# Patient Record
Sex: MALE | HISPANIC OR LATINO | Employment: UNEMPLOYED | URBAN - METROPOLITAN AREA
[De-identification: names, ages, dates, MRNs, and addresses within clinical notes are randomized per-mention and may not be internally consistent; named-entity substitution may affect disease eponyms.]

---

## 2017-01-01 ENCOUNTER — APPOINTMENT (OUTPATIENT)
Dept: RADIOLOGY | Facility: MEDICAL CENTER | Age: 52
DRG: 871 | End: 2017-01-01
Attending: INTERNAL MEDICINE
Payer: MEDICAID

## 2017-01-01 ENCOUNTER — RESOLUTE PROFESSIONAL BILLING HOSPITAL PROF FEE (OUTPATIENT)
Dept: HOSPITALIST | Facility: MEDICAL CENTER | Age: 52
End: 2017-01-01
Payer: MEDICAID

## 2017-01-01 ENCOUNTER — OFFICE VISIT (OUTPATIENT)
Dept: CARDIOLOGY | Facility: MEDICAL CENTER | Age: 52
End: 2017-01-01
Payer: MEDICAID

## 2017-01-01 ENCOUNTER — HOSPITAL ENCOUNTER (INPATIENT)
Facility: MEDICAL CENTER | Age: 52
LOS: 24 days | DRG: 871 | End: 2017-04-06
Attending: EMERGENCY MEDICINE | Admitting: HOSPITALIST
Payer: MEDICAID

## 2017-01-01 ENCOUNTER — RESOLUTE PROFESSIONAL BILLING HOSPITAL PROF FEE (OUTPATIENT)
Dept: OTHER | Facility: MEDICAL CENTER | Age: 52
End: 2017-01-01
Payer: MEDICAID

## 2017-01-01 ENCOUNTER — APPOINTMENT (OUTPATIENT)
Dept: RADIOLOGY | Facility: MEDICAL CENTER | Age: 52
DRG: 280 | End: 2017-01-01
Attending: INTERNAL MEDICINE
Payer: MEDICAID

## 2017-01-01 ENCOUNTER — APPOINTMENT (OUTPATIENT)
Dept: RADIOLOGY | Facility: MEDICAL CENTER | Age: 52
DRG: 280 | End: 2017-01-01
Attending: STUDENT IN AN ORGANIZED HEALTH CARE EDUCATION/TRAINING PROGRAM
Payer: MEDICAID

## 2017-01-01 ENCOUNTER — APPOINTMENT (OUTPATIENT)
Dept: RADIOLOGY | Facility: MEDICAL CENTER | Age: 52
DRG: 871 | End: 2017-01-01
Attending: HOSPITALIST
Payer: MEDICAID

## 2017-01-01 ENCOUNTER — TELEPHONE (OUTPATIENT)
Dept: VASCULAR LAB | Facility: MEDICAL CENTER | Age: 52
End: 2017-01-01

## 2017-01-01 ENCOUNTER — APPOINTMENT (OUTPATIENT)
Dept: RADIOLOGY | Facility: MEDICAL CENTER | Age: 52
DRG: 871 | End: 2017-01-01
Attending: EMERGENCY MEDICINE
Payer: MEDICAID

## 2017-01-01 ENCOUNTER — PATIENT OUTREACH (OUTPATIENT)
Dept: HEALTH INFORMATION MANAGEMENT | Facility: OTHER | Age: 52
End: 2017-01-01

## 2017-01-01 ENCOUNTER — HOSPITAL ENCOUNTER (INPATIENT)
Facility: MEDICAL CENTER | Age: 52
LOS: 14 days | DRG: 280 | End: 2017-02-10
Attending: EMERGENCY MEDICINE | Admitting: HOSPITALIST
Payer: MEDICAID

## 2017-01-01 ENCOUNTER — APPOINTMENT (OUTPATIENT)
Dept: RADIOLOGY | Facility: MEDICAL CENTER | Age: 52
DRG: 280 | End: 2017-01-01
Attending: EMERGENCY MEDICINE
Payer: MEDICAID

## 2017-01-01 VITALS
RESPIRATION RATE: 19 BRPM | TEMPERATURE: 97.2 F | DIASTOLIC BLOOD PRESSURE: 72 MMHG | SYSTOLIC BLOOD PRESSURE: 96 MMHG | BODY MASS INDEX: 38.5 KG/M2 | WEIGHT: 225.53 LBS | HEART RATE: 93 BPM | OXYGEN SATURATION: 100 % | HEIGHT: 64 IN

## 2017-01-01 VITALS
HEIGHT: 66 IN | HEART RATE: 58 BPM | TEMPERATURE: 96.8 F | RESPIRATION RATE: 4 BRPM | BODY MASS INDEX: 41.38 KG/M2 | SYSTOLIC BLOOD PRESSURE: 108 MMHG | WEIGHT: 257.5 LBS | OXYGEN SATURATION: 66 % | DIASTOLIC BLOOD PRESSURE: 76 MMHG

## 2017-01-01 VITALS
HEIGHT: 66 IN | BODY MASS INDEX: 32.14 KG/M2 | SYSTOLIC BLOOD PRESSURE: 90 MMHG | WEIGHT: 200 LBS | OXYGEN SATURATION: 94 % | HEART RATE: 98 BPM | DIASTOLIC BLOOD PRESSURE: 60 MMHG

## 2017-01-01 DIAGNOSIS — N19 RENAL FAILURE: ICD-10-CM

## 2017-01-01 DIAGNOSIS — R06.03 RESPIRATORY DISTRESS: ICD-10-CM

## 2017-01-01 DIAGNOSIS — I51.3 LV (LEFT VENTRICULAR) MURAL THROMBUS: ICD-10-CM

## 2017-01-01 DIAGNOSIS — I50.9 ACUTE CONGESTIVE HEART FAILURE, UNSPECIFIED CONGESTIVE HEART FAILURE TYPE: ICD-10-CM

## 2017-01-01 DIAGNOSIS — I50.9 ACUTE ON CHRONIC CONGESTIVE HEART FAILURE, UNSPECIFIED CONGESTIVE HEART FAILURE TYPE: ICD-10-CM

## 2017-01-01 DIAGNOSIS — R60.1 ANASARCA: ICD-10-CM

## 2017-01-01 DIAGNOSIS — E87.1 HYPONATREMIA: ICD-10-CM

## 2017-01-01 DIAGNOSIS — R18.8 OTHER ASCITES: ICD-10-CM

## 2017-01-01 DIAGNOSIS — I50.20 ACC/AHA STAGE D SYSTOLIC HEART FAILURE (HCC): ICD-10-CM

## 2017-01-01 DIAGNOSIS — A41.9 SEPSIS, DUE TO UNSPECIFIED ORGANISM: ICD-10-CM

## 2017-01-01 LAB
ALBUMIN SERPL BCP-MCNC: 2.4 G/DL (ref 3.2–4.9)
ALBUMIN SERPL BCP-MCNC: 2.5 G/DL (ref 3.2–4.9)
ALBUMIN SERPL BCP-MCNC: 2.5 G/DL (ref 3.2–4.9)
ALBUMIN SERPL BCP-MCNC: 2.6 G/DL (ref 3.2–4.9)
ALBUMIN SERPL BCP-MCNC: 2.7 G/DL (ref 3.2–4.9)
ALBUMIN SERPL BCP-MCNC: 2.7 G/DL (ref 3.2–4.9)
ALBUMIN SERPL BCP-MCNC: 2.8 G/DL (ref 3.2–4.9)
ALBUMIN SERPL BCP-MCNC: 2.9 G/DL (ref 3.2–4.9)
ALBUMIN SERPL BCP-MCNC: 2.9 G/DL (ref 3.2–4.9)
ALBUMIN SERPL BCP-MCNC: 3 G/DL (ref 3.2–4.9)
ALBUMIN SERPL BCP-MCNC: 3 G/DL (ref 3.2–4.9)
ALBUMIN SERPL BCP-MCNC: 3.2 G/DL (ref 3.2–4.9)
ALBUMIN SERPL BCP-MCNC: 3.2 G/DL (ref 3.2–4.9)
ALBUMIN SERPL BCP-MCNC: 3.4 G/DL (ref 3.2–4.9)
ALBUMIN SERPL BCP-MCNC: 3.5 G/DL (ref 3.2–4.9)
ALBUMIN SERPL BCP-MCNC: 3.6 G/DL (ref 3.2–4.9)
ALBUMIN SERPL BCP-MCNC: 3.7 G/DL (ref 3.2–4.9)
ALBUMIN SERPL BCP-MCNC: 3.9 G/DL (ref 3.2–4.9)
ALBUMIN SERPL BCP-MCNC: 3.9 G/DL (ref 3.2–4.9)
ALBUMIN SERPL BCP-MCNC: 4 G/DL (ref 3.2–4.9)
ALBUMIN SERPL BCP-MCNC: 4 G/DL (ref 3.2–4.9)
ALBUMIN/GLOB SERPL: 0.6 G/DL
ALBUMIN/GLOB SERPL: 0.7 G/DL
ALBUMIN/GLOB SERPL: 0.8 G/DL
ALBUMIN/GLOB SERPL: 0.9 G/DL
ALBUMIN/GLOB SERPL: 1 G/DL
ALP SERPL-CCNC: 35 U/L (ref 30–99)
ALP SERPL-CCNC: 36 U/L (ref 30–99)
ALP SERPL-CCNC: 37 U/L (ref 30–99)
ALP SERPL-CCNC: 38 U/L (ref 30–99)
ALP SERPL-CCNC: 39 U/L (ref 30–99)
ALP SERPL-CCNC: 39 U/L (ref 30–99)
ALP SERPL-CCNC: 40 U/L (ref 30–99)
ALP SERPL-CCNC: 40 U/L (ref 30–99)
ALP SERPL-CCNC: 41 U/L (ref 30–99)
ALP SERPL-CCNC: 41 U/L (ref 30–99)
ALP SERPL-CCNC: 42 U/L (ref 30–99)
ALP SERPL-CCNC: 43 U/L (ref 30–99)
ALP SERPL-CCNC: 43 U/L (ref 30–99)
ALP SERPL-CCNC: 44 U/L (ref 30–99)
ALP SERPL-CCNC: 46 U/L (ref 30–99)
ALP SERPL-CCNC: 50 U/L (ref 30–99)
ALP SERPL-CCNC: 51 U/L (ref 30–99)
ALP SERPL-CCNC: 52 U/L (ref 30–99)
ALP SERPL-CCNC: 55 U/L (ref 30–99)
ALP SERPL-CCNC: 55 U/L (ref 30–99)
ALP SERPL-CCNC: 56 U/L (ref 30–99)
ALP SERPL-CCNC: 64 U/L (ref 30–99)
ALP SERPL-CCNC: 69 U/L (ref 30–99)
ALP SERPL-CCNC: 69 U/L (ref 30–99)
ALP SERPL-CCNC: 72 U/L (ref 30–99)
ALT SERPL-CCNC: 101 U/L (ref 2–50)
ALT SERPL-CCNC: 105 U/L (ref 2–50)
ALT SERPL-CCNC: 114 U/L (ref 2–50)
ALT SERPL-CCNC: 122 U/L (ref 2–50)
ALT SERPL-CCNC: 21 U/L (ref 2–50)
ALT SERPL-CCNC: 21 U/L (ref 2–50)
ALT SERPL-CCNC: 22 U/L (ref 2–50)
ALT SERPL-CCNC: 23 U/L (ref 2–50)
ALT SERPL-CCNC: 23 U/L (ref 2–50)
ALT SERPL-CCNC: 24 U/L (ref 2–50)
ALT SERPL-CCNC: 25 U/L (ref 2–50)
ALT SERPL-CCNC: 25 U/L (ref 2–50)
ALT SERPL-CCNC: 26 U/L (ref 2–50)
ALT SERPL-CCNC: 27 U/L (ref 2–50)
ALT SERPL-CCNC: 27 U/L (ref 2–50)
ALT SERPL-CCNC: 29 U/L (ref 2–50)
ALT SERPL-CCNC: 31 U/L (ref 2–50)
ALT SERPL-CCNC: 35 U/L (ref 2–50)
ALT SERPL-CCNC: 38 U/L (ref 2–50)
ALT SERPL-CCNC: 41 U/L (ref 2–50)
ALT SERPL-CCNC: 52 U/L (ref 2–50)
ALT SERPL-CCNC: 53 U/L (ref 2–50)
ALT SERPL-CCNC: 58 U/L (ref 2–50)
ALT SERPL-CCNC: 67 U/L (ref 2–50)
ALT SERPL-CCNC: 71 U/L (ref 2–50)
ALT SERPL-CCNC: 81 U/L (ref 2–50)
ALT SERPL-CCNC: 84 U/L (ref 2–50)
AMMONIA PLAS-SCNC: 31 UMOL/L (ref 11–45)
AMMONIA PLAS-SCNC: 32 UMOL/L (ref 11–45)
AMMONIA PLAS-SCNC: 35 UMOL/L (ref 11–45)
AMMONIA PLAS-SCNC: 37 UMOL/L (ref 11–45)
AMMONIA PLAS-SCNC: 41 UMOL/L (ref 11–45)
AMMONIA PLAS-SCNC: 41 UMOL/L (ref 11–45)
AMMONIA PLAS-SCNC: 47 UMOL/L (ref 11–45)
AMMONIA PLAS-SCNC: 48 UMOL/L (ref 11–45)
AMMONIA PLAS-SCNC: 49 UMOL/L (ref 11–45)
AMMONIA PLAS-SCNC: 52 UMOL/L (ref 11–45)
AMMONIA PLAS-SCNC: 54 UMOL/L (ref 11–45)
AMMONIA PLAS-SCNC: 55 UMOL/L (ref 11–45)
AMMONIA PLAS-SCNC: 57 UMOL/L (ref 11–45)
AMMONIA PLAS-SCNC: 57 UMOL/L (ref 11–45)
AMMONIA PLAS-SCNC: 59 UMOL/L (ref 11–45)
AMMONIA PLAS-SCNC: 64 UMOL/L (ref 11–45)
AMMONIA PLAS-SCNC: 69 UMOL/L (ref 11–45)
AMMONIA PLAS-SCNC: 86 UMOL/L (ref 11–45)
AMMONIA PLAS-SCNC: 92 UMOL/L (ref 11–45)
AMORPH CRY #/AREA URNS HPF: PRESENT /HPF
AMPHET UR QL SCN: NEGATIVE
ANION GAP SERPL CALC-SCNC: 10 MMOL/L (ref 0–11.9)
ANION GAP SERPL CALC-SCNC: 11 MMOL/L (ref 0–11.9)
ANION GAP SERPL CALC-SCNC: 12 MMOL/L (ref 0–11.9)
ANION GAP SERPL CALC-SCNC: 13 MMOL/L (ref 0–11.9)
ANION GAP SERPL CALC-SCNC: 14 MMOL/L (ref 0–11.9)
ANION GAP SERPL CALC-SCNC: 15 MMOL/L (ref 0–11.9)
ANION GAP SERPL CALC-SCNC: 16 MMOL/L (ref 0–11.9)
ANION GAP SERPL CALC-SCNC: 16 MMOL/L (ref 0–11.9)
ANION GAP SERPL CALC-SCNC: 6 MMOL/L (ref 0–11.9)
ANION GAP SERPL CALC-SCNC: 6 MMOL/L (ref 0–11.9)
ANION GAP SERPL CALC-SCNC: 7 MMOL/L (ref 0–11.9)
ANION GAP SERPL CALC-SCNC: 8 MMOL/L (ref 0–11.9)
ANION GAP SERPL CALC-SCNC: 9 MMOL/L (ref 0–11.9)
ANISOCYTOSIS BLD QL SMEAR: ABNORMAL
APPEARANCE UR: ABNORMAL
APPEARANCE UR: CLEAR
APPEARANCE UR: CLEAR
APTT PPP: 115.7 SEC (ref 24.7–36)
APTT PPP: 117.6 SEC (ref 24.7–36)
APTT PPP: 121.6 SEC (ref 24.7–36)
APTT PPP: 135.6 SEC (ref 24.7–36)
APTT PPP: 182.4 SEC (ref 24.7–36)
APTT PPP: 29.9 SEC (ref 24.7–36)
APTT PPP: 35.7 SEC (ref 24.7–36)
APTT PPP: 39.4 SEC (ref 24.7–36)
APTT PPP: 41.2 SEC (ref 24.7–36)
APTT PPP: 44.2 SEC (ref 24.7–36)
APTT PPP: 45 SEC (ref 24.7–36)
APTT PPP: 50 SEC (ref 24.7–36)
APTT PPP: 50.1 SEC (ref 24.7–36)
APTT PPP: 50.1 SEC (ref 24.7–36)
APTT PPP: 50.5 SEC (ref 24.7–36)
APTT PPP: 51.8 SEC (ref 24.7–36)
APTT PPP: 53.3 SEC (ref 24.7–36)
APTT PPP: 55.8 SEC (ref 24.7–36)
APTT PPP: 56.5 SEC (ref 24.7–36)
APTT PPP: 59.6 SEC (ref 24.7–36)
APTT PPP: 60.2 SEC (ref 24.7–36)
APTT PPP: 62.3 SEC (ref 24.7–36)
APTT PPP: 62.7 SEC (ref 24.7–36)
APTT PPP: 63.9 SEC (ref 24.7–36)
APTT PPP: 80.3 SEC (ref 24.7–36)
APTT PPP: 84.8 SEC (ref 24.7–36)
APTT PPP: 85.3 SEC (ref 24.7–36)
APTT PPP: 86 SEC (ref 24.7–36)
APTT PPP: 86.9 SEC (ref 24.7–36)
APTT PPP: 88.1 SEC (ref 24.7–36)
APTT PPP: 88.2 SEC (ref 24.7–36)
APTT PPP: 89.9 SEC (ref 24.7–36)
APTT PPP: 90.4 SEC (ref 24.7–36)
APTT PPP: 94.4 SEC (ref 24.7–36)
APTT PPP: 98.6 SEC (ref 24.7–36)
APTT PPP: 98.7 SEC (ref 24.7–36)
APTT PPP: 98.7 SEC (ref 24.7–36)
APTT PPP: 98.9 SEC (ref 24.7–36)
AST SERPL-CCNC: 108 U/L (ref 12–45)
AST SERPL-CCNC: 111 U/L (ref 12–45)
AST SERPL-CCNC: 121 U/L (ref 12–45)
AST SERPL-CCNC: 121 U/L (ref 12–45)
AST SERPL-CCNC: 130 U/L (ref 12–45)
AST SERPL-CCNC: 41 U/L (ref 12–45)
AST SERPL-CCNC: 42 U/L (ref 12–45)
AST SERPL-CCNC: 42 U/L (ref 12–45)
AST SERPL-CCNC: 44 U/L (ref 12–45)
AST SERPL-CCNC: 47 U/L (ref 12–45)
AST SERPL-CCNC: 47 U/L (ref 12–45)
AST SERPL-CCNC: 48 U/L (ref 12–45)
AST SERPL-CCNC: 50 U/L (ref 12–45)
AST SERPL-CCNC: 52 U/L (ref 12–45)
AST SERPL-CCNC: 52 U/L (ref 12–45)
AST SERPL-CCNC: 53 U/L (ref 12–45)
AST SERPL-CCNC: 53 U/L (ref 12–45)
AST SERPL-CCNC: 54 U/L (ref 12–45)
AST SERPL-CCNC: 55 U/L (ref 12–45)
AST SERPL-CCNC: 55 U/L (ref 12–45)
AST SERPL-CCNC: 56 U/L (ref 12–45)
AST SERPL-CCNC: 60 U/L (ref 12–45)
AST SERPL-CCNC: 66 U/L (ref 12–45)
AST SERPL-CCNC: 66 U/L (ref 12–45)
AST SERPL-CCNC: 74 U/L (ref 12–45)
AST SERPL-CCNC: 77 U/L (ref 12–45)
BACTERIA #/AREA URNS HPF: ABNORMAL /HPF
BACTERIA BLD CULT: NORMAL
BACTERIA UR CULT: NORMAL
BACTERIA WND AEROBE CULT: NORMAL
BARBITURATES UR QL SCN: NEGATIVE
BASE EXCESS BLDA CALC-SCNC: -1 MMOL/L (ref -4–3)
BASE EXCESS BLDA CALC-SCNC: -6 MMOL/L (ref -4–3)
BASE EXCESS BLDA CALC-SCNC: -7 MMOL/L (ref -4–3)
BASOPHILS # BLD AUTO: 0 % (ref 0–1.8)
BASOPHILS # BLD AUTO: 0.1 % (ref 0–1.8)
BASOPHILS # BLD AUTO: 0.3 % (ref 0–1.8)
BASOPHILS # BLD AUTO: 0.3 % (ref 0–1.8)
BASOPHILS # BLD AUTO: 0.4 % (ref 0–1.8)
BASOPHILS # BLD AUTO: 0.4 % (ref 0–1.8)
BASOPHILS # BLD AUTO: 0.5 % (ref 0–1.8)
BASOPHILS # BLD AUTO: 0.6 % (ref 0–1.8)
BASOPHILS # BLD AUTO: 0.8 % (ref 0–1.8)
BASOPHILS # BLD AUTO: 0.9 % (ref 0–1.8)
BASOPHILS # BLD AUTO: 1 % (ref 0–1.8)
BASOPHILS # BLD AUTO: 1 % (ref 0–1.8)
BASOPHILS # BLD AUTO: 1.1 % (ref 0–1.8)
BASOPHILS # BLD AUTO: 1.5 % (ref 0–1.8)
BASOPHILS # BLD AUTO: 1.7 % (ref 0–1.8)
BASOPHILS # BLD AUTO: 1.7 % (ref 0–1.8)
BASOPHILS # BLD AUTO: 2.6 % (ref 0–1.8)
BASOPHILS # BLD AUTO: 2.7 % (ref 0–1.8)
BASOPHILS # BLD AUTO: 3.5 % (ref 0–1.8)
BASOPHILS # BLD: 0 K/UL (ref 0–0.12)
BASOPHILS # BLD: 0.01 K/UL (ref 0–0.12)
BASOPHILS # BLD: 0.05 K/UL (ref 0–0.12)
BASOPHILS # BLD: 0.05 K/UL (ref 0–0.12)
BASOPHILS # BLD: 0.06 K/UL (ref 0–0.12)
BASOPHILS # BLD: 0.07 K/UL (ref 0–0.12)
BASOPHILS # BLD: 0.08 K/UL (ref 0–0.12)
BASOPHILS # BLD: 0.09 K/UL (ref 0–0.12)
BASOPHILS # BLD: 0.11 K/UL (ref 0–0.12)
BASOPHILS # BLD: 0.12 K/UL (ref 0–0.12)
BASOPHILS # BLD: 0.13 K/UL (ref 0–0.12)
BASOPHILS # BLD: 0.14 K/UL (ref 0–0.12)
BASOPHILS # BLD: 0.15 K/UL (ref 0–0.12)
BASOPHILS # BLD: 0.16 K/UL (ref 0–0.12)
BASOPHILS # BLD: 0.16 K/UL (ref 0–0.12)
BASOPHILS # BLD: 0.19 K/UL (ref 0–0.12)
BASOPHILS # BLD: 0.21 K/UL (ref 0–0.12)
BASOPHILS # BLD: 0.21 K/UL (ref 0–0.12)
BASOPHILS # BLD: 0.29 K/UL (ref 0–0.12)
BASOPHILS # BLD: 0.32 K/UL (ref 0–0.12)
BASOPHILS # BLD: 0.4 K/UL (ref 0–0.12)
BASOPHILS # BLD: 0.47 K/UL (ref 0–0.12)
BENZODIAZ UR QL SCN: NEGATIVE
BILIRUB CONJ SERPL-MCNC: 1.2 MG/DL (ref 0.1–0.5)
BILIRUB INDIRECT SERPL-MCNC: 1 MG/DL (ref 0–1)
BILIRUB SERPL-MCNC: 10.1 MG/DL (ref 0.1–1.5)
BILIRUB SERPL-MCNC: 10.3 MG/DL (ref 0.1–1.5)
BILIRUB SERPL-MCNC: 2 MG/DL (ref 0.1–1.5)
BILIRUB SERPL-MCNC: 2.2 MG/DL (ref 0.1–1.5)
BILIRUB SERPL-MCNC: 2.2 MG/DL (ref 0.1–1.5)
BILIRUB SERPL-MCNC: 2.5 MG/DL (ref 0.1–1.5)
BILIRUB SERPL-MCNC: 2.8 MG/DL (ref 0.1–1.5)
BILIRUB SERPL-MCNC: 3.1 MG/DL (ref 0.1–1.5)
BILIRUB SERPL-MCNC: 3.5 MG/DL (ref 0.1–1.5)
BILIRUB SERPL-MCNC: 3.8 MG/DL (ref 0.1–1.5)
BILIRUB SERPL-MCNC: 4.3 MG/DL (ref 0.1–1.5)
BILIRUB SERPL-MCNC: 4.4 MG/DL (ref 0.1–1.5)
BILIRUB SERPL-MCNC: 4.7 MG/DL (ref 0.1–1.5)
BILIRUB SERPL-MCNC: 4.8 MG/DL (ref 0.1–1.5)
BILIRUB SERPL-MCNC: 4.8 MG/DL (ref 0.1–1.5)
BILIRUB SERPL-MCNC: 4.9 MG/DL (ref 0.1–1.5)
BILIRUB SERPL-MCNC: 5 MG/DL (ref 0.1–1.5)
BILIRUB SERPL-MCNC: 5.3 MG/DL (ref 0.1–1.5)
BILIRUB SERPL-MCNC: 5.3 MG/DL (ref 0.1–1.5)
BILIRUB SERPL-MCNC: 5.5 MG/DL (ref 0.1–1.5)
BILIRUB SERPL-MCNC: 5.6 MG/DL (ref 0.1–1.5)
BILIRUB SERPL-MCNC: 5.7 MG/DL (ref 0.1–1.5)
BILIRUB SERPL-MCNC: 6.8 MG/DL (ref 0.1–1.5)
BILIRUB SERPL-MCNC: 7.2 MG/DL (ref 0.1–1.5)
BILIRUB SERPL-MCNC: 7.2 MG/DL (ref 0.1–1.5)
BILIRUB SERPL-MCNC: 7.5 MG/DL (ref 0.1–1.5)
BILIRUB SERPL-MCNC: 7.9 MG/DL (ref 0.1–1.5)
BILIRUB SERPL-MCNC: 8.2 MG/DL (ref 0.1–1.5)
BILIRUB SERPL-MCNC: 9.3 MG/DL (ref 0.1–1.5)
BILIRUB SERPL-MCNC: 9.7 MG/DL (ref 0.1–1.5)
BILIRUB UR QL CFM: POSITIVE
BILIRUB UR QL CFM: POSITIVE
BILIRUB UR QL STRIP.AUTO: NEGATIVE
BNP SERPL-MCNC: 1025 PG/ML (ref 0–100)
BNP SERPL-MCNC: 1317 PG/ML (ref 0–100)
BNP SERPL-MCNC: 1389 PG/ML (ref 0–100)
BNP SERPL-MCNC: 1538 PG/ML (ref 0–100)
BNP SERPL-MCNC: 2331 PG/ML (ref 0–100)
BNP SERPL-MCNC: 3495 PG/ML (ref 0–100)
BNP SERPL-MCNC: 870 PG/ML (ref 0–100)
BODY TEMPERATURE: ABNORMAL DEGREES
BUN SERPL-MCNC: 111 MG/DL (ref 8–22)
BUN SERPL-MCNC: 119 MG/DL (ref 8–22)
BUN SERPL-MCNC: 129 MG/DL (ref 8–22)
BUN SERPL-MCNC: 143 MG/DL (ref 8–22)
BUN SERPL-MCNC: 143 MG/DL (ref 8–22)
BUN SERPL-MCNC: 144 MG/DL (ref 8–22)
BUN SERPL-MCNC: 155 MG/DL (ref 8–22)
BUN SERPL-MCNC: 25 MG/DL (ref 8–22)
BUN SERPL-MCNC: 26 MG/DL (ref 8–22)
BUN SERPL-MCNC: 26 MG/DL (ref 8–22)
BUN SERPL-MCNC: 27 MG/DL (ref 8–22)
BUN SERPL-MCNC: 28 MG/DL (ref 8–22)
BUN SERPL-MCNC: 29 MG/DL (ref 8–22)
BUN SERPL-MCNC: 29 MG/DL (ref 8–22)
BUN SERPL-MCNC: 30 MG/DL (ref 8–22)
BUN SERPL-MCNC: 32 MG/DL (ref 8–22)
BUN SERPL-MCNC: 33 MG/DL (ref 8–22)
BUN SERPL-MCNC: 33 MG/DL (ref 8–22)
BUN SERPL-MCNC: 34 MG/DL (ref 8–22)
BUN SERPL-MCNC: 34 MG/DL (ref 8–22)
BUN SERPL-MCNC: 36 MG/DL (ref 8–22)
BUN SERPL-MCNC: 36 MG/DL (ref 8–22)
BUN SERPL-MCNC: 37 MG/DL (ref 8–22)
BUN SERPL-MCNC: 39 MG/DL (ref 8–22)
BUN SERPL-MCNC: 45 MG/DL (ref 8–22)
BUN SERPL-MCNC: 45 MG/DL (ref 8–22)
BUN SERPL-MCNC: 46 MG/DL (ref 8–22)
BUN SERPL-MCNC: 46 MG/DL (ref 8–22)
BUN SERPL-MCNC: 47 MG/DL (ref 8–22)
BUN SERPL-MCNC: 48 MG/DL (ref 8–22)
BUN SERPL-MCNC: 49 MG/DL (ref 8–22)
BUN SERPL-MCNC: 49 MG/DL (ref 8–22)
BUN SERPL-MCNC: 50 MG/DL (ref 8–22)
BUN SERPL-MCNC: 52 MG/DL (ref 8–22)
BUN SERPL-MCNC: 53 MG/DL (ref 8–22)
BUN SERPL-MCNC: 60 MG/DL (ref 8–22)
BUN SERPL-MCNC: 74 MG/DL (ref 8–22)
BUN SERPL-MCNC: 91 MG/DL (ref 8–22)
BURR CELLS BLD QL SMEAR: NORMAL
BZE UR QL SCN: NEGATIVE
CALCIUM SERPL-MCNC: 7.7 MG/DL (ref 8.5–10.5)
CALCIUM SERPL-MCNC: 8.1 MG/DL (ref 8.5–10.5)
CALCIUM SERPL-MCNC: 8.2 MG/DL (ref 8.5–10.5)
CALCIUM SERPL-MCNC: 8.2 MG/DL (ref 8.5–10.5)
CALCIUM SERPL-MCNC: 8.3 MG/DL (ref 8.5–10.5)
CALCIUM SERPL-MCNC: 8.4 MG/DL (ref 8.5–10.5)
CALCIUM SERPL-MCNC: 8.6 MG/DL (ref 8.5–10.5)
CALCIUM SERPL-MCNC: 8.7 MG/DL (ref 8.5–10.5)
CALCIUM SERPL-MCNC: 8.8 MG/DL (ref 8.5–10.5)
CALCIUM SERPL-MCNC: 8.9 MG/DL (ref 8.5–10.5)
CALCIUM SERPL-MCNC: 9 MG/DL (ref 8.5–10.5)
CALCIUM SERPL-MCNC: 9.1 MG/DL (ref 8.5–10.5)
CALCIUM SERPL-MCNC: 9.2 MG/DL (ref 8.5–10.5)
CALCIUM SERPL-MCNC: 9.3 MG/DL (ref 8.5–10.5)
CALCIUM SERPL-MCNC: 9.4 MG/DL (ref 8.5–10.5)
CALCIUM SERPL-MCNC: 9.4 MG/DL (ref 8.5–10.5)
CALCIUM SERPL-MCNC: 9.5 MG/DL (ref 8.5–10.5)
CALCIUM SERPL-MCNC: 9.5 MG/DL (ref 8.5–10.5)
CALCIUM SERPL-MCNC: 9.6 MG/DL (ref 8.5–10.5)
CALCIUM SERPL-MCNC: 9.7 MG/DL (ref 8.5–10.5)
CALCIUM SERPL-MCNC: 9.9 MG/DL (ref 8.5–10.5)
CANNABINOIDS UR QL SCN: NEGATIVE
CHLORIDE SERPL-SCNC: 101 MMOL/L (ref 96–112)
CHLORIDE SERPL-SCNC: 101 MMOL/L (ref 96–112)
CHLORIDE SERPL-SCNC: 85 MMOL/L (ref 96–112)
CHLORIDE SERPL-SCNC: 85 MMOL/L (ref 96–112)
CHLORIDE SERPL-SCNC: 86 MMOL/L (ref 96–112)
CHLORIDE SERPL-SCNC: 87 MMOL/L (ref 96–112)
CHLORIDE SERPL-SCNC: 87 MMOL/L (ref 96–112)
CHLORIDE SERPL-SCNC: 88 MMOL/L (ref 96–112)
CHLORIDE SERPL-SCNC: 89 MMOL/L (ref 96–112)
CHLORIDE SERPL-SCNC: 90 MMOL/L (ref 96–112)
CHLORIDE SERPL-SCNC: 90 MMOL/L (ref 96–112)
CHLORIDE SERPL-SCNC: 91 MMOL/L (ref 96–112)
CHLORIDE SERPL-SCNC: 92 MMOL/L (ref 96–112)
CHLORIDE SERPL-SCNC: 94 MMOL/L (ref 96–112)
CHLORIDE SERPL-SCNC: 95 MMOL/L (ref 96–112)
CHLORIDE SERPL-SCNC: 95 MMOL/L (ref 96–112)
CHLORIDE SERPL-SCNC: 96 MMOL/L (ref 96–112)
CHLORIDE SERPL-SCNC: 97 MMOL/L (ref 96–112)
CHLORIDE SERPL-SCNC: 98 MMOL/L (ref 96–112)
CHLORIDE SERPL-SCNC: 99 MMOL/L (ref 96–112)
CHOLEST SERPL-MCNC: 57 MG/DL (ref 100–199)
CK SERPL-CCNC: 211 U/L (ref 0–154)
CO2 BLDA-SCNC: 16 MMOL/L (ref 20–33)
CO2 BLDA-SCNC: 18 MMOL/L (ref 20–33)
CO2 BLDA-SCNC: 23 MMOL/L (ref 20–33)
CO2 SERPL-SCNC: 12 MMOL/L (ref 20–33)
CO2 SERPL-SCNC: 12 MMOL/L (ref 20–33)
CO2 SERPL-SCNC: 13 MMOL/L (ref 20–33)
CO2 SERPL-SCNC: 14 MMOL/L (ref 20–33)
CO2 SERPL-SCNC: 14 MMOL/L (ref 20–33)
CO2 SERPL-SCNC: 15 MMOL/L (ref 20–33)
CO2 SERPL-SCNC: 15 MMOL/L (ref 20–33)
CO2 SERPL-SCNC: 16 MMOL/L (ref 20–33)
CO2 SERPL-SCNC: 16 MMOL/L (ref 20–33)
CO2 SERPL-SCNC: 17 MMOL/L (ref 20–33)
CO2 SERPL-SCNC: 17 MMOL/L (ref 20–33)
CO2 SERPL-SCNC: 18 MMOL/L (ref 20–33)
CO2 SERPL-SCNC: 19 MMOL/L (ref 20–33)
CO2 SERPL-SCNC: 20 MMOL/L (ref 20–33)
CO2 SERPL-SCNC: 21 MMOL/L (ref 20–33)
CO2 SERPL-SCNC: 22 MMOL/L (ref 20–33)
CO2 SERPL-SCNC: 23 MMOL/L (ref 20–33)
CO2 SERPL-SCNC: 23 MMOL/L (ref 20–33)
CO2 SERPL-SCNC: 24 MMOL/L (ref 20–33)
CO2 SERPL-SCNC: 25 MMOL/L (ref 20–33)
CO2 SERPL-SCNC: 26 MMOL/L (ref 20–33)
CO2 SERPL-SCNC: 27 MMOL/L (ref 20–33)
CO2 SERPL-SCNC: 30 MMOL/L (ref 20–33)
CO2 SERPL-SCNC: 31 MMOL/L (ref 20–33)
COLOR UR: NORMAL
COLOR UR: YELLOW
COLOR UR: YELLOW
CORTIS SERPL-MCNC: 10.4 UG/DL (ref 0–23)
CREAT SERPL-MCNC: 0.8 MG/DL (ref 0.5–1.4)
CREAT SERPL-MCNC: 0.81 MG/DL (ref 0.5–1.4)
CREAT SERPL-MCNC: 0.86 MG/DL (ref 0.5–1.4)
CREAT SERPL-MCNC: 0.91 MG/DL (ref 0.5–1.4)
CREAT SERPL-MCNC: 0.94 MG/DL (ref 0.5–1.4)
CREAT SERPL-MCNC: 1 MG/DL (ref 0.5–1.4)
CREAT SERPL-MCNC: 1.03 MG/DL (ref 0.5–1.4)
CREAT SERPL-MCNC: 1.05 MG/DL (ref 0.5–1.4)
CREAT SERPL-MCNC: 1.08 MG/DL (ref 0.5–1.4)
CREAT SERPL-MCNC: 1.09 MG/DL (ref 0.5–1.4)
CREAT SERPL-MCNC: 1.1 MG/DL (ref 0.5–1.4)
CREAT SERPL-MCNC: 1.14 MG/DL (ref 0.5–1.4)
CREAT SERPL-MCNC: 1.15 MG/DL (ref 0.5–1.4)
CREAT SERPL-MCNC: 1.16 MG/DL (ref 0.5–1.4)
CREAT SERPL-MCNC: 1.19 MG/DL (ref 0.5–1.4)
CREAT SERPL-MCNC: 1.21 MG/DL (ref 0.5–1.4)
CREAT SERPL-MCNC: 1.31 MG/DL (ref 0.5–1.4)
CREAT SERPL-MCNC: 1.32 MG/DL (ref 0.5–1.4)
CREAT SERPL-MCNC: 1.33 MG/DL (ref 0.5–1.4)
CREAT SERPL-MCNC: 1.35 MG/DL (ref 0.5–1.4)
CREAT SERPL-MCNC: 1.38 MG/DL (ref 0.5–1.4)
CREAT SERPL-MCNC: 1.42 MG/DL (ref 0.5–1.4)
CREAT SERPL-MCNC: 1.43 MG/DL (ref 0.5–1.4)
CREAT SERPL-MCNC: 1.45 MG/DL (ref 0.5–1.4)
CREAT SERPL-MCNC: 1.46 MG/DL (ref 0.5–1.4)
CREAT SERPL-MCNC: 1.47 MG/DL (ref 0.5–1.4)
CREAT SERPL-MCNC: 1.48 MG/DL (ref 0.5–1.4)
CREAT SERPL-MCNC: 1.49 MG/DL (ref 0.5–1.4)
CREAT SERPL-MCNC: 1.52 MG/DL (ref 0.5–1.4)
CREAT SERPL-MCNC: 1.56 MG/DL (ref 0.5–1.4)
CREAT SERPL-MCNC: 1.62 MG/DL (ref 0.5–1.4)
CREAT SERPL-MCNC: 1.78 MG/DL (ref 0.5–1.4)
CREAT SERPL-MCNC: 1.78 MG/DL (ref 0.5–1.4)
CREAT SERPL-MCNC: 1.93 MG/DL (ref 0.5–1.4)
CREAT SERPL-MCNC: 1.96 MG/DL (ref 0.5–1.4)
CREAT SERPL-MCNC: 1.97 MG/DL (ref 0.5–1.4)
CREAT SERPL-MCNC: 2.11 MG/DL (ref 0.5–1.4)
CREAT SERPL-MCNC: 2.17 MG/DL (ref 0.5–1.4)
CREAT SERPL-MCNC: 2.76 MG/DL (ref 0.5–1.4)
CREAT SERPL-MCNC: 3.16 MG/DL (ref 0.5–1.4)
CREAT SERPL-MCNC: 3.93 MG/DL (ref 0.5–1.4)
CREAT SERPL-MCNC: 4.54 MG/DL (ref 0.5–1.4)
CREAT SERPL-MCNC: 5.22 MG/DL (ref 0.5–1.4)
CREAT SERPL-MCNC: 5.58 MG/DL (ref 0.5–1.4)
CREAT SERPL-MCNC: 6.6 MG/DL (ref 0.5–1.4)
CRP SERPL HS-MCNC: 2.13 MG/DL (ref 0–0.75)
CRP SERPL HS-MCNC: 3.07 MG/DL (ref 0–0.75)
CULTURE IF INDICATED INDCX: NO UA CULTURE
DACRYOCYTES BLD QL SMEAR: NORMAL
EKG IMPRESSION: NORMAL
EOSINOPHIL # BLD AUTO: 0 K/UL (ref 0–0.51)
EOSINOPHIL # BLD AUTO: 0.02 K/UL (ref 0–0.51)
EOSINOPHIL # BLD AUTO: 0.02 K/UL (ref 0–0.51)
EOSINOPHIL # BLD AUTO: 0.03 K/UL (ref 0–0.51)
EOSINOPHIL # BLD AUTO: 0.04 K/UL (ref 0–0.51)
EOSINOPHIL # BLD AUTO: 0.13 K/UL (ref 0–0.51)
EOSINOPHIL # BLD AUTO: 0.14 K/UL (ref 0–0.51)
EOSINOPHIL # BLD AUTO: 0.15 K/UL (ref 0–0.51)
EOSINOPHIL # BLD AUTO: 0.18 K/UL (ref 0–0.51)
EOSINOPHIL # BLD AUTO: 0.2 K/UL (ref 0–0.51)
EOSINOPHIL # BLD AUTO: 0.2 K/UL (ref 0–0.51)
EOSINOPHIL # BLD AUTO: 0.22 K/UL (ref 0–0.51)
EOSINOPHIL # BLD AUTO: 0.25 K/UL (ref 0–0.51)
EOSINOPHIL # BLD AUTO: 0.27 K/UL (ref 0–0.51)
EOSINOPHIL # BLD AUTO: 0.32 K/UL (ref 0–0.51)
EOSINOPHIL # BLD AUTO: 0.39 K/UL (ref 0–0.51)
EOSINOPHIL # BLD AUTO: 0.45 K/UL (ref 0–0.51)
EOSINOPHIL # BLD AUTO: 0.59 K/UL (ref 0–0.51)
EOSINOPHIL # BLD AUTO: 0.61 K/UL (ref 0–0.51)
EOSINOPHIL # BLD AUTO: 0.64 K/UL (ref 0–0.51)
EOSINOPHIL # BLD AUTO: 0.67 K/UL (ref 0–0.51)
EOSINOPHIL # BLD AUTO: 0.67 K/UL (ref 0–0.51)
EOSINOPHIL # BLD AUTO: 0.94 K/UL (ref 0–0.51)
EOSINOPHIL NFR BLD: 0 % (ref 0–6.9)
EOSINOPHIL NFR BLD: 0.1 % (ref 0–6.9)
EOSINOPHIL NFR BLD: 0.1 % (ref 0–6.9)
EOSINOPHIL NFR BLD: 0.2 % (ref 0–6.9)
EOSINOPHIL NFR BLD: 0.3 % (ref 0–6.9)
EOSINOPHIL NFR BLD: 0.8 % (ref 0–6.9)
EOSINOPHIL NFR BLD: 0.9 % (ref 0–6.9)
EOSINOPHIL NFR BLD: 1.1 % (ref 0–6.9)
EOSINOPHIL NFR BLD: 1.3 % (ref 0–6.9)
EOSINOPHIL NFR BLD: 1.5 % (ref 0–6.9)
EOSINOPHIL NFR BLD: 1.7 % (ref 0–6.9)
EOSINOPHIL NFR BLD: 1.8 % (ref 0–6.9)
EOSINOPHIL NFR BLD: 1.8 % (ref 0–6.9)
EOSINOPHIL NFR BLD: 2.1 % (ref 0–6.9)
EOSINOPHIL NFR BLD: 2.6 % (ref 0–6.9)
EOSINOPHIL NFR BLD: 2.6 % (ref 0–6.9)
EOSINOPHIL NFR BLD: 3.6 % (ref 0–6.9)
EOSINOPHIL NFR BLD: 6.2 % (ref 0–6.9)
EOSINOPHIL NFR BLD: 6.5 % (ref 0–6.9)
EOSINOPHIL NFR BLD: 6.7 % (ref 0–6.9)
EOSINOPHIL NFR BLD: 7 % (ref 0–6.9)
EOSINOPHIL NFR BLD: 7.1 % (ref 0–6.9)
EOSINOPHIL NFR BLD: 7.6 % (ref 0–6.9)
EPI CELLS #/AREA URNS HPF: ABNORMAL /HPF
ERYTHROCYTE [DISTWIDTH] IN BLOOD BY AUTOMATED COUNT: 47.8 FL (ref 35.9–50)
ERYTHROCYTE [DISTWIDTH] IN BLOOD BY AUTOMATED COUNT: 48.2 FL (ref 35.9–50)
ERYTHROCYTE [DISTWIDTH] IN BLOOD BY AUTOMATED COUNT: 48.7 FL (ref 35.9–50)
ERYTHROCYTE [DISTWIDTH] IN BLOOD BY AUTOMATED COUNT: 48.9 FL (ref 35.9–50)
ERYTHROCYTE [DISTWIDTH] IN BLOOD BY AUTOMATED COUNT: 49.1 FL (ref 35.9–50)
ERYTHROCYTE [DISTWIDTH] IN BLOOD BY AUTOMATED COUNT: 49.1 FL (ref 35.9–50)
ERYTHROCYTE [DISTWIDTH] IN BLOOD BY AUTOMATED COUNT: 49.2 FL (ref 35.9–50)
ERYTHROCYTE [DISTWIDTH] IN BLOOD BY AUTOMATED COUNT: 49.5 FL (ref 35.9–50)
ERYTHROCYTE [DISTWIDTH] IN BLOOD BY AUTOMATED COUNT: 49.7 FL (ref 35.9–50)
ERYTHROCYTE [DISTWIDTH] IN BLOOD BY AUTOMATED COUNT: 49.9 FL (ref 35.9–50)
ERYTHROCYTE [DISTWIDTH] IN BLOOD BY AUTOMATED COUNT: 50.4 FL (ref 35.9–50)
ERYTHROCYTE [DISTWIDTH] IN BLOOD BY AUTOMATED COUNT: 50.6 FL (ref 35.9–50)
ERYTHROCYTE [DISTWIDTH] IN BLOOD BY AUTOMATED COUNT: 50.8 FL (ref 35.9–50)
ERYTHROCYTE [DISTWIDTH] IN BLOOD BY AUTOMATED COUNT: 51.4 FL (ref 35.9–50)
ERYTHROCYTE [DISTWIDTH] IN BLOOD BY AUTOMATED COUNT: 51.8 FL (ref 35.9–50)
ERYTHROCYTE [DISTWIDTH] IN BLOOD BY AUTOMATED COUNT: 51.9 FL (ref 35.9–50)
ERYTHROCYTE [DISTWIDTH] IN BLOOD BY AUTOMATED COUNT: 52.3 FL (ref 35.9–50)
ERYTHROCYTE [DISTWIDTH] IN BLOOD BY AUTOMATED COUNT: 52.4 FL (ref 35.9–50)
ERYTHROCYTE [DISTWIDTH] IN BLOOD BY AUTOMATED COUNT: 53.7 FL (ref 35.9–50)
ERYTHROCYTE [DISTWIDTH] IN BLOOD BY AUTOMATED COUNT: 54.1 FL (ref 35.9–50)
ERYTHROCYTE [DISTWIDTH] IN BLOOD BY AUTOMATED COUNT: 54.2 FL (ref 35.9–50)
ERYTHROCYTE [DISTWIDTH] IN BLOOD BY AUTOMATED COUNT: 55.7 FL (ref 35.9–50)
ERYTHROCYTE [DISTWIDTH] IN BLOOD BY AUTOMATED COUNT: 57 FL (ref 35.9–50)
ERYTHROCYTE [DISTWIDTH] IN BLOOD BY AUTOMATED COUNT: 61 FL (ref 35.9–50)
ERYTHROCYTE [DISTWIDTH] IN BLOOD BY AUTOMATED COUNT: 64.4 FL (ref 35.9–50)
ERYTHROCYTE [DISTWIDTH] IN BLOOD BY AUTOMATED COUNT: 68.7 FL (ref 35.9–50)
ERYTHROCYTE [DISTWIDTH] IN BLOOD BY AUTOMATED COUNT: 71.6 FL (ref 35.9–50)
ERYTHROCYTE [DISTWIDTH] IN BLOOD BY AUTOMATED COUNT: 73.5 FL (ref 35.9–50)
ERYTHROCYTE [DISTWIDTH] IN BLOOD BY AUTOMATED COUNT: 74.5 FL (ref 35.9–50)
ERYTHROCYTE [DISTWIDTH] IN BLOOD BY AUTOMATED COUNT: 77.3 FL (ref 35.9–50)
ERYTHROCYTE [DISTWIDTH] IN BLOOD BY AUTOMATED COUNT: 79.2 FL (ref 35.9–50)
ERYTHROCYTE [DISTWIDTH] IN BLOOD BY AUTOMATED COUNT: 79.3 FL (ref 35.9–50)
ERYTHROCYTE [DISTWIDTH] IN BLOOD BY AUTOMATED COUNT: 81 FL (ref 35.9–50)
ERYTHROCYTE [DISTWIDTH] IN BLOOD BY AUTOMATED COUNT: 81.5 FL (ref 35.9–50)
ERYTHROCYTE [DISTWIDTH] IN BLOOD BY AUTOMATED COUNT: 82 FL (ref 35.9–50)
ERYTHROCYTE [DISTWIDTH] IN BLOOD BY AUTOMATED COUNT: 84 FL (ref 35.9–50)
ERYTHROCYTE [SEDIMENTATION RATE] IN BLOOD BY WESTERGREN METHOD: 20 MM/HOUR (ref 0–20)
ERYTHROCYTE [SEDIMENTATION RATE] IN BLOOD BY WESTERGREN METHOD: 4 MM/HOUR (ref 0–20)
EST. AVERAGE GLUCOSE BLD GHB EST-MCNC: 134 MG/DL
ETHANOL BLD-MCNC: 0 G/DL
ETHANOL BLD-MCNC: 0 G/DL
FERRITIN SERPL-MCNC: 37.5 NG/ML (ref 22–322)
GFR SERPL CREATININE-BSD FRML MDRD: 11 ML/MIN/1.73 M 2
GFR SERPL CREATININE-BSD FRML MDRD: 12 ML/MIN/1.73 M 2
GFR SERPL CREATININE-BSD FRML MDRD: 14 ML/MIN/1.73 M 2
GFR SERPL CREATININE-BSD FRML MDRD: 16 ML/MIN/1.73 M 2
GFR SERPL CREATININE-BSD FRML MDRD: 21 ML/MIN/1.73 M 2
GFR SERPL CREATININE-BSD FRML MDRD: 24 ML/MIN/1.73 M 2
GFR SERPL CREATININE-BSD FRML MDRD: 32 ML/MIN/1.73 M 2
GFR SERPL CREATININE-BSD FRML MDRD: 33 ML/MIN/1.73 M 2
GFR SERPL CREATININE-BSD FRML MDRD: 36 ML/MIN/1.73 M 2
GFR SERPL CREATININE-BSD FRML MDRD: 40 ML/MIN/1.73 M 2
GFR SERPL CREATININE-BSD FRML MDRD: 40 ML/MIN/1.73 M 2
GFR SERPL CREATININE-BSD FRML MDRD: 45 ML/MIN/1.73 M 2
GFR SERPL CREATININE-BSD FRML MDRD: 47 ML/MIN/1.73 M 2
GFR SERPL CREATININE-BSD FRML MDRD: 48 ML/MIN/1.73 M 2
GFR SERPL CREATININE-BSD FRML MDRD: 49 ML/MIN/1.73 M 2
GFR SERPL CREATININE-BSD FRML MDRD: 50 ML/MIN/1.73 M 2
GFR SERPL CREATININE-BSD FRML MDRD: 51 ML/MIN/1.73 M 2
GFR SERPL CREATININE-BSD FRML MDRD: 52 ML/MIN/1.73 M 2
GFR SERPL CREATININE-BSD FRML MDRD: 52 ML/MIN/1.73 M 2
GFR SERPL CREATININE-BSD FRML MDRD: 54 ML/MIN/1.73 M 2
GFR SERPL CREATININE-BSD FRML MDRD: 55 ML/MIN/1.73 M 2
GFR SERPL CREATININE-BSD FRML MDRD: 56 ML/MIN/1.73 M 2
GFR SERPL CREATININE-BSD FRML MDRD: 57 ML/MIN/1.73 M 2
GFR SERPL CREATININE-BSD FRML MDRD: 9 ML/MIN/1.73 M 2
GFR SERPL CREATININE-BSD FRML MDRD: >60 ML/MIN/1.73 M 2
GLOBULIN SER CALC-MCNC: 3.3 G/DL (ref 1.9–3.5)
GLOBULIN SER CALC-MCNC: 3.5 G/DL (ref 1.9–3.5)
GLOBULIN SER CALC-MCNC: 3.6 G/DL (ref 1.9–3.5)
GLOBULIN SER CALC-MCNC: 3.7 G/DL (ref 1.9–3.5)
GLOBULIN SER CALC-MCNC: 3.7 G/DL (ref 1.9–3.5)
GLOBULIN SER CALC-MCNC: 3.8 G/DL (ref 1.9–3.5)
GLOBULIN SER CALC-MCNC: 3.9 G/DL (ref 1.9–3.5)
GLOBULIN SER CALC-MCNC: 4 G/DL (ref 1.9–3.5)
GLOBULIN SER CALC-MCNC: 4 G/DL (ref 1.9–3.5)
GLOBULIN SER CALC-MCNC: 4.1 G/DL (ref 1.9–3.5)
GLOBULIN SER CALC-MCNC: 4.2 G/DL (ref 1.9–3.5)
GLOBULIN SER CALC-MCNC: 4.3 G/DL (ref 1.9–3.5)
GLOBULIN SER CALC-MCNC: 4.4 G/DL (ref 1.9–3.5)
GLOBULIN SER CALC-MCNC: 4.5 G/DL (ref 1.9–3.5)
GLOBULIN SER CALC-MCNC: 4.6 G/DL (ref 1.9–3.5)
GLOBULIN SER CALC-MCNC: 4.6 G/DL (ref 1.9–3.5)
GLOBULIN SER CALC-MCNC: 4.7 G/DL (ref 1.9–3.5)
GLOBULIN SER CALC-MCNC: 5 G/DL (ref 1.9–3.5)
GLUCOSE BLD-MCNC: 109 MG/DL (ref 65–99)
GLUCOSE BLD-MCNC: 112 MG/DL (ref 65–99)
GLUCOSE BLD-MCNC: 113 MG/DL (ref 65–99)
GLUCOSE BLD-MCNC: 116 MG/DL (ref 65–99)
GLUCOSE BLD-MCNC: 116 MG/DL (ref 65–99)
GLUCOSE BLD-MCNC: 117 MG/DL (ref 65–99)
GLUCOSE BLD-MCNC: 122 MG/DL (ref 65–99)
GLUCOSE BLD-MCNC: 126 MG/DL (ref 65–99)
GLUCOSE BLD-MCNC: 127 MG/DL (ref 65–99)
GLUCOSE BLD-MCNC: 133 MG/DL (ref 65–99)
GLUCOSE BLD-MCNC: 136 MG/DL (ref 65–99)
GLUCOSE BLD-MCNC: 141 MG/DL (ref 65–99)
GLUCOSE BLD-MCNC: 154 MG/DL (ref 65–99)
GLUCOSE BLD-MCNC: 159 MG/DL (ref 65–99)
GLUCOSE BLD-MCNC: 163 MG/DL (ref 65–99)
GLUCOSE BLD-MCNC: 67 MG/DL (ref 65–99)
GLUCOSE BLD-MCNC: 95 MG/DL (ref 65–99)
GLUCOSE BLD-MCNC: 97 MG/DL (ref 65–99)
GLUCOSE SERPL-MCNC: 100 MG/DL (ref 65–99)
GLUCOSE SERPL-MCNC: 103 MG/DL (ref 65–99)
GLUCOSE SERPL-MCNC: 107 MG/DL (ref 65–99)
GLUCOSE SERPL-MCNC: 109 MG/DL (ref 65–99)
GLUCOSE SERPL-MCNC: 109 MG/DL (ref 65–99)
GLUCOSE SERPL-MCNC: 110 MG/DL (ref 65–99)
GLUCOSE SERPL-MCNC: 112 MG/DL (ref 65–99)
GLUCOSE SERPL-MCNC: 113 MG/DL (ref 65–99)
GLUCOSE SERPL-MCNC: 113 MG/DL (ref 65–99)
GLUCOSE SERPL-MCNC: 114 MG/DL (ref 65–99)
GLUCOSE SERPL-MCNC: 119 MG/DL (ref 65–99)
GLUCOSE SERPL-MCNC: 119 MG/DL (ref 65–99)
GLUCOSE SERPL-MCNC: 120 MG/DL (ref 65–99)
GLUCOSE SERPL-MCNC: 122 MG/DL (ref 65–99)
GLUCOSE SERPL-MCNC: 122 MG/DL (ref 65–99)
GLUCOSE SERPL-MCNC: 123 MG/DL (ref 65–99)
GLUCOSE SERPL-MCNC: 123 MG/DL (ref 65–99)
GLUCOSE SERPL-MCNC: 125 MG/DL (ref 65–99)
GLUCOSE SERPL-MCNC: 126 MG/DL (ref 65–99)
GLUCOSE SERPL-MCNC: 127 MG/DL (ref 65–99)
GLUCOSE SERPL-MCNC: 127 MG/DL (ref 65–99)
GLUCOSE SERPL-MCNC: 129 MG/DL (ref 65–99)
GLUCOSE SERPL-MCNC: 130 MG/DL (ref 65–99)
GLUCOSE SERPL-MCNC: 132 MG/DL (ref 65–99)
GLUCOSE SERPL-MCNC: 132 MG/DL (ref 65–99)
GLUCOSE SERPL-MCNC: 133 MG/DL (ref 65–99)
GLUCOSE SERPL-MCNC: 135 MG/DL (ref 65–99)
GLUCOSE SERPL-MCNC: 135 MG/DL (ref 65–99)
GLUCOSE SERPL-MCNC: 136 MG/DL (ref 65–99)
GLUCOSE SERPL-MCNC: 137 MG/DL (ref 65–99)
GLUCOSE SERPL-MCNC: 141 MG/DL (ref 65–99)
GLUCOSE SERPL-MCNC: 142 MG/DL (ref 65–99)
GLUCOSE SERPL-MCNC: 143 MG/DL (ref 65–99)
GLUCOSE SERPL-MCNC: 143 MG/DL (ref 65–99)
GLUCOSE SERPL-MCNC: 148 MG/DL (ref 65–99)
GLUCOSE SERPL-MCNC: 177 MG/DL (ref 65–99)
GLUCOSE SERPL-MCNC: 78 MG/DL (ref 65–99)
GLUCOSE SERPL-MCNC: 96 MG/DL (ref 65–99)
GLUCOSE SERPL-MCNC: 99 MG/DL (ref 65–99)
GLUCOSE UR STRIP.AUTO-MCNC: NEGATIVE MG/DL
GRAM STN SPEC: NORMAL
GRAM STN SPEC: NORMAL
HAV IGM SERPL QL IA: NEGATIVE
HBA1C MFR BLD: 6.3 % (ref 0–5.6)
HBV CORE IGM SER QL: NEGATIVE
HBV SURFACE AG SER QL: NEGATIVE
HCO3 BLDA-SCNC: 15 MMOL/L (ref 17–25)
HCO3 BLDA-SCNC: 16.8 MMOL/L (ref 17–25)
HCO3 BLDA-SCNC: 21.8 MMOL/L (ref 17–25)
HCT VFR BLD AUTO: 26.6 % (ref 42–52)
HCT VFR BLD AUTO: 32.7 % (ref 42–52)
HCT VFR BLD AUTO: 33.4 % (ref 42–52)
HCT VFR BLD AUTO: 33.6 % (ref 42–52)
HCT VFR BLD AUTO: 34.3 % (ref 42–52)
HCT VFR BLD AUTO: 34.7 % (ref 42–52)
HCT VFR BLD AUTO: 34.7 % (ref 42–52)
HCT VFR BLD AUTO: 34.9 % (ref 42–52)
HCT VFR BLD AUTO: 35 % (ref 42–52)
HCT VFR BLD AUTO: 35.2 % (ref 42–52)
HCT VFR BLD AUTO: 35.9 % (ref 42–52)
HCT VFR BLD AUTO: 36.1 % (ref 42–52)
HCT VFR BLD AUTO: 36.2 % (ref 42–52)
HCT VFR BLD AUTO: 36.2 % (ref 42–52)
HCT VFR BLD AUTO: 36.4 % (ref 42–52)
HCT VFR BLD AUTO: 36.5 % (ref 42–52)
HCT VFR BLD AUTO: 36.7 % (ref 42–52)
HCT VFR BLD AUTO: 36.8 % (ref 42–52)
HCT VFR BLD AUTO: 36.8 % (ref 42–52)
HCT VFR BLD AUTO: 37.6 % (ref 42–52)
HCT VFR BLD AUTO: 38.8 % (ref 42–52)
HCT VFR BLD AUTO: 39.3 % (ref 42–52)
HCT VFR BLD AUTO: 39.4 % (ref 42–52)
HCT VFR BLD AUTO: 39.6 % (ref 42–52)
HCT VFR BLD AUTO: 40.5 % (ref 42–52)
HCT VFR BLD AUTO: 43.2 % (ref 42–52)
HCT VFR BLD AUTO: 43.3 % (ref 42–52)
HCT VFR BLD AUTO: 43.6 % (ref 42–52)
HCT VFR BLD AUTO: 43.8 % (ref 42–52)
HCT VFR BLD AUTO: 44 % (ref 42–52)
HCT VFR BLD AUTO: 44.2 % (ref 42–52)
HCT VFR BLD AUTO: 44.8 % (ref 42–52)
HCT VFR BLD AUTO: 45.1 % (ref 42–52)
HCT VFR BLD AUTO: 45.5 % (ref 42–52)
HCT VFR BLD AUTO: 45.6 % (ref 42–52)
HCT VFR BLD AUTO: 46.1 % (ref 42–52)
HCT VFR BLD AUTO: 50.9 % (ref 42–52)
HCV AB SER QL: REACTIVE
HCV AB SER QL: REACTIVE
HCV RNA SERPL NAA+PROBE-ACNC: ABNORMAL IU/ML
HCV RNA SERPL NAA+PROBE-ACNC: ABNORMAL IU/ML
HCV RNA SERPL NAA+PROBE-LOG IU: 5.8 LOG IU
HCV RNA SERPL NAA+PROBE-LOG IU: 5.9 LOG IU
HCV RNA SERPL QL NAA+PROBE: DETECTED
HCV RNA SERPL QL NAA+PROBE: DETECTED
HDLC SERPL-MCNC: 15 MG/DL
HEMOCCULT SP1 STL QL: POSITIVE
HGB BLD-MCNC: 10 G/DL (ref 14–18)
HGB BLD-MCNC: 10.1 G/DL (ref 14–18)
HGB BLD-MCNC: 10.3 G/DL (ref 14–18)
HGB BLD-MCNC: 10.3 G/DL (ref 14–18)
HGB BLD-MCNC: 10.4 G/DL (ref 14–18)
HGB BLD-MCNC: 10.6 G/DL (ref 14–18)
HGB BLD-MCNC: 10.8 G/DL (ref 14–18)
HGB BLD-MCNC: 10.8 G/DL (ref 14–18)
HGB BLD-MCNC: 11 G/DL (ref 14–18)
HGB BLD-MCNC: 11.5 G/DL (ref 14–18)
HGB BLD-MCNC: 11.5 G/DL (ref 14–18)
HGB BLD-MCNC: 11.7 G/DL (ref 14–18)
HGB BLD-MCNC: 11.7 G/DL (ref 14–18)
HGB BLD-MCNC: 12 G/DL (ref 14–18)
HGB BLD-MCNC: 12.1 G/DL (ref 14–18)
HGB BLD-MCNC: 12.2 G/DL (ref 14–18)
HGB BLD-MCNC: 12.3 G/DL (ref 14–18)
HGB BLD-MCNC: 12.5 G/DL (ref 14–18)
HGB BLD-MCNC: 12.6 G/DL (ref 14–18)
HGB BLD-MCNC: 12.6 G/DL (ref 14–18)
HGB BLD-MCNC: 13 G/DL (ref 14–18)
HGB BLD-MCNC: 13.6 G/DL (ref 14–18)
HGB BLD-MCNC: 13.6 G/DL (ref 14–18)
HGB BLD-MCNC: 13.8 G/DL (ref 14–18)
HGB BLD-MCNC: 13.9 G/DL (ref 14–18)
HGB BLD-MCNC: 14 G/DL (ref 14–18)
HGB BLD-MCNC: 14.1 G/DL (ref 14–18)
HGB BLD-MCNC: 14.1 G/DL (ref 14–18)
HGB BLD-MCNC: 14.2 G/DL (ref 14–18)
HGB BLD-MCNC: 14.4 G/DL (ref 14–18)
HGB BLD-MCNC: 14.6 G/DL (ref 14–18)
HGB BLD-MCNC: 14.6 G/DL (ref 14–18)
HGB BLD-MCNC: 14.7 G/DL (ref 14–18)
HGB BLD-MCNC: 15.8 G/DL (ref 14–18)
HGB BLD-MCNC: 7.9 G/DL (ref 14–18)
HIV 1+2 AB+HIV1 P24 AG SERPL QL IA: NON REACTIVE
HYALINE CASTS #/AREA URNS LPF: >10 /LPF
HYALINE CASTS #/AREA URNS LPF: >10 /LPF
HYPOCHROMIA BLD QL SMEAR: ABNORMAL
HYPOCHROMIA BLD QL SMEAR: ABNORMAL
IMM GRANULOCYTES # BLD AUTO: 0.04 K/UL (ref 0–0.11)
IMM GRANULOCYTES # BLD AUTO: 0.06 K/UL (ref 0–0.11)
IMM GRANULOCYTES # BLD AUTO: 0.06 K/UL (ref 0–0.11)
IMM GRANULOCYTES # BLD AUTO: 0.07 K/UL (ref 0–0.11)
IMM GRANULOCYTES # BLD AUTO: 0.07 K/UL (ref 0–0.11)
IMM GRANULOCYTES # BLD AUTO: 0.09 K/UL (ref 0–0.11)
IMM GRANULOCYTES # BLD AUTO: 0.09 K/UL (ref 0–0.11)
IMM GRANULOCYTES # BLD AUTO: 0.14 K/UL (ref 0–0.11)
IMM GRANULOCYTES # BLD AUTO: 0.15 K/UL (ref 0–0.11)
IMM GRANULOCYTES # BLD AUTO: 0.17 K/UL (ref 0–0.11)
IMM GRANULOCYTES # BLD AUTO: 0.22 K/UL (ref 0–0.11)
IMM GRANULOCYTES # BLD AUTO: 0.24 K/UL (ref 0–0.11)
IMM GRANULOCYTES # BLD AUTO: 0.26 K/UL (ref 0–0.11)
IMM GRANULOCYTES # BLD AUTO: 0.27 K/UL (ref 0–0.11)
IMM GRANULOCYTES # BLD AUTO: 0.3 K/UL (ref 0–0.11)
IMM GRANULOCYTES # BLD AUTO: 0.33 K/UL (ref 0–0.11)
IMM GRANULOCYTES # BLD AUTO: 0.4 K/UL (ref 0–0.11)
IMM GRANULOCYTES # BLD AUTO: 0.72 K/UL (ref 0–0.11)
IMM GRANULOCYTES NFR BLD AUTO: 0.5 % (ref 0–0.9)
IMM GRANULOCYTES NFR BLD AUTO: 0.6 % (ref 0–0.9)
IMM GRANULOCYTES NFR BLD AUTO: 0.7 % (ref 0–0.9)
IMM GRANULOCYTES NFR BLD AUTO: 0.8 % (ref 0–0.9)
IMM GRANULOCYTES NFR BLD AUTO: 1 % (ref 0–0.9)
IMM GRANULOCYTES NFR BLD AUTO: 1 % (ref 0–0.9)
IMM GRANULOCYTES NFR BLD AUTO: 1.1 % (ref 0–0.9)
IMM GRANULOCYTES NFR BLD AUTO: 1.2 % (ref 0–0.9)
IMM GRANULOCYTES NFR BLD AUTO: 1.6 % (ref 0–0.9)
IMM GRANULOCYTES NFR BLD AUTO: 1.6 % (ref 0–0.9)
IMM GRANULOCYTES NFR BLD AUTO: 1.8 % (ref 0–0.9)
IMM GRANULOCYTES NFR BLD AUTO: 1.9 % (ref 0–0.9)
IMM GRANULOCYTES NFR BLD AUTO: 1.9 % (ref 0–0.9)
IMM GRANULOCYTES NFR BLD AUTO: 2.2 % (ref 0–0.9)
IMM GRANULOCYTES NFR BLD AUTO: 2.8 % (ref 0–0.9)
IMM GRANULOCYTES NFR BLD AUTO: 4.5 % (ref 0–0.9)
INR PPP: 1.35 (ref 0.87–1.13)
INR PPP: 1.35 (ref 0.87–1.13)
INR PPP: 1.38 (ref 0.87–1.13)
INR PPP: 1.6 (ref 0.87–1.13)
INR PPP: 1.62 (ref 0.87–1.13)
INR PPP: 1.62 (ref 0.87–1.13)
INR PPP: 1.67 (ref 0.87–1.13)
INR PPP: 1.69 (ref 0.87–1.13)
INR PPP: 1.82 (ref 0.87–1.13)
INR PPP: 1.85 (ref 0.87–1.13)
INR PPP: 1.89 (ref 0.87–1.13)
INR PPP: 1.98 (ref 0.87–1.13)
INR PPP: 2.1 (ref 0.87–1.13)
INR PPP: 2.15 (ref 0.87–1.13)
INR PPP: 2.45 (ref 0.87–1.13)
INR PPP: 2.54 (ref 0.87–1.13)
INR PPP: 2.55 (ref 0.87–1.13)
KETONES UR STRIP.AUTO-MCNC: NEGATIVE MG/DL
LACTATE BLD-SCNC: 2.1 MMOL/L (ref 0.5–2)
LACTATE BLD-SCNC: 2.7 MMOL/L (ref 0.5–2)
LACTATE BLD-SCNC: 2.8 MMOL/L (ref 0.5–2)
LACTATE BLD-SCNC: 3.3 MMOL/L (ref 0.5–2)
LACTATE BLD-SCNC: 3.4 MMOL/L (ref 0.5–2)
LACTATE BLD-SCNC: 4 MMOL/L (ref 0.5–2)
LDLC SERPL CALC-MCNC: 31 MG/DL
LEUKOCYTE ESTERASE UR QL STRIP.AUTO: NEGATIVE
LG PLATELETS BLD QL SMEAR: NORMAL
LG PLATELETS BLD QL SMEAR: NORMAL
LIPASE SERPL-CCNC: 170 U/L (ref 11–82)
LIPASE SERPL-CCNC: 37 U/L (ref 11–82)
LIPASE SERPL-CCNC: 403 U/L (ref 11–82)
LIPASE SERPL-CCNC: 465 U/L (ref 11–82)
LV EJECT FRACT  99904: 15
LV EJECT FRACT  99904: 20
LV EJECT FRACT MOD 2C 99903: 25.33
LV EJECT FRACT MOD 2C 99903: 34.01
LV EJECT FRACT MOD 4C 99902: 14.13
LV EJECT FRACT MOD 4C 99902: 28.85
LV EJECT FRACT MOD BP 99901: 19.87
LV EJECT FRACT MOD BP 99901: 27.03
LYMPHOCYTES # BLD AUTO: 0.32 K/UL (ref 1–4.8)
LYMPHOCYTES # BLD AUTO: 0.36 K/UL (ref 1–4.8)
LYMPHOCYTES # BLD AUTO: 0.43 K/UL (ref 1–4.8)
LYMPHOCYTES # BLD AUTO: 0.59 K/UL (ref 1–4.8)
LYMPHOCYTES # BLD AUTO: 0.62 K/UL (ref 1–4.8)
LYMPHOCYTES # BLD AUTO: 0.66 K/UL (ref 1–4.8)
LYMPHOCYTES # BLD AUTO: 0.73 K/UL (ref 1–4.8)
LYMPHOCYTES # BLD AUTO: 0.81 K/UL (ref 1–4.8)
LYMPHOCYTES # BLD AUTO: 0.82 K/UL (ref 1–4.8)
LYMPHOCYTES # BLD AUTO: 0.92 K/UL (ref 1–4.8)
LYMPHOCYTES # BLD AUTO: 0.94 K/UL (ref 1–4.8)
LYMPHOCYTES # BLD AUTO: 0.99 K/UL (ref 1–4.8)
LYMPHOCYTES # BLD AUTO: 1.23 K/UL (ref 1–4.8)
LYMPHOCYTES # BLD AUTO: 1.23 K/UL (ref 1–4.8)
LYMPHOCYTES # BLD AUTO: 1.31 K/UL (ref 1–4.8)
LYMPHOCYTES # BLD AUTO: 1.33 K/UL (ref 1–4.8)
LYMPHOCYTES # BLD AUTO: 1.37 K/UL (ref 1–4.8)
LYMPHOCYTES # BLD AUTO: 1.39 K/UL (ref 1–4.8)
LYMPHOCYTES # BLD AUTO: 1.41 K/UL (ref 1–4.8)
LYMPHOCYTES # BLD AUTO: 1.48 K/UL (ref 1–4.8)
LYMPHOCYTES # BLD AUTO: 1.51 K/UL (ref 1–4.8)
LYMPHOCYTES # BLD AUTO: 1.51 K/UL (ref 1–4.8)
LYMPHOCYTES # BLD AUTO: 1.67 K/UL (ref 1–4.8)
LYMPHOCYTES # BLD AUTO: 1.69 K/UL (ref 1–4.8)
LYMPHOCYTES # BLD AUTO: 1.76 K/UL (ref 1–4.8)
LYMPHOCYTES # BLD AUTO: 1.77 K/UL (ref 1–4.8)
LYMPHOCYTES # BLD AUTO: 1.84 K/UL (ref 1–4.8)
LYMPHOCYTES # BLD AUTO: 2.05 K/UL (ref 1–4.8)
LYMPHOCYTES # BLD AUTO: 2.12 K/UL (ref 1–4.8)
LYMPHOCYTES # BLD AUTO: 2.25 K/UL (ref 1–4.8)
LYMPHOCYTES # BLD AUTO: 2.31 K/UL (ref 1–4.8)
LYMPHOCYTES # BLD AUTO: 2.48 K/UL (ref 1–4.8)
LYMPHOCYTES NFR BLD: 10.4 % (ref 22–41)
LYMPHOCYTES NFR BLD: 10.6 % (ref 22–41)
LYMPHOCYTES NFR BLD: 11.3 % (ref 22–41)
LYMPHOCYTES NFR BLD: 12.5 % (ref 22–41)
LYMPHOCYTES NFR BLD: 13.2 % (ref 22–41)
LYMPHOCYTES NFR BLD: 15.7 % (ref 22–41)
LYMPHOCYTES NFR BLD: 16.3 % (ref 22–41)
LYMPHOCYTES NFR BLD: 18.4 % (ref 22–41)
LYMPHOCYTES NFR BLD: 2.6 % (ref 22–41)
LYMPHOCYTES NFR BLD: 2.6 % (ref 22–41)
LYMPHOCYTES NFR BLD: 21.2 % (ref 22–41)
LYMPHOCYTES NFR BLD: 21.8 % (ref 22–41)
LYMPHOCYTES NFR BLD: 22.5 % (ref 22–41)
LYMPHOCYTES NFR BLD: 24.1 % (ref 22–41)
LYMPHOCYTES NFR BLD: 25.8 % (ref 22–41)
LYMPHOCYTES NFR BLD: 3.4 % (ref 22–41)
LYMPHOCYTES NFR BLD: 3.6 % (ref 22–41)
LYMPHOCYTES NFR BLD: 3.7 % (ref 22–41)
LYMPHOCYTES NFR BLD: 4.3 % (ref 22–41)
LYMPHOCYTES NFR BLD: 4.4 % (ref 22–41)
LYMPHOCYTES NFR BLD: 4.9 % (ref 22–41)
LYMPHOCYTES NFR BLD: 5.2 % (ref 22–41)
LYMPHOCYTES NFR BLD: 5.3 % (ref 22–41)
LYMPHOCYTES NFR BLD: 5.8 % (ref 22–41)
LYMPHOCYTES NFR BLD: 6 % (ref 22–41)
LYMPHOCYTES NFR BLD: 6.1 % (ref 22–41)
LYMPHOCYTES NFR BLD: 6.1 % (ref 22–41)
LYMPHOCYTES NFR BLD: 7.8 % (ref 22–41)
LYMPHOCYTES NFR BLD: 8.5 % (ref 22–41)
LYMPHOCYTES NFR BLD: 9.3 % (ref 22–41)
LYMPHOCYTES NFR BLD: 9.7 % (ref 22–41)
LYMPHOCYTES NFR BLD: 9.9 % (ref 22–41)
MACROCYTES BLD QL SMEAR: ABNORMAL
MAGNESIUM SERPL-MCNC: 1.3 MG/DL (ref 1.5–2.5)
MAGNESIUM SERPL-MCNC: 1.8 MG/DL (ref 1.5–2.5)
MAGNESIUM SERPL-MCNC: 1.8 MG/DL (ref 1.5–2.5)
MAGNESIUM SERPL-MCNC: 1.9 MG/DL (ref 1.5–2.5)
MAGNESIUM SERPL-MCNC: 2.1 MG/DL (ref 1.5–2.5)
MAGNESIUM SERPL-MCNC: 3 MG/DL (ref 1.5–2.5)
MANUAL DIFF BLD: ABNORMAL
MANUAL DIFF BLD: NORMAL
MCH RBC QN AUTO: 24.4 PG (ref 27–33)
MCH RBC QN AUTO: 24.5 PG (ref 27–33)
MCH RBC QN AUTO: 24.5 PG (ref 27–33)
MCH RBC QN AUTO: 24.7 PG (ref 27–33)
MCH RBC QN AUTO: 24.7 PG (ref 27–33)
MCH RBC QN AUTO: 24.8 PG (ref 27–33)
MCH RBC QN AUTO: 24.8 PG (ref 27–33)
MCH RBC QN AUTO: 24.9 PG (ref 27–33)
MCH RBC QN AUTO: 25 PG (ref 27–33)
MCH RBC QN AUTO: 25.1 PG (ref 27–33)
MCH RBC QN AUTO: 25.2 PG (ref 27–33)
MCH RBC QN AUTO: 25.3 PG (ref 27–33)
MCH RBC QN AUTO: 25.3 PG (ref 27–33)
MCH RBC QN AUTO: 25.4 PG (ref 27–33)
MCH RBC QN AUTO: 25.5 PG (ref 27–33)
MCH RBC QN AUTO: 25.5 PG (ref 27–33)
MCH RBC QN AUTO: 25.6 PG (ref 27–33)
MCH RBC QN AUTO: 25.6 PG (ref 27–33)
MCH RBC QN AUTO: 25.7 PG (ref 27–33)
MCH RBC QN AUTO: 26 PG (ref 27–33)
MCH RBC QN AUTO: 26 PG (ref 27–33)
MCH RBC QN AUTO: 26.1 PG (ref 27–33)
MCH RBC QN AUTO: 26.1 PG (ref 27–33)
MCH RBC QN AUTO: 26.4 PG (ref 27–33)
MCH RBC QN AUTO: 26.4 PG (ref 27–33)
MCH RBC QN AUTO: 26.5 PG (ref 27–33)
MCH RBC QN AUTO: 26.9 PG (ref 27–33)
MCHC RBC AUTO-ENTMCNC: 29.7 G/DL (ref 33.7–35.3)
MCHC RBC AUTO-ENTMCNC: 29.7 G/DL (ref 33.7–35.3)
MCHC RBC AUTO-ENTMCNC: 29.8 G/DL (ref 33.7–35.3)
MCHC RBC AUTO-ENTMCNC: 29.8 G/DL (ref 33.7–35.3)
MCHC RBC AUTO-ENTMCNC: 29.9 G/DL (ref 33.7–35.3)
MCHC RBC AUTO-ENTMCNC: 30.7 G/DL (ref 33.7–35.3)
MCHC RBC AUTO-ENTMCNC: 30.8 G/DL (ref 33.7–35.3)
MCHC RBC AUTO-ENTMCNC: 30.9 G/DL (ref 33.7–35.3)
MCHC RBC AUTO-ENTMCNC: 31 G/DL (ref 33.7–35.3)
MCHC RBC AUTO-ENTMCNC: 31.1 G/DL (ref 33.7–35.3)
MCHC RBC AUTO-ENTMCNC: 31.3 G/DL (ref 33.7–35.3)
MCHC RBC AUTO-ENTMCNC: 31.5 G/DL (ref 33.7–35.3)
MCHC RBC AUTO-ENTMCNC: 31.5 G/DL (ref 33.7–35.3)
MCHC RBC AUTO-ENTMCNC: 31.6 G/DL (ref 33.7–35.3)
MCHC RBC AUTO-ENTMCNC: 31.7 G/DL (ref 33.7–35.3)
MCHC RBC AUTO-ENTMCNC: 31.9 G/DL (ref 33.7–35.3)
MCHC RBC AUTO-ENTMCNC: 32 G/DL (ref 33.7–35.3)
MCHC RBC AUTO-ENTMCNC: 32 G/DL (ref 33.7–35.3)
MCHC RBC AUTO-ENTMCNC: 32.1 G/DL (ref 33.7–35.3)
MCHC RBC AUTO-ENTMCNC: 32.1 G/DL (ref 33.7–35.3)
MCHC RBC AUTO-ENTMCNC: 32.2 G/DL (ref 33.7–35.3)
MCHC RBC AUTO-ENTMCNC: 32.3 G/DL (ref 33.7–35.3)
MCHC RBC AUTO-ENTMCNC: 32.6 G/DL (ref 33.7–35.3)
MCHC RBC AUTO-ENTMCNC: 33.2 G/DL (ref 33.7–35.3)
MCHC RBC AUTO-ENTMCNC: 33.2 G/DL (ref 33.7–35.3)
MCHC RBC AUTO-ENTMCNC: 33.4 G/DL (ref 33.7–35.3)
MCHC RBC AUTO-ENTMCNC: 33.5 G/DL (ref 33.7–35.3)
MCHC RBC AUTO-ENTMCNC: 33.6 G/DL (ref 33.7–35.3)
MCHC RBC AUTO-ENTMCNC: 33.7 G/DL (ref 33.7–35.3)
MCV RBC AUTO: 73 FL (ref 81.4–97.8)
MCV RBC AUTO: 73.9 FL (ref 81.4–97.8)
MCV RBC AUTO: 74.3 FL (ref 81.4–97.8)
MCV RBC AUTO: 74.5 FL (ref 81.4–97.8)
MCV RBC AUTO: 74.5 FL (ref 81.4–97.8)
MCV RBC AUTO: 74.6 FL (ref 81.4–97.8)
MCV RBC AUTO: 75.4 FL (ref 81.4–97.8)
MCV RBC AUTO: 75.5 FL (ref 81.4–97.8)
MCV RBC AUTO: 76.5 FL (ref 81.4–97.8)
MCV RBC AUTO: 76.8 FL (ref 81.4–97.8)
MCV RBC AUTO: 76.9 FL (ref 81.4–97.8)
MCV RBC AUTO: 77.6 FL (ref 81.4–97.8)
MCV RBC AUTO: 78 FL (ref 81.4–97.8)
MCV RBC AUTO: 78.1 FL (ref 81.4–97.8)
MCV RBC AUTO: 78.7 FL (ref 81.4–97.8)
MCV RBC AUTO: 78.8 FL (ref 81.4–97.8)
MCV RBC AUTO: 78.8 FL (ref 81.4–97.8)
MCV RBC AUTO: 79.3 FL (ref 81.4–97.8)
MCV RBC AUTO: 79.8 FL (ref 81.4–97.8)
MCV RBC AUTO: 79.8 FL (ref 81.4–97.8)
MCV RBC AUTO: 80.2 FL (ref 81.4–97.8)
MCV RBC AUTO: 80.3 FL (ref 81.4–97.8)
MCV RBC AUTO: 81.1 FL (ref 81.4–97.8)
MCV RBC AUTO: 81.5 FL (ref 81.4–97.8)
MCV RBC AUTO: 82.1 FL (ref 81.4–97.8)
MCV RBC AUTO: 83.4 FL (ref 81.4–97.8)
MCV RBC AUTO: 83.7 FL (ref 81.4–97.8)
MCV RBC AUTO: 84.6 FL (ref 81.4–97.8)
MCV RBC AUTO: 85.8 FL (ref 81.4–97.8)
MCV RBC AUTO: 87 FL (ref 81.4–97.8)
MCV RBC AUTO: 87.5 FL (ref 81.4–97.8)
MCV RBC AUTO: 87.7 FL (ref 81.4–97.8)
MCV RBC AUTO: 89 FL (ref 81.4–97.8)
MCV RBC AUTO: 89.8 FL (ref 81.4–97.8)
MDMA UR QL SCN: NEGATIVE
METAMYELOCYTES NFR BLD MANUAL: 0.9 %
METAMYELOCYTES NFR BLD MANUAL: 0.9 %
METAMYELOCYTES NFR BLD MANUAL: 1.7 %
METAMYELOCYTES NFR BLD MANUAL: 1.7 %
METAMYELOCYTES NFR BLD MANUAL: 2.6 %
METAMYELOCYTES NFR BLD MANUAL: 6.1 %
METHADONE UR QL SCN: NEGATIVE
MICRO URNS: ABNORMAL
MICRO URNS: ABNORMAL
MICRO URNS: NORMAL
MICROCYTES BLD QL SMEAR: ABNORMAL
MONOCYTES # BLD AUTO: 0.39 K/UL (ref 0–0.85)
MONOCYTES # BLD AUTO: 0.61 K/UL (ref 0–0.85)
MONOCYTES # BLD AUTO: 0.62 K/UL (ref 0–0.85)
MONOCYTES # BLD AUTO: 0.63 K/UL (ref 0–0.85)
MONOCYTES # BLD AUTO: 0.64 K/UL (ref 0–0.85)
MONOCYTES # BLD AUTO: 0.65 K/UL (ref 0–0.85)
MONOCYTES # BLD AUTO: 0.66 K/UL (ref 0–0.85)
MONOCYTES # BLD AUTO: 0.66 K/UL (ref 0–0.85)
MONOCYTES # BLD AUTO: 0.78 K/UL (ref 0–0.85)
MONOCYTES # BLD AUTO: 0.84 K/UL (ref 0–0.85)
MONOCYTES # BLD AUTO: 0.85 K/UL (ref 0–0.85)
MONOCYTES # BLD AUTO: 0.89 K/UL (ref 0–0.85)
MONOCYTES # BLD AUTO: 0.92 K/UL (ref 0–0.85)
MONOCYTES # BLD AUTO: 0.95 K/UL (ref 0–0.85)
MONOCYTES # BLD AUTO: 1.03 K/UL (ref 0–0.85)
MONOCYTES # BLD AUTO: 1.03 K/UL (ref 0–0.85)
MONOCYTES # BLD AUTO: 1.05 K/UL (ref 0–0.85)
MONOCYTES # BLD AUTO: 1.09 K/UL (ref 0–0.85)
MONOCYTES # BLD AUTO: 1.1 K/UL (ref 0–0.85)
MONOCYTES # BLD AUTO: 1.13 K/UL (ref 0–0.85)
MONOCYTES # BLD AUTO: 1.13 K/UL (ref 0–0.85)
MONOCYTES # BLD AUTO: 1.14 K/UL (ref 0–0.85)
MONOCYTES # BLD AUTO: 1.14 K/UL (ref 0–0.85)
MONOCYTES # BLD AUTO: 1.16 K/UL (ref 0–0.85)
MONOCYTES # BLD AUTO: 1.21 K/UL (ref 0–0.85)
MONOCYTES # BLD AUTO: 1.22 K/UL (ref 0–0.85)
MONOCYTES # BLD AUTO: 1.23 K/UL (ref 0–0.85)
MONOCYTES # BLD AUTO: 1.35 K/UL (ref 0–0.85)
MONOCYTES # BLD AUTO: 1.36 K/UL (ref 0–0.85)
MONOCYTES # BLD AUTO: 1.47 K/UL (ref 0–0.85)
MONOCYTES # BLD AUTO: 1.72 K/UL (ref 0–0.85)
MONOCYTES # BLD AUTO: 3.14 K/UL (ref 0–0.85)
MONOCYTES NFR BLD AUTO: 1.7 % (ref 0–13.4)
MONOCYTES NFR BLD AUTO: 10.8 % (ref 0–13.4)
MONOCYTES NFR BLD AUTO: 11.2 % (ref 0–13.4)
MONOCYTES NFR BLD AUTO: 11.3 % (ref 0–13.4)
MONOCYTES NFR BLD AUTO: 12.1 % (ref 0–13.4)
MONOCYTES NFR BLD AUTO: 12.1 % (ref 0–13.4)
MONOCYTES NFR BLD AUTO: 12.2 % (ref 0–13.4)
MONOCYTES NFR BLD AUTO: 12.2 % (ref 0–13.4)
MONOCYTES NFR BLD AUTO: 13.7 % (ref 0–13.4)
MONOCYTES NFR BLD AUTO: 2.6 % (ref 0–13.4)
MONOCYTES NFR BLD AUTO: 3.5 % (ref 0–13.4)
MONOCYTES NFR BLD AUTO: 3.5 % (ref 0–13.4)
MONOCYTES NFR BLD AUTO: 4.4 % (ref 0–13.4)
MONOCYTES NFR BLD AUTO: 4.5 % (ref 0–13.4)
MONOCYTES NFR BLD AUTO: 5.2 % (ref 0–13.4)
MONOCYTES NFR BLD AUTO: 5.3 % (ref 0–13.4)
MONOCYTES NFR BLD AUTO: 6 % (ref 0–13.4)
MONOCYTES NFR BLD AUTO: 6.1 % (ref 0–13.4)
MONOCYTES NFR BLD AUTO: 6.1 % (ref 0–13.4)
MONOCYTES NFR BLD AUTO: 7.3 % (ref 0–13.4)
MONOCYTES NFR BLD AUTO: 7.3 % (ref 0–13.4)
MONOCYTES NFR BLD AUTO: 7.4 % (ref 0–13.4)
MONOCYTES NFR BLD AUTO: 7.4 % (ref 0–13.4)
MONOCYTES NFR BLD AUTO: 7.6 % (ref 0–13.4)
MONOCYTES NFR BLD AUTO: 7.6 % (ref 0–13.4)
MONOCYTES NFR BLD AUTO: 7.8 % (ref 0–13.4)
MONOCYTES NFR BLD AUTO: 8.2 % (ref 0–13.4)
MONOCYTES NFR BLD AUTO: 8.5 % (ref 0–13.4)
MONOCYTES NFR BLD AUTO: 8.8 % (ref 0–13.4)
MONOCYTES NFR BLD AUTO: 9.1 % (ref 0–13.4)
MONOCYTES NFR BLD AUTO: 9.5 % (ref 0–13.4)
MONOCYTES NFR BLD AUTO: 9.5 % (ref 0–13.4)
MORPHOLOGY BLD-IMP: NORMAL
MUCOUS THREADS #/AREA URNS HPF: ABNORMAL /HPF
MYELOCYTES NFR BLD MANUAL: 0.9 %
MYELOCYTES NFR BLD MANUAL: 0.9 %
MYELOCYTES NFR BLD MANUAL: 1.7 %
MYELOCYTES NFR BLD MANUAL: 1.7 %
MYELOCYTES NFR BLD MANUAL: 2.6 %
MYELOCYTES NFR BLD MANUAL: 2.7 %
MYELOCYTES NFR BLD MANUAL: 5.2 %
NEUTROPHILS # BLD AUTO: 10.59 K/UL (ref 1.82–7.42)
NEUTROPHILS # BLD AUTO: 10.72 K/UL (ref 1.82–7.42)
NEUTROPHILS # BLD AUTO: 10.73 K/UL (ref 1.82–7.42)
NEUTROPHILS # BLD AUTO: 10.87 K/UL (ref 1.82–7.42)
NEUTROPHILS # BLD AUTO: 11.2 K/UL (ref 1.82–7.42)
NEUTROPHILS # BLD AUTO: 11.27 K/UL (ref 1.82–7.42)
NEUTROPHILS # BLD AUTO: 11.69 K/UL (ref 1.82–7.42)
NEUTROPHILS # BLD AUTO: 12.1 K/UL (ref 1.82–7.42)
NEUTROPHILS # BLD AUTO: 12.12 K/UL (ref 1.82–7.42)
NEUTROPHILS # BLD AUTO: 12.59 K/UL (ref 1.82–7.42)
NEUTROPHILS # BLD AUTO: 12.62 K/UL (ref 1.82–7.42)
NEUTROPHILS # BLD AUTO: 12.9 K/UL (ref 1.82–7.42)
NEUTROPHILS # BLD AUTO: 13.24 K/UL (ref 1.82–7.42)
NEUTROPHILS # BLD AUTO: 13.56 K/UL (ref 1.82–7.42)
NEUTROPHILS # BLD AUTO: 13.71 K/UL (ref 1.82–7.42)
NEUTROPHILS # BLD AUTO: 13.87 K/UL (ref 1.82–7.42)
NEUTROPHILS # BLD AUTO: 14.62 K/UL (ref 1.82–7.42)
NEUTROPHILS # BLD AUTO: 17.22 K/UL (ref 1.82–7.42)
NEUTROPHILS # BLD AUTO: 20.61 K/UL (ref 1.82–7.42)
NEUTROPHILS # BLD AUTO: 21.02 K/UL (ref 1.82–7.42)
NEUTROPHILS # BLD AUTO: 21.64 K/UL (ref 1.82–7.42)
NEUTROPHILS # BLD AUTO: 4.38 K/UL (ref 1.82–7.42)
NEUTROPHILS # BLD AUTO: 4.84 K/UL (ref 1.82–7.42)
NEUTROPHILS # BLD AUTO: 5 K/UL (ref 1.82–7.42)
NEUTROPHILS # BLD AUTO: 5.21 K/UL (ref 1.82–7.42)
NEUTROPHILS # BLD AUTO: 5.91 K/UL (ref 1.82–7.42)
NEUTROPHILS # BLD AUTO: 6.69 K/UL (ref 1.82–7.42)
NEUTROPHILS # BLD AUTO: 7.01 K/UL (ref 1.82–7.42)
NEUTROPHILS # BLD AUTO: 7.27 K/UL (ref 1.82–7.42)
NEUTROPHILS # BLD AUTO: 8.06 K/UL (ref 1.82–7.42)
NEUTROPHILS # BLD AUTO: 9.09 K/UL (ref 1.82–7.42)
NEUTROPHILS # BLD AUTO: 9.81 K/UL (ref 1.82–7.42)
NEUTROPHILS NFR BLD: 55.8 % (ref 44–72)
NEUTROPHILS NFR BLD: 55.9 % (ref 44–72)
NEUTROPHILS NFR BLD: 56 % (ref 44–72)
NEUTROPHILS NFR BLD: 59.2 % (ref 44–72)
NEUTROPHILS NFR BLD: 62.6 % (ref 44–72)
NEUTROPHILS NFR BLD: 63.8 % (ref 44–72)
NEUTROPHILS NFR BLD: 64.1 % (ref 44–72)
NEUTROPHILS NFR BLD: 64.4 % (ref 44–72)
NEUTROPHILS NFR BLD: 67.5 % (ref 44–72)
NEUTROPHILS NFR BLD: 67.5 % (ref 44–72)
NEUTROPHILS NFR BLD: 70.4 % (ref 44–72)
NEUTROPHILS NFR BLD: 71 % (ref 44–72)
NEUTROPHILS NFR BLD: 71.1 % (ref 44–72)
NEUTROPHILS NFR BLD: 73.2 % (ref 44–72)
NEUTROPHILS NFR BLD: 73.9 % (ref 44–72)
NEUTROPHILS NFR BLD: 74.6 % (ref 44–72)
NEUTROPHILS NFR BLD: 74.8 % (ref 44–72)
NEUTROPHILS NFR BLD: 75.6 % (ref 44–72)
NEUTROPHILS NFR BLD: 75.9 % (ref 44–72)
NEUTROPHILS NFR BLD: 76.1 % (ref 44–72)
NEUTROPHILS NFR BLD: 78.6 % (ref 44–72)
NEUTROPHILS NFR BLD: 78.8 % (ref 44–72)
NEUTROPHILS NFR BLD: 79.6 % (ref 44–72)
NEUTROPHILS NFR BLD: 81.8 % (ref 44–72)
NEUTROPHILS NFR BLD: 83.2 % (ref 44–72)
NEUTROPHILS NFR BLD: 83.9 % (ref 44–72)
NEUTROPHILS NFR BLD: 84.5 % (ref 44–72)
NEUTROPHILS NFR BLD: 85 % (ref 44–72)
NEUTROPHILS NFR BLD: 85.2 % (ref 44–72)
NEUTROPHILS NFR BLD: 85.3 % (ref 44–72)
NEUTROPHILS NFR BLD: 87 % (ref 44–72)
NEUTROPHILS NFR BLD: 92 % (ref 44–72)
NEUTS BAND NFR BLD MANUAL: 0.9 % (ref 0–10)
NEUTS BAND NFR BLD MANUAL: 0.9 % (ref 0–10)
NEUTS BAND NFR BLD MANUAL: 1.7 % (ref 0–10)
NEUTS BAND NFR BLD MANUAL: 16.1 % (ref 0–10)
NEUTS BAND NFR BLD MANUAL: 16.5 % (ref 0–10)
NEUTS BAND NFR BLD MANUAL: 19.3 % (ref 0–10)
NEUTS BAND NFR BLD MANUAL: 2.6 % (ref 0–10)
NEUTS BAND NFR BLD MANUAL: 3.4 % (ref 0–10)
NEUTS BAND NFR BLD MANUAL: 3.5 % (ref 0–10)
NEUTS BAND NFR BLD MANUAL: 5.2 % (ref 0–10)
NEUTS BAND NFR BLD MANUAL: 5.2 % (ref 0–10)
NEUTS BAND NFR BLD MANUAL: 6.9 % (ref 0–10)
NEUTS BAND NFR BLD MANUAL: 7.7 % (ref 0–10)
NITRITE UR QL STRIP.AUTO: NEGATIVE
NRBC # BLD AUTO: 0 K/UL
NRBC # BLD AUTO: 0.02 K/UL
NRBC # BLD AUTO: 0.02 K/UL
NRBC # BLD AUTO: 0.03 K/UL
NRBC # BLD AUTO: 0.03 K/UL
NRBC # BLD AUTO: 0.04 K/UL
NRBC # BLD AUTO: 0.05 K/UL
NRBC # BLD AUTO: 0.06 K/UL
NRBC # BLD AUTO: 0.07 K/UL
NRBC # BLD AUTO: 0.1 K/UL
NRBC # BLD AUTO: 0.1 K/UL
NRBC # BLD AUTO: 0.11 K/UL
NRBC # BLD AUTO: 0.12 K/UL
NRBC # BLD AUTO: 0.13 K/UL
NRBC # BLD AUTO: 0.42 K/UL
NRBC # BLD AUTO: 0.55 K/UL
NRBC # BLD AUTO: 0.65 K/UL
NRBC BLD AUTO-RTO: 0 /100 WBC
NRBC BLD AUTO-RTO: 0.1 /100 WBC
NRBC BLD AUTO-RTO: 0.2 /100 WBC
NRBC BLD AUTO-RTO: 0.3 /100 WBC
NRBC BLD AUTO-RTO: 0.4 /100 WBC
NRBC BLD AUTO-RTO: 0.5 /100 WBC
NRBC BLD AUTO-RTO: 0.5 /100 WBC
NRBC BLD AUTO-RTO: 0.6 /100 WBC
NRBC BLD AUTO-RTO: 0.9 /100 WBC
NRBC BLD AUTO-RTO: 2.7 /100 WBC
NRBC BLD AUTO-RTO: 4 /100 WBC
NRBC BLD AUTO-RTO: 5.2 /100 WBC
O2/TOTAL GAS SETTING VFR VENT: 21 %
O2/TOTAL GAS SETTING VFR VENT: 40 %
O2/TOTAL GAS SETTING VFR VENT: 40 %
OPIATES UR QL SCN: POSITIVE
OSMOLALITY SERPL: 270 MOSM/KG H2O (ref 278–298)
OSMOLALITY UR: 289 MOSM/KG H2O (ref 300–900)
OVALOCYTES BLD QL SMEAR: NORMAL
OXYCODONE UR QL SCN: NEGATIVE
PATHOLOGY STUDY: ABNORMAL
PATHOLOGY STUDY: ABNORMAL
PCO2 BLDA: 22.5 MMHG (ref 26–37)
PCO2 BLDA: 26.8 MMHG (ref 26–37)
PCO2 BLDA: 31.6 MMHG (ref 26–37)
PCO2 TEMP ADJ BLDA: 21.3 MMHG (ref 26–37)
PCP UR QL SCN: NEGATIVE
PH BLDA: 7.41 [PH] (ref 7.4–7.5)
PH BLDA: 7.43 [PH] (ref 7.4–7.5)
PH BLDA: 7.45 [PH] (ref 7.4–7.5)
PH TEMP ADJ BLDA: 7.45 [PH] (ref 7.4–7.5)
PH UR STRIP.AUTO: 5 [PH]
PH UR STRIP.AUTO: 5.5 [PH]
PH UR STRIP.AUTO: 6.5 [PH]
PHOSPHATE SERPL-MCNC: 3.3 MG/DL (ref 2.5–4.5)
PHOSPHATE SERPL-MCNC: 3.4 MG/DL (ref 2.5–4.5)
PHOSPHATE SERPL-MCNC: 4.4 MG/DL (ref 2.5–4.5)
PHOSPHATE SERPL-MCNC: 5.4 MG/DL (ref 2.5–4.5)
PLATELET # BLD AUTO: 106 K/UL (ref 164–446)
PLATELET # BLD AUTO: 108 K/UL (ref 164–446)
PLATELET # BLD AUTO: 110 K/UL (ref 164–446)
PLATELET # BLD AUTO: 112 K/UL (ref 164–446)
PLATELET # BLD AUTO: 114 K/UL (ref 164–446)
PLATELET # BLD AUTO: 114 K/UL (ref 164–446)
PLATELET # BLD AUTO: 118 K/UL (ref 164–446)
PLATELET # BLD AUTO: 120 K/UL (ref 164–446)
PLATELET # BLD AUTO: 121 K/UL (ref 164–446)
PLATELET # BLD AUTO: 123 K/UL (ref 164–446)
PLATELET # BLD AUTO: 124 K/UL (ref 164–446)
PLATELET # BLD AUTO: 126 K/UL (ref 164–446)
PLATELET # BLD AUTO: 127 K/UL (ref 164–446)
PLATELET # BLD AUTO: 128 K/UL (ref 164–446)
PLATELET # BLD AUTO: 128 K/UL (ref 164–446)
PLATELET # BLD AUTO: 130 K/UL (ref 164–446)
PLATELET # BLD AUTO: 131 K/UL (ref 164–446)
PLATELET # BLD AUTO: 132 K/UL (ref 164–446)
PLATELET # BLD AUTO: 134 K/UL (ref 164–446)
PLATELET # BLD AUTO: 137 K/UL (ref 164–446)
PLATELET # BLD AUTO: 138 K/UL (ref 164–446)
PLATELET # BLD AUTO: 138 K/UL (ref 164–446)
PLATELET # BLD AUTO: 142 K/UL (ref 164–446)
PLATELET # BLD AUTO: 142 K/UL (ref 164–446)
PLATELET # BLD AUTO: 145 K/UL (ref 164–446)
PLATELET # BLD AUTO: 148 K/UL (ref 164–446)
PLATELET # BLD AUTO: 152 K/UL (ref 164–446)
PLATELET # BLD AUTO: 152 K/UL (ref 164–446)
PLATELET # BLD AUTO: 155 K/UL (ref 164–446)
PLATELET # BLD AUTO: 157 K/UL (ref 164–446)
PLATELET # BLD AUTO: 163 K/UL (ref 164–446)
PLATELET # BLD AUTO: 169 K/UL (ref 164–446)
PLATELET # BLD AUTO: 178 K/UL (ref 164–446)
PLATELET # BLD AUTO: 91 K/UL (ref 164–446)
PLATELET # BLD AUTO: 98 K/UL (ref 164–446)
PLATELET # BLD AUTO: 99 K/UL (ref 164–446)
PLATELET BLD QL SMEAR: NORMAL
PMV BLD AUTO: 10 FL (ref 9–12.9)
PMV BLD AUTO: 10.1 FL (ref 9–12.9)
PMV BLD AUTO: 10.2 FL (ref 9–12.9)
PMV BLD AUTO: 10.3 FL (ref 9–12.9)
PMV BLD AUTO: 10.3 FL (ref 9–12.9)
PMV BLD AUTO: 10.4 FL (ref 9–12.9)
PMV BLD AUTO: 10.5 FL (ref 9–12.9)
PMV BLD AUTO: 10.6 FL (ref 9–12.9)
PMV BLD AUTO: 10.6 FL (ref 9–12.9)
PMV BLD AUTO: 10.7 FL (ref 9–12.9)
PMV BLD AUTO: 10.8 FL (ref 9–12.9)
PMV BLD AUTO: 10.9 FL (ref 9–12.9)
PMV BLD AUTO: 11 FL (ref 9–12.9)
PMV BLD AUTO: 11.1 FL (ref 9–12.9)
PMV BLD AUTO: 11.2 FL (ref 9–12.9)
PMV BLD AUTO: 11.3 FL (ref 9–12.9)
PMV BLD AUTO: 11.3 FL (ref 9–12.9)
PMV BLD AUTO: 11.4 FL (ref 9–12.9)
PMV BLD AUTO: 11.5 FL (ref 9–12.9)
PMV BLD AUTO: 9.7 FL (ref 9–12.9)
PMV BLD AUTO: 9.7 FL (ref 9–12.9)
PMV BLD AUTO: 9.8 FL (ref 9–12.9)
PMV BLD AUTO: 9.9 FL (ref 9–12.9)
PO2 BLDA: 165 MMHG (ref 64–87)
PO2 BLDA: 167 MMHG (ref 64–87)
PO2 BLDA: 79 MMHG (ref 64–87)
PO2 TEMP ADJ BLDA: 73 MMHG (ref 64–87)
POIKILOCYTOSIS BLD QL SMEAR: NORMAL
POLYCHROMASIA BLD QL SMEAR: NORMAL
POTASSIUM SERPL-SCNC: 3.1 MMOL/L (ref 3.6–5.5)
POTASSIUM SERPL-SCNC: 3.2 MMOL/L (ref 3.6–5.5)
POTASSIUM SERPL-SCNC: 3.3 MMOL/L (ref 3.6–5.5)
POTASSIUM SERPL-SCNC: 3.3 MMOL/L (ref 3.6–5.5)
POTASSIUM SERPL-SCNC: 3.6 MMOL/L (ref 3.6–5.5)
POTASSIUM SERPL-SCNC: 3.7 MMOL/L (ref 3.6–5.5)
POTASSIUM SERPL-SCNC: 3.8 MMOL/L (ref 3.6–5.5)
POTASSIUM SERPL-SCNC: 3.9 MMOL/L (ref 3.6–5.5)
POTASSIUM SERPL-SCNC: 3.9 MMOL/L (ref 3.6–5.5)
POTASSIUM SERPL-SCNC: 4 MMOL/L (ref 3.6–5.5)
POTASSIUM SERPL-SCNC: 4.1 MMOL/L (ref 3.6–5.5)
POTASSIUM SERPL-SCNC: 4.2 MMOL/L (ref 3.6–5.5)
POTASSIUM SERPL-SCNC: 4.4 MMOL/L (ref 3.6–5.5)
POTASSIUM SERPL-SCNC: 4.6 MMOL/L (ref 3.6–5.5)
POTASSIUM SERPL-SCNC: 4.6 MMOL/L (ref 3.6–5.5)
POTASSIUM SERPL-SCNC: 4.7 MMOL/L (ref 3.6–5.5)
POTASSIUM SERPL-SCNC: 4.7 MMOL/L (ref 3.6–5.5)
POTASSIUM SERPL-SCNC: 5.1 MMOL/L (ref 3.6–5.5)
POTASSIUM SERPL-SCNC: 5.1 MMOL/L (ref 3.6–5.5)
POTASSIUM SERPL-SCNC: 5.3 MMOL/L (ref 3.6–5.5)
POTASSIUM SERPL-SCNC: 5.5 MMOL/L (ref 3.6–5.5)
POTASSIUM SERPL-SCNC: 5.7 MMOL/L (ref 3.6–5.5)
POTASSIUM SERPL-SCNC: 5.8 MMOL/L (ref 3.6–5.5)
POTASSIUM SERPL-SCNC: 5.8 MMOL/L (ref 3.6–5.5)
POTASSIUM SERPL-SCNC: 6.3 MMOL/L (ref 3.6–5.5)
POTASSIUM SERPL-SCNC: 6.5 MMOL/L (ref 3.6–5.5)
POTASSIUM SERPL-SCNC: 6.6 MMOL/L (ref 3.6–5.5)
PREALB SERPL-MCNC: 7 MG/DL (ref 18–38)
PREALB SERPL-MCNC: 7 MG/DL (ref 18–38)
PROMYELOCYTES NFR BLD MANUAL: 0.9 %
PROMYELOCYTES NFR BLD MANUAL: 0.9 %
PROMYELOCYTES NFR BLD MANUAL: 1.8 %
PROMYELOCYTES NFR BLD MANUAL: 1.8 %
PROPOXYPH UR QL SCN: NEGATIVE
PROT SERPL-MCNC: 5.8 G/DL (ref 6–8.2)
PROT SERPL-MCNC: 6.2 G/DL (ref 6–8.2)
PROT SERPL-MCNC: 6.3 G/DL (ref 6–8.2)
PROT SERPL-MCNC: 6.4 G/DL (ref 6–8.2)
PROT SERPL-MCNC: 6.4 G/DL (ref 6–8.2)
PROT SERPL-MCNC: 6.7 G/DL (ref 6–8.2)
PROT SERPL-MCNC: 6.9 G/DL (ref 6–8.2)
PROT SERPL-MCNC: 7 G/DL (ref 6–8.2)
PROT SERPL-MCNC: 7.1 G/DL (ref 6–8.2)
PROT SERPL-MCNC: 7.2 G/DL (ref 6–8.2)
PROT SERPL-MCNC: 7.2 G/DL (ref 6–8.2)
PROT SERPL-MCNC: 7.3 G/DL (ref 6–8.2)
PROT SERPL-MCNC: 7.3 G/DL (ref 6–8.2)
PROT SERPL-MCNC: 7.4 G/DL (ref 6–8.2)
PROT SERPL-MCNC: 7.4 G/DL (ref 6–8.2)
PROT SERPL-MCNC: 7.5 G/DL (ref 6–8.2)
PROT SERPL-MCNC: 7.5 G/DL (ref 6–8.2)
PROT SERPL-MCNC: 7.6 G/DL (ref 6–8.2)
PROT SERPL-MCNC: 7.6 G/DL (ref 6–8.2)
PROT SERPL-MCNC: 7.7 G/DL (ref 6–8.2)
PROT SERPL-MCNC: 7.7 G/DL (ref 6–8.2)
PROT SERPL-MCNC: 7.9 G/DL (ref 6–8.2)
PROT SERPL-MCNC: 7.9 G/DL (ref 6–8.2)
PROT SERPL-MCNC: 8 G/DL (ref 6–8.2)
PROT SERPL-MCNC: 8.1 G/DL (ref 6–8.2)
PROT SERPL-MCNC: 8.2 G/DL (ref 6–8.2)
PROT SERPL-MCNC: 8.4 G/DL (ref 6–8.2)
PROT SERPL-MCNC: 8.5 G/DL (ref 6–8.2)
PROT UR QL STRIP: 30 MG/DL
PROT UR QL STRIP: NEGATIVE MG/DL
PROT UR QL STRIP: NEGATIVE MG/DL
PROTHROMBIN TIME: 17.1 SEC (ref 12–14.6)
PROTHROMBIN TIME: 17.1 SEC (ref 12–14.6)
PROTHROMBIN TIME: 17.4 SEC (ref 12–14.6)
PROTHROMBIN TIME: 19.5 SEC (ref 12–14.6)
PROTHROMBIN TIME: 19.7 SEC (ref 12–14.6)
PROTHROMBIN TIME: 19.7 SEC (ref 12–14.6)
PROTHROMBIN TIME: 20.2 SEC (ref 12–14.6)
PROTHROMBIN TIME: 20.4 SEC (ref 12–14.6)
PROTHROMBIN TIME: 21.6 SEC (ref 12–14.6)
PROTHROMBIN TIME: 21.9 SEC (ref 12–14.6)
PROTHROMBIN TIME: 22.3 SEC (ref 12–14.6)
PROTHROMBIN TIME: 23.1 SEC (ref 12–14.6)
PROTHROMBIN TIME: 24.2 SEC (ref 12–14.6)
PROTHROMBIN TIME: 24.7 SEC (ref 12–14.6)
PROTHROMBIN TIME: 27.3 SEC (ref 12–14.6)
PROTHROMBIN TIME: 28.1 SEC (ref 12–14.6)
PROTHROMBIN TIME: 28.2 SEC (ref 12–14.6)
RBC # BLD AUTO: 2.94 M/UL (ref 4.7–6.1)
RBC # BLD AUTO: 3.81 M/UL (ref 4.7–6.1)
RBC # BLD AUTO: 3.83 M/UL (ref 4.7–6.1)
RBC # BLD AUTO: 3.9 M/UL (ref 4.7–6.1)
RBC # BLD AUTO: 3.95 M/UL (ref 4.7–6.1)
RBC # BLD AUTO: 4 M/UL (ref 4.7–6.1)
RBC # BLD AUTO: 4.16 M/UL (ref 4.7–6.1)
RBC # BLD AUTO: 4.17 M/UL (ref 4.7–6.1)
RBC # BLD AUTO: 4.22 M/UL (ref 4.7–6.1)
RBC # BLD AUTO: 4.48 M/UL (ref 4.7–6.1)
RBC # BLD AUTO: 4.56 M/UL (ref 4.7–6.1)
RBC # BLD AUTO: 4.66 M/UL (ref 4.7–6.1)
RBC # BLD AUTO: 4.67 M/UL (ref 4.7–6.1)
RBC # BLD AUTO: 4.74 M/UL (ref 4.7–6.1)
RBC # BLD AUTO: 4.76 M/UL (ref 4.7–6.1)
RBC # BLD AUTO: 4.78 M/UL (ref 4.7–6.1)
RBC # BLD AUTO: 4.86 M/UL (ref 4.7–6.1)
RBC # BLD AUTO: 4.86 M/UL (ref 4.7–6.1)
RBC # BLD AUTO: 4.94 M/UL (ref 4.7–6.1)
RBC # BLD AUTO: 4.94 M/UL (ref 4.7–6.1)
RBC # BLD AUTO: 5.04 M/UL (ref 4.7–6.1)
RBC # BLD AUTO: 5.14 M/UL (ref 4.7–6.1)
RBC # BLD AUTO: 5.21 M/UL (ref 4.7–6.1)
RBC # BLD AUTO: 5.43 M/UL (ref 4.7–6.1)
RBC # BLD AUTO: 5.49 M/UL (ref 4.7–6.1)
RBC # BLD AUTO: 5.5 M/UL (ref 4.7–6.1)
RBC # BLD AUTO: 5.51 M/UL (ref 4.7–6.1)
RBC # BLD AUTO: 5.55 M/UL (ref 4.7–6.1)
RBC # BLD AUTO: 5.69 M/UL (ref 4.7–6.1)
RBC # BLD AUTO: 5.7 M/UL (ref 4.7–6.1)
RBC # BLD AUTO: 5.72 M/UL (ref 4.7–6.1)
RBC # BLD AUTO: 5.72 M/UL (ref 4.7–6.1)
RBC # BLD AUTO: 5.81 M/UL (ref 4.7–6.1)
RBC # BLD AUTO: 5.83 M/UL (ref 4.7–6.1)
RBC # BLD AUTO: 5.84 M/UL (ref 4.7–6.1)
RBC # BLD AUTO: 6.28 M/UL (ref 4.7–6.1)
RBC # URNS HPF: ABNORMAL /HPF
RBC # URNS HPF: ABNORMAL /HPF
RBC BLD AUTO: PRESENT
RBC UR QL AUTO: ABNORMAL
RBC UR QL AUTO: NEGATIVE
RBC UR QL AUTO: NEGATIVE
SAO2 % BLDA: 100 % (ref 93–99)
SAO2 % BLDA: 100 % (ref 93–99)
SAO2 % BLDA: 96 % (ref 93–99)
SCHISTOCYTES BLD QL SMEAR: NORMAL
SCHISTOCYTES BLD QL SMEAR: NORMAL
SIGNIFICANT IND 70042: NORMAL
SITE SITE: NORMAL
SODIUM SERPL-SCNC: 117 MMOL/L (ref 135–145)
SODIUM SERPL-SCNC: 117 MMOL/L (ref 135–145)
SODIUM SERPL-SCNC: 118 MMOL/L (ref 135–145)
SODIUM SERPL-SCNC: 118 MMOL/L (ref 135–145)
SODIUM SERPL-SCNC: 119 MMOL/L (ref 135–145)
SODIUM SERPL-SCNC: 120 MMOL/L (ref 135–145)
SODIUM SERPL-SCNC: 120 MMOL/L (ref 135–145)
SODIUM SERPL-SCNC: 121 MMOL/L (ref 135–145)
SODIUM SERPL-SCNC: 121 MMOL/L (ref 135–145)
SODIUM SERPL-SCNC: 122 MMOL/L (ref 135–145)
SODIUM SERPL-SCNC: 123 MMOL/L (ref 135–145)
SODIUM SERPL-SCNC: 124 MMOL/L (ref 135–145)
SODIUM SERPL-SCNC: 125 MMOL/L (ref 135–145)
SODIUM SERPL-SCNC: 126 MMOL/L (ref 135–145)
SODIUM SERPL-SCNC: 126 MMOL/L (ref 135–145)
SODIUM SERPL-SCNC: 127 MMOL/L (ref 135–145)
SODIUM SERPL-SCNC: 128 MMOL/L (ref 135–145)
SODIUM SERPL-SCNC: 128 MMOL/L (ref 135–145)
SODIUM SERPL-SCNC: 129 MMOL/L (ref 135–145)
SODIUM SERPL-SCNC: 130 MMOL/L (ref 135–145)
SODIUM SERPL-SCNC: 130 MMOL/L (ref 135–145)
SODIUM SERPL-SCNC: 131 MMOL/L (ref 135–145)
SOURCE SOURCE: NORMAL
SP GR UR STRIP.AUTO: 1.01
SP GR UR STRIP.AUTO: 1.01
SP GR UR STRIP.AUTO: 1.02
SPECIMEN DRAWN FROM PATIENT: ABNORMAL
SPHEROCYTES BLD QL SMEAR: NORMAL
SPHEROCYTES BLD QL SMEAR: NORMAL
T4 FREE SERPL-MCNC: 1.2 NG/DL (ref 0.53–1.43)
TARGETS BLD QL SMEAR: NORMAL
TOXIC GRANULES BLD QL SMEAR: SLIGHT
TRIGL SERPL-MCNC: 56 MG/DL (ref 0–149)
TROPONIN I SERPL-MCNC: 0.04 NG/ML (ref 0–0.04)
TROPONIN I SERPL-MCNC: 0.05 NG/ML (ref 0–0.04)
TROPONIN I SERPL-MCNC: 0.09 NG/ML (ref 0–0.04)
TSH SERPL DL<=0.005 MIU/L-ACNC: 1.16 UIU/ML (ref 0.3–3.7)
TSH SERPL DL<=0.005 MIU/L-ACNC: 4.3 UIU/ML (ref 0.3–3.7)
URATE SERPL-MCNC: 13.3 MG/DL (ref 2.5–8.3)
VANCOMYCIN SERPL-MCNC: 24.6 UG/ML
WBC # BLD AUTO: 10 K/UL (ref 4.8–10.8)
WBC # BLD AUTO: 10.9 K/UL (ref 4.8–10.8)
WBC # BLD AUTO: 10.9 K/UL (ref 4.8–10.8)
WBC # BLD AUTO: 11.4 K/UL (ref 4.8–10.8)
WBC # BLD AUTO: 12.4 K/UL (ref 4.8–10.8)
WBC # BLD AUTO: 12.4 K/UL (ref 4.8–10.8)
WBC # BLD AUTO: 12.6 K/UL (ref 4.8–10.8)
WBC # BLD AUTO: 13.4 K/UL (ref 4.8–10.8)
WBC # BLD AUTO: 13.8 K/UL (ref 4.8–10.8)
WBC # BLD AUTO: 14.1 K/UL (ref 4.8–10.8)
WBC # BLD AUTO: 14.1 K/UL (ref 4.8–10.8)
WBC # BLD AUTO: 14.2 K/UL (ref 4.8–10.8)
WBC # BLD AUTO: 14.3 K/UL (ref 4.8–10.8)
WBC # BLD AUTO: 14.7 K/UL (ref 4.8–10.8)
WBC # BLD AUTO: 14.9 K/UL (ref 4.8–10.8)
WBC # BLD AUTO: 15.4 K/UL (ref 4.8–10.8)
WBC # BLD AUTO: 15.5 K/UL (ref 4.8–10.8)
WBC # BLD AUTO: 15.8 K/UL (ref 4.8–10.8)
WBC # BLD AUTO: 15.9 K/UL (ref 4.8–10.8)
WBC # BLD AUTO: 16 K/UL (ref 4.8–10.8)
WBC # BLD AUTO: 17.1 K/UL (ref 4.8–10.8)
WBC # BLD AUTO: 17.4 K/UL (ref 4.8–10.8)
WBC # BLD AUTO: 17.9 K/UL (ref 4.8–10.8)
WBC # BLD AUTO: 22.4 K/UL (ref 4.8–10.8)
WBC # BLD AUTO: 22.9 K/UL (ref 4.8–10.8)
WBC # BLD AUTO: 23 K/UL (ref 4.8–10.8)
WBC # BLD AUTO: 23.7 K/UL (ref 4.8–10.8)
WBC # BLD AUTO: 6.7 K/UL (ref 4.8–10.8)
WBC # BLD AUTO: 7.6 K/UL (ref 4.8–10.8)
WBC # BLD AUTO: 7.8 K/UL (ref 4.8–10.8)
WBC # BLD AUTO: 7.8 K/UL (ref 4.8–10.8)
WBC # BLD AUTO: 8.4 K/UL (ref 4.8–10.8)
WBC # BLD AUTO: 8.7 K/UL (ref 4.8–10.8)
WBC # BLD AUTO: 9 K/UL (ref 4.8–10.8)
WBC # BLD AUTO: 9.3 K/UL (ref 4.8–10.8)
WBC # BLD AUTO: 9.4 K/UL (ref 4.8–10.8)
WBC #/AREA URNS HPF: ABNORMAL /HPF
WBC #/AREA URNS HPF: ABNORMAL /HPF

## 2017-01-01 PROCEDURE — 700111 HCHG RX REV CODE 636 W/ 250 OVERRIDE (IP): Performed by: INTERNAL MEDICINE

## 2017-01-01 PROCEDURE — 700105 HCHG RX REV CODE 258: Performed by: EMERGENCY MEDICINE

## 2017-01-01 PROCEDURE — 700102 HCHG RX REV CODE 250 W/ 637 OVERRIDE(OP): Performed by: INTERNAL MEDICINE

## 2017-01-01 PROCEDURE — 99291 CRITICAL CARE FIRST HOUR: CPT | Performed by: INTERNAL MEDICINE

## 2017-01-01 PROCEDURE — 99223 1ST HOSP IP/OBS HIGH 75: CPT | Performed by: HOSPITALIST

## 2017-01-01 PROCEDURE — A9270 NON-COVERED ITEM OR SERVICE: HCPCS | Performed by: HOSPITALIST

## 2017-01-01 PROCEDURE — 700102 HCHG RX REV CODE 250 W/ 637 OVERRIDE(OP): Performed by: HOSPITALIST

## 2017-01-01 PROCEDURE — C9113 INJ PANTOPRAZOLE SODIUM, VIA: HCPCS | Performed by: INTERNAL MEDICINE

## 2017-01-01 PROCEDURE — A9270 NON-COVERED ITEM OR SERVICE: HCPCS | Performed by: INTERNAL MEDICINE

## 2017-01-01 PROCEDURE — 700101 HCHG RX REV CODE 250: Performed by: HOSPITALIST

## 2017-01-01 PROCEDURE — 770022 HCHG ROOM/CARE - ICU (200)

## 2017-01-01 PROCEDURE — 71010 DX-CHEST-PORTABLE (1 VIEW): CPT

## 2017-01-01 PROCEDURE — 36415 COLL VENOUS BLD VENIPUNCTURE: CPT

## 2017-01-01 PROCEDURE — 84100 ASSAY OF PHOSPHORUS: CPT

## 2017-01-01 PROCEDURE — 0W9G3ZZ DRAINAGE OF PERITONEAL CAVITY, PERCUTANEOUS APPROACH: ICD-10-PCS | Performed by: RADIOLOGY

## 2017-01-01 PROCEDURE — 93005 ELECTROCARDIOGRAM TRACING: CPT | Performed by: EMERGENCY MEDICINE

## 2017-01-01 PROCEDURE — G8988 SELF CARE GOAL STATUS: HCPCS | Mod: CK

## 2017-01-01 PROCEDURE — A9270 NON-COVERED ITEM OR SERVICE: HCPCS

## 2017-01-01 PROCEDURE — 99233 SBSQ HOSP IP/OBS HIGH 50: CPT | Performed by: HOSPITALIST

## 2017-01-01 PROCEDURE — 80076 HEPATIC FUNCTION PANEL: CPT

## 2017-01-01 PROCEDURE — 700112 HCHG RX REV CODE 229: Performed by: INTERNAL MEDICINE

## 2017-01-01 PROCEDURE — 51702 INSERT TEMP BLADDER CATH: CPT

## 2017-01-01 PROCEDURE — 700105 HCHG RX REV CODE 258: Performed by: HOSPITALIST

## 2017-01-01 PROCEDURE — 94760 N-INVAS EAR/PLS OXIMETRY 1: CPT

## 2017-01-01 PROCEDURE — 80048 BASIC METABOLIC PNL TOTAL CA: CPT

## 2017-01-01 PROCEDURE — 303105 HCHG CATHETER EXTRA

## 2017-01-01 PROCEDURE — 700101 HCHG RX REV CODE 250: Performed by: INTERNAL MEDICINE

## 2017-01-01 PROCEDURE — 700105 HCHG RX REV CODE 258

## 2017-01-01 PROCEDURE — 83735 ASSAY OF MAGNESIUM: CPT

## 2017-01-01 PROCEDURE — 82140 ASSAY OF AMMONIA: CPT

## 2017-01-01 PROCEDURE — 99291 CRITICAL CARE FIRST HOUR: CPT | Mod: 25 | Performed by: INTERNAL MEDICINE

## 2017-01-01 PROCEDURE — G8980 MOBILITY D/C STATUS: HCPCS | Mod: CI

## 2017-01-01 PROCEDURE — 87086 URINE CULTURE/COLONY COUNT: CPT

## 2017-01-01 PROCEDURE — 700111 HCHG RX REV CODE 636 W/ 250 OVERRIDE (IP): Performed by: HOSPITALIST

## 2017-01-01 PROCEDURE — 85730 THROMBOPLASTIN TIME PARTIAL: CPT

## 2017-01-01 PROCEDURE — 85025 COMPLETE CBC W/AUTO DIFF WBC: CPT

## 2017-01-01 PROCEDURE — 80053 COMPREHEN METABOLIC PANEL: CPT

## 2017-01-01 PROCEDURE — 770020 HCHG ROOM/CARE - TELE (206)

## 2017-01-01 PROCEDURE — 93970 EXTREMITY STUDY: CPT

## 2017-01-01 PROCEDURE — 76700 US EXAM ABDOM COMPLETE: CPT

## 2017-01-01 PROCEDURE — 84439 ASSAY OF FREE THYROXINE: CPT

## 2017-01-01 PROCEDURE — 700111 HCHG RX REV CODE 636 W/ 250 OVERRIDE (IP)

## 2017-01-01 PROCEDURE — 700105 HCHG RX REV CODE 258: Performed by: INTERNAL MEDICINE

## 2017-01-01 PROCEDURE — 92526 ORAL FUNCTION THERAPY: CPT

## 2017-01-01 PROCEDURE — 85007 BL SMEAR W/DIFF WBC COUNT: CPT

## 2017-01-01 PROCEDURE — C1751 CATH, INF, PER/CENT/MIDLINE: HCPCS

## 2017-01-01 PROCEDURE — 87389 HIV-1 AG W/HIV-1&-2 AB AG IA: CPT

## 2017-01-01 PROCEDURE — 99291 CRITICAL CARE FIRST HOUR: CPT | Performed by: HOSPITALIST

## 2017-01-01 PROCEDURE — 83930 ASSAY OF BLOOD OSMOLALITY: CPT

## 2017-01-01 PROCEDURE — 97535 SELF CARE MNGMENT TRAINING: CPT

## 2017-01-01 PROCEDURE — 94002 VENT MGMT INPAT INIT DAY: CPT

## 2017-01-01 PROCEDURE — 85027 COMPLETE CBC AUTOMATED: CPT

## 2017-01-01 PROCEDURE — 82962 GLUCOSE BLOOD TEST: CPT

## 2017-01-01 PROCEDURE — A9270 NON-COVERED ITEM OR SERVICE: HCPCS | Performed by: STUDENT IN AN ORGANIZED HEALTH CARE EDUCATION/TRAINING PROGRAM

## 2017-01-01 PROCEDURE — 700102 HCHG RX REV CODE 250 W/ 637 OVERRIDE(OP): Performed by: EMERGENCY MEDICINE

## 2017-01-01 PROCEDURE — 700111 HCHG RX REV CODE 636 W/ 250 OVERRIDE (IP): Performed by: EMERGENCY MEDICINE

## 2017-01-01 PROCEDURE — B548ZZA ULTRASONOGRAPHY OF SUPERIOR VENA CAVA, GUIDANCE: ICD-10-PCS | Performed by: INTERNAL MEDICINE

## 2017-01-01 PROCEDURE — 36556 INSERT NON-TUNNEL CV CATH: CPT | Performed by: INTERNAL MEDICINE

## 2017-01-01 PROCEDURE — 83880 ASSAY OF NATRIURETIC PEPTIDE: CPT

## 2017-01-01 PROCEDURE — 700105 HCHG RX REV CODE 258: Performed by: PHARMACIST

## 2017-01-01 PROCEDURE — 700111 HCHG RX REV CODE 636 W/ 250 OVERRIDE (IP): Performed by: STUDENT IN AN ORGANIZED HEALTH CARE EDUCATION/TRAINING PROGRAM

## 2017-01-01 PROCEDURE — 700102 HCHG RX REV CODE 250 W/ 637 OVERRIDE(OP)

## 2017-01-01 PROCEDURE — 83605 ASSAY OF LACTIC ACID: CPT

## 2017-01-01 PROCEDURE — 78226 HEPATOBILIARY SYSTEM IMAGING: CPT

## 2017-01-01 PROCEDURE — 85610 PROTHROMBIN TIME: CPT

## 2017-01-01 PROCEDURE — 85730 THROMBOPLASTIN TIME PARTIAL: CPT | Mod: 91

## 2017-01-01 PROCEDURE — 99291 CRITICAL CARE FIRST HOUR: CPT

## 2017-01-01 PROCEDURE — 80061 LIPID PANEL: CPT

## 2017-01-01 PROCEDURE — 302146: Performed by: HOSPITALIST

## 2017-01-01 PROCEDURE — P9047 ALBUMIN (HUMAN), 25%, 50ML: HCPCS | Performed by: HOSPITALIST

## 2017-01-01 PROCEDURE — 85652 RBC SED RATE AUTOMATED: CPT

## 2017-01-01 PROCEDURE — 306595 SYSTEM,FEED TUBE CLOG ZAPPER: Performed by: INTERNAL MEDICINE

## 2017-01-01 PROCEDURE — 81001 URINALYSIS AUTO W/SCOPE: CPT

## 2017-01-01 PROCEDURE — 700102 HCHG RX REV CODE 250 W/ 637 OVERRIDE(OP): Performed by: STUDENT IN AN ORGANIZED HEALTH CARE EDUCATION/TRAINING PROGRAM

## 2017-01-01 PROCEDURE — 80202 ASSAY OF VANCOMYCIN: CPT

## 2017-01-01 PROCEDURE — 83036 HEMOGLOBIN GLYCOSYLATED A1C: CPT

## 2017-01-01 PROCEDURE — 99233 SBSQ HOSP IP/OBS HIGH 50: CPT | Performed by: INTERNAL MEDICINE

## 2017-01-01 PROCEDURE — 700101 HCHG RX REV CODE 250

## 2017-01-01 PROCEDURE — G8979 MOBILITY GOAL STATUS: HCPCS | Mod: CK

## 2017-01-01 PROCEDURE — 87070 CULTURE OTHR SPECIMN AEROBIC: CPT

## 2017-01-01 PROCEDURE — 99232 SBSQ HOSP IP/OBS MODERATE 35: CPT | Performed by: HOSPITALIST

## 2017-01-01 PROCEDURE — G8978 MOBILITY CURRENT STATUS: HCPCS | Mod: CM

## 2017-01-01 PROCEDURE — 84550 ASSAY OF BLOOD/URIC ACID: CPT

## 2017-01-01 PROCEDURE — 84443 ASSAY THYROID STIM HORMONE: CPT

## 2017-01-01 PROCEDURE — 82803 BLOOD GASES ANY COMBINATION: CPT

## 2017-01-01 PROCEDURE — 84484 ASSAY OF TROPONIN QUANT: CPT

## 2017-01-01 PROCEDURE — 96368 THER/DIAG CONCURRENT INF: CPT

## 2017-01-01 PROCEDURE — G8996 SWALLOW CURRENT STATUS: HCPCS | Mod: CK

## 2017-01-01 PROCEDURE — G8979 MOBILITY GOAL STATUS: HCPCS | Mod: CI

## 2017-01-01 PROCEDURE — G8978 MOBILITY CURRENT STATUS: HCPCS | Mod: CJ

## 2017-01-01 PROCEDURE — 83935 ASSAY OF URINE OSMOLALITY: CPT

## 2017-01-01 PROCEDURE — 49083 ABD PARACENTESIS W/IMAGING: CPT

## 2017-01-01 PROCEDURE — 51798 US URINE CAPACITY MEASURE: CPT

## 2017-01-01 PROCEDURE — 700101 HCHG RX REV CODE 250: Performed by: STUDENT IN AN ORGANIZED HEALTH CARE EDUCATION/TRAINING PROGRAM

## 2017-01-01 PROCEDURE — 80048 BASIC METABOLIC PNL TOTAL CA: CPT | Mod: 91

## 2017-01-01 PROCEDURE — 94640 AIRWAY INHALATION TREATMENT: CPT

## 2017-01-01 PROCEDURE — 96367 TX/PROPH/DG ADDL SEQ IV INF: CPT

## 2017-01-01 PROCEDURE — 82270 OCCULT BLOOD FECES: CPT

## 2017-01-01 PROCEDURE — 85018 HEMOGLOBIN: CPT

## 2017-01-01 PROCEDURE — 80074 ACUTE HEPATITIS PANEL: CPT

## 2017-01-01 PROCEDURE — G8987 SELF CARE CURRENT STATUS: HCPCS | Mod: CM

## 2017-01-01 PROCEDURE — 700117 HCHG RX CONTRAST REV CODE 255: Performed by: EMERGENCY MEDICINE

## 2017-01-01 PROCEDURE — 96375 TX/PRO/DX INJ NEW DRUG ADDON: CPT

## 2017-01-01 PROCEDURE — 86140 C-REACTIVE PROTEIN: CPT

## 2017-01-01 PROCEDURE — 83605 ASSAY OF LACTIC ACID: CPT | Mod: 91

## 2017-01-01 PROCEDURE — 84134 ASSAY OF PREALBUMIN: CPT

## 2017-01-01 PROCEDURE — 99292 CRITICAL CARE ADDL 30 MIN: CPT | Mod: 25 | Performed by: INTERNAL MEDICINE

## 2017-01-01 PROCEDURE — C1751 CATH, INF, PER/CENT/MIDLINE: HCPCS | Performed by: EMERGENCY MEDICINE

## 2017-01-01 PROCEDURE — 93306 TTE W/DOPPLER COMPLETE: CPT

## 2017-01-01 PROCEDURE — 304562 HCHG STAT O2 MASK/CANNULA

## 2017-01-01 PROCEDURE — 70450 CT HEAD/BRAIN W/O DYE: CPT

## 2017-01-01 PROCEDURE — 96366 THER/PROPH/DIAG IV INF ADDON: CPT

## 2017-01-01 PROCEDURE — 02HV33Z INSERTION OF INFUSION DEVICE INTO SUPERIOR VENA CAVA, PERCUTANEOUS APPROACH: ICD-10-PCS | Performed by: INTERNAL MEDICINE

## 2017-01-01 PROCEDURE — 83690 ASSAY OF LIPASE: CPT

## 2017-01-01 PROCEDURE — 85014 HEMATOCRIT: CPT

## 2017-01-01 PROCEDURE — A9270 NON-COVERED ITEM OR SERVICE: HCPCS | Performed by: EMERGENCY MEDICINE

## 2017-01-01 PROCEDURE — 97530 THERAPEUTIC ACTIVITIES: CPT

## 2017-01-01 PROCEDURE — 80307 DRUG TEST PRSMV CHEM ANLYZR: CPT

## 2017-01-01 PROCEDURE — 99239 HOSP IP/OBS DSCHRG MGMT >30: CPT | Performed by: HOSPITALIST

## 2017-01-01 PROCEDURE — 93306 TTE W/DOPPLER COMPLETE: CPT | Mod: 26 | Performed by: INTERNAL MEDICINE

## 2017-01-01 PROCEDURE — 304561 HCHG STAT O2

## 2017-01-01 PROCEDURE — 94003 VENT MGMT INPAT SUBQ DAY: CPT

## 2017-01-01 PROCEDURE — 87205 SMEAR GRAM STAIN: CPT

## 2017-01-01 PROCEDURE — 76705 ECHO EXAM OF ABDOMEN: CPT

## 2017-01-01 PROCEDURE — 99232 SBSQ HOSP IP/OBS MODERATE 35: CPT | Performed by: INTERNAL MEDICINE

## 2017-01-01 PROCEDURE — 96365 THER/PROPH/DIAG IV INF INIT: CPT

## 2017-01-01 PROCEDURE — 97167 OT EVAL HIGH COMPLEX 60 MIN: CPT

## 2017-01-01 PROCEDURE — 92610 EVALUATE SWALLOWING FUNCTION: CPT

## 2017-01-01 PROCEDURE — 87522 HEPATITIS C REVRS TRNSCRPJ: CPT

## 2017-01-01 PROCEDURE — 306802 CATHETER,INSTAFLOW BOWEL: Performed by: INTERNAL MEDICINE

## 2017-01-01 PROCEDURE — 93010 ELECTROCARDIOGRAM REPORT: CPT | Performed by: INTERNAL MEDICINE

## 2017-01-01 PROCEDURE — 97161 PT EVAL LOW COMPLEX 20 MIN: CPT

## 2017-01-01 PROCEDURE — 71010 DX-CHEST-LIMITED (1 VIEW): CPT

## 2017-01-01 PROCEDURE — 86803 HEPATITIS C AB TEST: CPT

## 2017-01-01 PROCEDURE — 306310 ANTI-EMBOLISM STOCKINGS XXLRG REG: Performed by: HOSPITALIST

## 2017-01-01 PROCEDURE — G8997 SWALLOW GOAL STATUS: HCPCS | Mod: CI

## 2017-01-01 PROCEDURE — 81003 URINALYSIS AUTO W/O SCOPE: CPT

## 2017-01-01 PROCEDURE — 3E043XZ INTRODUCTION OF VASOPRESSOR INTO CENTRAL VEIN, PERCUTANEOUS APPROACH: ICD-10-PCS | Performed by: INTERNAL MEDICINE

## 2017-01-01 PROCEDURE — 82533 TOTAL CORTISOL: CPT

## 2017-01-01 PROCEDURE — 82728 ASSAY OF FERRITIN: CPT

## 2017-01-01 PROCEDURE — 74177 CT ABD & PELVIS W/CONTRAST: CPT

## 2017-01-01 PROCEDURE — 99285 EMERGENCY DEPT VISIT HI MDM: CPT

## 2017-01-01 PROCEDURE — 700111 HCHG RX REV CODE 636 W/ 250 OVERRIDE (IP): Performed by: PHARMACIST

## 2017-01-01 PROCEDURE — 36556 INSERT NON-TUNNEL CV CATH: CPT

## 2017-01-01 PROCEDURE — 32555 ASPIRATE PLEURA W/ IMAGING: CPT | Mod: LT

## 2017-01-01 PROCEDURE — 93005 ELECTROCARDIOGRAM TRACING: CPT | Performed by: INTERNAL MEDICINE

## 2017-01-01 PROCEDURE — 87040 BLOOD CULTURE FOR BACTERIA: CPT | Mod: 91

## 2017-01-01 PROCEDURE — 93970 EXTREMITY STUDY: CPT | Mod: 26 | Performed by: SURGERY

## 2017-01-01 PROCEDURE — 87040 BLOOD CULTURE FOR BACTERIA: CPT

## 2017-01-01 PROCEDURE — 96374 THER/PROPH/DIAG INJ IV PUSH: CPT

## 2017-01-01 PROCEDURE — 97162 PT EVAL MOD COMPLEX 30 MIN: CPT

## 2017-01-01 PROCEDURE — 93005 ELECTROCARDIOGRAM TRACING: CPT | Performed by: HOSPITALIST

## 2017-01-01 PROCEDURE — 82550 ASSAY OF CK (CPK): CPT

## 2017-01-01 PROCEDURE — 74000 DX-ABDOMEN-1 VIEW: CPT

## 2017-01-01 RX ORDER — OXYCODONE HYDROCHLORIDE 5 MG/1
2.5-5 TABLET ORAL EVERY 4 HOURS PRN
Status: DISCONTINUED | OUTPATIENT
Start: 2017-01-01 | End: 2017-04-07 | Stop reason: HOSPADM

## 2017-01-01 RX ORDER — HALOPERIDOL 5 MG/ML
5 INJECTION INTRAMUSCULAR EVERY 4 HOURS PRN
Status: DISCONTINUED | OUTPATIENT
Start: 2017-01-01 | End: 2017-04-07 | Stop reason: HOSPADM

## 2017-01-01 RX ORDER — RISPERIDONE 1 MG/1
1 TABLET ORAL EVERY EVENING
Status: DISCONTINUED | OUTPATIENT
Start: 2017-01-01 | End: 2017-01-01

## 2017-01-01 RX ORDER — FUROSEMIDE 10 MG/ML
40 INJECTION INTRAMUSCULAR; INTRAVENOUS EVERY 8 HOURS
Status: DISCONTINUED | OUTPATIENT
Start: 2017-01-01 | End: 2017-01-01

## 2017-01-01 RX ORDER — FUROSEMIDE 10 MG/ML
40 INJECTION INTRAMUSCULAR; INTRAVENOUS
Status: DISCONTINUED | OUTPATIENT
Start: 2017-01-01 | End: 2017-01-01

## 2017-01-01 RX ORDER — MORPHINE SULFATE 10 MG/ML
5-10 INJECTION, SOLUTION INTRAMUSCULAR; INTRAVENOUS
Status: DISCONTINUED | OUTPATIENT
Start: 2017-01-01 | End: 2017-04-07 | Stop reason: HOSPADM

## 2017-01-01 RX ORDER — ONDANSETRON 4 MG/1
4 TABLET, ORALLY DISINTEGRATING ORAL EVERY 4 HOURS PRN
Status: DISCONTINUED | OUTPATIENT
Start: 2017-01-01 | End: 2017-04-07 | Stop reason: HOSPADM

## 2017-01-01 RX ORDER — CLINDAMYCIN PHOSPHATE 900 MG/50ML
900 INJECTION, SOLUTION INTRAVENOUS EVERY 8 HOURS
Status: DISCONTINUED | OUTPATIENT
Start: 2017-01-01 | End: 2017-01-01

## 2017-01-01 RX ORDER — SENNOSIDES A AND B 8.6 MG/1
8.6 TABLET, FILM COATED ORAL
COMMUNITY
End: 2017-01-01

## 2017-01-01 RX ORDER — BISACODYL 10 MG
10 SUPPOSITORY, RECTAL RECTAL
Status: DISCONTINUED | OUTPATIENT
Start: 2017-01-01 | End: 2017-01-01 | Stop reason: HOSPADM

## 2017-01-01 RX ORDER — POTASSIUM CHLORIDE 20 MEQ/1
40 TABLET, EXTENDED RELEASE ORAL ONCE
Status: COMPLETED | OUTPATIENT
Start: 2017-01-01 | End: 2017-01-01

## 2017-01-01 RX ORDER — OXYCODONE HYDROCHLORIDE 10 MG/1
10 TABLET ORAL EVERY 4 HOURS PRN
Status: DISCONTINUED | OUTPATIENT
Start: 2017-01-01 | End: 2017-01-01 | Stop reason: HOSPADM

## 2017-01-01 RX ORDER — SODIUM CHLORIDE 9 MG/ML
INJECTION, SOLUTION INTRAVENOUS CONTINUOUS
Status: DISCONTINUED | OUTPATIENT
Start: 2017-01-01 | End: 2017-01-01

## 2017-01-01 RX ORDER — FAMOTIDINE 20 MG/1
20 TABLET, FILM COATED ORAL 2 TIMES DAILY
Status: DISCONTINUED | OUTPATIENT
Start: 2017-01-01 | End: 2017-01-01

## 2017-01-01 RX ORDER — SENNOSIDES A AND B 8.6 MG/1
8.6 TABLET, FILM COATED ORAL 2 TIMES DAILY
COMMUNITY

## 2017-01-01 RX ORDER — MORPHINE SULFATE 4 MG/ML
2-4 INJECTION, SOLUTION INTRAMUSCULAR; INTRAVENOUS EVERY 4 HOURS PRN
Status: DISCONTINUED | OUTPATIENT
Start: 2017-01-01 | End: 2017-01-01 | Stop reason: HOSPADM

## 2017-01-01 RX ORDER — CYCLOBENZAPRINE HCL 10 MG
10 TABLET ORAL 3 TIMES DAILY PRN
Status: DISCONTINUED | OUTPATIENT
Start: 2017-01-01 | End: 2017-01-01 | Stop reason: HOSPADM

## 2017-01-01 RX ORDER — MORPHINE SULFATE 10 MG/ML
INJECTION, SOLUTION INTRAMUSCULAR; INTRAVENOUS
Status: COMPLETED
Start: 2017-01-01 | End: 2017-01-01

## 2017-01-01 RX ORDER — LACTULOSE 20 G/30ML
30 SOLUTION ORAL
Status: DISCONTINUED | OUTPATIENT
Start: 2017-01-01 | End: 2017-01-01 | Stop reason: HOSPADM

## 2017-01-01 RX ORDER — LISINOPRIL 5 MG/1
2.5 TABLET ORAL DAILY
Status: DISCONTINUED | OUTPATIENT
Start: 2017-01-01 | End: 2017-01-01

## 2017-01-01 RX ORDER — GABAPENTIN 300 MG/1
300 CAPSULE ORAL
Status: DISCONTINUED | OUTPATIENT
Start: 2017-01-01 | End: 2017-01-01 | Stop reason: HOSPADM

## 2017-01-01 RX ORDER — WARFARIN SODIUM 3 MG/1
3 TABLET ORAL
Status: DISCONTINUED | OUTPATIENT
Start: 2017-01-01 | End: 2017-01-01

## 2017-01-01 RX ORDER — ASPIRIN 325 MG
325 TABLET ORAL ONCE
Status: COMPLETED | OUTPATIENT
Start: 2017-01-01 | End: 2017-01-01

## 2017-01-01 RX ORDER — FUROSEMIDE 40 MG/1
40 TABLET ORAL
Status: DISCONTINUED | OUTPATIENT
Start: 2017-01-01 | End: 2017-01-01

## 2017-01-01 RX ORDER — AMPICILLIN AND SULBACTAM 2; 1 G/1; G/1
3 INJECTION, POWDER, FOR SOLUTION INTRAMUSCULAR; INTRAVENOUS ONCE
Status: COMPLETED | OUTPATIENT
Start: 2017-01-01 | End: 2017-01-01

## 2017-01-01 RX ORDER — WARFARIN SODIUM 4 MG/1
4 TABLET ORAL
Status: DISCONTINUED | OUTPATIENT
Start: 2017-01-01 | End: 2017-01-01

## 2017-01-01 RX ORDER — PANTOPRAZOLE SODIUM 40 MG/10ML
40 INJECTION, POWDER, LYOPHILIZED, FOR SOLUTION INTRAVENOUS 2 TIMES DAILY
Status: DISCONTINUED | OUTPATIENT
Start: 2017-01-01 | End: 2017-01-01

## 2017-01-01 RX ORDER — MORPHINE SULFATE 4 MG/ML
1-10 INJECTION, SOLUTION INTRAMUSCULAR; INTRAVENOUS
Status: DISCONTINUED | OUTPATIENT
Start: 2017-01-01 | End: 2017-01-01

## 2017-01-01 RX ORDER — ACETAMINOPHEN 325 MG/1
650 TABLET ORAL EVERY 6 HOURS PRN
Status: DISCONTINUED | OUTPATIENT
Start: 2017-01-01 | End: 2017-04-07 | Stop reason: HOSPADM

## 2017-01-01 RX ORDER — SODIUM POLYSTYRENE SULFONATE 15 G/60ML
30 SUSPENSION ORAL; RECTAL ONCE
Status: COMPLETED | OUTPATIENT
Start: 2017-01-01 | End: 2017-01-01

## 2017-01-01 RX ORDER — LORAZEPAM 0.5 MG/1
0.5 TABLET ORAL EVERY 4 HOURS PRN
Qty: 30 TAB | Refills: 0 | Status: SHIPPED | OUTPATIENT
Start: 2017-01-01 | End: 2017-01-01

## 2017-01-01 RX ORDER — LACTULOSE 20 G/30ML
30 SOLUTION ORAL 2 TIMES DAILY
Status: DISCONTINUED | OUTPATIENT
Start: 2017-01-01 | End: 2017-04-07 | Stop reason: HOSPADM

## 2017-01-01 RX ORDER — METOLAZONE 5 MG/1
5 TABLET ORAL DAILY
Status: DISCONTINUED | OUTPATIENT
Start: 2017-01-01 | End: 2017-01-01

## 2017-01-01 RX ORDER — POTASSIUM CHLORIDE 1.5 G/1.58G
20 POWDER, FOR SOLUTION ORAL DAILY
Status: DISCONTINUED | OUTPATIENT
Start: 2017-01-01 | End: 2017-01-01

## 2017-01-01 RX ORDER — POTASSIUM CHLORIDE 1.5 G/1.58G
40 POWDER, FOR SOLUTION ORAL 2 TIMES DAILY
Status: DISCONTINUED | OUTPATIENT
Start: 2017-01-01 | End: 2017-01-01

## 2017-01-01 RX ORDER — WARFARIN SODIUM 5 MG/1
5 TABLET ORAL
Status: DISCONTINUED | OUTPATIENT
Start: 2017-01-01 | End: 2017-01-01

## 2017-01-01 RX ORDER — DEXTROSE MONOHYDRATE 25 G/50ML
50 INJECTION, SOLUTION INTRAVENOUS ONCE
Status: COMPLETED | OUTPATIENT
Start: 2017-01-01 | End: 2017-01-01

## 2017-01-01 RX ORDER — FUROSEMIDE 10 MG/ML
80 INJECTION INTRAMUSCULAR; INTRAVENOUS ONCE
Status: COMPLETED | OUTPATIENT
Start: 2017-01-01 | End: 2017-01-01

## 2017-01-01 RX ORDER — GABAPENTIN 300 MG/1
300 CAPSULE ORAL 2 TIMES DAILY
COMMUNITY

## 2017-01-01 RX ORDER — OXYCODONE HYDROCHLORIDE 5 MG/1
5 TABLET ORAL EVERY 4 HOURS PRN
Status: DISCONTINUED | OUTPATIENT
Start: 2017-01-01 | End: 2017-01-01

## 2017-01-01 RX ORDER — FUROSEMIDE 10 MG/ML
60 INJECTION INTRAMUSCULAR; INTRAVENOUS EVERY 8 HOURS
Status: DISCONTINUED | OUTPATIENT
Start: 2017-01-01 | End: 2017-01-01

## 2017-01-01 RX ORDER — LORAZEPAM 2 MG/ML
2 INJECTION INTRAMUSCULAR EVERY 6 HOURS PRN
Status: DISCONTINUED | OUTPATIENT
Start: 2017-01-01 | End: 2017-01-01

## 2017-01-01 RX ORDER — GABAPENTIN 300 MG/1
300 CAPSULE ORAL 2 TIMES DAILY
Status: DISCONTINUED | OUTPATIENT
Start: 2017-01-01 | End: 2017-04-07 | Stop reason: HOSPADM

## 2017-01-01 RX ORDER — GABAPENTIN 300 MG/1
300 CAPSULE ORAL 2 TIMES DAILY
Status: DISCONTINUED | OUTPATIENT
Start: 2017-01-01 | End: 2017-01-01

## 2017-01-01 RX ORDER — LISINOPRIL 5 MG/1
2.5 TABLET ORAL
Status: DISCONTINUED | OUTPATIENT
Start: 2017-01-01 | End: 2017-01-01 | Stop reason: HOSPADM

## 2017-01-01 RX ORDER — BUMETANIDE 1 MG/1
8 TABLET ORAL
Status: DISCONTINUED | OUTPATIENT
Start: 2017-01-01 | End: 2017-01-01

## 2017-01-01 RX ORDER — SODIUM CHLORIDE 9 MG/ML
500 INJECTION, SOLUTION INTRAVENOUS ONCE
Status: COMPLETED | OUTPATIENT
Start: 2017-01-01 | End: 2017-01-01

## 2017-01-01 RX ORDER — POTASSIUM CHLORIDE 1.5 G/1.58G
20 POWDER, FOR SOLUTION ORAL 4 TIMES DAILY
Status: COMPLETED | OUTPATIENT
Start: 2017-01-01 | End: 2017-01-01

## 2017-01-01 RX ORDER — POTASSIUM CHLORIDE 7.45 MG/ML
10 INJECTION INTRAVENOUS
Status: ACTIVE | OUTPATIENT
Start: 2017-01-01 | End: 2017-01-01

## 2017-01-01 RX ORDER — CARVEDILOL 3.12 MG/1
3.12 TABLET ORAL 2 TIMES DAILY WITH MEALS
Status: DISCONTINUED | OUTPATIENT
Start: 2017-01-01 | End: 2017-01-01 | Stop reason: HOSPADM

## 2017-01-01 RX ORDER — HEPARIN SODIUM 5000 [USP'U]/ML
5000 INJECTION, SOLUTION INTRAVENOUS; SUBCUTANEOUS EVERY 8 HOURS
Status: DISCONTINUED | OUTPATIENT
Start: 2017-01-01 | End: 2017-01-01

## 2017-01-01 RX ORDER — SODIUM CHLORIDE 9 MG/ML
INJECTION, SOLUTION INTRAVENOUS
Status: COMPLETED
Start: 2017-01-01 | End: 2017-01-01

## 2017-01-01 RX ORDER — GUAIFENESIN 600 MG/1
600 TABLET, EXTENDED RELEASE ORAL EVERY 12 HOURS
Status: DISCONTINUED | OUTPATIENT
Start: 2017-01-01 | End: 2017-01-01

## 2017-01-01 RX ORDER — BUMETANIDE 1 MG/1
3 TABLET ORAL
Status: DISCONTINUED | OUTPATIENT
Start: 2017-01-01 | End: 2017-01-01 | Stop reason: HOSPADM

## 2017-01-01 RX ORDER — DEXTROSE MONOHYDRATE 25 G/50ML
25 INJECTION, SOLUTION INTRAVENOUS
Status: DISCONTINUED | OUTPATIENT
Start: 2017-01-01 | End: 2017-04-07 | Stop reason: HOSPADM

## 2017-01-01 RX ORDER — CARVEDILOL 3.12 MG/1
3.12 TABLET ORAL 2 TIMES DAILY WITH MEALS
Status: DISCONTINUED | OUTPATIENT
Start: 2017-01-01 | End: 2017-01-01

## 2017-01-01 RX ORDER — ALLOPURINOL 100 MG/1
100 TABLET ORAL DAILY
Qty: 30 TAB | Refills: 2 | Status: SHIPPED | OUTPATIENT
Start: 2017-01-01 | End: 2017-01-01

## 2017-01-01 RX ORDER — METOCLOPRAMIDE HYDROCHLORIDE 5 MG/ML
5 INJECTION INTRAMUSCULAR; INTRAVENOUS EVERY 6 HOURS PRN
Status: DISCONTINUED | OUTPATIENT
Start: 2017-01-01 | End: 2017-01-01 | Stop reason: HOSPADM

## 2017-01-01 RX ORDER — HALOPERIDOL 5 MG/ML
5 INJECTION INTRAMUSCULAR EVERY 4 HOURS PRN
Status: DISCONTINUED | OUTPATIENT
Start: 2017-01-01 | End: 2017-01-01

## 2017-01-01 RX ORDER — HEPARIN SODIUM 5000 [USP'U]/ML
4400 INJECTION, SOLUTION INTRAVENOUS; SUBCUTANEOUS ONCE
Status: DISCONTINUED | OUTPATIENT
Start: 2017-01-01 | End: 2017-01-01

## 2017-01-01 RX ORDER — IPRATROPIUM BROMIDE AND ALBUTEROL SULFATE 2.5; .5 MG/3ML; MG/3ML
3 SOLUTION RESPIRATORY (INHALATION)
Status: DISCONTINUED | OUTPATIENT
Start: 2017-01-01 | End: 2017-01-01

## 2017-01-01 RX ORDER — FAMOTIDINE 20 MG/1
20 TABLET, FILM COATED ORAL DAILY
Status: DISCONTINUED | OUTPATIENT
Start: 2017-01-01 | End: 2017-01-01

## 2017-01-01 RX ORDER — WARFARIN SODIUM 5 MG/1
5 TABLET ORAL
Status: COMPLETED | OUTPATIENT
Start: 2017-01-01 | End: 2017-01-01

## 2017-01-01 RX ORDER — FUROSEMIDE 40 MG/1
40 TABLET ORAL 2 TIMES DAILY
COMMUNITY

## 2017-01-01 RX ORDER — FUROSEMIDE 10 MG/ML
40 INJECTION INTRAMUSCULAR; INTRAVENOUS ONCE
Status: DISCONTINUED | OUTPATIENT
Start: 2017-01-01 | End: 2017-01-01

## 2017-01-01 RX ORDER — COLCHICINE 0.6 MG/1
0.6 TABLET ORAL DAILY
Status: DISCONTINUED | OUTPATIENT
Start: 2017-01-01 | End: 2017-01-01

## 2017-01-01 RX ORDER — FUROSEMIDE 10 MG/ML
40 INJECTION INTRAMUSCULAR; INTRAVENOUS EVERY 12 HOURS
Status: DISCONTINUED | OUTPATIENT
Start: 2017-01-01 | End: 2017-01-01

## 2017-01-01 RX ORDER — SPIRONOLACTONE 25 MG/1
50 TABLET ORAL DAILY
Status: DISCONTINUED | OUTPATIENT
Start: 2017-01-01 | End: 2017-01-01

## 2017-01-01 RX ORDER — BUMETANIDE 1 MG/1
2 TABLET ORAL
Status: DISCONTINUED | OUTPATIENT
Start: 2017-01-01 | End: 2017-01-01

## 2017-01-01 RX ORDER — WARFARIN SODIUM 1 MG/1
1 TABLET ORAL
Status: COMPLETED | OUTPATIENT
Start: 2017-01-01 | End: 2017-01-01

## 2017-01-01 RX ORDER — ALBUMIN (HUMAN) 12.5 G/50ML
25 SOLUTION INTRAVENOUS EVERY 6 HOURS
Status: DISCONTINUED | OUTPATIENT
Start: 2017-01-01 | End: 2017-01-01

## 2017-01-01 RX ORDER — TAMSULOSIN HYDROCHLORIDE 0.4 MG/1
0.4 CAPSULE ORAL ONCE
Status: DISCONTINUED | OUTPATIENT
Start: 2017-01-01 | End: 2017-01-01

## 2017-01-01 RX ORDER — ENEMA 19; 7 G/133ML; G/133ML
1 ENEMA RECTAL
Status: DISCONTINUED | OUTPATIENT
Start: 2017-01-01 | End: 2017-01-01 | Stop reason: HOSPADM

## 2017-01-01 RX ORDER — FUROSEMIDE 10 MG/ML
20 INJECTION INTRAMUSCULAR; INTRAVENOUS ONCE
Status: COMPLETED | OUTPATIENT
Start: 2017-01-01 | End: 2017-01-01

## 2017-01-01 RX ORDER — TRAMADOL HYDROCHLORIDE 50 MG/1
50 TABLET ORAL EVERY 4 HOURS PRN
Status: DISCONTINUED | OUTPATIENT
Start: 2017-01-01 | End: 2017-01-01

## 2017-01-01 RX ORDER — SODIUM POLYSTYRENE SULFONATE 15 G/60ML
15 SUSPENSION ORAL; RECTAL EVERY 4 HOURS
Status: DISPENSED | OUTPATIENT
Start: 2017-01-01 | End: 2017-01-01

## 2017-01-01 RX ORDER — POLYETHYLENE GLYCOL 3350 17 G/17G
1 POWDER, FOR SOLUTION ORAL
Status: DISCONTINUED | OUTPATIENT
Start: 2017-01-01 | End: 2017-04-07 | Stop reason: HOSPADM

## 2017-01-01 RX ORDER — POTASSIUM CHLORIDE 20 MEQ/1
20 TABLET, EXTENDED RELEASE ORAL 2 TIMES DAILY
Status: DISCONTINUED | OUTPATIENT
Start: 2017-01-01 | End: 2017-01-01 | Stop reason: HOSPADM

## 2017-01-01 RX ORDER — SPIRONOLACTONE 25 MG/1
50 TABLET ORAL
Status: DISCONTINUED | OUTPATIENT
Start: 2017-01-01 | End: 2017-01-01 | Stop reason: HOSPADM

## 2017-01-01 RX ORDER — LACTULOSE 20 G/30ML
30 SOLUTION ORAL 2 TIMES DAILY
Status: DISCONTINUED | OUTPATIENT
Start: 2017-01-01 | End: 2017-01-01

## 2017-01-01 RX ORDER — FUROSEMIDE 10 MG/ML
20 INJECTION INTRAMUSCULAR; INTRAVENOUS EVERY 12 HOURS
Status: DISCONTINUED | OUTPATIENT
Start: 2017-01-01 | End: 2017-01-01

## 2017-01-01 RX ORDER — SPIRONOLACTONE 25 MG/1
50 TABLET ORAL DAILY
Qty: 60 TAB | Refills: 2 | Status: SHIPPED | OUTPATIENT
Start: 2017-01-01 | End: 2017-01-01

## 2017-01-01 RX ORDER — FUROSEMIDE 10 MG/ML
100 INJECTION INTRAMUSCULAR; INTRAVENOUS ONCE
Status: COMPLETED | OUTPATIENT
Start: 2017-01-01 | End: 2017-01-01

## 2017-01-01 RX ORDER — DEXTROSE MONOHYDRATE 25 G/50ML
25 INJECTION, SOLUTION INTRAVENOUS
Status: DISCONTINUED | OUTPATIENT
Start: 2017-01-01 | End: 2017-01-01 | Stop reason: HOSPADM

## 2017-01-01 RX ORDER — AMOXICILLIN 250 MG
1 CAPSULE ORAL NIGHTLY
Status: DISCONTINUED | OUTPATIENT
Start: 2017-01-01 | End: 2017-01-01 | Stop reason: HOSPADM

## 2017-01-01 RX ORDER — WARFARIN SODIUM 7.5 MG/1
7.5 TABLET ORAL
Status: COMPLETED | OUTPATIENT
Start: 2017-01-01 | End: 2017-01-01

## 2017-01-01 RX ORDER — PROMETHAZINE HYDROCHLORIDE 25 MG/1
12.5-25 SUPPOSITORY RECTAL EVERY 4 HOURS PRN
Status: DISCONTINUED | OUTPATIENT
Start: 2017-01-01 | End: 2017-04-07 | Stop reason: HOSPADM

## 2017-01-01 RX ORDER — AMOXICILLIN 250 MG
1 CAPSULE ORAL
Status: DISCONTINUED | OUTPATIENT
Start: 2017-01-01 | End: 2017-01-01 | Stop reason: HOSPADM

## 2017-01-01 RX ORDER — MORPHINE SULFATE 4 MG/ML
2 INJECTION, SOLUTION INTRAMUSCULAR; INTRAVENOUS ONCE
Status: COMPLETED | OUTPATIENT
Start: 2017-01-01 | End: 2017-01-01

## 2017-01-01 RX ORDER — FUROSEMIDE 10 MG/ML
20 INJECTION INTRAMUSCULAR; INTRAVENOUS
Status: DISCONTINUED | OUTPATIENT
Start: 2017-01-01 | End: 2017-01-01

## 2017-01-01 RX ORDER — SODIUM POLYSTYRENE SULFONATE 15 G/60ML
15 SUSPENSION ORAL; RECTAL ONCE
Status: COMPLETED | OUTPATIENT
Start: 2017-01-01 | End: 2017-01-01

## 2017-01-01 RX ORDER — ASPIRIN 81 MG
100 TABLET, DELAYED RELEASE (ENTERIC COATED) ORAL 2 TIMES DAILY PRN
COMMUNITY

## 2017-01-01 RX ORDER — DIPHENHYDRAMINE HCL 25 MG
50 TABLET ORAL NIGHTLY PRN
Status: DISCONTINUED | OUTPATIENT
Start: 2017-01-01 | End: 2017-01-01

## 2017-01-01 RX ORDER — PROMETHAZINE HYDROCHLORIDE 25 MG/1
12.5-25 TABLET ORAL EVERY 4 HOURS PRN
Status: DISCONTINUED | OUTPATIENT
Start: 2017-01-01 | End: 2017-04-07 | Stop reason: HOSPADM

## 2017-01-01 RX ORDER — ARIPIPRAZOLE 10 MG/1
10 TABLET ORAL DAILY
Status: DISCONTINUED | OUTPATIENT
Start: 2017-01-01 | End: 2017-01-01

## 2017-01-01 RX ORDER — COLCHICINE 0.6 MG/1
1.2 TABLET ORAL ONCE
Status: COMPLETED | OUTPATIENT
Start: 2017-01-01 | End: 2017-01-01

## 2017-01-01 RX ORDER — SPIRONOLACTONE 50 MG/1
50 TABLET, FILM COATED ORAL DAILY
COMMUNITY

## 2017-01-01 RX ORDER — SPIRONOLACTONE 25 MG/1
25 TABLET ORAL 2 TIMES DAILY
Status: DISCONTINUED | OUTPATIENT
Start: 2017-01-01 | End: 2017-01-01

## 2017-01-01 RX ORDER — MIDODRINE HYDROCHLORIDE 5 MG/1
5 TABLET ORAL
Status: DISCONTINUED | OUTPATIENT
Start: 2017-01-01 | End: 2017-04-07 | Stop reason: HOSPADM

## 2017-01-01 RX ORDER — AZITHROMYCIN 500 MG/1
500 INJECTION, POWDER, LYOPHILIZED, FOR SOLUTION INTRAVENOUS ONCE
Status: COMPLETED | OUTPATIENT
Start: 2017-01-01 | End: 2017-01-01

## 2017-01-01 RX ORDER — DOCUSATE SODIUM 100 MG/1
100 CAPSULE, LIQUID FILLED ORAL EVERY MORNING
Status: DISCONTINUED | OUTPATIENT
Start: 2017-01-01 | End: 2017-01-01 | Stop reason: HOSPADM

## 2017-01-01 RX ORDER — MORPHINE SULFATE 4 MG/ML
1-4 INJECTION, SOLUTION INTRAMUSCULAR; INTRAVENOUS
Status: DISCONTINUED | OUTPATIENT
Start: 2017-01-01 | End: 2017-04-07 | Stop reason: HOSPADM

## 2017-01-01 RX ORDER — METOLAZONE 2.5 MG/1
2.5 TABLET ORAL
Status: DISCONTINUED | OUTPATIENT
Start: 2017-01-01 | End: 2017-01-01

## 2017-01-01 RX ORDER — SPIRONOLACTONE 25 MG/1
25 TABLET ORAL
Status: DISCONTINUED | OUTPATIENT
Start: 2017-01-01 | End: 2017-01-01

## 2017-01-01 RX ORDER — OXYCODONE HYDROCHLORIDE 5 MG/1
5 TABLET ORAL EVERY 4 HOURS PRN
Status: DISCONTINUED | OUTPATIENT
Start: 2017-01-01 | End: 2017-01-01 | Stop reason: HOSPADM

## 2017-01-01 RX ORDER — POTASSIUM CHLORIDE 20 MEQ/1
20 TABLET, EXTENDED RELEASE ORAL 2 TIMES DAILY
Status: DISCONTINUED | OUTPATIENT
Start: 2017-01-01 | End: 2017-01-01

## 2017-01-01 RX ORDER — MORPHINE SULFATE 20 MG/5ML
5 SOLUTION ORAL
COMMUNITY
End: 2017-01-01

## 2017-01-01 RX ORDER — AMOXICILLIN AND CLAVULANATE POTASSIUM 875; 125 MG/1; MG/1
1 TABLET, FILM COATED ORAL 2 TIMES DAILY
COMMUNITY
End: 2017-01-01

## 2017-01-01 RX ORDER — COLCHICINE 0.6 MG/1
0.6 TABLET ORAL 2 TIMES DAILY
Status: DISCONTINUED | OUTPATIENT
Start: 2017-01-01 | End: 2017-01-01 | Stop reason: HOSPADM

## 2017-01-01 RX ORDER — POTASSIUM CHLORIDE 1.5 G/1.58G
20 POWDER, FOR SOLUTION ORAL 2 TIMES DAILY
Status: DISCONTINUED | OUTPATIENT
Start: 2017-01-01 | End: 2017-01-01

## 2017-01-01 RX ORDER — NITROGLYCERIN 20 MG/100ML
5 INJECTION INTRAVENOUS CONTINUOUS
Status: DISCONTINUED | OUTPATIENT
Start: 2017-01-01 | End: 2017-01-01

## 2017-01-01 RX ORDER — POTASSIUM CHLORIDE 20 MEQ/1
20 TABLET, EXTENDED RELEASE ORAL ONCE
Status: COMPLETED | OUTPATIENT
Start: 2017-01-01 | End: 2017-01-01

## 2017-01-01 RX ORDER — FUROSEMIDE 10 MG/ML
60 INJECTION INTRAMUSCULAR; INTRAVENOUS
Status: DISCONTINUED | OUTPATIENT
Start: 2017-01-01 | End: 2017-01-01

## 2017-01-01 RX ORDER — LORAZEPAM 2 MG/ML
2 INJECTION INTRAMUSCULAR
Status: DISCONTINUED | OUTPATIENT
Start: 2017-01-01 | End: 2017-04-07 | Stop reason: HOSPADM

## 2017-01-01 RX ORDER — AMOXICILLIN 250 MG
2 CAPSULE ORAL 2 TIMES DAILY
Status: DISCONTINUED | OUTPATIENT
Start: 2017-01-01 | End: 2017-04-07 | Stop reason: HOSPADM

## 2017-01-01 RX ORDER — HEPARIN SODIUM 1000 [USP'U]/ML
6000 INJECTION, SOLUTION INTRAVENOUS; SUBCUTANEOUS ONCE
Status: COMPLETED | OUTPATIENT
Start: 2017-01-01 | End: 2017-01-01

## 2017-01-01 RX ORDER — COLCHICINE 0.6 MG/1
0.6 TABLET ORAL
Qty: 30 TAB | Refills: 2 | Status: SHIPPED | OUTPATIENT
Start: 2017-01-01 | End: 2017-01-01

## 2017-01-01 RX ORDER — FUROSEMIDE 20 MG/1
60 TABLET ORAL
Status: DISCONTINUED | OUTPATIENT
Start: 2017-01-01 | End: 2017-01-01

## 2017-01-01 RX ORDER — OXYCODONE HYDROCHLORIDE 10 MG/1
10 TABLET ORAL EVERY 4 HOURS PRN
Qty: 40 TAB | Refills: 0 | Status: SHIPPED | OUTPATIENT
Start: 2017-01-01 | End: 2017-01-01

## 2017-01-01 RX ORDER — COLCHICINE 0.6 MG/1
1.2 TABLET ORAL DAILY
Status: COMPLETED | OUTPATIENT
Start: 2017-01-01 | End: 2017-01-01

## 2017-01-01 RX ORDER — HEPARIN SODIUM 1000 [USP'U]/ML
4400 INJECTION, SOLUTION INTRAVENOUS; SUBCUTANEOUS ONCE
Status: CANCELLED | OUTPATIENT
Start: 2017-01-01 | End: 2017-01-01

## 2017-01-01 RX ORDER — HALOPERIDOL 5 MG/ML
2-5 INJECTION INTRAMUSCULAR EVERY 4 HOURS PRN
Status: DISCONTINUED | OUTPATIENT
Start: 2017-01-01 | End: 2017-01-01

## 2017-01-01 RX ORDER — LORAZEPAM 2 MG/ML
0.5 INJECTION INTRAMUSCULAR ONCE
Status: COMPLETED | OUTPATIENT
Start: 2017-01-01 | End: 2017-01-01

## 2017-01-01 RX ORDER — SENNOSIDES A AND B 8.6 MG/1
8.6 TABLET, FILM COATED ORAL 2 TIMES DAILY
Status: DISCONTINUED | OUTPATIENT
Start: 2017-01-01 | End: 2017-01-01

## 2017-01-01 RX ORDER — LACTULOSE 20 G/30ML
30 SOLUTION ORAL EVERY MORNING
Status: DISCONTINUED | OUTPATIENT
Start: 2017-01-01 | End: 2017-01-01 | Stop reason: HOSPADM

## 2017-01-01 RX ORDER — HEPARIN SODIUM 1000 [USP'U]/ML
3200 INJECTION, SOLUTION INTRAVENOUS; SUBCUTANEOUS PRN
Status: DISCONTINUED | OUTPATIENT
Start: 2017-01-01 | End: 2017-01-01

## 2017-01-01 RX ORDER — DEXTROSE MONOHYDRATE 25 G/50ML
25 INJECTION, SOLUTION INTRAVENOUS ONCE
Status: COMPLETED | OUTPATIENT
Start: 2017-01-01 | End: 2017-01-01

## 2017-01-01 RX ORDER — WARFARIN SODIUM 3 MG/1
3 TABLET ORAL
Status: DISCONTINUED | OUTPATIENT
Start: 2017-01-01 | End: 2017-01-01 | Stop reason: HOSPADM

## 2017-01-01 RX ORDER — HALOPERIDOL 2 MG/1
2 TABLET ORAL EVERY 6 HOURS
Status: DISCONTINUED | OUTPATIENT
Start: 2017-01-01 | End: 2017-01-01

## 2017-01-01 RX ORDER — POTASSIUM CHLORIDE 20 MEQ/1
20 TABLET, EXTENDED RELEASE ORAL DAILY
Status: DISCONTINUED | OUTPATIENT
Start: 2017-01-01 | End: 2017-01-01

## 2017-01-01 RX ORDER — ONDANSETRON 2 MG/ML
4 INJECTION INTRAMUSCULAR; INTRAVENOUS EVERY 4 HOURS PRN
Status: DISCONTINUED | OUTPATIENT
Start: 2017-01-01 | End: 2017-04-07 | Stop reason: HOSPADM

## 2017-01-01 RX ORDER — HALOPERIDOL 5 MG/1
5 TABLET ORAL EVERY 6 HOURS
Status: DISCONTINUED | OUTPATIENT
Start: 2017-01-01 | End: 2017-01-01

## 2017-01-01 RX ORDER — IPRATROPIUM BROMIDE AND ALBUTEROL SULFATE 2.5; .5 MG/3ML; MG/3ML
3 SOLUTION RESPIRATORY (INHALATION)
Status: DISCONTINUED | OUTPATIENT
Start: 2017-01-01 | End: 2017-01-01 | Stop reason: ALTCHOICE

## 2017-01-01 RX ORDER — OXYCODONE HYDROCHLORIDE 5 MG/1
5-10 TABLET ORAL EVERY 4 HOURS PRN
Status: DISCONTINUED | OUTPATIENT
Start: 2017-01-01 | End: 2017-01-01

## 2017-01-01 RX ORDER — ALLOPURINOL 100 MG/1
100 TABLET ORAL DAILY
Status: DISCONTINUED | OUTPATIENT
Start: 2017-01-01 | End: 2017-01-01 | Stop reason: HOSPADM

## 2017-01-01 RX ORDER — FUROSEMIDE 10 MG/ML
40 INJECTION INTRAMUSCULAR; INTRAVENOUS ONCE
Status: COMPLETED | OUTPATIENT
Start: 2017-01-01 | End: 2017-01-01

## 2017-01-01 RX ORDER — SODIUM CHLORIDE 1 G/1
1 TABLET ORAL 2 TIMES DAILY WITH MEALS
Status: DISCONTINUED | OUTPATIENT
Start: 2017-01-01 | End: 2017-01-01

## 2017-01-01 RX ORDER — AMOXICILLIN 250 MG
1 CAPSULE ORAL ONCE
Status: COMPLETED | OUTPATIENT
Start: 2017-01-01 | End: 2017-01-01

## 2017-01-01 RX ORDER — COLCHICINE 0.6 MG/1
0.6 TABLET ORAL DAILY
Status: COMPLETED | OUTPATIENT
Start: 2017-01-01 | End: 2017-01-01

## 2017-01-01 RX ORDER — ALLOPURINOL 100 MG/1
100 TABLET ORAL DAILY
COMMUNITY
End: 2017-01-01

## 2017-01-01 RX ORDER — SODIUM CHLORIDE 1 G/1
1 TABLET ORAL
Status: DISCONTINUED | OUTPATIENT
Start: 2017-01-01 | End: 2017-01-01

## 2017-01-01 RX ORDER — BISACODYL 10 MG
10 SUPPOSITORY, RECTAL RECTAL
Status: DISCONTINUED | OUTPATIENT
Start: 2017-01-01 | End: 2017-04-07 | Stop reason: HOSPADM

## 2017-01-01 RX ORDER — BUMETANIDE 1 MG/1
3 TABLET ORAL 2 TIMES DAILY
Qty: 60 TAB | Refills: 2 | Status: SHIPPED | OUTPATIENT
Start: 2017-01-01 | End: 2017-01-01

## 2017-01-01 RX ORDER — CLINDAMYCIN PHOSPHATE 600 MG/50ML
600 INJECTION, SOLUTION INTRAVENOUS EVERY 8 HOURS
Status: DISCONTINUED | OUTPATIENT
Start: 2017-01-01 | End: 2017-01-01

## 2017-01-01 RX ORDER — WARFARIN SODIUM 7.5 MG/1
7.5 TABLET ORAL
Status: DISCONTINUED | OUTPATIENT
Start: 2017-01-01 | End: 2017-01-01

## 2017-01-01 RX ADMIN — OXYCODONE HYDROCHLORIDE 10 MG: 10 TABLET ORAL at 01:08

## 2017-01-01 RX ADMIN — FUROSEMIDE 40 MG: 10 INJECTION, SOLUTION INTRAVENOUS at 21:05

## 2017-01-01 RX ADMIN — COLCHICINE 0.6 MG: 0.6 TABLET, FILM COATED ORAL at 20:28

## 2017-01-01 RX ADMIN — WARFARIN SODIUM 5 MG: 5 TABLET ORAL at 17:30

## 2017-01-01 RX ADMIN — CARVEDILOL 3.12 MG: 3.12 TABLET, FILM COATED ORAL at 06:15

## 2017-01-01 RX ADMIN — DOBUTAMINE HYDROCHLORIDE 5 MCG/KG/MIN: 100 INJECTION INTRAVENOUS at 03:40

## 2017-01-01 RX ADMIN — DOBUTAMINE HYDROCHLORIDE 5 MCG/KG/MIN: 100 INJECTION INTRAVENOUS at 09:55

## 2017-01-01 RX ADMIN — GABAPENTIN 300 MG: 300 CAPSULE ORAL at 10:09

## 2017-01-01 RX ADMIN — OXYCODONE HYDROCHLORIDE 10 MG: 10 TABLET ORAL at 22:09

## 2017-01-01 RX ADMIN — MORPHINE SULFATE 4 MG: 4 INJECTION INTRAVENOUS at 13:09

## 2017-01-01 RX ADMIN — MORPHINE SULFATE 2 MG: 4 INJECTION INTRAVENOUS at 18:05

## 2017-01-01 RX ADMIN — FUROSEMIDE 40 MG: 10 INJECTION, SOLUTION INTRAVENOUS at 08:21

## 2017-01-01 RX ADMIN — OXYCODONE HYDROCHLORIDE 5 MG: 5 TABLET ORAL at 16:57

## 2017-01-01 RX ADMIN — ALLOPURINOL 100 MG: 100 TABLET ORAL at 10:54

## 2017-01-01 RX ADMIN — GUAIFENESIN 600 MG: 600 TABLET, EXTENDED RELEASE ORAL at 08:06

## 2017-01-01 RX ADMIN — PIPERACILLIN AND TAZOBACTAM 3.38 G: 3; .375 INJECTION, POWDER, LYOPHILIZED, FOR SOLUTION INTRAVENOUS; PARENTERAL at 00:45

## 2017-01-01 RX ADMIN — HEPARIN SODIUM 3200 UNITS: 1000 INJECTION, SOLUTION INTRAVENOUS; SUBCUTANEOUS at 18:50

## 2017-01-01 RX ADMIN — PIPERACILLIN AND TAZOBACTAM 4.5 G: 4; .5 INJECTION, POWDER, LYOPHILIZED, FOR SOLUTION INTRAVENOUS; PARENTERAL at 00:28

## 2017-01-01 RX ADMIN — IPRATROPIUM BROMIDE AND ALBUTEROL SULFATE 3 ML: .5; 3 SOLUTION RESPIRATORY (INHALATION) at 09:20

## 2017-01-01 RX ADMIN — DOBUTAMINE HYDROCHLORIDE 5 MCG/KG/MIN: 100 INJECTION INTRAVENOUS at 20:14

## 2017-01-01 RX ADMIN — SODIUM CHLORIDE TAB 1 GM 1 G: 1 TAB at 08:25

## 2017-01-01 RX ADMIN — Medication 2 TABLET: at 08:21

## 2017-01-01 RX ADMIN — SODIUM CHLORIDE TAB 1 GM 1 G: 1 TAB at 17:31

## 2017-01-01 RX ADMIN — GUAIFENESIN 600 MG: 600 TABLET, EXTENDED RELEASE ORAL at 08:28

## 2017-01-01 RX ADMIN — PIPERACILLIN AND TAZOBACTAM 3.38 G: 3; .375 INJECTION, POWDER, LYOPHILIZED, FOR SOLUTION INTRAVENOUS; PARENTERAL at 18:11

## 2017-01-01 RX ADMIN — HEPARIN SODIUM 1550 UNITS/HR: 5000 INJECTION, SOLUTION INTRAVENOUS at 12:20

## 2017-01-01 RX ADMIN — OMEPRAZOLE 40 MG: 20 CAPSULE, DELAYED RELEASE ORAL at 20:28

## 2017-01-01 RX ADMIN — RIFAXIMIN 550 MG: 550 TABLET ORAL at 20:44

## 2017-01-01 RX ADMIN — SODIUM CHLORIDE: 9 INJECTION, SOLUTION INTRAVENOUS at 20:19

## 2017-01-01 RX ADMIN — HEPARIN SODIUM 3200 UNITS: 1000 INJECTION, SOLUTION INTRAVENOUS; SUBCUTANEOUS at 23:28

## 2017-01-01 RX ADMIN — STANDARDIZED SENNA CONCENTRATE AND DOCUSATE SODIUM 1 TABLET: 8.6; 5 TABLET, FILM COATED ORAL at 20:29

## 2017-01-01 RX ADMIN — LACTULOSE 30 ML: 10 SOLUTION ORAL at 09:29

## 2017-01-01 RX ADMIN — DOBUTAMINE HYDROCHLORIDE 5 MCG/KG/MIN: 100 INJECTION INTRAVENOUS at 19:45

## 2017-01-01 RX ADMIN — Medication 2 TABLET: at 21:42

## 2017-01-01 RX ADMIN — PIPERACILLIN AND TAZOBACTAM 4.5 G: 4; .5 INJECTION, POWDER, LYOPHILIZED, FOR SOLUTION INTRAVENOUS; PARENTERAL at 12:22

## 2017-01-01 RX ADMIN — LACTULOSE 30 ML: 10 SOLUTION ORAL at 08:43

## 2017-01-01 RX ADMIN — DOBUTAMINE HYDROCHLORIDE 5 MCG/KG/MIN: 100 INJECTION INTRAVENOUS at 18:40

## 2017-01-01 RX ADMIN — OXYCODONE HYDROCHLORIDE 10 MG: 10 TABLET ORAL at 08:00

## 2017-01-01 RX ADMIN — PIPERACILLIN AND TAZOBACTAM 3.38 G: 3; .375 INJECTION, POWDER, LYOPHILIZED, FOR SOLUTION INTRAVENOUS; PARENTERAL at 12:16

## 2017-01-01 RX ADMIN — DEXTROSE MONOHYDRATE 50 ML: 25 INJECTION, SOLUTION INTRAVENOUS at 05:27

## 2017-01-01 RX ADMIN — LACTULOSE 30 ML: 10 SOLUTION ORAL at 10:16

## 2017-01-01 RX ADMIN — GABAPENTIN 300 MG: 300 CAPSULE ORAL at 09:55

## 2017-01-01 RX ADMIN — SODIUM CHLORIDE 1000 ML: 9 INJECTION, SOLUTION INTRAVENOUS at 14:05

## 2017-01-01 RX ADMIN — MIDODRINE HYDROCHLORIDE 5 MG: 5 TABLET ORAL at 07:47

## 2017-01-01 RX ADMIN — SODIUM POLYSTYRENE SULFONATE 30 G: 15 SUSPENSION ORAL; RECTAL at 17:34

## 2017-01-01 RX ADMIN — SODIUM CHLORIDE 500 ML: 9 INJECTION, SOLUTION INTRAVENOUS at 17:46

## 2017-01-01 RX ADMIN — HEPARIN SODIUM 1500 UNITS: 5000 INJECTION, SOLUTION INTRAVENOUS at 11:37

## 2017-01-01 RX ADMIN — FUROSEMIDE 80 MG: 10 INJECTION, SOLUTION INTRAVENOUS at 10:34

## 2017-01-01 RX ADMIN — FUROSEMIDE 20 MG: 10 INJECTION, SOLUTION INTRAVENOUS at 15:11

## 2017-01-01 RX ADMIN — DOBUTAMINE HYDROCHLORIDE 5 MCG/KG/MIN: 100 INJECTION INTRAVENOUS at 18:25

## 2017-01-01 RX ADMIN — RIFAXIMIN 550 MG: 550 TABLET ORAL at 19:59

## 2017-01-01 RX ADMIN — WARFARIN SODIUM 5 MG: 5 TABLET ORAL at 18:06

## 2017-01-01 RX ADMIN — PIPERACILLIN AND TAZOBACTAM 3.38 G: 3; .375 INJECTION, POWDER, LYOPHILIZED, FOR SOLUTION INTRAVENOUS; PARENTERAL at 11:10

## 2017-01-01 RX ADMIN — SODIUM CHLORIDE TAB 1 GM 1 G: 1 TAB at 17:46

## 2017-01-01 RX ADMIN — COLCHICINE 0.6 MG: 0.6 TABLET, FILM COATED ORAL at 15:15

## 2017-01-01 RX ADMIN — SODIUM CHLORIDE 500 ML: 9 INJECTION, SOLUTION INTRAVENOUS at 13:08

## 2017-01-01 RX ADMIN — HEPARIN SODIUM 1050 UNITS/HR: 5000 INJECTION, SOLUTION INTRAVENOUS at 14:49

## 2017-01-01 RX ADMIN — FUROSEMIDE 100 MG: 10 INJECTION, SOLUTION INTRAVENOUS at 13:25

## 2017-01-01 RX ADMIN — LACTULOSE 30 ML: 10 SOLUTION ORAL at 07:47

## 2017-01-01 RX ADMIN — PIPERACILLIN AND TAZOBACTAM 4.5 G: 4; .5 INJECTION, POWDER, LYOPHILIZED, FOR SOLUTION INTRAVENOUS; PARENTERAL at 11:12

## 2017-01-01 RX ADMIN — LACTULOSE 30 ML: 10 SOLUTION ORAL at 20:25

## 2017-01-01 RX ADMIN — HALOPERIDOL 5 MG: 5 TABLET ORAL at 17:06

## 2017-01-01 RX ADMIN — NOREPINEPHRINE BITARTRATE 11 MCG/MIN: 1 INJECTION INTRAVENOUS at 05:10

## 2017-01-01 RX ADMIN — HEPARIN SODIUM 1750 UNITS: 5000 INJECTION, SOLUTION INTRAVENOUS at 15:53

## 2017-01-01 RX ADMIN — PIPERACILLIN AND TAZOBACTAM 4.5 G: 4; .5 INJECTION, POWDER, LYOPHILIZED, FOR SOLUTION INTRAVENOUS; PARENTERAL at 05:09

## 2017-01-01 RX ADMIN — PIPERACILLIN AND TAZOBACTAM 4.5 G: 4; .5 INJECTION, POWDER, LYOPHILIZED, FOR SOLUTION INTRAVENOUS; PARENTERAL at 17:16

## 2017-01-01 RX ADMIN — PIPERACILLIN AND TAZOBACTAM 3.38 G: 3; .375 INJECTION, POWDER, LYOPHILIZED, FOR SOLUTION INTRAVENOUS; PARENTERAL at 05:38

## 2017-01-01 RX ADMIN — DOBUTAMINE HYDROCHLORIDE 5 MCG/KG/MIN: 100 INJECTION INTRAVENOUS at 16:58

## 2017-01-01 RX ADMIN — RIFAXIMIN 550 MG: 550 TABLET ORAL at 07:55

## 2017-01-01 RX ADMIN — DOBUTAMINE HYDROCHLORIDE 10 MCG/KG/MIN: 100 INJECTION INTRAVENOUS at 06:22

## 2017-01-01 RX ADMIN — LACTULOSE 30 ML: 10 SOLUTION ORAL at 20:39

## 2017-01-01 RX ADMIN — POTASSIUM CHLORIDE 10 MEQ: 7.46 INJECTION, SOLUTION INTRAVENOUS at 10:30

## 2017-01-01 RX ADMIN — NOREPINEPHRINE BITARTRATE 16 MCG/MIN: 1 INJECTION INTRAVENOUS at 18:18

## 2017-01-01 RX ADMIN — GUAIFENESIN 600 MG: 600 TABLET, EXTENDED RELEASE ORAL at 21:16

## 2017-01-01 RX ADMIN — CARVEDILOL 3.12 MG: 3.12 TABLET, FILM COATED ORAL at 08:40

## 2017-01-01 RX ADMIN — POTASSIUM CHLORIDE 20 MEQ: 20 POWDER ORAL at 08:21

## 2017-01-01 RX ADMIN — LACTULOSE 30 ML: 10 SOLUTION ORAL at 18:24

## 2017-01-01 RX ADMIN — CLINDAMYCIN IN 5 PERCENT DEXTROSE 600 MG: 12 INJECTION, SOLUTION INTRAVENOUS at 21:48

## 2017-01-01 RX ADMIN — CLINDAMYCIN IN 5 PERCENT DEXTROSE 900 MG: 18 INJECTION, SOLUTION INTRAVENOUS at 14:43

## 2017-01-01 RX ADMIN — OXYCODONE HYDROCHLORIDE 10 MG: 10 TABLET ORAL at 05:58

## 2017-01-01 RX ADMIN — LACTULOSE 30 ML: 10 SOLUTION ORAL at 08:01

## 2017-01-01 RX ADMIN — POTASSIUM CHLORIDE 10 MEQ: 7.46 INJECTION, SOLUTION INTRAVENOUS at 08:12

## 2017-01-01 RX ADMIN — ENOXAPARIN SODIUM 100 MG: 100 INJECTION SUBCUTANEOUS at 09:58

## 2017-01-01 RX ADMIN — RIFAXIMIN 550 MG: 550 TABLET ORAL at 21:42

## 2017-01-01 RX ADMIN — LACTULOSE 30 ML: 10 SOLUTION ORAL at 10:09

## 2017-01-01 RX ADMIN — CARVEDILOL 3.12 MG: 3.12 TABLET, FILM COATED ORAL at 17:30

## 2017-01-01 RX ADMIN — OXYCODONE HYDROCHLORIDE 5 MG: 5 TABLET ORAL at 01:19

## 2017-01-01 RX ADMIN — OXYCODONE HYDROCHLORIDE 10 MG: 10 TABLET ORAL at 09:58

## 2017-01-01 RX ADMIN — DOBUTAMINE HYDROCHLORIDE 5 MCG/KG/MIN: 100 INJECTION INTRAVENOUS at 15:43

## 2017-01-01 RX ADMIN — FUROSEMIDE 40 MG: 10 INJECTION, SOLUTION INTRAVENOUS at 17:25

## 2017-01-01 RX ADMIN — HALOPERIDOL 2 MG: 2 TABLET ORAL at 05:24

## 2017-01-01 RX ADMIN — CYCLOBENZAPRINE HYDROCHLORIDE 10 MG: 10 TABLET, FILM COATED ORAL at 03:05

## 2017-01-01 RX ADMIN — RIFAXIMIN 550 MG: 550 TABLET ORAL at 21:44

## 2017-01-01 RX ADMIN — GABAPENTIN 300 MG: 300 CAPSULE ORAL at 08:49

## 2017-01-01 RX ADMIN — GABAPENTIN 300 MG: 300 CAPSULE ORAL at 20:35

## 2017-01-01 RX ADMIN — PANTOPRAZOLE SODIUM 40 MG: 40 INJECTION, POWDER, FOR SOLUTION INTRAVENOUS at 20:24

## 2017-01-01 RX ADMIN — ASPIRIN 325 MG: 325 TABLET, COATED ORAL at 02:58

## 2017-01-01 RX ADMIN — NOREPINEPHRINE BITARTRATE 15 MCG/MIN: 1 INJECTION INTRAVENOUS at 01:40

## 2017-01-01 RX ADMIN — ALLOPURINOL 100 MG: 100 TABLET ORAL at 08:56

## 2017-01-01 RX ADMIN — GABAPENTIN 300 MG: 300 CAPSULE ORAL at 09:43

## 2017-01-01 RX ADMIN — MORPHINE SULFATE 2 MG: 4 INJECTION INTRAVENOUS at 13:42

## 2017-01-01 RX ADMIN — GABAPENTIN 300 MG: 300 CAPSULE ORAL at 20:05

## 2017-01-01 RX ADMIN — ARIPIPRAZOLE 10 MG: 10 TABLET ORAL at 10:09

## 2017-01-01 RX ADMIN — OXYCODONE HYDROCHLORIDE 10 MG: 10 TABLET ORAL at 05:30

## 2017-01-01 RX ADMIN — Medication 2 TABLET: at 10:09

## 2017-01-01 RX ADMIN — ONDANSETRON 4 MG: 2 INJECTION, SOLUTION INTRAMUSCULAR; INTRAVENOUS at 18:21

## 2017-01-01 RX ADMIN — COLCHICINE 0.6 MG: 0.6 TABLET, FILM COATED ORAL at 10:59

## 2017-01-01 RX ADMIN — OXYCODONE HYDROCHLORIDE 10 MG: 10 TABLET ORAL at 09:20

## 2017-01-01 RX ADMIN — ENOXAPARIN SODIUM 100 MG: 100 INJECTION SUBCUTANEOUS at 21:04

## 2017-01-01 RX ADMIN — HALOPERIDOL LACTATE 5 MG: 5 INJECTION, SOLUTION INTRAMUSCULAR at 18:33

## 2017-01-01 RX ADMIN — CLINDAMYCIN IN 5 PERCENT DEXTROSE 600 MG: 12 INJECTION, SOLUTION INTRAVENOUS at 05:43

## 2017-01-01 RX ADMIN — HEPARIN SODIUM 3200 UNITS: 1000 INJECTION, SOLUTION INTRAVENOUS; SUBCUTANEOUS at 12:19

## 2017-01-01 RX ADMIN — PIPERACILLIN AND TAZOBACTAM 4.5 G: 4; .5 INJECTION, POWDER, LYOPHILIZED, FOR SOLUTION INTRAVENOUS; PARENTERAL at 17:10

## 2017-01-01 RX ADMIN — LACTULOSE 30 ML: 10 SOLUTION ORAL at 20:35

## 2017-01-01 RX ADMIN — OXYCODONE HYDROCHLORIDE 5 MG: 5 TABLET ORAL at 16:38

## 2017-01-01 RX ADMIN — MIDODRINE HYDROCHLORIDE 5 MG: 5 TABLET ORAL at 07:31

## 2017-01-01 RX ADMIN — GABAPENTIN 300 MG: 300 CAPSULE ORAL at 07:45

## 2017-01-01 RX ADMIN — CARVEDILOL 3.12 MG: 3.12 TABLET, FILM COATED ORAL at 18:31

## 2017-01-01 RX ADMIN — CARVEDILOL 3.12 MG: 3.12 TABLET, FILM COATED ORAL at 18:36

## 2017-01-01 RX ADMIN — STANDARDIZED SENNA CONCENTRATE AND DOCUSATE SODIUM 1 TABLET: 8.6; 5 TABLET, FILM COATED ORAL at 22:16

## 2017-01-01 RX ADMIN — POTASSIUM CHLORIDE 40 MEQ: 1500 TABLET, EXTENDED RELEASE ORAL at 11:58

## 2017-01-01 RX ADMIN — FAMOTIDINE 20 MG: 10 INJECTION INTRAVENOUS at 08:19

## 2017-01-01 RX ADMIN — GABAPENTIN 300 MG: 300 CAPSULE ORAL at 20:24

## 2017-01-01 RX ADMIN — RIFAXIMIN 550 MG: 550 TABLET ORAL at 10:31

## 2017-01-01 RX ADMIN — DOBUTAMINE HYDROCHLORIDE 5 MCG/KG/MIN: 100 INJECTION INTRAVENOUS at 23:20

## 2017-01-01 RX ADMIN — GUAIFENESIN 600 MG: 600 TABLET, EXTENDED RELEASE ORAL at 20:25

## 2017-01-01 RX ADMIN — PIPERACILLIN AND TAZOBACTAM 4.5 G: 4; .5 INJECTION, POWDER, LYOPHILIZED, FOR SOLUTION INTRAVENOUS; PARENTERAL at 20:04

## 2017-01-01 RX ADMIN — WARFARIN SODIUM 4 MG: 4 TABLET ORAL at 17:46

## 2017-01-01 RX ADMIN — POTASSIUM CHLORIDE 20 MEQ: 1.5 POWDER, FOR SOLUTION ORAL at 17:30

## 2017-01-01 RX ADMIN — DOBUTAMINE HYDROCHLORIDE 5 MCG/KG/MIN: 100 INJECTION INTRAVENOUS at 09:43

## 2017-01-01 RX ADMIN — IPRATROPIUM BROMIDE AND ALBUTEROL SULFATE 3 ML: .5; 3 SOLUTION RESPIRATORY (INHALATION) at 06:40

## 2017-01-01 RX ADMIN — DOBUTAMINE HYDROCHLORIDE 5 MCG/KG/MIN: 100 INJECTION INTRAVENOUS at 11:30

## 2017-01-01 RX ADMIN — OMEPRAZOLE 40 MG: 20 CAPSULE, DELAYED RELEASE ORAL at 21:20

## 2017-01-01 RX ADMIN — DOCUSATE SODIUM 100 MG: 100 CAPSULE ORAL at 08:06

## 2017-01-01 RX ADMIN — Medication 25 G: at 06:02

## 2017-01-01 RX ADMIN — DEXTROSE MONOHYDRATE 500 MG: 50 INJECTION, SOLUTION INTRAVENOUS at 12:12

## 2017-01-01 RX ADMIN — GUAIFENESIN 600 MG: 600 TABLET, EXTENDED RELEASE ORAL at 08:30

## 2017-01-01 RX ADMIN — LACTULOSE 30 ML: 10 SOLUTION ORAL at 19:29

## 2017-01-01 RX ADMIN — CEFTRIAXONE 2 G: 2 INJECTION, POWDER, FOR SOLUTION INTRAMUSCULAR; INTRAVENOUS at 07:45

## 2017-01-01 RX ADMIN — DOBUTAMINE HYDROCHLORIDE 5 MCG/KG/MIN: 100 INJECTION INTRAVENOUS at 18:32

## 2017-01-01 RX ADMIN — DOBUTAMINE HYDROCHLORIDE 5 MCG/KG/MIN: 100 INJECTION INTRAVENOUS at 08:42

## 2017-01-01 RX ADMIN — FUROSEMIDE 40 MG: 10 INJECTION, SOLUTION INTRAVENOUS at 19:23

## 2017-01-01 RX ADMIN — HALOPERIDOL 2 MG: 2 TABLET ORAL at 00:45

## 2017-01-01 RX ADMIN — ENOXAPARIN SODIUM 100 MG: 100 INJECTION SUBCUTANEOUS at 21:17

## 2017-01-01 RX ADMIN — CLINDAMYCIN IN 5 PERCENT DEXTROSE 900 MG: 18 INJECTION, SOLUTION INTRAVENOUS at 22:09

## 2017-01-01 RX ADMIN — RIFAXIMIN 550 MG: 550 TABLET ORAL at 19:52

## 2017-01-01 RX ADMIN — MIDODRINE HYDROCHLORIDE 5 MG: 5 TABLET ORAL at 12:13

## 2017-01-01 RX ADMIN — MORPHINE SULFATE 4 MG: 4 INJECTION INTRAVENOUS at 23:39

## 2017-01-01 RX ADMIN — RIFAXIMIN 550 MG: 550 TABLET ORAL at 07:31

## 2017-01-01 RX ADMIN — OXYCODONE HYDROCHLORIDE 10 MG: 10 TABLET ORAL at 15:46

## 2017-01-01 RX ADMIN — PIPERACILLIN AND TAZOBACTAM 3.38 G: 3; .375 INJECTION, POWDER, LYOPHILIZED, FOR SOLUTION INTRAVENOUS; PARENTERAL at 17:18

## 2017-01-01 RX ADMIN — OXYCODONE HYDROCHLORIDE 10 MG: 10 TABLET ORAL at 22:04

## 2017-01-01 RX ADMIN — OXYCODONE HYDROCHLORIDE 5 MG: 5 TABLET ORAL at 14:57

## 2017-01-01 RX ADMIN — POTASSIUM CHLORIDE 20 MEQ: 1500 TABLET, EXTENDED RELEASE ORAL at 17:15

## 2017-01-01 RX ADMIN — POTASSIUM CHLORIDE 20 MEQ: 1.5 POWDER, FOR SOLUTION ORAL at 12:53

## 2017-01-01 RX ADMIN — IPRATROPIUM BROMIDE AND ALBUTEROL SULFATE 3 ML: .5; 3 SOLUTION RESPIRATORY (INHALATION) at 13:50

## 2017-01-01 RX ADMIN — ENOXAPARIN SODIUM 100 MG: 100 INJECTION SUBCUTANEOUS at 20:19

## 2017-01-01 RX ADMIN — SODIUM CHLORIDE: 9 INJECTION, SOLUTION INTRAVENOUS at 02:00

## 2017-01-01 RX ADMIN — LISINOPRIL 2.5 MG: 5 TABLET ORAL at 09:29

## 2017-01-01 RX ADMIN — NOREPINEPHRINE BITARTRATE 17 MCG/MIN: 1 INJECTION INTRAVENOUS at 02:01

## 2017-01-01 RX ADMIN — PIPERACILLIN AND TAZOBACTAM 3.38 G: 3; .375 INJECTION, POWDER, LYOPHILIZED, FOR SOLUTION INTRAVENOUS; PARENTERAL at 17:05

## 2017-01-01 RX ADMIN — AMPICILLIN SODIUM AND SULBACTAM SODIUM 3 G: 2; 1 INJECTION, POWDER, FOR SOLUTION INTRAMUSCULAR; INTRAVENOUS at 11:37

## 2017-01-01 RX ADMIN — OXYCODONE HYDROCHLORIDE 10 MG: 10 TABLET ORAL at 07:31

## 2017-01-01 RX ADMIN — CEFTRIAXONE 2 G: 2 INJECTION, POWDER, FOR SOLUTION INTRAMUSCULAR; INTRAVENOUS at 07:55

## 2017-01-01 RX ADMIN — STANDARDIZED SENNA CONCENTRATE AND DOCUSATE SODIUM 1 TABLET: 8.6; 5 TABLET, FILM COATED ORAL at 21:05

## 2017-01-01 RX ADMIN — ENOXAPARIN SODIUM 100 MG: 100 INJECTION SUBCUTANEOUS at 09:31

## 2017-01-01 RX ADMIN — ENOXAPARIN SODIUM 100 MG: 100 INJECTION SUBCUTANEOUS at 20:44

## 2017-01-01 RX ADMIN — ENOXAPARIN SODIUM 100 MG: 100 INJECTION SUBCUTANEOUS at 08:57

## 2017-01-01 RX ADMIN — FUROSEMIDE 40 MG: 10 INJECTION, SOLUTION INTRAVENOUS at 20:11

## 2017-01-01 RX ADMIN — OXYCODONE HYDROCHLORIDE 5 MG: 5 TABLET ORAL at 04:14

## 2017-01-01 RX ADMIN — Medication 2 TABLET: at 08:35

## 2017-01-01 RX ADMIN — HEPARIN SODIUM 1750 UNITS/HR: 5000 INJECTION, SOLUTION INTRAVENOUS at 05:02

## 2017-01-01 RX ADMIN — COLCHICINE 0.6 MG: 0.6 TABLET, FILM COATED ORAL at 08:24

## 2017-01-01 RX ADMIN — HALOPERIDOL LACTATE 5 MG: 5 INJECTION, SOLUTION INTRAMUSCULAR at 05:02

## 2017-01-01 RX ADMIN — DOBUTAMINE HYDROCHLORIDE 5 MCG/KG/MIN: 100 INJECTION INTRAVENOUS at 18:17

## 2017-01-01 RX ADMIN — CYCLOBENZAPRINE HYDROCHLORIDE 10 MG: 10 TABLET, FILM COATED ORAL at 18:36

## 2017-01-01 RX ADMIN — SPIRONOLACTONE 25 MG: 25 TABLET, FILM COATED ORAL at 07:53

## 2017-01-01 RX ADMIN — GABAPENTIN 300 MG: 300 CAPSULE ORAL at 20:44

## 2017-01-01 RX ADMIN — CYCLOBENZAPRINE HYDROCHLORIDE 10 MG: 10 TABLET, FILM COATED ORAL at 21:16

## 2017-01-01 RX ADMIN — FUROSEMIDE 20 MG: 10 INJECTION, SOLUTION INTRAVENOUS at 17:30

## 2017-01-01 RX ADMIN — METOLAZONE 2.5 MG: 2.5 TABLET ORAL at 16:30

## 2017-01-01 RX ADMIN — PANTOPRAZOLE SODIUM 40 MG: 40 INJECTION, POWDER, FOR SOLUTION INTRAVENOUS at 08:50

## 2017-01-01 RX ADMIN — SPIRONOLACTONE 25 MG: 25 TABLET, FILM COATED ORAL at 09:17

## 2017-01-01 RX ADMIN — LACTULOSE 30 ML: 10 SOLUTION ORAL at 21:00

## 2017-01-01 RX ADMIN — DIPHENHYDRAMINE HCL 50 MG: 25 TABLET ORAL at 03:06

## 2017-01-01 RX ADMIN — DOBUTAMINE HYDROCHLORIDE 5 MCG/KG/MIN: 100 INJECTION INTRAVENOUS at 21:56

## 2017-01-01 RX ADMIN — FUROSEMIDE 60 MG: 10 INJECTION, SOLUTION INTRAMUSCULAR; INTRAVENOUS at 16:10

## 2017-01-01 RX ADMIN — FUROSEMIDE 60 MG: 10 INJECTION, SOLUTION INTRAMUSCULAR; INTRAVENOUS at 06:20

## 2017-01-01 RX ADMIN — SODIUM CHLORIDE 500 ML: 9 INJECTION, SOLUTION INTRAVENOUS at 01:03

## 2017-01-01 RX ADMIN — LACTULOSE 30 ML: 10 SOLUTION ORAL at 08:05

## 2017-01-01 RX ADMIN — DOBUTAMINE HYDROCHLORIDE 5 MCG/KG/MIN: 100 INJECTION INTRAVENOUS at 04:05

## 2017-01-01 RX ADMIN — FUROSEMIDE 20 MG: 10 INJECTION, SOLUTION INTRAVENOUS at 09:35

## 2017-01-01 RX ADMIN — ENOXAPARIN SODIUM 100 MG: 100 INJECTION SUBCUTANEOUS at 20:05

## 2017-01-01 RX ADMIN — DOCUSATE SODIUM 100 MG: 100 CAPSULE ORAL at 08:40

## 2017-01-01 RX ADMIN — DOBUTAMINE HYDROCHLORIDE 5 MCG/KG/MIN: 100 INJECTION INTRAVENOUS at 08:07

## 2017-01-01 RX ADMIN — COLCHICINE 0.6 MG: 0.6 TABLET, FILM COATED ORAL at 07:54

## 2017-01-01 RX ADMIN — OXYCODONE HYDROCHLORIDE 5 MG: 5 TABLET ORAL at 00:07

## 2017-01-01 RX ADMIN — COLCHICINE 0.6 MG: 0.6 TABLET, FILM COATED ORAL at 21:00

## 2017-01-01 RX ADMIN — OXYCODONE HYDROCHLORIDE 5 MG: 5 TABLET ORAL at 11:51

## 2017-01-01 RX ADMIN — SPIRONOLACTONE 50 MG: 25 TABLET, FILM COATED ORAL at 08:56

## 2017-01-01 RX ADMIN — NOREPINEPHRINE BITARTRATE 17 MCG/MIN: 1 INJECTION INTRAVENOUS at 08:52

## 2017-01-01 RX ADMIN — CARVEDILOL 3.12 MG: 3.12 TABLET, FILM COATED ORAL at 09:37

## 2017-01-01 RX ADMIN — FUROSEMIDE 80 MG: 10 INJECTION, SOLUTION INTRAMUSCULAR; INTRAVENOUS at 18:10

## 2017-01-01 RX ADMIN — NOREPINEPHRINE BITARTRATE 30 MCG/MIN: 1 INJECTION INTRAVENOUS at 14:43

## 2017-01-01 RX ADMIN — GABAPENTIN 300 MG: 300 CAPSULE ORAL at 08:03

## 2017-01-01 RX ADMIN — LACTULOSE 30 ML: 10 SOLUTION ORAL at 08:03

## 2017-01-01 RX ADMIN — DOBUTAMINE HYDROCHLORIDE 5 MCG/KG/MIN: 100 INJECTION INTRAVENOUS at 00:15

## 2017-01-01 RX ADMIN — PIPERACILLIN AND TAZOBACTAM 3.38 G: 3; .375 INJECTION, POWDER, LYOPHILIZED, FOR SOLUTION INTRAVENOUS; PARENTERAL at 00:23

## 2017-01-01 RX ADMIN — CLINDAMYCIN IN 5 PERCENT DEXTROSE 600 MG: 12 INJECTION, SOLUTION INTRAVENOUS at 14:13

## 2017-01-01 RX ADMIN — HEPARIN SODIUM 3200 UNITS: 1000 INJECTION, SOLUTION INTRAVENOUS; SUBCUTANEOUS at 06:27

## 2017-01-01 RX ADMIN — PIPERACILLIN AND TAZOBACTAM 4.5 G: 4; .5 INJECTION, POWDER, LYOPHILIZED, FOR SOLUTION INTRAVENOUS; PARENTERAL at 05:05

## 2017-01-01 RX ADMIN — DOBUTAMINE HYDROCHLORIDE 5 MCG/KG/MIN: 100 INJECTION INTRAVENOUS at 23:42

## 2017-01-01 RX ADMIN — GABAPENTIN 300 MG: 300 CAPSULE ORAL at 20:39

## 2017-01-01 RX ADMIN — PIPERACILLIN AND TAZOBACTAM 3.38 G: 3; .375 INJECTION, POWDER, LYOPHILIZED, FOR SOLUTION INTRAVENOUS; PARENTERAL at 05:14

## 2017-01-01 RX ADMIN — FUROSEMIDE 40 MG: 10 INJECTION, SOLUTION INTRAVENOUS at 05:13

## 2017-01-01 RX ADMIN — OXYCODONE HYDROCHLORIDE 10 MG: 10 TABLET ORAL at 06:34

## 2017-01-01 RX ADMIN — Medication 2 TABLET: at 07:31

## 2017-01-01 RX ADMIN — LACTULOSE 30 ML: 10 SOLUTION ORAL at 19:23

## 2017-01-01 RX ADMIN — HEPARIN SODIUM 1750 UNITS/HR: 5000 INJECTION, SOLUTION INTRAVENOUS at 06:16

## 2017-01-01 RX ADMIN — ALLOPURINOL 100 MG: 100 TABLET ORAL at 09:16

## 2017-01-01 RX ADMIN — DOCUSATE SODIUM 100 MG: 100 CAPSULE ORAL at 10:54

## 2017-01-01 RX ADMIN — FUROSEMIDE 40 MG: 10 INJECTION, SOLUTION INTRAVENOUS at 15:46

## 2017-01-01 RX ADMIN — CARVEDILOL 3.12 MG: 3.12 TABLET, FILM COATED ORAL at 08:31

## 2017-01-01 RX ADMIN — OXYCODONE HYDROCHLORIDE 10 MG: 10 TABLET ORAL at 14:20

## 2017-01-01 RX ADMIN — DOBUTAMINE HYDROCHLORIDE 5 MCG/KG/MIN: 100 INJECTION INTRAVENOUS at 12:23

## 2017-01-01 RX ADMIN — CLINDAMYCIN IN 5 PERCENT DEXTROSE 900 MG: 18 INJECTION, SOLUTION INTRAVENOUS at 21:37

## 2017-01-01 RX ADMIN — MORPHINE SULFATE 4 MG: 4 INJECTION INTRAVENOUS at 16:01

## 2017-01-01 RX ADMIN — PIPERACILLIN AND TAZOBACTAM 4.5 G: 4; .5 INJECTION, POWDER, LYOPHILIZED, FOR SOLUTION INTRAVENOUS; PARENTERAL at 05:18

## 2017-01-01 RX ADMIN — HALOPERIDOL 5 MG: 5 TABLET ORAL at 12:29

## 2017-01-01 RX ADMIN — FUROSEMIDE 40 MG: 10 INJECTION, SOLUTION INTRAVENOUS at 07:55

## 2017-01-01 RX ADMIN — SODIUM CHLORIDE TAB 1 GM 1 G: 1 TAB at 08:30

## 2017-01-01 RX ADMIN — CYCLOBENZAPRINE HYDROCHLORIDE 10 MG: 10 TABLET, FILM COATED ORAL at 11:27

## 2017-01-01 RX ADMIN — SODIUM CHLORIDE: 9 INJECTION, SOLUTION INTRAVENOUS at 02:20

## 2017-01-01 RX ADMIN — HALOPERIDOL 5 MG: 5 TABLET ORAL at 11:06

## 2017-01-01 RX ADMIN — SODIUM CHLORIDE: 9 INJECTION, SOLUTION INTRAVENOUS at 23:45

## 2017-01-01 RX ADMIN — GABAPENTIN 300 MG: 300 CAPSULE ORAL at 07:55

## 2017-01-01 RX ADMIN — GABAPENTIN 300 MG: 300 CAPSULE ORAL at 21:16

## 2017-01-01 RX ADMIN — LACTULOSE 30 ML: 10 SOLUTION ORAL at 20:44

## 2017-01-01 RX ADMIN — MORPHINE SULFATE 2 MG: 4 INJECTION INTRAVENOUS at 07:57

## 2017-01-01 RX ADMIN — IPRATROPIUM BROMIDE AND ALBUTEROL SULFATE 3 ML: .5; 3 SOLUTION RESPIRATORY (INHALATION) at 14:23

## 2017-01-01 RX ADMIN — POTASSIUM CHLORIDE 20 MEQ: 1500 TABLET, EXTENDED RELEASE ORAL at 08:56

## 2017-01-01 RX ADMIN — FUROSEMIDE 40 MG: 10 INJECTION, SOLUTION INTRAVENOUS at 21:44

## 2017-01-01 RX ADMIN — FUROSEMIDE 40 MG: 10 INJECTION, SOLUTION INTRAVENOUS at 15:34

## 2017-01-01 RX ADMIN — FUROSEMIDE 40 MG: 10 INJECTION, SOLUTION INTRAVENOUS at 08:01

## 2017-01-01 RX ADMIN — Medication 2 TABLET: at 19:53

## 2017-01-01 RX ADMIN — LACTULOSE 30 ML: 10 SOLUTION ORAL at 08:57

## 2017-01-01 RX ADMIN — OXYCODONE HYDROCHLORIDE 5 MG: 5 TABLET ORAL at 02:07

## 2017-01-01 RX ADMIN — OXYCODONE HYDROCHLORIDE 5 MG: 5 TABLET ORAL at 21:05

## 2017-01-01 RX ADMIN — WARFARIN SODIUM 4.5 MG: 2.5 TABLET ORAL at 16:57

## 2017-01-01 RX ADMIN — PIPERACILLIN AND TAZOBACTAM 4.5 G: 4; .5 INJECTION, POWDER, LYOPHILIZED, FOR SOLUTION INTRAVENOUS; PARENTERAL at 12:50

## 2017-01-01 RX ADMIN — SODIUM CHLORIDE TAB 1 GM 1 G: 1 TAB at 10:19

## 2017-01-01 RX ADMIN — DEXTROSE MONOHYDRATE 50 ML: 25 INJECTION, SOLUTION INTRAVENOUS at 19:45

## 2017-01-01 RX ADMIN — WARFARIN SODIUM 1 MG: 1 TABLET ORAL at 17:30

## 2017-01-01 RX ADMIN — HALOPERIDOL 5 MG: 5 TABLET ORAL at 00:15

## 2017-01-01 RX ADMIN — ENOXAPARIN SODIUM 100 MG: 100 INJECTION SUBCUTANEOUS at 07:45

## 2017-01-01 RX ADMIN — FUROSEMIDE 60 MG: 10 INJECTION, SOLUTION INTRAVENOUS at 07:53

## 2017-01-01 RX ADMIN — FUROSEMIDE 40 MG: 10 INJECTION, SOLUTION INTRAVENOUS at 05:04

## 2017-01-01 RX ADMIN — RIFAXIMIN 550 MG: 550 TABLET ORAL at 10:09

## 2017-01-01 RX ADMIN — WARFARIN SODIUM 3 MG: 3 TABLET ORAL at 18:15

## 2017-01-01 RX ADMIN — OXYCODONE HYDROCHLORIDE 5 MG: 5 TABLET ORAL at 00:51

## 2017-01-01 RX ADMIN — MORPHINE SULFATE 2 MG: 4 INJECTION INTRAVENOUS at 20:19

## 2017-01-01 RX ADMIN — AZITHROMYCIN 500 MG: 500 INJECTION, POWDER, LYOPHILIZED, FOR SOLUTION INTRAVENOUS at 16:28

## 2017-01-01 RX ADMIN — SODIUM CHLORIDE: 9 INJECTION, SOLUTION INTRAVENOUS at 20:31

## 2017-01-01 RX ADMIN — ENOXAPARIN SODIUM 100 MG: 100 INJECTION SUBCUTANEOUS at 23:52

## 2017-01-01 RX ADMIN — OXYCODONE HYDROCHLORIDE 5 MG: 5 TABLET ORAL at 05:37

## 2017-01-01 RX ADMIN — PANTOPRAZOLE SODIUM 40 MG: 40 INJECTION, POWDER, FOR SOLUTION INTRAVENOUS at 10:09

## 2017-01-01 RX ADMIN — DOBUTAMINE HYDROCHLORIDE 5 MCG/KG/MIN: 100 INJECTION INTRAVENOUS at 01:41

## 2017-01-01 RX ADMIN — RIFAXIMIN 550 MG: 550 TABLET ORAL at 08:21

## 2017-01-01 RX ADMIN — HEPARIN SODIUM 3200 UNITS: 1000 INJECTION, SOLUTION INTRAVENOUS; SUBCUTANEOUS at 15:53

## 2017-01-01 RX ADMIN — METOLAZONE 2.5 MG: 2.5 TABLET ORAL at 05:49

## 2017-01-01 RX ADMIN — PIPERACILLIN AND TAZOBACTAM 4.5 G: 4; .5 INJECTION, POWDER, LYOPHILIZED, FOR SOLUTION INTRAVENOUS; PARENTERAL at 11:59

## 2017-01-01 RX ADMIN — CARVEDILOL 3.12 MG: 3.12 TABLET, FILM COATED ORAL at 08:57

## 2017-01-01 RX ADMIN — MORPHINE SULFATE 4 MG: 4 INJECTION INTRAVENOUS at 03:06

## 2017-01-01 RX ADMIN — FAMOTIDINE 20 MG: 20 TABLET, FILM COATED ORAL at 08:01

## 2017-01-01 RX ADMIN — STANDARDIZED SENNA CONCENTRATE AND DOCUSATE SODIUM 1 TABLET: 8.6; 5 TABLET, FILM COATED ORAL at 20:25

## 2017-01-01 RX ADMIN — GUAIFENESIN 600 MG: 600 TABLET, EXTENDED RELEASE ORAL at 22:16

## 2017-01-01 RX ADMIN — CARVEDILOL 3.12 MG: 3.12 TABLET, FILM COATED ORAL at 17:25

## 2017-01-01 RX ADMIN — PIPERACILLIN AND TAZOBACTAM 4.5 G: 4; .5 INJECTION, POWDER, LYOPHILIZED, FOR SOLUTION INTRAVENOUS; PARENTERAL at 05:32

## 2017-01-01 RX ADMIN — CYCLOBENZAPRINE HYDROCHLORIDE 10 MG: 10 TABLET, FILM COATED ORAL at 00:34

## 2017-01-01 RX ADMIN — SPIRONOLACTONE 25 MG: 25 TABLET, FILM COATED ORAL at 08:25

## 2017-01-01 RX ADMIN — FUROSEMIDE 40 MG: 10 INJECTION, SOLUTION INTRAVENOUS at 13:09

## 2017-01-01 RX ADMIN — CARVEDILOL 3.12 MG: 3.12 TABLET, FILM COATED ORAL at 09:16

## 2017-01-01 RX ADMIN — OXYCODONE HYDROCHLORIDE 10 MG: 10 TABLET ORAL at 23:54

## 2017-01-01 RX ADMIN — MIDODRINE HYDROCHLORIDE 5 MG: 5 TABLET ORAL at 11:36

## 2017-01-01 RX ADMIN — DOBUTAMINE HYDROCHLORIDE 2.5 MCG/KG/MIN: 100 INJECTION INTRAVENOUS at 15:16

## 2017-01-01 RX ADMIN — NOREPINEPHRINE BITARTRATE 30 MCG/MIN: 1 INJECTION INTRAVENOUS at 18:42

## 2017-01-01 RX ADMIN — FUROSEMIDE 40 MG: 10 INJECTION, SOLUTION INTRAVENOUS at 08:41

## 2017-01-01 RX ADMIN — NOREPINEPHRINE BITARTRATE 16 MCG/MIN: 1 INJECTION INTRAVENOUS at 12:00

## 2017-01-01 RX ADMIN — NITROGLYCERIN 5 MCG/MIN: 20 INJECTION INTRAVENOUS at 18:53

## 2017-01-01 RX ADMIN — LISINOPRIL 2.5 MG: 5 TABLET ORAL at 08:40

## 2017-01-01 RX ADMIN — OXYCODONE HYDROCHLORIDE 5 MG: 5 TABLET ORAL at 19:57

## 2017-01-01 RX ADMIN — METOCLOPRAMIDE 5 MG: 5 INJECTION, SOLUTION INTRAMUSCULAR; INTRAVENOUS at 12:39

## 2017-01-01 RX ADMIN — GABAPENTIN 300 MG: 300 CAPSULE ORAL at 07:31

## 2017-01-01 RX ADMIN — GUAIFENESIN 600 MG: 600 TABLET, EXTENDED RELEASE ORAL at 21:00

## 2017-01-01 RX ADMIN — DOBUTAMINE HYDROCHLORIDE 5 MCG/KG/MIN: 100 INJECTION INTRAVENOUS at 04:08

## 2017-01-01 RX ADMIN — STANDARDIZED SENNA CONCENTRATE AND DOCUSATE SODIUM 1 TABLET: 8.6; 5 TABLET, FILM COATED ORAL at 20:39

## 2017-01-01 RX ADMIN — HEPARIN SODIUM 1200 UNITS/HR: 5000 INJECTION, SOLUTION INTRAVENOUS at 06:37

## 2017-01-01 RX ADMIN — HALOPERIDOL 2 MG: 2 TABLET ORAL at 18:00

## 2017-01-01 RX ADMIN — HALOPERIDOL 5 MG: 5 TABLET ORAL at 05:57

## 2017-01-01 RX ADMIN — DOBUTAMINE HYDROCHLORIDE 5 MCG/KG/MIN: 100 INJECTION INTRAVENOUS at 14:24

## 2017-01-01 RX ADMIN — MORPHINE SULFATE 2 MG: 4 INJECTION INTRAVENOUS at 11:36

## 2017-01-01 RX ADMIN — DOBUTAMINE HYDROCHLORIDE 5 MCG/KG/MIN: 100 INJECTION INTRAVENOUS at 07:52

## 2017-01-01 RX ADMIN — FUROSEMIDE 40 MG: 10 INJECTION, SOLUTION INTRAVENOUS at 05:38

## 2017-01-01 RX ADMIN — DOBUTAMINE HYDROCHLORIDE 5 MCG/KG/MIN: 100 INJECTION INTRAVENOUS at 16:15

## 2017-01-01 RX ADMIN — DOBUTAMINE HYDROCHLORIDE 5 MCG/KG/MIN: 100 INJECTION INTRAVENOUS at 10:29

## 2017-01-01 RX ADMIN — LACTULOSE 30 ML: 10 SOLUTION ORAL at 07:55

## 2017-01-01 RX ADMIN — PIPERACILLIN AND TAZOBACTAM 3.38 G: 3; .375 INJECTION, POWDER, LYOPHILIZED, FOR SOLUTION INTRAVENOUS; PARENTERAL at 13:11

## 2017-01-01 RX ADMIN — FUROSEMIDE 60 MG: 10 INJECTION, SOLUTION INTRAMUSCULAR; INTRAVENOUS at 16:58

## 2017-01-01 RX ADMIN — LISINOPRIL 2.5 MG: 5 TABLET ORAL at 08:56

## 2017-01-01 RX ADMIN — SPIRONOLACTONE 25 MG: 25 TABLET, FILM COATED ORAL at 11:50

## 2017-01-01 RX ADMIN — OXYCODONE HYDROCHLORIDE 10 MG: 10 TABLET ORAL at 20:57

## 2017-01-01 RX ADMIN — HEPARIN SODIUM 1200 UNITS: 5000 INJECTION, SOLUTION INTRAVENOUS at 21:23

## 2017-01-01 RX ADMIN — OXYCODONE HYDROCHLORIDE 5 MG: 5 TABLET ORAL at 12:48

## 2017-01-01 RX ADMIN — NOREPINEPHRINE BITARTRATE 30 MCG/MIN: 1 INJECTION INTRAVENOUS at 04:00

## 2017-01-01 RX ADMIN — DOBUTAMINE HYDROCHLORIDE 2.5 MCG/KG/MIN: 100 INJECTION INTRAVENOUS at 04:30

## 2017-01-01 RX ADMIN — CARVEDILOL 3.12 MG: 3.12 TABLET, FILM COATED ORAL at 07:53

## 2017-01-01 RX ADMIN — PIPERACILLIN AND TAZOBACTAM 4.5 G: 4; .5 INJECTION, POWDER, LYOPHILIZED, FOR SOLUTION INTRAVENOUS; PARENTERAL at 14:22

## 2017-01-01 RX ADMIN — FUROSEMIDE 20 MG: 10 INJECTION, SOLUTION INTRAVENOUS at 22:08

## 2017-01-01 RX ADMIN — CLINDAMYCIN IN 5 PERCENT DEXTROSE 900 MG: 18 INJECTION, SOLUTION INTRAVENOUS at 05:31

## 2017-01-01 RX ADMIN — GABAPENTIN 300 MG: 300 CAPSULE ORAL at 19:53

## 2017-01-01 RX ADMIN — Medication 2 TABLET: at 21:00

## 2017-01-01 RX ADMIN — POTASSIUM CHLORIDE 40 MEQ: 1.5 POWDER, FOR SOLUTION ORAL at 08:18

## 2017-01-01 RX ADMIN — FUROSEMIDE 40 MG: 10 INJECTION, SOLUTION INTRAVENOUS at 20:49

## 2017-01-01 RX ADMIN — GABAPENTIN 300 MG: 300 CAPSULE ORAL at 20:29

## 2017-01-01 RX ADMIN — RIFAXIMIN 550 MG: 550 TABLET ORAL at 20:24

## 2017-01-01 RX ADMIN — RIFAXIMIN 550 MG: 550 TABLET ORAL at 08:18

## 2017-01-01 RX ADMIN — HEPARIN SODIUM 3200 UNITS: 1000 INJECTION, SOLUTION INTRAVENOUS; SUBCUTANEOUS at 04:56

## 2017-01-01 RX ADMIN — CLINDAMYCIN IN 5 PERCENT DEXTROSE 900 MG: 18 INJECTION, SOLUTION INTRAVENOUS at 22:42

## 2017-01-01 RX ADMIN — PANTOPRAZOLE SODIUM 40 MG: 40 INJECTION, POWDER, FOR SOLUTION INTRAVENOUS at 19:52

## 2017-01-01 RX ADMIN — HEPARIN SODIUM 1350 UNITS: 5000 INJECTION, SOLUTION INTRAVENOUS at 03:45

## 2017-01-01 RX ADMIN — FAMOTIDINE 20 MG: 20 TABLET, FILM COATED ORAL at 20:44

## 2017-01-01 RX ADMIN — POTASSIUM CHLORIDE 20 MEQ: 1.5 POWDER, FOR SOLUTION ORAL at 10:36

## 2017-01-01 RX ADMIN — OXYCODONE HYDROCHLORIDE 5 MG: 5 TABLET ORAL at 20:27

## 2017-01-01 RX ADMIN — METOLAZONE 2.5 MG: 2.5 TABLET ORAL at 10:54

## 2017-01-01 RX ADMIN — LISINOPRIL 2.5 MG: 5 TABLET ORAL at 10:18

## 2017-01-01 RX ADMIN — ENOXAPARIN SODIUM 100 MG: 100 INJECTION SUBCUTANEOUS at 22:10

## 2017-01-01 RX ADMIN — NOREPINEPHRINE BITARTRATE 30 MCG/MIN: 1 INJECTION INTRAVENOUS at 00:39

## 2017-01-01 RX ADMIN — OXYCODONE HYDROCHLORIDE 10 MG: 10 TABLET ORAL at 20:45

## 2017-01-01 RX ADMIN — FUROSEMIDE 40 MG: 10 INJECTION, SOLUTION INTRAVENOUS at 20:39

## 2017-01-01 RX ADMIN — GABAPENTIN 300 MG: 300 CAPSULE ORAL at 20:15

## 2017-01-01 RX ADMIN — LACTULOSE 30 ML: 10 SOLUTION ORAL at 08:30

## 2017-01-01 RX ADMIN — FUROSEMIDE 40 MG: 40 TABLET ORAL at 08:40

## 2017-01-01 RX ADMIN — MORPHINE SULFATE 4 MG: 4 INJECTION INTRAVENOUS at 03:31

## 2017-01-01 RX ADMIN — HALOPERIDOL 2 MG: 2 TABLET ORAL at 23:09

## 2017-01-01 RX ADMIN — PIPERACILLIN AND TAZOBACTAM 4.5 G: 4; .5 INJECTION, POWDER, LYOPHILIZED, FOR SOLUTION INTRAVENOUS; PARENTERAL at 00:00

## 2017-01-01 RX ADMIN — DOBUTAMINE HYDROCHLORIDE 5 MCG/KG/MIN: 100 INJECTION INTRAVENOUS at 16:01

## 2017-01-01 RX ADMIN — CEFTRIAXONE 2 G: 2 INJECTION, POWDER, FOR SOLUTION INTRAMUSCULAR; INTRAVENOUS at 09:00

## 2017-01-01 RX ADMIN — WARFARIN SODIUM 3 MG: 3 TABLET ORAL at 18:46

## 2017-01-01 RX ADMIN — FUROSEMIDE 40 MG: 10 INJECTION, SOLUTION INTRAVENOUS at 08:18

## 2017-01-01 RX ADMIN — FAMOTIDINE 20 MG: 20 TABLET, FILM COATED ORAL at 08:41

## 2017-01-01 RX ADMIN — OXYCODONE HYDROCHLORIDE 5 MG: 5 TABLET ORAL at 17:11

## 2017-01-01 RX ADMIN — OXYCODONE HYDROCHLORIDE 10 MG: 10 TABLET ORAL at 02:19

## 2017-01-01 RX ADMIN — OXYCODONE HYDROCHLORIDE 10 MG: 10 TABLET ORAL at 17:25

## 2017-01-01 RX ADMIN — RISPERIDONE 1 MG: 1 TABLET, FILM COATED ORAL at 20:12

## 2017-01-01 RX ADMIN — RIFAXIMIN 550 MG: 550 TABLET ORAL at 20:29

## 2017-01-01 RX ADMIN — CARVEDILOL 3.12 MG: 3.12 TABLET, FILM COATED ORAL at 08:28

## 2017-01-01 RX ADMIN — HEPARIN SODIUM 1350 UNITS/HR: 5000 INJECTION, SOLUTION INTRAVENOUS at 22:04

## 2017-01-01 RX ADMIN — CALCIUM GLUCONATE 1000 MG: 94 INJECTION, SOLUTION INTRAVENOUS at 05:51

## 2017-01-01 RX ADMIN — ARIPIPRAZOLE 10 MG: 10 TABLET ORAL at 08:42

## 2017-01-01 RX ADMIN — CARVEDILOL 3.12 MG: 3.12 TABLET, FILM COATED ORAL at 17:26

## 2017-01-01 RX ADMIN — OXYCODONE HYDROCHLORIDE 5 MG: 5 TABLET ORAL at 11:18

## 2017-01-01 RX ADMIN — DOCUSATE SODIUM 100 MG: 100 CAPSULE ORAL at 09:18

## 2017-01-01 RX ADMIN — FAMOTIDINE 20 MG: 10 INJECTION INTRAVENOUS at 08:23

## 2017-01-01 RX ADMIN — FUROSEMIDE 20 MG: 10 INJECTION, SOLUTION INTRAVENOUS at 21:36

## 2017-01-01 RX ADMIN — LACTULOSE 30 ML: 10 SOLUTION ORAL at 22:09

## 2017-01-01 RX ADMIN — HALOPERIDOL 2 MG: 2 TABLET ORAL at 12:16

## 2017-01-01 RX ADMIN — DEXTROSE MONOHYDRATE 50 ML: 25 INJECTION, SOLUTION INTRAVENOUS at 08:59

## 2017-01-01 RX ADMIN — BUMETANIDE 2 MG/HR: 0.25 INJECTION, SOLUTION INTRAMUSCULAR; INTRAVENOUS at 05:12

## 2017-01-01 RX ADMIN — DOBUTAMINE HYDROCHLORIDE 5 MCG/KG/MIN: 100 INJECTION INTRAVENOUS at 19:47

## 2017-01-01 RX ADMIN — GUAIFENESIN 600 MG: 600 TABLET, EXTENDED RELEASE ORAL at 08:40

## 2017-01-01 RX ADMIN — RIFAXIMIN 550 MG: 550 TABLET ORAL at 20:15

## 2017-01-01 RX ADMIN — FAMOTIDINE 20 MG: 20 TABLET, FILM COATED ORAL at 08:18

## 2017-01-01 RX ADMIN — GUAIFENESIN 600 MG: 600 TABLET, EXTENDED RELEASE ORAL at 20:19

## 2017-01-01 RX ADMIN — MORPHINE SULFATE 2 MG: 4 INJECTION INTRAVENOUS at 08:34

## 2017-01-01 RX ADMIN — AMPICILLIN SODIUM AND SULBACTAM SODIUM 3 G: 2; 1 INJECTION, POWDER, FOR SOLUTION INTRAMUSCULAR; INTRAVENOUS at 16:56

## 2017-01-01 RX ADMIN — PIPERACILLIN AND TAZOBACTAM 3.38 G: 3; .375 INJECTION, POWDER, LYOPHILIZED, FOR SOLUTION INTRAVENOUS; PARENTERAL at 05:16

## 2017-01-01 RX ADMIN — SODIUM CHLORIDE 250 ML: 9 INJECTION, SOLUTION INTRAVENOUS at 04:34

## 2017-01-01 RX ADMIN — LACTULOSE 30 ML: 10 SOLUTION ORAL at 07:57

## 2017-01-01 RX ADMIN — POTASSIUM CHLORIDE 20 MEQ: 20 POWDER ORAL at 20:13

## 2017-01-01 RX ADMIN — ARIPIPRAZOLE 10 MG: 10 TABLET ORAL at 08:03

## 2017-01-01 RX ADMIN — HEPARIN SODIUM 1750 UNITS: 5000 INJECTION, SOLUTION INTRAVENOUS at 12:22

## 2017-01-01 RX ADMIN — HEPARIN SODIUM 1750 UNITS: 5000 INJECTION, SOLUTION INTRAVENOUS at 23:01

## 2017-01-01 RX ADMIN — DOBUTAMINE HYDROCHLORIDE 5 MCG/KG/MIN: 100 INJECTION INTRAVENOUS at 12:56

## 2017-01-01 RX ADMIN — PANTOPRAZOLE SODIUM 40 MG: 40 INJECTION, POWDER, FOR SOLUTION INTRAVENOUS at 07:56

## 2017-01-01 RX ADMIN — DOBUTAMINE HYDROCHLORIDE 5 MCG/KG/MIN: 100 INJECTION INTRAVENOUS at 20:18

## 2017-01-01 RX ADMIN — POTASSIUM CHLORIDE 10 MEQ: 7.46 INJECTION, SOLUTION INTRAVENOUS at 13:45

## 2017-01-01 RX ADMIN — Medication 2 TABLET: at 08:39

## 2017-01-01 RX ADMIN — SODIUM POLYSTYRENE SULFONATE 15 G: 15 SUSPENSION ORAL; RECTAL at 22:19

## 2017-01-01 RX ADMIN — HEPARIN SODIUM 6000 UNITS: 1000 INJECTION, SOLUTION INTRAVENOUS; SUBCUTANEOUS at 21:23

## 2017-01-01 RX ADMIN — RIFAXIMIN 550 MG: 550 TABLET ORAL at 08:01

## 2017-01-01 RX ADMIN — DIPHENHYDRAMINE HCL 50 MG: 25 TABLET ORAL at 20:25

## 2017-01-01 RX ADMIN — LACTULOSE 30 ML: 10 SOLUTION ORAL at 20:15

## 2017-01-01 RX ADMIN — OXYCODONE HYDROCHLORIDE 5 MG: 5 TABLET ORAL at 10:31

## 2017-01-01 RX ADMIN — PIPERACILLIN AND TAZOBACTAM 3.38 G: 3; .375 INJECTION, POWDER, LYOPHILIZED, FOR SOLUTION INTRAVENOUS; PARENTERAL at 18:00

## 2017-01-01 RX ADMIN — FAMOTIDINE 20 MG: 20 TABLET, FILM COATED ORAL at 08:39

## 2017-01-01 RX ADMIN — FUROSEMIDE 40 MG: 10 INJECTION, SOLUTION INTRAVENOUS at 19:43

## 2017-01-01 RX ADMIN — FAMOTIDINE 20 MG: 10 INJECTION INTRAVENOUS at 07:54

## 2017-01-01 RX ADMIN — HEPARIN SODIUM 3200 UNITS: 1000 INJECTION, SOLUTION INTRAVENOUS; SUBCUTANEOUS at 07:47

## 2017-01-01 RX ADMIN — ENOXAPARIN SODIUM 100 MG: 100 INJECTION SUBCUTANEOUS at 08:41

## 2017-01-01 RX ADMIN — CLINDAMYCIN IN 5 PERCENT DEXTROSE 900 MG: 18 INJECTION, SOLUTION INTRAVENOUS at 20:01

## 2017-01-01 RX ADMIN — ENOXAPARIN SODIUM 100 MG: 100 INJECTION SUBCUTANEOUS at 08:30

## 2017-01-01 RX ADMIN — PIPERACILLIN AND TAZOBACTAM 3.38 G: 3; .375 INJECTION, POWDER, LYOPHILIZED, FOR SOLUTION INTRAVENOUS; PARENTERAL at 05:49

## 2017-01-01 RX ADMIN — PIPERACILLIN AND TAZOBACTAM 4.5 G: 4; .5 INJECTION, POWDER, LYOPHILIZED, FOR SOLUTION INTRAVENOUS; PARENTERAL at 08:25

## 2017-01-01 RX ADMIN — WARFARIN SODIUM 3 MG: 3 TABLET ORAL at 18:31

## 2017-01-01 RX ADMIN — PIPERACILLIN AND TAZOBACTAM 3.38 G: 3; .375 INJECTION, POWDER, LYOPHILIZED, FOR SOLUTION INTRAVENOUS; PARENTERAL at 17:06

## 2017-01-01 RX ADMIN — OXYCODONE HYDROCHLORIDE 5 MG: 5 TABLET ORAL at 17:47

## 2017-01-01 RX ADMIN — FUROSEMIDE 40 MG: 10 INJECTION, SOLUTION INTRAVENOUS at 05:58

## 2017-01-01 RX ADMIN — PIPERACILLIN AND TAZOBACTAM 3.38 G: 3; .375 INJECTION, POWDER, LYOPHILIZED, FOR SOLUTION INTRAVENOUS; PARENTERAL at 00:08

## 2017-01-01 RX ADMIN — PANTOPRAZOLE SODIUM 40 MG: 40 INJECTION, POWDER, FOR SOLUTION INTRAVENOUS at 20:15

## 2017-01-01 RX ADMIN — Medication 25 G: at 09:35

## 2017-01-01 RX ADMIN — PANTOPRAZOLE SODIUM 40 MG: 40 INJECTION, POWDER, FOR SOLUTION INTRAVENOUS at 20:34

## 2017-01-01 RX ADMIN — FUROSEMIDE 60 MG: 10 INJECTION, SOLUTION INTRAMUSCULAR; INTRAVENOUS at 21:29

## 2017-01-01 RX ADMIN — PANTOPRAZOLE SODIUM 40 MG: 40 INJECTION, POWDER, FOR SOLUTION INTRAVENOUS at 09:43

## 2017-01-01 RX ADMIN — GABAPENTIN 300 MG: 300 CAPSULE ORAL at 20:58

## 2017-01-01 RX ADMIN — RIFAXIMIN 550 MG: 550 TABLET ORAL at 19:46

## 2017-01-01 RX ADMIN — GABAPENTIN 300 MG: 300 CAPSULE ORAL at 09:32

## 2017-01-01 RX ADMIN — NOREPINEPHRINE BITARTRATE 30 MCG/MIN: 1 INJECTION INTRAVENOUS at 13:32

## 2017-01-01 RX ADMIN — FUROSEMIDE 40 MG: 10 INJECTION, SOLUTION INTRAVENOUS at 07:48

## 2017-01-01 RX ADMIN — OXYCODONE HYDROCHLORIDE 10 MG: 10 TABLET ORAL at 20:20

## 2017-01-01 RX ADMIN — LACTULOSE 30 ML: 10 SOLUTION ORAL at 08:49

## 2017-01-01 RX ADMIN — FAMOTIDINE 20 MG: 10 INJECTION INTRAVENOUS at 09:29

## 2017-01-01 RX ADMIN — PIPERACILLIN AND TAZOBACTAM 3.38 G: 3; .375 INJECTION, POWDER, LYOPHILIZED, FOR SOLUTION INTRAVENOUS; PARENTERAL at 17:34

## 2017-01-01 RX ADMIN — LACTULOSE 30 ML: 10 SOLUTION ORAL at 19:42

## 2017-01-01 RX ADMIN — NOREPINEPHRINE BITARTRATE 20 MCG/MIN: 1 INJECTION INTRAVENOUS at 08:50

## 2017-01-01 RX ADMIN — DOBUTAMINE HYDROCHLORIDE 5 MCG/KG/MIN: 100 INJECTION INTRAVENOUS at 23:41

## 2017-01-01 RX ADMIN — DOBUTAMINE HYDROCHLORIDE 5 MCG/KG/MIN: 100 INJECTION INTRAVENOUS at 13:32

## 2017-01-01 RX ADMIN — RIFAXIMIN 550 MG: 550 TABLET ORAL at 08:50

## 2017-01-01 RX ADMIN — POTASSIUM CHLORIDE 40 MEQ: 1.5 POWDER, FOR SOLUTION ORAL at 08:41

## 2017-01-01 RX ADMIN — PIPERACILLIN AND TAZOBACTAM 3.38 G: 3; .375 INJECTION, POWDER, LYOPHILIZED, FOR SOLUTION INTRAVENOUS; PARENTERAL at 23:43

## 2017-01-01 RX ADMIN — BUMETANIDE 0.5 MG/HR: 0.25 INJECTION, SOLUTION INTRAMUSCULAR; INTRAVENOUS at 20:55

## 2017-01-01 RX ADMIN — ARIPIPRAZOLE 10 MG: 10 TABLET ORAL at 09:36

## 2017-01-01 RX ADMIN — CLINDAMYCIN IN 5 PERCENT DEXTROSE 900 MG: 18 INJECTION, SOLUTION INTRAVENOUS at 06:22

## 2017-01-01 RX ADMIN — LISINOPRIL 2.5 MG: 5 TABLET ORAL at 09:39

## 2017-01-01 RX ADMIN — LACTULOSE 30 ML: 10 SOLUTION ORAL at 21:20

## 2017-01-01 RX ADMIN — ENOXAPARIN SODIUM 100 MG: 100 INJECTION SUBCUTANEOUS at 20:27

## 2017-01-01 RX ADMIN — RIFAXIMIN 550 MG: 550 TABLET ORAL at 19:23

## 2017-01-01 RX ADMIN — LISINOPRIL 2.5 MG: 5 TABLET ORAL at 07:53

## 2017-01-01 RX ADMIN — CYCLOBENZAPRINE HYDROCHLORIDE 10 MG: 10 TABLET, FILM COATED ORAL at 12:05

## 2017-01-01 RX ADMIN — DOBUTAMINE HYDROCHLORIDE 5 MCG/KG/MIN: 100 INJECTION INTRAVENOUS at 01:25

## 2017-01-01 RX ADMIN — MIDODRINE HYDROCHLORIDE 5 MG: 5 TABLET ORAL at 08:49

## 2017-01-01 RX ADMIN — PIPERACILLIN AND TAZOBACTAM 4.5 G: 4; .5 INJECTION, POWDER, LYOPHILIZED, FOR SOLUTION INTRAVENOUS; PARENTERAL at 00:35

## 2017-01-01 RX ADMIN — FUROSEMIDE 40 MG: 10 INJECTION, SOLUTION INTRAVENOUS at 19:29

## 2017-01-01 RX ADMIN — GABAPENTIN 300 MG: 300 CAPSULE ORAL at 21:20

## 2017-01-01 RX ADMIN — GUAIFENESIN 600 MG: 600 TABLET, EXTENDED RELEASE ORAL at 08:25

## 2017-01-01 RX ADMIN — CARVEDILOL 3.12 MG: 3.12 TABLET, FILM COATED ORAL at 17:46

## 2017-01-01 RX ADMIN — LISINOPRIL 2.5 MG: 5 TABLET ORAL at 08:25

## 2017-01-01 RX ADMIN — FUROSEMIDE 20 MG: 10 INJECTION, SOLUTION INTRAVENOUS at 05:54

## 2017-01-01 RX ADMIN — GUAIFENESIN 600 MG: 600 TABLET, EXTENDED RELEASE ORAL at 20:05

## 2017-01-01 RX ADMIN — PANTOPRAZOLE SODIUM 40 MG: 40 INJECTION, POWDER, FOR SOLUTION INTRAVENOUS at 20:29

## 2017-01-01 RX ADMIN — METOLAZONE 5 MG: 5 TABLET ORAL at 10:35

## 2017-01-01 RX ADMIN — DOBUTAMINE HYDROCHLORIDE 5 MCG/KG/MIN: 100 INJECTION INTRAVENOUS at 10:14

## 2017-01-01 RX ADMIN — GABAPENTIN 300 MG: 300 CAPSULE ORAL at 21:05

## 2017-01-01 RX ADMIN — POTASSIUM CHLORIDE 10 MEQ: 7.46 INJECTION, SOLUTION INTRAVENOUS at 11:37

## 2017-01-01 RX ADMIN — Medication 2 TABLET: at 22:09

## 2017-01-01 RX ADMIN — GUAIFENESIN 600 MG: 600 TABLET, EXTENDED RELEASE ORAL at 09:30

## 2017-01-01 RX ADMIN — RIFAXIMIN 550 MG: 550 TABLET ORAL at 21:20

## 2017-01-01 RX ADMIN — PIPERACILLIN AND TAZOBACTAM 4.5 G: 4; .5 INJECTION, POWDER, LYOPHILIZED, FOR SOLUTION INTRAVENOUS; PARENTERAL at 11:18

## 2017-01-01 RX ADMIN — PIPERACILLIN AND TAZOBACTAM 4.5 G: 4; .5 INJECTION, POWDER, LYOPHILIZED, FOR SOLUTION INTRAVENOUS; PARENTERAL at 17:22

## 2017-01-01 RX ADMIN — Medication 2 TABLET: at 19:47

## 2017-01-01 RX ADMIN — CARVEDILOL 3.12 MG: 3.12 TABLET, FILM COATED ORAL at 10:15

## 2017-01-01 RX ADMIN — FAMOTIDINE 20 MG: 20 TABLET, FILM COATED ORAL at 20:10

## 2017-01-01 RX ADMIN — LISINOPRIL 2.5 MG: 5 TABLET ORAL at 12:55

## 2017-01-01 RX ADMIN — FAMOTIDINE 20 MG: 20 TABLET, FILM COATED ORAL at 07:56

## 2017-01-01 RX ADMIN — CLINDAMYCIN IN 5 PERCENT DEXTROSE 900 MG: 18 INJECTION, SOLUTION INTRAVENOUS at 22:25

## 2017-01-01 RX ADMIN — OXYCODONE HYDROCHLORIDE 10 MG: 10 TABLET ORAL at 15:27

## 2017-01-01 RX ADMIN — DOBUTAMINE HYDROCHLORIDE 10 MCG/KG/MIN: 100 INJECTION INTRAVENOUS at 02:32

## 2017-01-01 RX ADMIN — DOBUTAMINE HYDROCHLORIDE 5 MCG/KG/MIN: 100 INJECTION INTRAVENOUS at 18:26

## 2017-01-01 RX ADMIN — FUROSEMIDE 60 MG: 10 INJECTION, SOLUTION INTRAMUSCULAR; INTRAVENOUS at 13:36

## 2017-01-01 RX ADMIN — CLINDAMYCIN IN 5 PERCENT DEXTROSE 900 MG: 18 INJECTION, SOLUTION INTRAVENOUS at 13:10

## 2017-01-01 RX ADMIN — CLINDAMYCIN IN 5 PERCENT DEXTROSE 600 MG: 12 INJECTION, SOLUTION INTRAVENOUS at 05:13

## 2017-01-01 RX ADMIN — PIPERACILLIN AND TAZOBACTAM 3.38 G: 3; .375 INJECTION, POWDER, LYOPHILIZED, FOR SOLUTION INTRAVENOUS; PARENTERAL at 12:28

## 2017-01-01 RX ADMIN — FAMOTIDINE 20 MG: 20 TABLET, FILM COATED ORAL at 08:43

## 2017-01-01 RX ADMIN — MIDODRINE HYDROCHLORIDE 5 MG: 5 TABLET ORAL at 11:55

## 2017-01-01 RX ADMIN — FUROSEMIDE 20 MG: 10 INJECTION, SOLUTION INTRAVENOUS at 08:38

## 2017-01-01 RX ADMIN — FAMOTIDINE 20 MG: 20 TABLET, FILM COATED ORAL at 07:53

## 2017-01-01 RX ADMIN — MORPHINE SULFATE 4 MG: 4 INJECTION INTRAVENOUS at 00:03

## 2017-01-01 RX ADMIN — ENOXAPARIN SODIUM 100 MG: 100 INJECTION SUBCUTANEOUS at 20:39

## 2017-01-01 RX ADMIN — RIFAXIMIN 550 MG: 550 TABLET ORAL at 09:43

## 2017-01-01 RX ADMIN — METOLAZONE 2.5 MG: 2.5 TABLET ORAL at 15:34

## 2017-01-01 RX ADMIN — POTASSIUM CHLORIDE 10 MEQ: 7.46 INJECTION, SOLUTION INTRAVENOUS at 09:13

## 2017-01-01 RX ADMIN — FUROSEMIDE 40 MG: 10 INJECTION, SOLUTION INTRAVENOUS at 17:45

## 2017-01-01 RX ADMIN — SODIUM POLYSTYRENE SULFONATE 15 G: 15 SUSPENSION ORAL; RECTAL at 10:30

## 2017-01-01 RX ADMIN — HALOPERIDOL 2 MG: 2 TABLET ORAL at 18:33

## 2017-01-01 RX ADMIN — ENOXAPARIN SODIUM 100 MG: 100 INJECTION SUBCUTANEOUS at 20:10

## 2017-01-01 RX ADMIN — RIFAXIMIN 550 MG: 550 TABLET ORAL at 09:55

## 2017-01-01 RX ADMIN — Medication 25 G: at 11:26

## 2017-01-01 RX ADMIN — PIPERACILLIN AND TAZOBACTAM 3.38 G: 3; .375 INJECTION, POWDER, LYOPHILIZED, FOR SOLUTION INTRAVENOUS; PARENTERAL at 01:19

## 2017-01-01 RX ADMIN — MORPHINE SULFATE 4 MG: 4 INJECTION INTRAVENOUS at 09:15

## 2017-01-01 RX ADMIN — CLINDAMYCIN IN 5 PERCENT DEXTROSE 900 MG: 18 INJECTION, SOLUTION INTRAVENOUS at 18:45

## 2017-01-01 RX ADMIN — POTASSIUM CHLORIDE 20 MEQ: 20 POWDER ORAL at 14:05

## 2017-01-01 RX ADMIN — HEPARIN SODIUM 1550 UNITS/HR: 5000 INJECTION, SOLUTION INTRAVENOUS at 05:44

## 2017-01-01 RX ADMIN — LACTULOSE 30 ML: 10 SOLUTION ORAL at 09:16

## 2017-01-01 RX ADMIN — POTASSIUM CHLORIDE 20 MEQ: 1.5 POWDER, FOR SOLUTION ORAL at 08:43

## 2017-01-01 RX ADMIN — WARFARIN SODIUM 5 MG: 5 TABLET ORAL at 17:26

## 2017-01-01 RX ADMIN — COLCHICINE 1.2 MG: 0.6 TABLET, FILM COATED ORAL at 10:54

## 2017-01-01 RX ADMIN — SODIUM CHLORIDE 500 ML: 9 INJECTION, SOLUTION INTRAVENOUS at 09:15

## 2017-01-01 RX ADMIN — CARVEDILOL 3.12 MG: 3.12 TABLET, FILM COATED ORAL at 16:35

## 2017-01-01 RX ADMIN — HEPARIN SODIUM 1200 UNITS/HR: 5000 INJECTION, SOLUTION INTRAVENOUS at 16:07

## 2017-01-01 RX ADMIN — RIFAXIMIN 550 MG: 550 TABLET ORAL at 20:46

## 2017-01-01 RX ADMIN — LACTULOSE 30 ML: 10 SOLUTION ORAL at 08:20

## 2017-01-01 RX ADMIN — OXYCODONE HYDROCHLORIDE 5 MG: 5 TABLET ORAL at 12:15

## 2017-01-01 RX ADMIN — FENTANYL CITRATE 50 MCG: 50 INJECTION, SOLUTION INTRAMUSCULAR; INTRAVENOUS at 18:57

## 2017-01-01 RX ADMIN — RIFAXIMIN 550 MG: 550 TABLET ORAL at 08:03

## 2017-01-01 RX ADMIN — GABAPENTIN 300 MG: 300 CAPSULE ORAL at 20:22

## 2017-01-01 RX ADMIN — OXYCODONE HYDROCHLORIDE 5 MG: 5 TABLET ORAL at 00:02

## 2017-01-01 RX ADMIN — CEFTRIAXONE 2 G: 2 INJECTION, POWDER, FOR SOLUTION INTRAMUSCULAR; INTRAVENOUS at 13:08

## 2017-01-01 RX ADMIN — METOLAZONE 2.5 MG: 2.5 TABLET ORAL at 05:51

## 2017-01-01 RX ADMIN — ARIPIPRAZOLE 10 MG: 10 TABLET ORAL at 09:43

## 2017-01-01 RX ADMIN — PIPERACILLIN AND TAZOBACTAM 3.38 G: 3; .375 INJECTION, POWDER, LYOPHILIZED, FOR SOLUTION INTRAVENOUS; PARENTERAL at 11:23

## 2017-01-01 RX ADMIN — OXYCODONE HYDROCHLORIDE 5 MG: 5 TABLET ORAL at 20:51

## 2017-01-01 RX ADMIN — DOBUTAMINE HYDROCHLORIDE 5 MCG/KG/MIN: 100 INJECTION INTRAVENOUS at 01:20

## 2017-01-01 RX ADMIN — PIPERACILLIN AND TAZOBACTAM 4.5 G: 4; .5 INJECTION, POWDER, LYOPHILIZED, FOR SOLUTION INTRAVENOUS; PARENTERAL at 18:30

## 2017-01-01 RX ADMIN — HEPARIN SODIUM 1050 UNITS/HR: 5000 INJECTION, SOLUTION INTRAVENOUS at 15:10

## 2017-01-01 RX ADMIN — CLINDAMYCIN IN 5 PERCENT DEXTROSE 600 MG: 12 INJECTION, SOLUTION INTRAVENOUS at 20:51

## 2017-01-01 RX ADMIN — CLINDAMYCIN IN 5 PERCENT DEXTROSE 900 MG: 18 INJECTION, SOLUTION INTRAVENOUS at 15:34

## 2017-01-01 RX ADMIN — LACTULOSE 30 ML: 10 SOLUTION ORAL at 07:31

## 2017-01-01 RX ADMIN — STANDARDIZED SENNA CONCENTRATE AND DOCUSATE SODIUM 1 TABLET: 8.6; 5 TABLET, FILM COATED ORAL at 20:38

## 2017-01-01 RX ADMIN — OXYCODONE HYDROCHLORIDE 5 MG: 5 TABLET ORAL at 15:29

## 2017-01-01 RX ADMIN — NOREPINEPHRINE BITARTRATE 12 MCG/MIN: 1 INJECTION INTRAVENOUS at 11:59

## 2017-01-01 RX ADMIN — OXYCODONE HYDROCHLORIDE 10 MG: 10 TABLET ORAL at 08:07

## 2017-01-01 RX ADMIN — CARVEDILOL 3.12 MG: 3.12 TABLET, FILM COATED ORAL at 07:54

## 2017-01-01 RX ADMIN — CLINDAMYCIN IN 5 PERCENT DEXTROSE 900 MG: 18 INJECTION, SOLUTION INTRAVENOUS at 14:26

## 2017-01-01 RX ADMIN — LISINOPRIL 2.5 MG: 5 TABLET ORAL at 08:28

## 2017-01-01 RX ADMIN — POTASSIUM CHLORIDE 40 MEQ: 1.5 POWDER, FOR SOLUTION ORAL at 07:56

## 2017-01-01 RX ADMIN — DOBUTAMINE HYDROCHLORIDE 5 MCG/KG/MIN: 100 INJECTION INTRAVENOUS at 10:06

## 2017-01-01 RX ADMIN — DOBUTAMINE HYDROCHLORIDE 5 MCG/KG/MIN: 100 INJECTION INTRAVENOUS at 11:44

## 2017-01-01 RX ADMIN — NOREPINEPHRINE BITARTRATE 30 MCG/MIN: 1 INJECTION INTRAVENOUS at 08:43

## 2017-01-01 RX ADMIN — LACTULOSE 30 ML: 10 SOLUTION ORAL at 09:31

## 2017-01-01 RX ADMIN — RIFAXIMIN 550 MG: 550 TABLET ORAL at 08:19

## 2017-01-01 RX ADMIN — SODIUM POLYSTYRENE SULFONATE 15 G: 15 SUSPENSION ORAL; RECTAL at 08:57

## 2017-01-01 RX ADMIN — LACTULOSE 30 ML: 10 SOLUTION ORAL at 09:39

## 2017-01-01 RX ADMIN — PANTOPRAZOLE SODIUM 40 MG: 40 INJECTION, POWDER, FOR SOLUTION INTRAVENOUS at 07:45

## 2017-01-01 RX ADMIN — GABAPENTIN 300 MG: 300 CAPSULE ORAL at 20:38

## 2017-01-01 RX ADMIN — HALOPERIDOL LACTATE 5 MG: 5 INJECTION, SOLUTION INTRAMUSCULAR at 02:31

## 2017-01-01 RX ADMIN — DOBUTAMINE HYDROCHLORIDE 5 MCG/KG/MIN: 100 INJECTION INTRAVENOUS at 01:52

## 2017-01-01 RX ADMIN — LACTULOSE 30 ML: 10 SOLUTION ORAL at 21:17

## 2017-01-01 RX ADMIN — INSULIN HUMAN 10 UNITS: 100 INJECTION, SOLUTION PARENTERAL at 05:28

## 2017-01-01 RX ADMIN — PIPERACILLIN AND TAZOBACTAM 4.5 G: 4; .5 INJECTION, POWDER, LYOPHILIZED, FOR SOLUTION INTRAVENOUS; PARENTERAL at 05:35

## 2017-01-01 RX ADMIN — SODIUM CHLORIDE: 9 INJECTION, SOLUTION INTRAVENOUS at 17:24

## 2017-01-01 RX ADMIN — OXYCODONE HYDROCHLORIDE 5 MG: 5 TABLET ORAL at 11:06

## 2017-01-01 RX ADMIN — STANDARDIZED SENNA CONCENTRATE AND DOCUSATE SODIUM 1 TABLET: 8.6; 5 TABLET, FILM COATED ORAL at 21:18

## 2017-01-01 RX ADMIN — STANDARDIZED SENNA CONCENTRATE AND DOCUSATE SODIUM 1 TABLET: 8.6; 5 TABLET, FILM COATED ORAL at 20:19

## 2017-01-01 RX ADMIN — SPIRONOLACTONE 25 MG: 25 TABLET, FILM COATED ORAL at 09:38

## 2017-01-01 RX ADMIN — FUROSEMIDE 20 MG: 10 INJECTION, SOLUTION INTRAVENOUS at 05:30

## 2017-01-01 RX ADMIN — DOBUTAMINE HYDROCHLORIDE 5 MCG/KG/MIN: 100 INJECTION INTRAVENOUS at 22:49

## 2017-01-01 RX ADMIN — RIFAXIMIN 550 MG: 550 TABLET ORAL at 20:49

## 2017-01-01 RX ADMIN — DOCUSATE SODIUM 100 MG: 100 CAPSULE ORAL at 08:28

## 2017-01-01 RX ADMIN — FUROSEMIDE 20 MG: 10 INJECTION, SOLUTION INTRAVENOUS at 09:29

## 2017-01-01 RX ADMIN — RIFAXIMIN 550 MG: 550 TABLET ORAL at 10:07

## 2017-01-01 RX ADMIN — HEPARIN SODIUM 1200 UNITS/HR: 5000 INJECTION, SOLUTION INTRAVENOUS at 20:49

## 2017-01-01 RX ADMIN — RIFAXIMIN 550 MG: 550 TABLET ORAL at 20:28

## 2017-01-01 RX ADMIN — POTASSIUM CHLORIDE 20 MEQ: 1500 TABLET, EXTENDED RELEASE ORAL at 07:53

## 2017-01-01 RX ADMIN — IPRATROPIUM BROMIDE AND ALBUTEROL SULFATE 3 ML: .5; 3 SOLUTION RESPIRATORY (INHALATION) at 18:41

## 2017-01-01 RX ADMIN — FUROSEMIDE 40 MG: 10 INJECTION, SOLUTION INTRAVENOUS at 15:25

## 2017-01-01 RX ADMIN — OXYCODONE HYDROCHLORIDE 2.5 MG: 5 TABLET ORAL at 02:12

## 2017-01-01 RX ADMIN — MORPHINE SULFATE 3 MG: 10 INJECTION INTRAVENOUS at 12:14

## 2017-01-01 RX ADMIN — INSULIN HUMAN 8 UNITS: 100 INJECTION, SOLUTION PARENTERAL at 21:24

## 2017-01-01 RX ADMIN — NOREPINEPHRINE BITARTRATE 30 MCG/MIN: 1 INJECTION INTRAVENOUS at 23:00

## 2017-01-01 RX ADMIN — RIFAXIMIN 550 MG: 550 TABLET ORAL at 20:03

## 2017-01-01 RX ADMIN — FUROSEMIDE 40 MG: 10 INJECTION, SOLUTION INTRAVENOUS at 08:43

## 2017-01-01 RX ADMIN — COLCHICINE 0.6 MG: 0.6 TABLET, FILM COATED ORAL at 08:56

## 2017-01-01 RX ADMIN — ENOXAPARIN SODIUM 100 MG: 100 INJECTION SUBCUTANEOUS at 08:06

## 2017-01-01 RX ADMIN — LACTULOSE 30 ML: 10 SOLUTION ORAL at 20:20

## 2017-01-01 RX ADMIN — CARVEDILOL 3.12 MG: 3.12 TABLET, FILM COATED ORAL at 16:57

## 2017-01-01 RX ADMIN — IPRATROPIUM BROMIDE AND ALBUTEROL SULFATE 3 ML: .5; 3 SOLUTION RESPIRATORY (INHALATION) at 07:26

## 2017-01-01 RX ADMIN — GABAPENTIN 300 MG: 300 CAPSULE ORAL at 20:46

## 2017-01-01 RX ADMIN — COLCHICINE 0.6 MG: 0.6 TABLET, FILM COATED ORAL at 18:46

## 2017-01-01 RX ADMIN — SPIRONOLACTONE 25 MG: 25 TABLET, FILM COATED ORAL at 08:30

## 2017-01-01 RX ADMIN — WARFARIN SODIUM 5 MG: 5 TABLET ORAL at 18:00

## 2017-01-01 RX ADMIN — RIFAXIMIN 550 MG: 550 TABLET ORAL at 09:00

## 2017-01-01 RX ADMIN — Medication 2 TABLET: at 08:22

## 2017-01-01 RX ADMIN — SODIUM CHLORIDE 500 ML: 9 INJECTION, SOLUTION INTRAVENOUS at 23:21

## 2017-01-01 RX ADMIN — LISINOPRIL 2.5 MG: 5 TABLET ORAL at 08:07

## 2017-01-01 RX ADMIN — FUROSEMIDE 40 MG: 10 INJECTION, SOLUTION INTRAVENOUS at 03:02

## 2017-01-01 RX ADMIN — NOREPINEPHRINE BITARTRATE 30 MCG/MIN: 1 INJECTION INTRAVENOUS at 09:52

## 2017-01-01 RX ADMIN — SODIUM CHLORIDE: 9 INJECTION, SOLUTION INTRAVENOUS at 04:08

## 2017-01-01 RX ADMIN — CYCLOBENZAPRINE HYDROCHLORIDE 10 MG: 10 TABLET, FILM COATED ORAL at 23:00

## 2017-01-01 RX ADMIN — OXYCODONE HYDROCHLORIDE 5 MG: 5 TABLET ORAL at 18:40

## 2017-01-01 RX ADMIN — LACTULOSE 30 ML: 10 SOLUTION ORAL at 10:53

## 2017-01-01 RX ADMIN — DOBUTAMINE HYDROCHLORIDE 2.5 MCG/KG/MIN: 100 INJECTION INTRAVENOUS at 01:26

## 2017-01-01 RX ADMIN — OXYCODONE HYDROCHLORIDE 10 MG: 10 TABLET ORAL at 22:30

## 2017-01-01 RX ADMIN — GUAIFENESIN 600 MG: 600 TABLET, EXTENDED RELEASE ORAL at 20:44

## 2017-01-01 RX ADMIN — DOBUTAMINE HYDROCHLORIDE 5 MCG/KG/MIN: 100 INJECTION INTRAVENOUS at 12:07

## 2017-01-01 RX ADMIN — HEPARIN SODIUM 950 UNITS/HR: 5000 INJECTION, SOLUTION INTRAVENOUS at 23:29

## 2017-01-01 RX ADMIN — RIFAXIMIN 550 MG: 550 TABLET ORAL at 08:39

## 2017-01-01 RX ADMIN — SODIUM CHLORIDE 250 ML: 9 INJECTION, SOLUTION INTRAVENOUS at 13:16

## 2017-01-01 RX ADMIN — LACTULOSE 30 ML: 10 SOLUTION ORAL at 21:05

## 2017-01-01 RX ADMIN — RIFAXIMIN 550 MG: 550 TABLET ORAL at 07:56

## 2017-01-01 RX ADMIN — ALBUTEROL SULFATE 2.5 MG: 2.5 SOLUTION RESPIRATORY (INHALATION) at 16:19

## 2017-01-01 RX ADMIN — OMEPRAZOLE 40 MG: 20 CAPSULE, DELAYED RELEASE ORAL at 07:31

## 2017-01-01 RX ADMIN — FUROSEMIDE 40 MG: 10 INJECTION, SOLUTION INTRAVENOUS at 13:08

## 2017-01-01 RX ADMIN — LACTULOSE 30 ML: 10 SOLUTION ORAL at 20:04

## 2017-01-01 RX ADMIN — CARVEDILOL 3.12 MG: 3.12 TABLET, FILM COATED ORAL at 08:24

## 2017-01-01 RX ADMIN — DOBUTAMINE HYDROCHLORIDE 5 MCG/KG/MIN: 100 INJECTION INTRAVENOUS at 06:34

## 2017-01-01 RX ADMIN — GABAPENTIN 300 MG: 300 CAPSULE ORAL at 20:28

## 2017-01-01 RX ADMIN — FUROSEMIDE 40 MG: 10 INJECTION, SOLUTION INTRAVENOUS at 21:16

## 2017-01-01 RX ADMIN — IPRATROPIUM BROMIDE AND ALBUTEROL SULFATE 3 ML: .5; 3 SOLUTION RESPIRATORY (INHALATION) at 20:42

## 2017-01-01 RX ADMIN — CLINDAMYCIN IN 5 PERCENT DEXTROSE 900 MG: 18 INJECTION, SOLUTION INTRAVENOUS at 14:00

## 2017-01-01 RX ADMIN — DOBUTAMINE HYDROCHLORIDE 5 MCG/KG/MIN: 100 INJECTION INTRAVENOUS at 02:23

## 2017-01-01 RX ADMIN — COLCHICINE 0.6 MG: 0.6 TABLET, FILM COATED ORAL at 08:28

## 2017-01-01 RX ADMIN — GUAIFENESIN 600 MG: 600 TABLET, EXTENDED RELEASE ORAL at 21:05

## 2017-01-01 RX ADMIN — GABAPENTIN 300 MG: 300 CAPSULE ORAL at 21:58

## 2017-01-01 RX ADMIN — LACTULOSE 30 ML: 10 SOLUTION ORAL at 20:12

## 2017-01-01 RX ADMIN — OXYCODONE HYDROCHLORIDE 5 MG: 5 TABLET ORAL at 04:52

## 2017-01-01 RX ADMIN — CARVEDILOL 3.12 MG: 3.12 TABLET, FILM COATED ORAL at 08:06

## 2017-01-01 RX ADMIN — HEPARIN SODIUM 3200 UNITS: 1000 INJECTION, SOLUTION INTRAVENOUS; SUBCUTANEOUS at 05:18

## 2017-01-01 RX ADMIN — HEPARIN SODIUM 1050 UNITS: 5000 INJECTION, SOLUTION INTRAVENOUS at 15:44

## 2017-01-01 RX ADMIN — FUROSEMIDE 20 MG: 10 INJECTION, SOLUTION INTRAVENOUS at 16:24

## 2017-01-01 RX ADMIN — PIPERACILLIN AND TAZOBACTAM 4.5 G: 4; .5 INJECTION, POWDER, LYOPHILIZED, FOR SOLUTION INTRAVENOUS; PARENTERAL at 00:32

## 2017-01-01 RX ADMIN — LACTULOSE 30 ML: 10 SOLUTION ORAL at 07:54

## 2017-01-01 RX ADMIN — CARVEDILOL 3.12 MG: 3.12 TABLET, FILM COATED ORAL at 09:30

## 2017-01-01 RX ADMIN — HALOPERIDOL LACTATE 5 MG: 5 INJECTION, SOLUTION INTRAMUSCULAR at 21:15

## 2017-01-01 RX ADMIN — LORAZEPAM 0.5 MG: 2 INJECTION INTRAMUSCULAR; INTRAVENOUS at 09:33

## 2017-01-01 RX ADMIN — IPRATROPIUM BROMIDE AND ALBUTEROL SULFATE 3 ML: .5; 3 SOLUTION RESPIRATORY (INHALATION) at 02:55

## 2017-01-01 RX ADMIN — COLCHICINE 0.6 MG: 0.6 TABLET, FILM COATED ORAL at 10:17

## 2017-01-01 RX ADMIN — FUROSEMIDE 40 MG: 10 INJECTION, SOLUTION INTRAVENOUS at 06:17

## 2017-01-01 RX ADMIN — CLINDAMYCIN IN 5 PERCENT DEXTROSE 900 MG: 18 INJECTION, SOLUTION INTRAVENOUS at 00:12

## 2017-01-01 RX ADMIN — DOBUTAMINE HYDROCHLORIDE 5 MCG/KG/MIN: 100 INJECTION INTRAVENOUS at 11:54

## 2017-01-01 RX ADMIN — DOBUTAMINE HYDROCHLORIDE 5 MCG/KG/MIN: 100 INJECTION INTRAVENOUS at 00:30

## 2017-01-01 RX ADMIN — LACTULOSE 30 ML: 10 SOLUTION ORAL at 19:46

## 2017-01-01 RX ADMIN — GUAIFENESIN 600 MG: 600 TABLET, EXTENDED RELEASE ORAL at 07:47

## 2017-01-01 RX ADMIN — Medication 2 TABLET: at 08:19

## 2017-01-01 RX ADMIN — PANTOPRAZOLE SODIUM 40 MG: 40 INJECTION, POWDER, FOR SOLUTION INTRAVENOUS at 22:52

## 2017-01-01 RX ADMIN — LACTULOSE 30 ML: 10 SOLUTION ORAL at 10:18

## 2017-01-01 RX ADMIN — RIFAXIMIN 550 MG: 550 TABLET ORAL at 08:41

## 2017-01-01 RX ADMIN — CLINDAMYCIN IN 5 PERCENT DEXTROSE 900 MG: 18 INJECTION, SOLUTION INTRAVENOUS at 13:25

## 2017-01-01 RX ADMIN — DOBUTAMINE HYDROCHLORIDE 2.5 MCG/KG/MIN: 100 INJECTION INTRAVENOUS at 10:15

## 2017-01-01 RX ADMIN — LACTULOSE 30 ML: 10 SOLUTION ORAL at 20:29

## 2017-01-01 RX ADMIN — COLCHICINE 0.6 MG: 0.6 TABLET, FILM COATED ORAL at 09:24

## 2017-01-01 RX ADMIN — NOREPINEPHRINE BITARTRATE 0.5 MCG/MIN: 1 INJECTION INTRAVENOUS at 21:53

## 2017-01-01 RX ADMIN — DIPHENHYDRAMINE HCL 50 MG: 25 TABLET ORAL at 20:15

## 2017-01-01 RX ADMIN — FUROSEMIDE 60 MG: 10 INJECTION, SOLUTION INTRAMUSCULAR; INTRAVENOUS at 21:03

## 2017-01-01 RX ADMIN — DOBUTAMINE HYDROCHLORIDE 5 MCG/KG/MIN: 100 INJECTION INTRAVENOUS at 07:01

## 2017-01-01 RX ADMIN — CEFTRIAXONE 2 G: 2 INJECTION, POWDER, FOR SOLUTION INTRAMUSCULAR; INTRAVENOUS at 08:39

## 2017-01-01 RX ADMIN — DOBUTAMINE HYDROCHLORIDE 5 MCG/KG/MIN: 100 INJECTION INTRAVENOUS at 05:49

## 2017-01-01 RX ADMIN — GABAPENTIN 300 MG: 300 CAPSULE ORAL at 10:07

## 2017-01-01 RX ADMIN — NOREPINEPHRINE BITARTRATE 10 MCG/MIN: 1 INJECTION INTRAVENOUS at 13:17

## 2017-01-01 RX ADMIN — FUROSEMIDE 40 MG: 10 INJECTION, SOLUTION INTRAVENOUS at 13:01

## 2017-01-01 RX ADMIN — CYCLOBENZAPRINE HYDROCHLORIDE 10 MG: 10 TABLET, FILM COATED ORAL at 22:30

## 2017-01-01 RX ADMIN — SPIRONOLACTONE 25 MG: 25 TABLET, FILM COATED ORAL at 08:31

## 2017-01-01 RX ADMIN — DOBUTAMINE HYDROCHLORIDE 5 MCG/KG/MIN: 100 INJECTION INTRAVENOUS at 08:41

## 2017-01-01 RX ADMIN — SODIUM POLYSTYRENE SULFONATE 30 G: 15 SUSPENSION ORAL; RECTAL at 05:27

## 2017-01-01 RX ADMIN — DOBUTAMINE HYDROCHLORIDE 5 MCG/KG/MIN: 100 INJECTION INTRAVENOUS at 13:16

## 2017-01-01 RX ADMIN — DOCUSATE SODIUM 100 MG: 100 CAPSULE ORAL at 10:17

## 2017-01-01 RX ADMIN — INSULIN HUMAN 10 UNITS: 100 INJECTION, SOLUTION PARENTERAL at 08:55

## 2017-01-01 RX ADMIN — FUROSEMIDE 40 MG: 10 INJECTION, SOLUTION INTRAVENOUS at 15:29

## 2017-01-01 RX ADMIN — Medication 25 G: at 17:29

## 2017-01-01 RX ADMIN — CLINDAMYCIN IN 5 PERCENT DEXTROSE 900 MG: 18 INJECTION, SOLUTION INTRAVENOUS at 05:41

## 2017-01-01 RX ADMIN — GABAPENTIN 300 MG: 300 CAPSULE ORAL at 20:03

## 2017-01-01 RX ADMIN — DOBUTAMINE HYDROCHLORIDE 5 MCG/KG/MIN: 100 INJECTION INTRAVENOUS at 08:27

## 2017-01-01 RX ADMIN — POTASSIUM CHLORIDE 20 MEQ: 20 POWDER ORAL at 07:47

## 2017-01-01 RX ADMIN — GUAIFENESIN 600 MG: 600 TABLET, EXTENDED RELEASE ORAL at 10:17

## 2017-01-01 RX ADMIN — LACTULOSE 30 ML: 10 SOLUTION ORAL at 08:55

## 2017-01-01 RX ADMIN — BUMETANIDE 2 MG: 1 TABLET ORAL at 06:21

## 2017-01-01 RX ADMIN — GUAIFENESIN 600 MG: 600 TABLET, EXTENDED RELEASE ORAL at 11:50

## 2017-01-01 RX ADMIN — WARFARIN SODIUM 7.5 MG: 7.5 TABLET ORAL at 21:59

## 2017-01-01 RX ADMIN — CLINDAMYCIN IN 5 PERCENT DEXTROSE 900 MG: 18 INJECTION, SOLUTION INTRAVENOUS at 06:18

## 2017-01-01 RX ADMIN — RIFAXIMIN 550 MG: 550 TABLET ORAL at 20:12

## 2017-01-01 RX ADMIN — RIFAXIMIN 550 MG: 550 TABLET ORAL at 19:29

## 2017-01-01 RX ADMIN — LORAZEPAM 2 MG: 2 INJECTION INTRAMUSCULAR; INTRAVENOUS at 03:00

## 2017-01-01 RX ADMIN — POTASSIUM CHLORIDE 20 MEQ: 20 POWDER ORAL at 19:23

## 2017-01-01 RX ADMIN — COLCHICINE 0.6 MG: 0.6 TABLET, FILM COATED ORAL at 09:30

## 2017-01-01 RX ADMIN — POTASSIUM CHLORIDE 20 MEQ: 1.5 POWDER, FOR SOLUTION ORAL at 21:42

## 2017-01-01 RX ADMIN — FUROSEMIDE 40 MG: 10 INJECTION, SOLUTION INTRAVENOUS at 05:19

## 2017-01-01 RX ADMIN — MORPHINE SULFATE 2 MG: 4 INJECTION INTRAVENOUS at 12:00

## 2017-01-01 RX ADMIN — LACTULOSE 30 ML: 10 SOLUTION ORAL at 08:18

## 2017-01-01 RX ADMIN — LACTULOSE 30 ML: 10 SOLUTION ORAL at 21:58

## 2017-01-01 RX ADMIN — SPIRONOLACTONE 25 MG: 25 TABLET, FILM COATED ORAL at 08:40

## 2017-01-01 RX ADMIN — ENOXAPARIN SODIUM 100 MG: 100 INJECTION SUBCUTANEOUS at 08:25

## 2017-01-01 RX ADMIN — COLCHICINE 0.6 MG: 0.6 TABLET, FILM COATED ORAL at 07:46

## 2017-01-01 RX ADMIN — DOBUTAMINE HYDROCHLORIDE 5 MCG/KG/MIN: 100 INJECTION INTRAVENOUS at 02:12

## 2017-01-01 RX ADMIN — Medication 2 TABLET: at 20:44

## 2017-01-01 RX ADMIN — POTASSIUM CHLORIDE 20 MEQ: 1500 TABLET, EXTENDED RELEASE ORAL at 20:58

## 2017-01-01 RX ADMIN — IOHEXOL 100 ML: 350 INJECTION, SOLUTION INTRAVENOUS at 02:25

## 2017-01-01 RX ADMIN — OXYCODONE HYDROCHLORIDE 10 MG: 10 TABLET ORAL at 08:16

## 2017-01-01 RX ADMIN — FUROSEMIDE 40 MG: 10 INJECTION, SOLUTION INTRAVENOUS at 10:31

## 2017-01-01 RX ADMIN — HALOPERIDOL 2 MG: 2 TABLET ORAL at 05:53

## 2017-01-01 RX ADMIN — SODIUM CHLORIDE: 9 INJECTION, SOLUTION INTRAVENOUS at 09:45

## 2017-01-01 RX ADMIN — Medication 2 TABLET: at 08:50

## 2017-01-01 RX ADMIN — CARVEDILOL 3.12 MG: 3.12 TABLET, FILM COATED ORAL at 10:53

## 2017-01-01 RX ADMIN — GUAIFENESIN 600 MG: 600 TABLET, EXTENDED RELEASE ORAL at 21:58

## 2017-01-01 RX ADMIN — Medication 25 G: at 00:19

## 2017-01-01 RX ADMIN — NOREPINEPHRINE BITARTRATE 5 MCG/MIN: 1 INJECTION INTRAVENOUS at 07:45

## 2017-01-01 RX ADMIN — PIPERACILLIN AND TAZOBACTAM 4.5 G: 4; .5 INJECTION, POWDER, LYOPHILIZED, FOR SOLUTION INTRAVENOUS; PARENTERAL at 01:52

## 2017-01-01 RX ADMIN — DOBUTAMINE HYDROCHLORIDE 250000 MCG: 100 INJECTION INTRAVENOUS at 02:00

## 2017-01-01 RX ADMIN — SODIUM CHLORIDE TAB 1 GM 1 G: 1 TAB at 13:27

## 2017-01-01 RX ADMIN — HEPARIN SODIUM 1550 UNITS/HR: 5000 INJECTION, SOLUTION INTRAVENOUS at 14:24

## 2017-01-01 RX ADMIN — WARFARIN SODIUM 3 MG: 3 TABLET ORAL at 18:36

## 2017-01-01 RX ADMIN — BUMETANIDE 2 MG: 1 TABLET ORAL at 20:58

## 2017-01-01 RX ADMIN — HEPARIN SODIUM 3200 UNITS: 1000 INJECTION, SOLUTION INTRAVENOUS; SUBCUTANEOUS at 13:04

## 2017-01-01 RX ADMIN — Medication 2 TABLET: at 08:01

## 2017-01-01 RX ADMIN — LACTULOSE 30 ML: 10 SOLUTION ORAL at 20:28

## 2017-01-01 RX ADMIN — FAMOTIDINE 20 MG: 10 INJECTION INTRAVENOUS at 11:36

## 2017-01-01 RX ADMIN — ARIPIPRAZOLE 10 MG: 10 TABLET ORAL at 09:35

## 2017-01-01 RX ADMIN — GABAPENTIN 300 MG: 300 CAPSULE ORAL at 20:27

## 2017-01-01 RX ADMIN — COLCHICINE 0.6 MG: 0.6 TABLET, FILM COATED ORAL at 08:06

## 2017-01-01 RX ADMIN — OXYCODONE HYDROCHLORIDE 5 MG: 5 TABLET ORAL at 12:42

## 2017-01-01 RX ADMIN — LACTULOSE 30 ML: 10 SOLUTION ORAL at 08:19

## 2017-01-01 RX ADMIN — GUAIFENESIN 600 MG: 600 TABLET, EXTENDED RELEASE ORAL at 20:39

## 2017-01-01 RX ADMIN — POTASSIUM CHLORIDE 40 MEQ: 1.5 POWDER, FOR SOLUTION ORAL at 21:44

## 2017-01-01 RX ADMIN — CARVEDILOL 3.12 MG: 3.12 TABLET, FILM COATED ORAL at 08:56

## 2017-01-01 RX ADMIN — HALOPERIDOL LACTATE 5 MG: 5 INJECTION, SOLUTION INTRAMUSCULAR at 23:43

## 2017-01-01 RX ADMIN — LORAZEPAM 2 MG: 2 INJECTION INTRAMUSCULAR; INTRAVENOUS at 19:47

## 2017-01-01 RX ADMIN — NOREPINEPHRINE BITARTRATE 9 MCG/MIN: 1 INJECTION INTRAVENOUS at 18:30

## 2017-01-01 RX ADMIN — STANDARDIZED SENNA CONCENTRATE AND DOCUSATE SODIUM 1 TABLET: 8.6; 5 TABLET, FILM COATED ORAL at 21:16

## 2017-01-01 RX ADMIN — PIPERACILLIN AND TAZOBACTAM 4.5 G: 4; .5 INJECTION, POWDER, LYOPHILIZED, FOR SOLUTION INTRAVENOUS; PARENTERAL at 23:41

## 2017-01-01 RX ADMIN — PIPERACILLIN AND TAZOBACTAM 3.38 G: 3; .375 INJECTION, POWDER, LYOPHILIZED, FOR SOLUTION INTRAVENOUS; PARENTERAL at 05:53

## 2017-01-01 RX ADMIN — Medication 2 TABLET: at 20:15

## 2017-01-01 RX ADMIN — Medication 2 TABLET: at 10:34

## 2017-01-01 RX ADMIN — GABAPENTIN 300 MG: 300 CAPSULE ORAL at 20:19

## 2017-01-01 RX ADMIN — NOREPINEPHRINE BITARTRATE 10 MCG/MIN: 1 INJECTION INTRAVENOUS at 01:52

## 2017-01-01 RX ADMIN — WARFARIN SODIUM 3 MG: 3 TABLET ORAL at 17:30

## 2017-01-01 RX ADMIN — DIPHENHYDRAMINE HCL 50 MG: 25 TABLET ORAL at 20:03

## 2017-01-01 RX ADMIN — DOBUTAMINE HYDROCHLORIDE 5 MCG/KG/MIN: 100 INJECTION INTRAVENOUS at 02:18

## 2017-01-01 RX ADMIN — OXYCODONE HYDROCHLORIDE 10 MG: 10 TABLET ORAL at 15:12

## 2017-01-01 RX ADMIN — OMEPRAZOLE 40 MG: 20 CAPSULE, DELAYED RELEASE ORAL at 09:55

## 2017-01-01 RX ADMIN — FUROSEMIDE 20 MG: 10 INJECTION, SOLUTION INTRAVENOUS at 02:59

## 2017-01-01 RX ADMIN — CALCIUM CHLORIDE 1 G: 100 INJECTION, SOLUTION INTRAVENOUS at 19:45

## 2017-01-01 RX ADMIN — FUROSEMIDE 40 MG: 40 TABLET ORAL at 07:47

## 2017-01-01 RX ADMIN — LACTULOSE 30 ML: 10 SOLUTION ORAL at 22:15

## 2017-01-01 RX ADMIN — SODIUM CHLORIDE 500 ML: 9 INJECTION, SOLUTION INTRAVENOUS at 21:47

## 2017-01-01 RX ADMIN — MORPHINE SULFATE 4 MG: 4 INJECTION INTRAVENOUS at 20:58

## 2017-01-01 RX ADMIN — PIPERACILLIN AND TAZOBACTAM 3.38 G: 3; .375 INJECTION, POWDER, LYOPHILIZED, FOR SOLUTION INTRAVENOUS; PARENTERAL at 05:22

## 2017-01-01 RX ADMIN — OXYCODONE HYDROCHLORIDE 5 MG: 5 TABLET ORAL at 04:54

## 2017-01-01 RX ADMIN — HALOPERIDOL LACTATE 5 MG: 5 INJECTION, SOLUTION INTRAMUSCULAR at 22:25

## 2017-01-01 RX ADMIN — VANCOMYCIN HYDROCHLORIDE 2600 MG: 100 INJECTION, POWDER, LYOPHILIZED, FOR SOLUTION INTRAVENOUS at 20:18

## 2017-01-01 RX ADMIN — DOCUSATE SODIUM 100 MG: 100 CAPSULE ORAL at 18:00

## 2017-01-01 RX ADMIN — DOBUTAMINE HYDROCHLORIDE 2.5 MCG/KG/MIN: 100 INJECTION INTRAVENOUS at 18:11

## 2017-01-01 RX ADMIN — OXYCODONE HYDROCHLORIDE 5 MG: 5 TABLET ORAL at 11:04

## 2017-01-01 RX ADMIN — MORPHINE SULFATE 4 MG: 4 INJECTION INTRAVENOUS at 13:36

## 2017-01-01 RX ADMIN — CALCIUM CHLORIDE 1 G: 100 INJECTION, SOLUTION INTRAVENOUS at 09:05

## 2017-01-01 RX ADMIN — CLINDAMYCIN IN 5 PERCENT DEXTROSE 900 MG: 18 INJECTION, SOLUTION INTRAVENOUS at 06:21

## 2017-01-01 RX ADMIN — ENOXAPARIN SODIUM 100 MG: 100 INJECTION SUBCUTANEOUS at 07:54

## 2017-01-01 RX ADMIN — POTASSIUM CHLORIDE 40 MEQ: 1.5 POWDER, FOR SOLUTION ORAL at 20:49

## 2017-01-01 RX ADMIN — DEXTROSE MONOHYDRATE 50 ML: 500 INJECTION PARENTERAL at 21:14

## 2017-01-01 RX ADMIN — INSULIN HUMAN 10 UNITS: 100 INJECTION, SOLUTION PARENTERAL at 19:46

## 2017-01-01 RX ADMIN — RIFAXIMIN 550 MG: 550 TABLET ORAL at 21:37

## 2017-01-01 RX ADMIN — GUAIFENESIN 600 MG: 600 TABLET, EXTENDED RELEASE ORAL at 10:54

## 2017-01-01 RX ADMIN — HALOPERIDOL LACTATE 5 MG: 5 INJECTION, SOLUTION INTRAMUSCULAR at 05:49

## 2017-01-01 RX ADMIN — PIPERACILLIN AND TAZOBACTAM 4.5 G: 4; .5 INJECTION, POWDER, LYOPHILIZED, FOR SOLUTION INTRAVENOUS; PARENTERAL at 06:34

## 2017-01-01 RX ADMIN — SPIRONOLACTONE 25 MG: 25 TABLET, FILM COATED ORAL at 08:06

## 2017-01-01 RX ADMIN — CLINDAMYCIN IN 5 PERCENT DEXTROSE 900 MG: 18 INJECTION, SOLUTION INTRAVENOUS at 06:36

## 2017-01-01 RX ADMIN — DOBUTAMINE HYDROCHLORIDE 5 MCG/KG/MIN: 100 INJECTION INTRAVENOUS at 20:56

## 2017-01-01 RX ADMIN — CARVEDILOL 3.12 MG: 3.12 TABLET, FILM COATED ORAL at 18:15

## 2017-01-01 RX ADMIN — DOBUTAMINE HYDROCHLORIDE 5 MCG/KG/MIN: 100 INJECTION INTRAVENOUS at 07:49

## 2017-01-01 RX ADMIN — PANTOPRAZOLE SODIUM 40 MG: 40 INJECTION, POWDER, FOR SOLUTION INTRAVENOUS at 08:03

## 2017-01-01 RX ADMIN — DOBUTAMINE HYDROCHLORIDE 5 MCG/KG/MIN: 100 INJECTION INTRAVENOUS at 10:31

## 2017-01-01 RX ADMIN — PIPERACILLIN AND TAZOBACTAM 4.5 G: 4; .5 INJECTION, POWDER, LYOPHILIZED, FOR SOLUTION INTRAVENOUS; PARENTERAL at 17:31

## 2017-01-01 RX ADMIN — RIFAXIMIN 550 MG: 550 TABLET ORAL at 22:09

## 2017-01-01 RX ADMIN — IPRATROPIUM BROMIDE AND ALBUTEROL SULFATE 3 ML: .5; 3 SOLUTION RESPIRATORY (INHALATION) at 23:58

## 2017-01-01 RX ADMIN — FAMOTIDINE 20 MG: 20 TABLET, FILM COATED ORAL at 20:00

## 2017-01-01 RX ADMIN — LACTULOSE 30 ML: 10 SOLUTION ORAL at 19:52

## 2017-01-01 RX ADMIN — DOBUTAMINE HYDROCHLORIDE 5 MCG/KG/MIN: 100 INJECTION INTRAVENOUS at 14:14

## 2017-01-01 RX ADMIN — MORPHINE SULFATE 4 MG: 4 INJECTION INTRAVENOUS at 12:04

## 2017-01-01 RX ADMIN — MIDODRINE HYDROCHLORIDE 5 MG: 5 TABLET ORAL at 12:00

## 2017-01-01 RX ADMIN — OXYCODONE HYDROCHLORIDE 10 MG: 10 TABLET ORAL at 04:04

## 2017-01-01 RX ADMIN — OXYCODONE HYDROCHLORIDE 5 MG: 5 TABLET ORAL at 11:36

## 2017-01-01 RX ADMIN — GABAPENTIN 300 MG: 300 CAPSULE ORAL at 19:46

## 2017-01-01 RX ADMIN — RIFAXIMIN 550 MG: 550 TABLET ORAL at 07:46

## 2017-01-01 RX ADMIN — HALOPERIDOL LACTATE 5 MG: 5 INJECTION, SOLUTION INTRAMUSCULAR at 07:30

## 2017-01-01 RX ADMIN — FUROSEMIDE 40 MG: 10 INJECTION, SOLUTION INTRAVENOUS at 16:35

## 2017-01-01 RX ADMIN — PIPERACILLIN AND TAZOBACTAM 3.38 G: 3; .375 INJECTION, POWDER, LYOPHILIZED, FOR SOLUTION INTRAVENOUS; PARENTERAL at 18:33

## 2017-01-01 RX ADMIN — GABAPENTIN 300 MG: 300 CAPSULE ORAL at 19:42

## 2017-01-01 RX ADMIN — ACETAMINOPHEN 650 MG: 325 TABLET, FILM COATED ORAL at 08:38

## 2017-01-01 RX ADMIN — OXYCODONE HYDROCHLORIDE 5 MG: 5 TABLET ORAL at 22:20

## 2017-01-01 RX ADMIN — OXYCODONE HYDROCHLORIDE 10 MG: 10 TABLET ORAL at 16:57

## 2017-01-01 RX ADMIN — STANDARDIZED SENNA CONCENTRATE AND DOCUSATE SODIUM 1 TABLET: 8.6; 5 TABLET, FILM COATED ORAL at 19:42

## 2017-01-01 RX ADMIN — FUROSEMIDE 40 MG: 10 INJECTION, SOLUTION INTRAVENOUS at 05:12

## 2017-01-01 RX ADMIN — RIFAXIMIN 550 MG: 550 TABLET ORAL at 20:35

## 2017-01-01 RX ADMIN — FUROSEMIDE 40 MG: 10 INJECTION, SOLUTION INTRAVENOUS at 06:18

## 2017-01-01 RX ADMIN — FUROSEMIDE 40 MG: 10 INJECTION, SOLUTION INTRAVENOUS at 21:42

## 2017-01-01 RX ADMIN — DOCUSATE SODIUM 100 MG: 100 CAPSULE ORAL at 09:38

## 2017-01-01 RX ADMIN — GABAPENTIN 300 MG: 300 CAPSULE ORAL at 22:16

## 2017-01-01 RX ADMIN — LISINOPRIL 2.5 MG: 5 TABLET ORAL at 08:31

## 2017-01-01 RX ADMIN — OXYCODONE HYDROCHLORIDE 5 MG: 5 TABLET ORAL at 05:31

## 2017-01-01 RX ADMIN — COLCHICINE 0.6 MG: 0.6 TABLET, FILM COATED ORAL at 08:30

## 2017-01-01 RX ADMIN — RIFAXIMIN 550 MG: 550 TABLET ORAL at 09:29

## 2017-01-01 RX ADMIN — HEPARIN SODIUM 1650 UNITS/HR: 5000 INJECTION, SOLUTION INTRAVENOUS at 18:24

## 2017-01-01 RX ADMIN — DOBUTAMINE HYDROCHLORIDE 5 MCG/KG/MIN: 100 INJECTION INTRAVENOUS at 02:48

## 2017-01-01 RX ADMIN — CLINDAMYCIN IN 5 PERCENT DEXTROSE 900 MG: 18 INJECTION, SOLUTION INTRAVENOUS at 06:12

## 2017-01-01 RX ADMIN — POTASSIUM CHLORIDE 40 MEQ: 1500 TABLET, EXTENDED RELEASE ORAL at 10:54

## 2017-01-01 RX ADMIN — PIPERACILLIN AND TAZOBACTAM 4.5 G: 4; .5 INJECTION, POWDER, LYOPHILIZED, FOR SOLUTION INTRAVENOUS; PARENTERAL at 00:02

## 2017-01-01 RX ADMIN — LACTULOSE 30 ML: 10 SOLUTION ORAL at 08:24

## 2017-01-01 RX ADMIN — DIPHENHYDRAMINE HCL 50 MG: 25 TABLET ORAL at 20:22

## 2017-01-01 RX ADMIN — GABAPENTIN 300 MG: 300 CAPSULE ORAL at 20:12

## 2017-01-01 RX ADMIN — MIDODRINE HYDROCHLORIDE 5 MG: 5 TABLET ORAL at 17:30

## 2017-01-01 RX ADMIN — DOBUTAMINE HYDROCHLORIDE 5 MCG/KG/MIN: 100 INJECTION INTRAVENOUS at 18:20

## 2017-01-01 RX ADMIN — MORPHINE SULFATE 4 MG: 4 INJECTION INTRAVENOUS at 08:32

## 2017-01-01 RX ADMIN — PIPERACILLIN AND TAZOBACTAM 4.5 G: 4; .5 INJECTION, POWDER, LYOPHILIZED, FOR SOLUTION INTRAVENOUS; PARENTERAL at 12:55

## 2017-01-01 RX ADMIN — PIPERACILLIN AND TAZOBACTAM 3.38 G: 3; .375 INJECTION, POWDER, LYOPHILIZED, FOR SOLUTION INTRAVENOUS; PARENTERAL at 11:03

## 2017-01-01 RX ADMIN — MIDODRINE HYDROCHLORIDE 5 MG: 5 TABLET ORAL at 16:35

## 2017-01-01 RX ADMIN — DOBUTAMINE HYDROCHLORIDE 5 MCG/KG/MIN: 100 INJECTION INTRAVENOUS at 16:21

## 2017-01-01 RX ADMIN — MORPHINE SULFATE 4 MG: 4 INJECTION INTRAVENOUS at 04:27

## 2017-01-01 RX ADMIN — OXYCODONE HYDROCHLORIDE 10 MG: 10 TABLET ORAL at 23:15

## 2017-01-01 RX ADMIN — PIPERACILLIN AND TAZOBACTAM 4.5 G: 4; .5 INJECTION, POWDER, LYOPHILIZED, FOR SOLUTION INTRAVENOUS; PARENTERAL at 23:03

## 2017-01-01 RX ADMIN — STANDARDIZED SENNA CONCENTRATE AND DOCUSATE SODIUM 1 TABLET: 8.6; 5 TABLET, FILM COATED ORAL at 20:58

## 2017-01-01 RX ADMIN — POTASSIUM CHLORIDE 20 MEQ: 1.5 POWDER, FOR SOLUTION ORAL at 17:04

## 2017-01-01 RX ADMIN — PIPERACILLIN AND TAZOBACTAM 3.38 G: 3; .375 INJECTION, POWDER, LYOPHILIZED, FOR SOLUTION INTRAVENOUS; PARENTERAL at 12:24

## 2017-01-01 RX ADMIN — PIPERACILLIN AND TAZOBACTAM 3.38 G: 3; .375 INJECTION, POWDER, LYOPHILIZED, FOR SOLUTION INTRAVENOUS; PARENTERAL at 00:15

## 2017-01-01 RX ADMIN — PIPERACILLIN AND TAZOBACTAM 4.5 G: 4; .5 INJECTION, POWDER, LYOPHILIZED, FOR SOLUTION INTRAVENOUS; PARENTERAL at 06:20

## 2017-01-01 RX ADMIN — LACTULOSE 30 ML: 10 SOLUTION ORAL at 20:27

## 2017-01-01 RX ADMIN — STANDARDIZED SENNA CONCENTRATE AND DOCUSATE SODIUM 1 TABLET: 8.6; 5 TABLET, FILM COATED ORAL at 21:58

## 2017-01-01 RX ADMIN — OXYCODONE HYDROCHLORIDE 10 MG: 10 TABLET ORAL at 17:45

## 2017-01-01 RX ADMIN — DOBUTAMINE HYDROCHLORIDE 5 MCG/KG/MIN: 100 INJECTION INTRAVENOUS at 17:11

## 2017-01-01 RX ADMIN — Medication 2 TABLET: at 20:12

## 2017-01-01 RX ADMIN — COLCHICINE 0.6 MG: 0.6 TABLET, FILM COATED ORAL at 09:39

## 2017-01-01 RX ADMIN — OXYCODONE HYDROCHLORIDE 5 MG: 5 TABLET ORAL at 15:44

## 2017-01-01 RX ADMIN — DOBUTAMINE HYDROCHLORIDE 5 MCG/KG/MIN: 100 INJECTION INTRAVENOUS at 17:13

## 2017-01-01 RX ADMIN — PIPERACILLIN AND TAZOBACTAM 4.5 G: 4; .5 INJECTION, POWDER, LYOPHILIZED, FOR SOLUTION INTRAVENOUS; PARENTERAL at 17:13

## 2017-01-01 RX ADMIN — RIFAXIMIN 550 MG: 550 TABLET ORAL at 07:45

## 2017-01-01 RX ADMIN — ALLOPURINOL 100 MG: 100 TABLET ORAL at 09:37

## 2017-01-01 RX ADMIN — DOCUSATE SODIUM 100 MG: 100 CAPSULE ORAL at 09:30

## 2017-01-01 RX ADMIN — CARVEDILOL 3.12 MG: 3.12 TABLET, FILM COATED ORAL at 18:46

## 2017-01-01 RX ADMIN — DOBUTAMINE HYDROCHLORIDE 5 MCG/KG/MIN: 100 INJECTION INTRAVENOUS at 01:55

## 2017-01-01 RX ADMIN — CLINDAMYCIN IN 5 PERCENT DEXTROSE 900 MG: 18 INJECTION, SOLUTION INTRAVENOUS at 21:44

## 2017-01-01 RX ADMIN — MORPHINE SULFATE 4 MG: 4 INJECTION INTRAVENOUS at 13:35

## 2017-01-01 RX ADMIN — HEPARIN SODIUM 3200 UNITS: 1000 INJECTION, SOLUTION INTRAVENOUS; SUBCUTANEOUS at 10:07

## 2017-01-01 RX ADMIN — HEPARIN SODIUM 1550 UNITS/HR: 5000 INJECTION, SOLUTION INTRAVENOUS at 22:14

## 2017-01-01 RX ADMIN — OXYCODONE HYDROCHLORIDE 10 MG: 10 TABLET ORAL at 06:15

## 2017-01-01 RX ADMIN — MIDODRINE HYDROCHLORIDE 5 MG: 5 TABLET ORAL at 18:11

## 2017-01-01 RX ADMIN — SODIUM CHLORIDE: 9 INJECTION, SOLUTION INTRAVENOUS at 02:13

## 2017-01-01 RX ADMIN — DOBUTAMINE HYDROCHLORIDE 5 MCG/KG/MIN: 100 INJECTION INTRAVENOUS at 05:11

## 2017-01-01 RX ADMIN — DOBUTAMINE HYDROCHLORIDE 5 MCG/KG/MIN: 100 INJECTION INTRAVENOUS at 05:36

## 2017-01-01 RX ADMIN — STANDARDIZED SENNA CONCENTRATE AND DOCUSATE SODIUM 1 TABLET: 8.6; 5 TABLET, FILM COATED ORAL at 20:05

## 2017-01-01 RX ADMIN — OXYCODONE HYDROCHLORIDE 10 MG: 10 TABLET ORAL at 18:31

## 2017-01-01 ASSESSMENT — ENCOUNTER SYMPTOMS
BLURRED VISION: 0
SPUTUM PRODUCTION: 0
NERVOUS/ANXIOUS: 1
FEVER: 0
FEVER: 0
ABDOMINAL PAIN: 1
CHILLS: 0
FOCAL WEAKNESS: 0
FEVER: 0
MYALGIAS: 1
ABDOMINAL PAIN: 1
FEVER: 0
HEADACHES: 0
NERVOUS/ANXIOUS: 0
FEVER: 0
DEPRESSION: 0
EYE REDNESS: 0
MYALGIAS: 1
SEIZURES: 0
SPUTUM PRODUCTION: 1
COUGH: 1
BACK PAIN: 0
EYE REDNESS: 0
ABDOMINAL PAIN: 0
FEVER: 0
FALLS: 0
SENSORY CHANGE: 0
HEADACHES: 0
SHORTNESS OF BREATH: 1
WEAKNESS: 1
WHEEZING: 0
EYE REDNESS: 0
ABDOMINAL PAIN: 0
NECK PAIN: 0
DIZZINESS: 0
SPUTUM PRODUCTION: 0
MYALGIAS: 0
FLANK PAIN: 0
ABDOMINAL PAIN: 0
EYES NEGATIVE: 1
COUGH: 0
HEADACHES: 0
GASTROINTESTINAL NEGATIVE: 1
CHILLS: 0
ABDOMINAL PAIN: 0
DIZZINESS: 0
EYE DISCHARGE: 0
NECK PAIN: 0
VOMITING: 0
ABDOMINAL PAIN: 0
SPUTUM PRODUCTION: 1
INSOMNIA: 0
DEPRESSION: 0
MYALGIAS: 1
NERVOUS/ANXIOUS: 0
ABDOMINAL PAIN: 1
NERVOUS/ANXIOUS: 0
SHORTNESS OF BREATH: 1
BACK PAIN: 0
EYES NEGATIVE: 1
NAUSEA: 0
BLURRED VISION: 0
BRUISES/BLEEDS EASILY: 0
CHILLS: 0
MYALGIAS: 1
FEVER: 0
EYES NEGATIVE: 1
SHORTNESS OF BREATH: 1
ABDOMINAL PAIN: 0
CHILLS: 0
DIARRHEA: 0
WHEEZING: 0
CARDIOVASCULAR NEGATIVE: 1
COUGH: 1
CONSTITUTIONAL NEGATIVE: 1
BACK PAIN: 0
FEVER: 0
DEPRESSION: 0
WEIGHT LOSS: 0
BLURRED VISION: 0
COUGH: 1
SENSORY CHANGE: 0
DEPRESSION: 0
CHILLS: 0
EYES NEGATIVE: 1
NECK PAIN: 0
BLOOD IN STOOL: 0
SORE THROAT: 0
MYALGIAS: 1
VOMITING: 0
COUGH: 1
SPUTUM PRODUCTION: 1
FEVER: 0
HEADACHES: 0
SHORTNESS OF BREATH: 0
SORE THROAT: 0
COUGH: 1
GASTROINTESTINAL NEGATIVE: 1
DIZZINESS: 0
MYALGIAS: 1
HEADACHES: 0
NERVOUS/ANXIOUS: 0
WEAKNESS: 1
DIZZINESS: 0
SHORTNESS OF BREATH: 1
NERVOUS/ANXIOUS: 1
MYALGIAS: 0
HEADACHES: 0
CHILLS: 0
HEADACHES: 0
SHORTNESS OF BREATH: 1
HEMOPTYSIS: 0
ROS GI COMMENTS: ABD DISTENTION
PALPITATIONS: 0
EYE PAIN: 0
SHORTNESS OF BREATH: 0
DEPRESSION: 0
FEVER: 0
MYALGIAS: 1
TINGLING: 0
VOMITING: 0
ORTHOPNEA: 1
FEVER: 0
SHORTNESS OF BREATH: 1
WEAKNESS: 1
SORE THROAT: 0
NAUSEA: 0
NECK PAIN: 0
DIZZINESS: 0
MYALGIAS: 1
CHILLS: 0
WHEEZING: 0
PALPITATIONS: 0
BLOOD IN STOOL: 0
CHILLS: 0
BACK PAIN: 0
SPEECH CHANGE: 0
VOMITING: 1
LOSS OF CONSCIOUSNESS: 0
SHORTNESS OF BREATH: 1
CHILLS: 0
COUGH: 0
WEAKNESS: 1
PSYCHIATRIC NEGATIVE: 1
NAUSEA: 0
BLOOD IN STOOL: 0
FEVER: 0
DIZZINESS: 0
EYE PAIN: 0
WEAKNESS: 1
NAUSEA: 0
FEVER: 0
ABDOMINAL PAIN: 0
SHORTNESS OF BREATH: 1
MUSCULOSKELETAL NEGATIVE: 1
HEADACHES: 0
SHORTNESS OF BREATH: 0
MYALGIAS: 1
FEVER: 0
ABDOMINAL PAIN: 1
HEMOPTYSIS: 0
FEVER: 0
BACK PAIN: 0
WEAKNESS: 1
ABDOMINAL PAIN: 1
SORE THROAT: 0
ORTHOPNEA: 1
ABDOMINAL PAIN: 0
WEAKNESS: 1
FEVER: 0
SHORTNESS OF BREATH: 1
NAUSEA: 0
HEMOPTYSIS: 0
VOMITING: 0
VOMITING: 0
MYALGIAS: 1
FEVER: 0
FOCAL WEAKNESS: 0
NERVOUS/ANXIOUS: 0
COUGH: 0
SPUTUM PRODUCTION: 1
COUGH: 1
HEADACHES: 0
BLOOD IN STOOL: 0
CHILLS: 0
SHORTNESS OF BREATH: 1
MUSCULOSKELETAL NEGATIVE: 1
SPEECH CHANGE: 0
SPEECH CHANGE: 0
NERVOUS/ANXIOUS: 0
NAUSEA: 0
CHILLS: 0
ABDOMINAL PAIN: 0
COUGH: 1
CHILLS: 0
EYES NEGATIVE: 1
HALLUCINATIONS: 0
VOMITING: 0
MYALGIAS: 0
CHILLS: 0
FEVER: 0
SHORTNESS OF BREATH: 1
COUGH: 0
ABDOMINAL PAIN: 1
NAUSEA: 0
NERVOUS/ANXIOUS: 0
HEADACHES: 0
TINGLING: 0
WHEEZING: 0
ORTHOPNEA: 1
MYALGIAS: 1
WEAKNESS: 1
CHILLS: 0
DIZZINESS: 0
COUGH: 0
DOUBLE VISION: 0
VOMITING: 1
FEVER: 0
NAUSEA: 0
NAUSEA: 1
ORTHOPNEA: 0
ABDOMINAL PAIN: 1
FOCAL WEAKNESS: 0
BACK PAIN: 0
DIZZINESS: 0
BACK PAIN: 0
VOMITING: 0
FEVER: 0
COUGH: 0
VOMITING: 1
INSOMNIA: 0
BACK PAIN: 0
FOCAL WEAKNESS: 0
NAUSEA: 0
ABDOMINAL PAIN: 1
MYALGIAS: 1
ABDOMINAL PAIN: 0
HEADACHES: 0
MUSCULOSKELETAL NEGATIVE: 1
WEAKNESS: 1
TINGLING: 0
CONSTITUTIONAL NEGATIVE: 1
MYALGIAS: 0
PALPITATIONS: 0
NAUSEA: 1
PSYCHIATRIC NEGATIVE: 1
DIARRHEA: 0
HEADACHES: 0
NAUSEA: 0
HEADACHES: 0
SHORTNESS OF BREATH: 1
NECK PAIN: 0
VOMITING: 0
SPUTUM PRODUCTION: 0
CHILLS: 0
NAUSEA: 1
BACK PAIN: 0
SHORTNESS OF BREATH: 0
CHILLS: 0
FEVER: 0
ORTHOPNEA: 1
NECK PAIN: 0
CHILLS: 0
NEUROLOGICAL NEGATIVE: 1
HEARTBURN: 0
DIZZINESS: 0
NERVOUS/ANXIOUS: 1
DIZZINESS: 0
VOMITING: 0
FEVER: 0
DIARRHEA: 0
NECK PAIN: 0
HEMOPTYSIS: 0
FEVER: 0
HEADACHES: 0
DEPRESSION: 0
BACK PAIN: 0
EYE REDNESS: 0
SENSORY CHANGE: 0
FEVER: 0
VOMITING: 0
SORE THROAT: 0

## 2017-01-01 ASSESSMENT — PAIN SCALES - GENERAL
PAINLEVEL_OUTOF10: 0
PAINLEVEL_OUTOF10: 4
PAINLEVEL_OUTOF10: 10
PAINLEVEL_OUTOF10: 6
PAINLEVEL_OUTOF10: 0
PAINLEVEL_OUTOF10: 3
PAINLEVEL_OUTOF10: 0
PAINLEVEL_OUTOF10: 4
PAINLEVEL_OUTOF10: 6
PAINLEVEL_OUTOF10: 0
PAINLEVEL_OUTOF10: 5
PAINLEVEL_OUTOF10: 10
PAINLEVEL_OUTOF10: 7
PAINLEVEL_OUTOF10: 9
PAINLEVEL_OUTOF10: 0
PAINLEVEL_OUTOF10: 6
PAINLEVEL_OUTOF10: 4
PAINLEVEL_OUTOF10: 0
PAINLEVEL_OUTOF10: 10
PAINLEVEL_OUTOF10: 6
PAINLEVEL_OUTOF10: 5
PAINLEVEL_OUTOF10: 0
PAINLEVEL_OUTOF10: 8
PAINLEVEL_OUTOF10: 4
PAINLEVEL_OUTOF10: 0
PAINLEVEL_OUTOF10: 4
PAINLEVEL_OUTOF10: 10
PAINLEVEL_OUTOF10: 9
PAINLEVEL_OUTOF10: 4
PAINLEVEL_OUTOF10: 0
PAINLEVEL_OUTOF10: 0
PAINLEVEL_OUTOF10: 8
PAINLEVEL_OUTOF10: 3
PAINLEVEL_OUTOF10: 7
PAINLEVEL_OUTOF10: 0
PAINLEVEL_OUTOF10: 4
PAINLEVEL_OUTOF10: 3
PAINLEVEL_OUTOF10: 0
PAINLEVEL_OUTOF10: 5
PAINLEVEL_OUTOF10: 4
PAINLEVEL_OUTOF10: 0
PAINLEVEL_OUTOF10: 0
PAINLEVEL_OUTOF10: 7
PAINLEVEL_OUTOF10: 7
PAINLEVEL_OUTOF10: 5
PAINLEVEL_OUTOF10: 5
PAINLEVEL_OUTOF10: 0
PAINLEVEL_OUTOF10: 0
PAINLEVEL_OUTOF10: 9
PAINLEVEL_OUTOF10: 0
PAINLEVEL_OUTOF10: 0
PAINLEVEL_OUTOF10: 7
PAINLEVEL_OUTOF10: 0
PAINLEVEL_OUTOF10: 6
PAINLEVEL_OUTOF10: 0
PAINLEVEL_OUTOF10: 3
PAINLEVEL_OUTOF10: 0
PAINLEVEL_OUTOF10: 8
PAINLEVEL_OUTOF10: 6
PAINLEVEL_OUTOF10: 5
PAINLEVEL_OUTOF10: 5
PAINLEVEL_OUTOF10: 7
PAINLEVEL_OUTOF10: 0
PAINLEVEL_OUTOF10: ASSUMED PAIN PRESENT
PAINLEVEL_OUTOF10: 7
PAINLEVEL_OUTOF10: 0
PAINLEVEL_OUTOF10: 10
PAINLEVEL_OUTOF10: 5
PAINLEVEL_OUTOF10: 3
PAINLEVEL_OUTOF10: 10
PAINLEVEL_OUTOF10: 3
PAINLEVEL_OUTOF10: 0
PAINLEVEL_OUTOF10: 7
PAINLEVEL_OUTOF10: 4
PAINLEVEL_OUTOF10: 0
PAINLEVEL_OUTOF10: 3
PAINLEVEL_OUTOF10: 0
PAINLEVEL_OUTOF10: 8
PAINLEVEL_OUTOF10: 0
PAINLEVEL_OUTOF10: 8
PAINLEVEL_OUTOF10: 8
PAINLEVEL_OUTOF10: 0
PAINLEVEL_OUTOF10: 6
PAINLEVEL_OUTOF10: 3
PAINLEVEL_OUTOF10: 3
PAINLEVEL_OUTOF10: 4
PAINLEVEL_OUTOF10: 4
PAINLEVEL_OUTOF10: 10
PAINLEVEL_OUTOF10: 4
PAINLEVEL_OUTOF10: 0
PAINLEVEL_OUTOF10: 4
PAINLEVEL_OUTOF10: 0
PAINLEVEL_OUTOF10: 2
PAINLEVEL_OUTOF10: 0
PAINLEVEL_OUTOF10: 4
PAINLEVEL_OUTOF10: 0
PAINLEVEL_OUTOF10: 8
PAINLEVEL_OUTOF10: 8
PAINLEVEL_OUTOF10: 4
PAINLEVEL_OUTOF10: 0
PAINLEVEL_OUTOF10: 5
PAINLEVEL_OUTOF10: 6
PAINLEVEL_OUTOF10: 10
PAINLEVEL_OUTOF10: 8
PAINLEVEL_OUTOF10: 0
PAINLEVEL_OUTOF10: 0
PAINLEVEL_OUTOF10: 5
PAINLEVEL_OUTOF10: 5
PAINLEVEL_OUTOF10: 10
PAINLEVEL_OUTOF10: 6
PAINLEVEL_OUTOF10: 0
PAINLEVEL_OUTOF10: 10
PAINLEVEL_OUTOF10: 0
PAINLEVEL_OUTOF10: 8
PAINLEVEL_OUTOF10: 0
PAINLEVEL_OUTOF10: 0
PAINLEVEL_OUTOF10: 6
PAINLEVEL_OUTOF10: 6
PAINLEVEL_OUTOF10: 0
PAINLEVEL_OUTOF10: ASSUMED PAIN PRESENT
PAINLEVEL_OUTOF10: ASSUMED PAIN PRESENT
PAINLEVEL_OUTOF10: 3
PAINLEVEL_OUTOF10: 0
PAINLEVEL_OUTOF10: 0
PAINLEVEL_OUTOF10: 4
PAINLEVEL_OUTOF10: 8
PAINLEVEL_OUTOF10: 3
PAINLEVEL_OUTOF10: 7
PAINLEVEL_OUTOF10: 7
PAINLEVEL_OUTOF10: 10
PAINLEVEL_OUTOF10: 5
PAINLEVEL_OUTOF10: 2
PAINLEVEL_OUTOF10: 10
PAINLEVEL_OUTOF10: 5
PAINLEVEL_OUTOF10: 0
PAINLEVEL_OUTOF10: 0
PAINLEVEL_OUTOF10: 10
PAINLEVEL_OUTOF10: 1
PAINLEVEL_OUTOF10: 10
PAINLEVEL_OUTOF10: 3
PAINLEVEL_OUTOF10: 3
PAINLEVEL_OUTOF10: 8
PAINLEVEL_OUTOF10: 2
PAINLEVEL_OUTOF10: 0
PAINLEVEL_OUTOF10: 0
PAINLEVEL_OUTOF10: 7
PAINLEVEL_OUTOF10: 0
PAINLEVEL_OUTOF10: 0
PAINLEVEL_OUTOF10: 8
PAINLEVEL_OUTOF10: 0
PAINLEVEL_OUTOF10: 3
PAINLEVEL_OUTOF10: 10
PAINLEVEL_OUTOF10: 4
PAINLEVEL_OUTOF10: 0
PAINLEVEL_OUTOF10: 6
PAINLEVEL_OUTOF10: 8
PAINLEVEL_OUTOF10: 6
PAINLEVEL_OUTOF10: 6
PAINLEVEL_OUTOF10: 0
PAINLEVEL_OUTOF10: 8
PAINLEVEL_OUTOF10: 4
PAINLEVEL_OUTOF10: 4
PAINLEVEL_OUTOF10: 0
PAINLEVEL_OUTOF10: 5
PAINLEVEL_OUTOF10: 6
PAINLEVEL_OUTOF10: 10
PAINLEVEL_OUTOF10: 0
PAINLEVEL_OUTOF10: 9
PAINLEVEL_OUTOF10: 3
PAINLEVEL_OUTOF10: 10
PAINLEVEL_OUTOF10: 0
PAINLEVEL_OUTOF10: 3
PAINLEVEL_OUTOF10: 4
PAINLEVEL_OUTOF10: 5
PAINLEVEL_OUTOF10: 0
PAINLEVEL_OUTOF10: 0
PAINLEVEL_OUTOF10: 5
PAINLEVEL_OUTOF10: 0
PAINLEVEL_OUTOF10: 3
PAINLEVEL_OUTOF10: 8
PAINLEVEL_OUTOF10: 5
PAINLEVEL_OUTOF10: 10
PAINLEVEL_OUTOF10: 8
PAINLEVEL_OUTOF10: 0
PAINLEVEL_OUTOF10: 0
PAINLEVEL_OUTOF10: 10
PAINLEVEL_OUTOF10: 5
PAINLEVEL_OUTOF10: 0
PAINLEVEL_OUTOF10: 8
PAINLEVEL_OUTOF10: 6
PAINLEVEL_OUTOF10: 0
PAINLEVEL_OUTOF10: 6
PAINLEVEL_OUTOF10: 3
PAINLEVEL_OUTOF10: 5
PAINLEVEL_OUTOF10: 0
PAINLEVEL_OUTOF10: 0
PAINLEVEL_OUTOF10: 4
PAINLEVEL_OUTOF10: 0
PAINLEVEL_OUTOF10: 5
PAINLEVEL_OUTOF10: 4
PAINLEVEL_OUTOF10: 4
PAINLEVEL_OUTOF10: 5
PAINLEVEL_OUTOF10: 3
PAINLEVEL_OUTOF10: 0
PAINLEVEL_OUTOF10: 7
PAINLEVEL_OUTOF10: 0
PAINLEVEL_OUTOF10: 0
PAINLEVEL_OUTOF10: 4
PAINLEVEL_OUTOF10: 8
PAINLEVEL_OUTOF10: 4
PAINLEVEL_OUTOF10: 0
PAINLEVEL_OUTOF10: 0
PAINLEVEL_OUTOF10: 4
PAINLEVEL_OUTOF10: 4
PAINLEVEL_OUTOF10: 10
PAINLEVEL_OUTOF10: 2
PAINLEVEL_OUTOF10: 5
PAINLEVEL_OUTOF10: 10
PAINLEVEL_OUTOF10: 5
PAINLEVEL_OUTOF10: 5
PAINLEVEL_OUTOF10: 3
PAINLEVEL_OUTOF10: 0
PAINLEVEL_OUTOF10: 3
PAINLEVEL_OUTOF10: 9
PAINLEVEL_OUTOF10: 3
PAINLEVEL_OUTOF10: 0
PAINLEVEL_OUTOF10: 5
PAINLEVEL_OUTOF10: 7
PAINLEVEL_OUTOF10: 3
PAINLEVEL_OUTOF10: 8
PAINLEVEL_OUTOF10: 2
PAINLEVEL_OUTOF10: 4
PAINLEVEL_OUTOF10: 0
PAINLEVEL_OUTOF10: 6
PAINLEVEL_OUTOF10: 4
PAINLEVEL_OUTOF10: 4
PAINLEVEL_OUTOF10: 3
PAINLEVEL_OUTOF10: 5
PAINLEVEL_OUTOF10: 0
PAINLEVEL_OUTOF10: 0
PAINLEVEL_OUTOF10: 4
PAINLEVEL_OUTOF10: 2
PAINLEVEL_OUTOF10: 10
PAINLEVEL_OUTOF10: 8
PAINLEVEL_OUTOF10: 0
PAINLEVEL_OUTOF10: 10
PAINLEVEL_OUTOF10: 2
PAINLEVEL_OUTOF10: 10
PAINLEVEL_OUTOF10: 4
PAINLEVEL_OUTOF10: 0
PAINLEVEL_OUTOF10: 5
PAINLEVEL_OUTOF10: 0
PAINLEVEL_OUTOF10: 5
PAINLEVEL_OUTOF10: 0
PAINLEVEL_OUTOF10: 0
PAINLEVEL_OUTOF10: 4
PAINLEVEL_OUTOF10: 0

## 2017-01-01 ASSESSMENT — PATIENT HEALTH QUESTIONNAIRE - PHQ9
2. FEELING DOWN, DEPRESSED, IRRITABLE, OR HOPELESS: NOT AT ALL
1. LITTLE INTEREST OR PLEASURE IN DOING THINGS: NOT AT ALL
SUM OF ALL RESPONSES TO PHQ9 QUESTIONS 1 AND 2: 0
SUM OF ALL RESPONSES TO PHQ QUESTIONS 1-9: 0
SUM OF ALL RESPONSES TO PHQ9 QUESTIONS 1 AND 2: 0
SUM OF ALL RESPONSES TO PHQ QUESTIONS 1-9: 0
1. LITTLE INTEREST OR PLEASURE IN DOING THINGS: NOT AT ALL
SUM OF ALL RESPONSES TO PHQ QUESTIONS 1-9: 0
SUM OF ALL RESPONSES TO PHQ9 QUESTIONS 1 AND 2: 0
2. FEELING DOWN, DEPRESSED, IRRITABLE, OR HOPELESS: NOT AT ALL
1. LITTLE INTEREST OR PLEASURE IN DOING THINGS: NOT AT ALL
2. FEELING DOWN, DEPRESSED, IRRITABLE, OR HOPELESS: NOT AT ALL

## 2017-01-01 ASSESSMENT — LIFESTYLE VARIABLES
ALCOHOL_USE: NO
EVER_SMOKED: NEVER
DO YOU DRINK ALCOHOL: NO
DO YOU DRINK ALCOHOL: NO
SUBSTANCE_ABUSE: 1
DO YOU DRINK ALCOHOL: NO
DO YOU DRINK ALCOHOL: NO
SUBSTANCE_ABUSE: 1
DO YOU DRINK ALCOHOL: NO
SUBSTANCE_ABUSE: 1
SUBSTANCE_ABUSE: 0
EVER_SMOKED: NEVER
SUBSTANCE_ABUSE: 1
EVER_SMOKED: NEVER
EVER_SMOKED: NEVER
ALCOHOL_USE: NO
DO YOU DRINK ALCOHOL: NO

## 2017-01-01 ASSESSMENT — COPD QUESTIONNAIRES
DURING THE PAST 4 WEEKS HOW MUCH DID YOU FEEL SHORT OF BREATH: NONE/LITTLE OF THE TIME
DO YOU EVER COUGH UP ANY MUCUS OR PHLEGM?: NO/ONLY WITH OCCASIONAL COLDS OR INFECTIONS
HAVE YOU SMOKED AT LEAST 100 CIGARETTES IN YOUR ENTIRE LIFE: YES
DURING THE PAST 4 WEEKS HOW MUCH DID YOU FEEL SHORT OF BREATH: NONE/LITTLE OF THE TIME
DO YOU EVER COUGH UP ANY MUCUS OR PHLEGM?: NO/ONLY WITH OCCASIONAL COLDS OR INFECTIONS
DO YOU EVER COUGH UP ANY MUCUS OR PHLEGM?: NO/ONLY WITH OCCASIONAL COLDS OR INFECTIONS
COPD SCREENING SCORE: 3
HAVE YOU SMOKED AT LEAST 100 CIGARETTES IN YOUR ENTIRE LIFE: YES
DURING THE PAST 4 WEEKS HOW MUCH DID YOU FEEL SHORT OF BREATH: NONE/LITTLE OF THE TIME
DO YOU EVER COUGH UP ANY MUCUS OR PHLEGM?: NO/ONLY WITH OCCASIONAL COLDS OR INFECTIONS
DURING THE PAST 4 WEEKS HOW MUCH DID YOU FEEL SHORT OF BREATH: NONE/LITTLE OF THE TIME
HAVE YOU SMOKED AT LEAST 100 CIGARETTES IN YOUR ENTIRE LIFE: YES
COPD SCREENING SCORE: 3
DURING THE PAST 4 WEEKS HOW MUCH DID YOU FEEL SHORT OF BREATH: NONE/LITTLE OF THE TIME
DO YOU EVER COUGH UP ANY MUCUS OR PHLEGM?: NO/ONLY WITH OCCASIONAL COLDS OR INFECTIONS
DO YOU EVER COUGH UP ANY MUCUS OR PHLEGM?: NO/ONLY WITH OCCASIONAL COLDS OR INFECTIONS
HAVE YOU SMOKED AT LEAST 100 CIGARETTES IN YOUR ENTIRE LIFE: NO/DON'T KNOW
HAVE YOU SMOKED AT LEAST 100 CIGARETTES IN YOUR ENTIRE LIFE: YES
HAVE YOU SMOKED AT LEAST 100 CIGARETTES IN YOUR ENTIRE LIFE: NO/DON'T KNOW
DURING THE PAST 4 WEEKS HOW MUCH DID YOU FEEL SHORT OF BREATH: NONE/LITTLE OF THE TIME
COPD SCREENING SCORE: 1
COPD SCREENING SCORE: 3
DO YOU EVER COUGH UP ANY MUCUS OR PHLEGM?: NO/ONLY WITH OCCASIONAL COLDS OR INFECTIONS
COPD SCREENING SCORE: 3
COPD SCREENING SCORE: 3
HAVE YOU SMOKED AT LEAST 100 CIGARETTES IN YOUR ENTIRE LIFE: YES
DO YOU EVER COUGH UP ANY MUCUS OR PHLEGM?: NO/ONLY WITH OCCASIONAL COLDS OR INFECTIONS
DURING THE PAST 4 WEEKS HOW MUCH DID YOU FEEL SHORT OF BREATH: NONE/LITTLE OF THE TIME
DURING THE PAST 4 WEEKS HOW MUCH DID YOU FEEL SHORT OF BREATH: NONE/LITTLE OF THE TIME
COPD SCREENING SCORE: 1
DURING THE PAST 4 WEEKS HOW MUCH DID YOU FEEL SHORT OF BREATH: NONE/LITTLE OF THE TIME
HAVE YOU SMOKED AT LEAST 100 CIGARETTES IN YOUR ENTIRE LIFE: YES
DO YOU EVER COUGH UP ANY MUCUS OR PHLEGM?: NO/ONLY WITH OCCASIONAL COLDS OR INFECTIONS
COPD SCREENING SCORE: 3
COPD SCREENING SCORE: 3
HAVE YOU SMOKED AT LEAST 100 CIGARETTES IN YOUR ENTIRE LIFE: YES

## 2017-01-01 ASSESSMENT — PULMONARY FUNCTION TESTS
EPAP_CMH2O: 7
EPAP_CMH2O: 10
EPAP_CMH2O: 6
EPAP_CMH2O: 10
EPAP_CMH2O: 10

## 2017-01-01 ASSESSMENT — GAIT ASSESSMENTS
GAIT LEVEL OF ASSIST: UNABLE TO PARTICIPATE
DEVIATION: OTHER (COMMENT)
GAIT LEVEL OF ASSIST: SUPERVISED
DISTANCE (FEET): 25
DISTANCE (FEET): 250
DEVIATION: BRADYKINETIC
GAIT LEVEL OF ASSIST: STAND BY ASSIST
ASSISTIVE DEVICE: FRONT WHEEL WALKER
ASSISTIVE DEVICE: NONE;FRONT WHEEL WALKER

## 2017-01-01 ASSESSMENT — ACTIVITIES OF DAILY LIVING (ADL): TOILETING: INDEPENDENT

## 2017-01-26 NOTE — IP AVS SNAPSHOT
" <p align=\"LEFT\"><IMG SRC=\"//[email or web address]\" alt=\"Image\" WIDTH=\"50%\" HEIGHT=\"200\" BORDER=\"\"></p>                   Name:Obinna Grande  Medical Record Number:[id number]  CSN: [id number]    YOB: 1965   Age: 51 y.o.  Sex: male  HT:1.626 m (5' 4\") WT: 102.3 kg (225 lb 8.5 oz)          Admit Date: 1/26/2017     Discharge Date:   Today's Date: 2/10/2017  Attending Doctor:  Mihai Patel M.D.                  Allergies:  Review of patient's allergies indicates no known allergies.          Your appointments     Feb 15, 2017  9:30 AM   Heart Failure New with Chaya Olivia M.D.   [facility] (--)    [address], [address]  [facility] [address] [phone]              Follow-up Information     1. Follow up with Hailey ESCOBAR. Go on 2/13/2017.    Why:  Please arrive at 8:15am for a 8:30am appointment. Bring your photo ID, proof of address and proof of income if you are employed and list of current medications. Thank you.    Contact information    [address] [address] [phone]        2. Follow up with Светлана Ortiz M.D.. Schedule an appointment as soon as possible for a visit in 2 weeks.    Specialty:  Cardiology    Why:  Hospital follow-up appointment with PCP    Contact information    [address]  [address]  [facility] [address] [phone]           Medication List      Take these Medications        Instructions    allopurinol 100 MG Tabs   Commonly known as:  ZYLOPRIM    Take 1 Tab by mouth every day.   Dose:  100 mg       bumetanide 1 MG Tabs   Commonly known as:  BUMEX    Take 3 Tabs by mouth 2 Times a Day.   Dose:  3 mg       carvedilol 3.125 MG Tabs   Commonly known as:  COREG    Take 3.125 mg by mouth 2 times a day, with meals.   Dose:  3.125 mg       colchicine 0.6 MG Tabs   Commonly known as:  COLCRYS    Take 1 Tab by mouth 1 time daily as needed (gout pain).   Dose:  0.6 mg       gabapentin " 300 MG Caps   Commonly known as:  NEURONTIN    Take 1 Cap by mouth every bedtime.   Dose:  300 mg       lisinopril 2.5 MG Tabs   Commonly known as:  PRINIVIL    Take 1 Tab by mouth every day.   Dose:  2.5 mg       lorazepam 0.5 MG Tabs   Commonly known as:  ATIVAN    Take 1 Tab by mouth every four hours as needed for Anxiety.   Dose:  0.5 mg       oxycodone immediate release 10 MG immediate release tablet   Commonly known as:  ROXICODONE    Take 1 Tab by mouth every four hours as needed for Moderate Pain.   Dose:  10 mg       potassium chloride SA 20 MEQ Tbcr   Commonly known as:  Kdur    Take 1 Tab by mouth 2 Times a Day.   Dose:  20 mEq       spironolactone 25 MG Tabs   What changed:    - how much to take  - when to take this   Commonly known as:  ALDACTONE    Take 2 Tabs by mouth every day.   Dose:  50 mg       tramadol 50 MG Tabs   Commonly known as:  ULTRAM    Take 50 mg by mouth every four hours as needed for Severe Pain.   Dose:  50 mg       warfarin 3 MG Tabs   Commonly known as:  COUMADIN    Take 3 mg by mouth every day. Per patient, no variation.   Dose:  3 mg

## 2017-01-26 NOTE — IP AVS SNAPSHOT
2/10/2017          Obinna Grande  [address] [facility] [place] [address]    Dear Obinna:    [facility] wants to ensure your discharge home is safe and you or your loved ones have had all your questions answered regarding your care after you leave the hospital.    You may receive a telephone call within two days of your discharge.  This call is to make certain you understand your discharge instructions as well as ensure we provided you with the best care possible during your stay with us.     The call will only last approximately 3-5 minutes and will be done by a nurse.    Once again, we want to ensure your discharge home is safe and that you have a clear understanding of any next steps in your care.  If you have any questions or concerns, please do not hesitate to contact us, we are here for you.  Thank you for choosing [facility] for your healthcare needs.    Sincerely,    Juan Cornell    [facility]

## 2017-01-26 NOTE — IP AVS SNAPSHOT
" Home Care Instructions                                                                                                                  Name:Obinna Grande  Medical Record Number:[id number]  CSN: [id number]    YOB: 1965   Age: 51 y.o.  Sex: male  HT:1.626 m (5' 4\") WT: 102.3 kg (225 lb 8.5 oz)          Admit Date: 1/26/2017     Discharge Date:   Today's Date: 2/10/2017  Attending Doctor:  Mihai Patel M.D.                  Allergies:  Review of patient's allergies indicates no known allergies.            Discharge Instructions       Discharge Instructions    Discharged to home by car with relative. Discharged via wheelchair, hospital escort: Yes.  Special equipment needed: Not Applicable    Be sure to schedule a follow-up appointment with your primary care doctor or any specialists as instructed.     Discharge Plan:   Influenza Vaccine Indication: Patient Refuses    I understand that a diet low in cholesterol, fat, and sodium is recommended for good health. Unless I have been given specific instructions below for another diet, I accept this instruction as my diet prescription.     Special Instructions: None    · Is patient discharged on Warfarin / Coumadin?   No     · Is patient Post Blood Transfusion?  No    Depression / Suicide Risk    As you are discharged from this [facility] facility, it is important to learn how to keep safe from harming yourself.    Recognize the warning signs:  · Abrupt changes in personality, positive or negative- including increase in energy   · Giving away possessions  · Change in eating patterns- significant weight changes-  positive or negative  · Change in sleeping patterns- unable to sleep or sleeping all the time   · Unwillingness or inability to communicate  · Depression  · Unusual sadness, discouragement and loneliness  · Talk of wanting to die  · Neglect of personal appearance   · Rebelliousness- reckless behavior  · Withdrawal from " people/activities they love  · Confusion- inability to concentrate     If you or a loved one observes any of these behaviors or has concerns about self-harm, here's what you can do:  · Talk about it- your feelings and reasons for harming yourself  · Remove any means that you might use to hurt yourself (examples: pills, rope, extension cords, firearm)  · Get professional help from the community (Mental Health, Substance Abuse, psychological counseling)  · Do not be alone:Call your Safe Contact- someone whom you trust who will be there for you.  · Call your local CRISIS HOTLINE 831-9680 or 182-280-2491  · Call your local Children's Mobile Crisis Response Team [place] (390) 480-7491 or www.[place]  · Call the toll free National Suicide Prevention Hotlines   · National Suicide Prevention Lifeline 097-446-GVSY (3005)  · National Hope Line Network 800-SUICIDE (567-6696)      HF Patient Discharge Instructions  · Monitor your weight daily, and maintain a weight chart, to track your weight changes.   · Activity as tolerated, unless your Doctor has ordered otherwise.   · Follow a low fat, low cholesterol, low salt diet unless instructed otherwise by your Doctor. Read the labels on the back of food products and track your intake of fat, cholesterol and salt.   · Fluid Restriction Yes. If a Fluid Restriction has been ordered by your Doctor, measure fluids with a measuring cup to ensure that you are not exceeding the restriction.   · No smoking.  · Oxygen No. If your Doctor has ordered that you wear Oxygen at home, it is important to wear it as ordered.  · Did you receive an explanation from staff on the importance of taking each of your medications and why it is necessary to keep taking them unless your doctor says to stop? Yes  · Were all of your questions answered about how to manage your heart failure and what to do if you have increased signs and symptoms after you go home? Yes  · Do you feel like your heart  failure care team involved you in the care treatment plan and allowed you to make decisions regarding your care while in the hospital and addressed any discharge needs you might have? Yes    See the educational handout provided at discharge for more information on monitoring your daily weight, activity and diet. This also explains more about Heart Failure, symptoms of a flare-up and some of the tests that you have undergone.     Warning Signs of a Flare-Up include:  · Swelling in the ankles or lower legs.  · Shortness of breath, while at rest, or while doing normal activities.   · Shortness of breath at night when in bed, or coughing in bed.   · Requiring more pillows to sleep at night, or needing to sit up at night to sleep.  · Feeling weak, dizzy or fatigued.     When to call your Doctor:  · [facility] [address] [place] [address] about questions regarding the discharge instructions you were given [phone].  · Call your Primary Care Physician or Cardiologist if:   1. You experience any pain radiating to your jaw or neck.  2. You have any difficulty breathing.  3. You experience weight gain of 3 lbs in a day or 5 lbs in a week.   4. You feel any palpitations or irregular heartbeats.  5. You become dizzy or lose consciousness.   If you have had an angiogram or had a pacemaker or AICD placed, and experience:  1. Bleeding, drainage or swelling at the surgical / puncture site.  2. Fever greater than 100.0 F  3. Shock from internal defibrillator.  4. Cool and / or numb extremities.      Oxycodone extended-release tablets  What is this medicine?  OXYCODONE (ox i KOE done) is a pain reliever. It is used to treat constant pain that lasts for more than a few days.  This medicine may be used for other purposes; ask your health care provider or pharmacist if you have questions.  COMMON BRAND NAME(S): OxyContin  What should I tell my health care provider before I take this medicine?  They need to know if you have any of these  conditions:  -[place]'s disease  -brain tumor  -drug abuse or addiction  -gallbladder disease  -head injury  -heart disease  -if you frequently drink alcohol-containing drinks  -kidney disease or problems going to the bathroom  -liver disease  -lung disease, asthma, or breathing problems  -mental problems  -pancreatic disease  -seizures  -stomach or intestine problems  -thyroid disease  -trouble swallowing  -an unusual or allergic reaction to oxycodone, codeine, hydrocodone, morphine, other medicines, foods, dyes, or preservatives  -pregnant or trying to get pregnant  -breast-feeding  How should I use this medicine?  Take this medicine by mouth with a full glass of water. Follow the directions on the prescription label. Do not cut, crush or chew this medicine. Swallow only one tablet at a time. Do not wet, soak, or lick the tablet before you take it. You can take it with or without food. If it upsets your stomach, take it with food. Take your medicine at regular intervals. Do not take it more often than directed. Do not stop taking except on your doctor's advice.  A special MedGuide will be given to you by the pharmacist with each prescription and refill. Be sure to read this information carefully each time.  Talk to your pediatrician regarding the use of this medicine in children. Special care may be needed.  Overdosage: If you think you have taken too much of this medicine contact a poison control center or emergency room at once.  NOTE: This medicine is only for you. Do not share this medicine with others.  What if I miss a dose?  If you miss a dose, take it as soon as you can. If it is almost time for your next dose, take only that dose. Do not take double or extra doses.  What may interact with this medicine?  -alcohol  -antihistamines  -carbamazepine  -certain medicines used for nausea like chlorpromazine, droperidol  -erythromycin  -ketoconazole  -medicines for depression, anxiety, or psychotic  disturbances  -medicines for sleep  -muscle relaxants  -naloxone  -naltrexone  -narcotic medicines (opiates) for pain  -nilotinib  -phenobarbital  -phenytoin  -rifampin  -ritonavir  -voriconazole  This list may not describe all possible interactions. Give your health care provider a list of all the medicines, herbs, non-prescription drugs, or dietary supplements you use. Also tell them if you smoke, drink alcohol, or use illegal drugs. Some items may interact with your medicine.  What should I watch for while using this medicine?  Tell your doctor or health care professional if your pain does not go away, if it gets worse, or if you have new or a different type of pain. You may develop tolerance to the medicine. Tolerance means that you will need a higher dose of the medication for pain relief. Tolerance is normal and is expected if you take this medicine for a long time.  Do not suddenly stop taking your medicine because you may develop a severe reaction. Your body becomes used to the medicine. This does NOT mean you are addicted. Addiction is a behavior related to getting and using a drug for a non-medical reason. If you have pain, you have a medical reason to take pain medicine. Your doctor will tell you how much medicine to take. If your doctor wants you to stop the medicine, the dose will be slowly lowered over time to avoid any side effects.  You may get drowsy or dizzy when you first start taking the medicine or change doses. Do not drive, use machinery, or do anything that may be dangerous until you know how the medicine affects you. Stand or sit up slowly.  There are different types of narcotic medicines (opiates) for pain. If you take more than one type at the same time, you may have more side effects. Give your health care provider a list of all medicines you use. Your doctor will tell you how much medicine to take. Do not take more medicine than directed. Call emergency for help if you have problems  breathing.  This medicine will cause constipation. Try to have a bowel movement at least every 2 to 3 days. If you do not have a bowel movement for 3 days, call your doctor or health care professional.  You may see empty tablets in your stool. The tablet shell for some brands of this medicine does not dissolve. This is normal.  Your mouth may get dry. Drinking water, chewing sugarless gum, or sucking on hard candy may help. See your dentist every 6 months.  What side effects may I notice from receiving this medicine?  Side effects that you should report to your doctor or health care professional as soon as possible:  -allergic reactions like skin rash, itching or hives, swelling of the face, lips, or tongue  -breathing problems  -confusion  -feeling faint or lightheaded, falls  -trouble passing urine or change in the amount of urine  -trouble swallowing  -unusually weak or tired  Side effects that usually do not require medical attention (report to your doctor or health care professional if they continue or are bothersome):  -constipation  -dizziness  -dry mouth  -itching  -nausea, vomiting  -stomach pain  -tiredness  -upset stomach  This list may not describe all possible side effects. Call your doctor for medical advice about side effects. You may report side effects to FDA at 9-118-FDA-0392.  Where should I keep my medicine?  Keep out of the reach of children. This medicine can be abused. Keep your medicine in a safe place to protect it from theft. Do not share this medicine with anyone. Selling or giving away this medicine is dangerous and against the law.  Store at room temperature between 15 and 30 degrees C (59 and 86 degrees F). Protect from light. Keep container tightly closed.  Discard unused medicine and used packaging carefully. Pets and children can be harmed if they find used or lost packages. Flush any unused medicines down the toilet. Do not use the medicine after the expiration date. Follow the  directions in the MedGuide.  NOTE: This sheet is a summary. It may not cover all possible information. If you have questions about this medicine, talk to your doctor, pharmacist, or health care provider.  © 2014, [employer]. (10/7/2013 12:04:38 PM)    Lorazepam tablets  What is this medicine?  LORAZEPAM (bryan A ze dick) is a benzodiazepine. It is used to treat anxiety.  This medicine may be used for other purposes; ask your health care provider or pharmacist if you have questions.  COMMON BRAND NAME(S): Ativan  What should I tell my health care provider before I take this medicine?  They need to know if you have any of these conditions:  -alcohol or drug abuse problem  -bipolar disorder, depression, psychosis or other mental health condition  -glaucoma  -kidney or liver disease  -lung disease or breathing difficulties  -myasthenia gravis  -Parkinson's disease  -seizures or a history of seizures  -suicidal thoughts  -an unusual or allergic reaction to lorazepam, other benzodiazepines, foods, dyes, or preservatives  -pregnant or trying to get pregnant  -breast-feeding  How should I use this medicine?  Take this medicine by mouth with a glass of water. Follow the directions on the prescription label. If it upsets your stomach, take it with food or milk. Take your medicine at regular intervals. Do not take it more often than directed. Do not stop taking except on the advice of your doctor or health care professional.  Talk to your pediatrician regarding the use of this medicine in children. Special care may be needed.  Overdosage: If you think you have taken too much of this medicine contact a poison control center or emergency room at once.  NOTE: This medicine is only for you. Do not share this medicine with others.  What if I miss a dose?  If you miss a dose, take it as soon as you can. If it is almost time for your next dose, take only that dose. Do not take double or extra doses.  What may interact with  this medicine?  -barbiturate medicines for inducing sleep or treating seizures, like phenobarbital  -clozapine  -medicines for depression, mental problems or psychiatric disturbances  -medicines for sleep  -phenytoin  -probenecid  -theophylline  -valproic acid  This list may not describe all possible interactions. Give your health care provider a list of all the medicines, herbs, non-prescription drugs, or dietary supplements you use. Also tell them if you smoke, drink alcohol, or use illegal drugs. Some items may interact with your medicine.  What should I watch for while using this medicine?  Visit your doctor or health care professional for regular checks on your progress. Your body may become dependent on this medicine, ask your doctor or health care professional if you still need to take it. However, if you have been taking this medicine regularly for some time, do not suddenly stop taking it. You must gradually reduce the dose or you may get severe side effects. Ask your doctor or health care professional for advice before increasing or decreasing the dose. Even after you stop taking this medicine it can still affect your body for several days.  You may get drowsy or dizzy. Do not drive, use machinery, or do anything that needs mental alertness until you know how this medicine affects you. To reduce the risk of dizzy and fainting spells, do not stand or sit up quickly, especially if you are an older patient. Alcohol may increase dizziness and drowsiness. Avoid alcoholic drinks.  Do not treat yourself for coughs, colds or allergies without asking your doctor or health care professional for advice. Some ingredients can increase possible side effects.  What side effects may I notice from receiving this medicine?  Side effects that you should report to your doctor or health care professional as soon as possible:  -changes in vision  -confusion  -depression  -mood changes, excitability or aggressive  behavior  -movement difficulty, staggering or jerky movements  -muscle cramps  -restlessness  -weakness or tiredness  Side effects that usually do not require medical attention (report to your doctor or health care professional if they continue or are bothersome):  -constipation or diarrhea  -difficulty sleeping, nightmares  -dizziness, drowsiness  -headache  -nausea, vomiting  This list may not describe all possible side effects. Call your doctor for medical advice about side effects. You may report side effects to FDA at 8-274-FDA-8809.  Where should I keep my medicine?  Keep out of the reach of children. This medicine can be abused. Keep your medicine in a safe place to protect it from theft. Do not share this medicine with anyone. Selling or giving away this medicine is dangerous and against the law.  Store at room temperature between 20 and 25 degrees C (68 and 77 degrees F). Protect from light. Keep container tightly closed. Throw away any unused medicine after the expiration date.  NOTE: This sheet is a summary. It may not cover all possible information. If you have questions about this medicine, talk to your doctor, pharmacist, or health care provider.  © 2014, [employer]. (6/19/2009 2:58:20 PM)    Spironolactone tablets  What is this medicine?  SPIRONOLACTONE (dawit on oh LAK tone) is a diuretic. It helps you make more urine and to lose excess water from your body. This medicine is used to treat high blood pressure, and edema or swelling from heart, kidney, or liver disease. It is also used to treat patients who make too much aldosterone or have low potassium.  This medicine may be used for other purposes; ask your health care provider or pharmacist if you have questions.  COMMON BRAND NAME(S): Aldactone  What should I tell my health care provider before I take this medicine?  They need to know if you have any of these conditions:  -high blood level of potassium  -kidney disease or trouble  making urine  -liver disease  -an unusual or allergic reaction to spironolactone, other medicines, foods, dyes, or preservatives  -pregnant or trying to get pregnant  -breast-feeding  How should I use this medicine?  Take this medicine by mouth with a drink of water. Follow the directions on your prescription label. You can take it with or without food. If it upsets your stomach, take it with food. Do not take your medicine more often than directed. Remember that you will need to pass more urine after taking this medicine. Do not take your doses at a time of day that will cause you problems. Do not take at bedtime.  Talk to your pediatrician regarding the use of this medicine in children. While this drug may be prescribed for selected conditions, precautions do apply.  Overdosage: If you think you have taken too much of this medicine contact a poison control center or emergency room at once.  NOTE: This medicine is only for you. Do not share this medicine with others.  What if I miss a dose?  If you miss a dose, take it as soon as you can. If it is almost time for your next dose, take only that dose. Do not take double or extra doses.  What may interact with this medicine?  Do not take this medicine with any of the following medications:  -eplerenone  This medicine may also interact with the following medications:  -corticosteroids  -digoxin  -lithium  -medicines for high blood pressure like ACE inhibitors  -skeletal muscle relaxants like tubocurarine  -NSAIDs, medicines for pain and inflammation, like ibuprofen or naproxen  -potassium products like salt substitute or supplements  -pressor amines like norepinephrine  -some diuretics  This list may not describe all possible interactions. Give your health care provider a list of all the medicines, herbs, non-prescription drugs, or dietary supplements you use. Also tell them if you smoke, drink alcohol, or use illegal drugs. Some items may interact with your  medicine.  What should I watch for while using this medicine?  Visit your doctor or health care professional for regular checks on your progress. Check your blood pressure as directed. Ask your doctor what your blood pressure should be, and when you should contact them.  You may need to be on a special diet while taking this medicine. Ask your doctor. Also, ask how many glasses of fluid you need to drink a day. You must not get dehydrated.  This medicine may make you feel confused, dizzy or lightheaded. Drinking alcohol and taking some medicines can make this worse. Do not drive, use machinery, or do anything that needs mental alertness until you know how this medicine affects you. Do not sit or stand up quickly.  What side effects may I notice from receiving this medicine?  Side effects that you should report to your doctor or health care professional as soon as possible:  -allergic reactions such as skin rash or itching, hives, swelling of the lips, mouth, tongue, or throat  -black or tarry stools  -fast, irregular heartbeat  -fever  -muscle pain, cramps  -numbness, tingling in hands or feet  -trouble breathing  -trouble passing urine  -unusual bleeding  -unusually weak or tired  Side effects that usually do not require medical attention (report to your doctor or health care professional if they continue or are bothersome):  -change in voice or hair growth  -confusion  -dizzy, drowsy  -dry mouth, increased thirst  -enlarged or tender breasts  -headache  -irregular menstrual periods  -sexual difficulty, unable to have an erection  -stomach upset  This list may not describe all possible side effects. Call your doctor for medical advice about side effects. You may report side effects to FDA at 5-571-FDA-2889.  Where should I keep my medicine?  Keep out of the reach of children.  Store below 25 degrees C (77 degrees F). Throw away any unused medicine after the expiration date.  NOTE: This sheet is a summary. It may  not cover all possible information. If you have questions about this medicine, talk to your doctor, pharmacist, or health care provider.  © 2014, [employer]. (8/30/2011 12:51:30 PM)    Allopurinol tablets  What is this medicine?  ALLOPURINOL (al oh PURE i nole) reduces the amount of uric acid the body makes. It is used to treat the symptoms of gout. It is also used to treat or prevent high uric acid levels that occur as a result of certain types of chemotherapy. This medicine may also help patients who frequently have kidney stones.  This medicine may be used for other purposes; ask your health care provider or pharmacist if you have questions.  COMMON BRAND NAME(S): Zyloprim  What should I tell my health care provider before I take this medicine?  They need to know if you have any of these conditions:  -kidney or liver disease  -an unusual or allergic reaction to allopurinol, other medicines, foods, dyes, or preservatives  -pregnant or trying to get pregnant  -breast feeding  How should I use this medicine?  Take this medicine by mouth with a glass of water. Follow the directions on the prescription label. If this medicine upsets your stomach, take it with food or milk. Take your doses at regular intervals. Do not take your medicine more often than directed.  Talk to your pediatrician regarding the use of this medicine in children. Special care may be needed. While this drug may be prescribed for children as young as 6 years for selected conditions, precautions do apply.  Overdosage: If you think you have taken too much of this medicine contact a poison control center or emergency room at once.  NOTE: This medicine is only for you. Do not share this medicine with others.  What if I miss a dose?  If you miss a dose, take it as soon as you can. If it is almost time for your next dose, take only that dose. Do not take double or extra doses.  What may interact with this medicine?  Do not take this medicine  with the following medication:  -didanosine, ddI  This medicine may also interact with the following medications:  -amoxicillin or ampicillin  -azathioprine  -certain medicines used to treat gout  -certain types of diuretics  -chlorpropamide  -cyclosporine  -dicumarol  -mercaptopurine  -tolbutamide  -warfarin  This list may not describe all possible interactions. Give your health care provider a list of all the medicines, herbs, non-prescription drugs, or dietary supplements you use. Also tell them if you smoke, drink alcohol, or use illegal drugs. Some items may interact with your medicine.  What should I watch for while using this medicine?  Visit your doctor or health care professional for regular checks on your progress. If you are taking this medicine to treat gout, you may not have less frequent attacks at first. Keep taking your medicine regularly and the attacks should get better within 2 to 6 weeks. Drink plenty of water (10 to 12 full glasses a day) while you are taking this medicine. This will help to reduce stomach upset and reduce the risk of getting gout or kidney stones.  Call your doctor or health care professional at once if you get a skin rash together with chills, fever, sore throat, or nausea and vomiting, if you have blood in your urine, or difficulty passing urine.  Do not take vitamin C without asking your doctor or health care professional. Too much vitamin C can increase the chance of getting kidney stones.  You may get drowsy or dizzy. Do not drive, use machinery, or do anything that needs mental alertness until you know how this drug affects you. Do not stand or sit up quickly, especially if you are an older patient. This reduces the risk of dizzy or fainting spells. Alcohol can make you more drowsy and dizzy. Alcohol can also increase the chance of stomach problems and increase the amount of uric acid in your blood. Avoid alcoholic drinks.  What side effects may I notice from receiving  this medicine?  Side effects that you should report to your doctor or health care professional as soon as possible:  -allergic reactions like skin rash, itching or hives, swelling of the face, lips, or tongue  -breathing problems  -muscle aches or pains  -redness, blistering, peeling or loosening of the skin, including inside the mouth  Side effects that usually do not require medical attention (report to your doctor or health care professional if they continue or are bothersome):  -changes in taste  -diarrhea  -indigestion  -stomach pain or cramps  This list may not describe all possible side effects. Call your doctor for medical advice about side effects. You may report side effects to FDA at 5-503-FDA-8298.  Where should I keep my medicine?  Keep out of the reach of children.  Store at room temperature between 15 and 25 degrees C (59 and 77 degrees F). Protect from light and moisture. Throw away any unused medicine after the expiration date.  NOTE: This sheet is a summary. It may not cover all possible information. If you have questions about this medicine, talk to your doctor, pharmacist, or health care provider.  © 2014, Elsevier/Gold Standard. (6/22/2009 2:26:54 PM)    Colchicine tablets  What is this medicine?  COLCHICINE (KOL chi seen) is for joint pain and swelling due to attacks of acute gouty arthritis. The medicine is also used to treat familial Mediterranean fever.  This medicine may be used for other purposes; ask your health care provider or pharmacist if you have questions.  COMMON BRAND NAME(S): Colcrys   What should I tell my health care provider before I take this medicine?  They need to know if you have any of these conditions:  -anemia  -blood disorders like leukemia or lymphoma  -heart disease  -immune system problems  -intestinal disease  -kidney disease  -liver disease  -muscle pain or weakness  -take other medicines  -stomach problems  -an unusual or allergic reaction to colchicine, other  medicines, lactose, foods, dyes, or preservatives  -pregnant or trying to get pregnant  -breast-feeding  How should I use this medicine?  Take this medicine by mouth with a full glass of water. Follow the directions on the prescription label. You can take it with or without food. If it upsets your stomach, take it with food. Take your medicine at regular intervals. Do not take your medicine more often than directed.  A special MedGuide will be given to you by the pharmacist with each prescription and refill. Be sure to read this information carefully each time.  Talk to your pediatrician regarding the use of this medicine in children. While this drug may be prescribed for children as young as 4 years old for selected conditions, precautions do apply.  Patients over 65 years old may have a stronger reaction and need a smaller dose.  Overdosage: If you think you have taken too much of this medicine contact a poison control center or emergency room at once.  NOTE: This medicine is only for you. Do not share this medicine with others.  What if I miss a dose?  If you miss a dose, take it as soon as you can. If it is almost time for your next dose, take only that dose. Do not take double or extra doses.  What may interact with this medicine?  -alcohol  -certain medicines for cholesterol like atorvastatin  -certain medicines for coughs and colds  -certain medicines to help you breathe better  -cyclosporine  -digoxin  -epinephrine  -grapefruit or grapefruit juice  -methenamine  -sodium bicarbonate  -some antibiotics like clarithromycin, erythromycin, and telithromycin  -some medicines for an irregular heartbeat or other heart problems  -some medicines for cancer, like lapatinib and tamoxifen  -some medicines for fungal infection  -some medicines for HIV  This list may not describe all possible interactions. Give your health care provider a list of all the medicines, herbs, non-prescription drugs, or dietary supplements you  use. Also tell them if you smoke, drink alcohol, or use illegal drugs. Some items may interact with your medicine.  What should I watch for while using this medicine?  Visit your doctor or health care professional for regular checks on your progress. You may need periodic blood checks.  Alcohol can increase the chance of getting stomach problems and gout attacks. Do not drink alcohol.  What side effects may I notice from receiving this medicine?  Side effects that you should report to your doctor or health care professional as soon as possible:  -allergic reactions like skin rash, itching or hives, swelling of the face, lips, or tongue  -fever, chills, or sore throat  -muscle tenderness, pain, or weakness  -numbness or tingling in hands or feet  -unusual bleeding or bruising  -unusually weak or tired  -vomiting  Side effects that usually do not require medical attention (report to your doctor or health care professional if they continue or are bothersome):  -diarrhea  -hair loss  -loss of appetite  -stomach pain or nausea  This list may not describe all possible side effects. Call your doctor for medical advice about side effects. You may report side effects to FDA at 7-518-FDA-5078.  Where should I keep my medicine?  Keep out of the reach of children.  Store at room temperature between 15 and 30 degrees C (59 and 86 degrees F). Keep container tightly closed. Protect from light. Throw away any unused medicine after the expiration date.  NOTE: This sheet is a summary. It may not cover all possible information. If you have questions about this medicine, talk to your doctor, pharmacist, or health care provider.  © 2014, Elsevier/Gold Standard. (8/9/2011 12:57:48 PM)    Bumetanide tablets  What is this medicine?  BUMETANIDE (byoo MET a nide) is a diuretic. It helps you make more urine and to lose salt and excess water from your body. This medicine is used to treat high blood pressure, and edema or swelling from heart,  kidney, or liver disease.  This medicine may be used for other purposes; ask your health care provider or pharmacist if you have questions.  COMMON BRAND NAME(S): Bumex  What should I tell my health care provider before I take this medicine?  They need to know if you have any of these conditions:  -abnormal blood electrolytes  -diarrhea or vomiting  -gout  -heart disease  -kidney disease, small amounts of urine, or difficulty passing urine  -liver disease  -an unusual or allergic reaction to bumetanide, sulfa drugs, other medicines, foods, dyes, or preservatives  -pregnant or trying to get pregnant  -breast-feeding  How should I use this medicine?  Take this medicine by mouth with a glass of water. Follow the directions on the prescription label. You may take this medicine with or without food but if it upsets your stomach, take it with food or milk. Do not take it more often than directed. Remember that you will need to pass more urine after taking this medicine. Do not take it at a time of day that will cause you problems. Do not take at bedtime.  Talk to your pediatrician regarding the use of this medicine in children. Special care may be needed.  Overdosage: If you think you have taken too much of this medicine contact a poison control center or emergency room at once.  NOTE: This medicine is only for you. Do not share this medicine with others.  What if I miss a dose?  If you miss a dose, take it as soon as you can. If it is almost time for your next dose, take only that dose. Do not take double or extra doses.  What may interact with this medicine?  -alcohol  -certain antibiotics given by injection  -diuretics  -heart medicines like digoxin and dofetilide  -hormones like cortisone, fludrocortisone, hydrocortisone  -lithium  -medicines for diabetes  -medicines for high blood pressure  -medicines for inflammation like indomethacin  -OTC supplements like ginseng and ephedra  This list may not describe all  possible interactions. Give your health care provider a list of all the medicines, herbs, non-prescription drugs, or dietary supplements you use. Also tell them if you smoke, drink alcohol, or use illegal drugs. Some items may interact with your medicine.  What should I watch for while using this medicine?  Visit your doctor or health care professional for regular check ups. Check your blood pressure regularly. Ask your doctor or health care professional what your blood pressure should be, and when you should contact him or her. If you are a diabetic, check your blood sugar as directed.  You may need to be on a special diet while taking this medicine. Ask your doctor. Also, ask how many glasses of fluid you need to drink each day. You must not get dehydrated.  You may get drowsy or dizzy. Do not drive, use machinery, or do anything that needs mental alertness until you know how this drug affects you. Do not stand or sit up quickly, especially if you are an older patient. This reduces the risk of dizzy or fainting spells. Alcohol can make you more drowsy and dizzy. Avoid alcoholic drinks.  What side effects may I notice from receiving this medicine?  Side effects that you should report to your doctor or health care professional as soon as possible:  -allergic reactions like skin rash, itching or hives, swelling of the face, lips, or tongue  -blood in the urine or stools  -blurred vision  -dry mouth  -fever or chills  -hearing loss or ringing in the ears  -irregular heartbeat  -muscle cramps, pain or weakness  -unusually weak or tired  -vomiting or diarrhea  Side effects that usually do not require medical attention (report to your doctor or health care professional if they continue or are bothersome):  -headache  -loss of appetite  -unusual bleeding or bruising  This list may not describe all possible side effects. Call your doctor for medical advice about side effects. You may report side effects to FDA at  1-800-FDA-1088.  Where should I keep my medicine?  Keep out of the reach of children.  Store at room temperature between 15 and 30 degrees C (59 and 86 degrees F). Throw away any unused medicine after the expiration date.  NOTE: This sheet is a summary. It may not cover all possible information. If you have questions about this medicine, talk to your doctor, pharmacist, or health care provider.  © 2014, Elsevier/Gold Standard. (3/18/2009 3:55:56 PM)        Your appointments     Feb 15, 2017  9:30 AM   Heart Failure New with Chaya Olivia M.D.   [facility] (--)    [address], [address]  [place] [address] [phone]              Follow-up Information     1. Follow up with Hailey ESCOBAR. Go on 2/13/2017.    Why:  Please arrive at 8:15am for a 8:30am appointment. Bring your photo ID, proof of address and proof of income if you are employed and list of current medications. Thank you.    Contact information    [address] [place] [address] [phone]        2. Follow up with Светлана Ortiz M.D.. Schedule an appointment as soon as possible for a visit in 2 weeks.    Specialty:  Cardiology    Why:  Hospital follow-up appointment with PCP    Contact information    [address] [facility]  [address]  [place] [id number] [phone]           Discharge Medication Instructions:    Below are the medications your physician expects you to take upon discharge:    Review all your home medications and newly ordered medications with your doctor and/or pharmacist. Follow medication instructions as directed by your doctor and/or pharmacist.    Please keep your medication list with you and share with your physician.               Medication List      START taking these medications        Instructions    allopurinol 100 MG Tabs   Last time this was given:  100 mg on 2/10/2017  8:56 AM   Commonly known as:  ZYLOPRIM   Next Dose Due:  Tomorrow, 2/11.    Take 1 Tab by mouth  every day.   Dose:  100 mg       bumetanide 1 MG Tabs   Last time this was given:  2 mg on 2/10/2017  6:21 AM   Commonly known as:  BUMEX   Next Dose Due:  This evening, 2/10.    Take 3 Tabs by mouth 2 Times a Day.   Dose:  3 mg       colchicine 0.6 MG Tabs   Last time this was given:  0.6 mg on 2/10/2017  8:56 AM   Commonly known as:  COLCRYS   Next Dose Due:  Tomorrow, 2/11.    Take 1 Tab by mouth 1 time daily as needed (gout pain).   Dose:  0.6 mg       lorazepam 0.5 MG Tabs   Commonly known as:  ATIVAN    Take 1 Tab by mouth every four hours as needed for Anxiety.   Dose:  0.5 mg       oxycodone immediate release 10 MG immediate release tablet   Last time this was given:  10 mg on 2/9/2017  2:19 AM   Commonly known as:  ROXICODONE    Take 1 Tab by mouth every four hours as needed for Moderate Pain.   Dose:  10 mg         CHANGE how you take these medications        Instructions    spironolactone 25 MG Tabs   What changed:    - how much to take  - when to take this   Last time this was given:  50 mg on 2/10/2017  8:56 AM   Commonly known as:  ALDACTONE   Next Dose Due:  Tomorrow, 2/11.    Take 2 Tabs by mouth every day.   Dose:  50 mg         CONTINUE taking these medications        Instructions    carvedilol 3.125 MG Tabs   Last time this was given:  3.125 mg on 2/10/2017  8:56 AM   Commonly known as:  COREG    Take 3.125 mg by mouth 2 times a day, with meals.   Dose:  3.125 mg       gabapentin 300 MG Caps   Last time this was given:  300 mg on 2/9/2017  8:58 PM   Commonly known as:  NEURONTIN    Take 1 Cap by mouth every bedtime.   Dose:  300 mg       lisinopril 2.5 MG Tabs   Last time this was given:  2.5 mg on 2/10/2017  8:56 AM   Commonly known as:  PRINIVIL    Take 1 Tab by mouth every day.   Dose:  2.5 mg       potassium chloride SA 20 MEQ Tbcr   Last time this was given:  20 mEq on 2/10/2017  8:56 AM   Commonly known as:  Kdur    Take 1 Tab by mouth 2 Times a Day.   Dose:  20 mEq       tramadol 50 MG Tabs    Commonly known as:  ULTRAM    Take 50 mg by mouth every four hours as needed for Severe Pain.   Dose:  50 mg       warfarin 3 MG Tabs   Last time this was given:  3 mg on 2/9/2017  6:36 PM   Commonly known as:  COUMADIN    Take 3 mg by mouth every day. Per patient, no variation.   Dose:  3 mg         STOP taking these medications     enoxaparin 100 MG/ML Soln inj   Commonly known as:  LOVENOX       furosemide 40 MG Tabs   Commonly known as:  LASIX       metolazone 5 MG Tabs   Commonly known as:  ZAROXOLYN               Instructions           Diet / Nutrition:    Follow any diet instructions given to you by your doctor or the dietician, including how much salt (sodium) you are allowed each day.    If you are overweight, talk to your doctor about a weight reduction plan.    Activity:    Remain physically active following your doctor's instructions about exercise and activity.    Rest often.     Any time you become even a little tired or short of breath, SIT DOWN and rest.    Worsening Symptoms:    Report any of the following signs and symptoms to the doctor's office immediately:    *Pain of jaw, arm, or neck  *Chest pain not relieved by medication                               *Dizziness or loss of consciousness  *Difficulty breathing even when at rest   *More tired than usual                                       *Bleeding drainage or swelling of surgical site  *Swelling of feet, ankles, legs or stomach                 *Fever (>100ºF)  *Pink or blood tinged sputum  *Weight gain (3lbs/day or 5lbs /week)           *Shock from internal defibrillator (if applicable)  *Palpitations or irregular heartbeats                *Cool and/or numb extremities    Stroke Awareness    Common Risk Factors for Stroke include:    Age  Atrial Fibrillation  Carotid Artery Stenosis  Diabetes Mellitus  Excessive alcohol consumption  High blood pressure  Overweight   Physical inactivity  Smoking    Warning signs and symptoms of a stroke  include:    *Sudden numbness or weakness of the face, arm or leg (especially on one side of the body).  *Sudden confusion, trouble speaking or understanding.  *Sudden trouble seeing in one or both eyes.  *Sudden trouble walking, dizziness, loss of balance or coordination.Sudden severe headache with no known cause.    It is very important to get treatment quickly when a stroke occurs. If you experience any of the above warning signs, call 911 immediately.                   Disclaimer         Quit Smoking / Tobacco Use:    I understand the use of any tobacco products increases my chance of suffering from future heart disease or stroke and could cause other illnesses which may shorten my life. Quitting the use of tobacco products is the single most important thing I can do to improve my health. For further information on smoking / tobacco cessation call a Toll Free Quit Line at 1-679.955.5670 (*[employer]) or 1-532.356.1915 (American Lung Association) or you can access the web based program at www.lungThermalin [email or web address].    [place] Tobacco Users Help Line:  (917) 542-2160       Toll Free: 1-867.936.6585  Quit Tobacco Program [facility] (890)276-6337    Crisis Hotline:    [place] Crisis Hotline:  0-684-BEKPCIJ or 1-675.774.1032    [place] Crisis Hotline:    1-273.160.8349 or 683-164-8932    Discharge Survey:   Thank you for choosing [facility]. We hope we did everything we could to make your hospital stay a pleasant one. You may be receiving a phone survey and we would appreciate your time and participation in answering the questions. Your input is very valuable to us in our efforts to improve our service to our patients and their families.        My signature on this form indicates that:    1. I have reviewed and understand the above information.  2. My questions regarding this information have been answered to my satisfaction.  3. I have formulated a plan with my discharge nurse to obtain  my prescribed medications for home.                  Disclaimer         __________________________________                     __________       ________                       Patient Signature                                                 Date                    Time

## 2017-01-26 NOTE — IP AVS SNAPSHOT
[employer] Access Code: [email or web address]  Expires: 3/9/2017 11:59 AM    Your email address is not on file at [facility].  Email Addresses are required for you to sign up for [employer], please contact [phone] to verify your personal information and to provide your email address prior to attempting to register for [employer].    Abeltanisha Grande  [address] [place] [address]    When You [employer]  A secure, online tool to manage your health information     [facility]’s [employer]® is a secure, online tool that connects you to your personalized health information from the privacy of your home -- day or night - making it very easy for you to manage your healthcare. Once the activation process is completed, you can even access your medical information using the [employer] say, which is available for free in the Apple Say store or Google Play store.     To learn more about [employer], visit [email or web address]    There are two levels of access available (as shown below):   My Chart Features  [facility] Primary Care Doctor [facility]  Specialists [facility]  Urgent  Care Non-[facility] Primary Care Doctor   Email your healthcare team securely and privately 24/7 X X X    Manage appointments: schedule your next appointment; view details of past/upcoming appointments X      Request prescription refills. X      View recent personal medical records, including lab and immunizations X X X X   View health record, including health history, allergies, medications X X X X   Read reports about your outpatient visits, procedures, consult and ER notes X X X X   See your discharge summary, which is a recap of your hospital and/or ER visit that includes your diagnosis, lab results, and care plan X X  X     How to register for When You [employer]ht:  Once your e-mail address has been verified, follow the following steps to sign up for When You [employer]ht.     1. Go to  [email or web address]  2. Click on the Sign Up Now box, which takes you to the New Member Sign Up page. You  will need to provide the following information:  a. Enter your [employer] Access Code exactly as it appears at the top of this page. (You will not need to use this code after you’ve completed the sign-up process. If you do not sign up before the expiration date, you must request a new code.)   b. Enter your date of birth.   c. Enter your home email address.   d. Click Submit, and follow the next screen’s instructions.  3. Create a [employer] ID. This will be your [employer] login ID and cannot be changed, so think of one that is secure and easy to remember.  4. Create a [employer] password. You can change your password at any time.  5. Enter your Password Reset Question and Answer. This can be used at a later time if you forget your password.   6. Enter your e-mail address. This allows you to receive e-mail notifications when new information is available in [employer].  7. Click Sign Up. You can now view your health information.    For assistance activating your [employer] account, call [phone]

## 2017-01-27 PROBLEM — I50.9 ACUTE CHF (CONGESTIVE HEART FAILURE) (HCC): Status: ACTIVE | Noted: 2017-01-01

## 2017-01-27 NOTE — ED NOTES
Chief Complaint   Patient presents with   • Abdominal Swelling     x1 week. Patient states that everytime he eats something his belly swells up and makes it hard for him to breath.    • N/V     Pt ambulatory to triage with above complaint. Pt returned to lobby, educated on triage process, and to inform staff of any changes or concerns.   Patient requests supplemental O2. Room air sat is 98%

## 2017-01-27 NOTE — CARE PLAN
Problem: Bronchoconstriction:  Goal: Improve in air movement and diminished wheezing  DUOneb Q4 per MD order  Will reassess after 24 hrs  Pt denies benefit

## 2017-01-27 NOTE — H&P
Name Obinna Grande     1965   Age/Sex 51 y.o. male   MRN [id number]   Code Status FULL     [facility]  ATTENDING: Piper Enriquez MD  Resident: Leticia      Chief Complaint:    Abdominal Distension, shortness of breath, orthopnea, bilateral leg swelling for one week  Nausea and vomiting    Diagnosis:  Acute respiratory failure due to volume overload secondary to CHF exacerbation  Acute CHF exacerbation--Status post left-sided pacemaker/defibrillator   Cirrhotic liver with mild ascites/levated liver enzymes with elevated T bilirubin  Hyponatremia  Elevated lactic acid  Leukocytosis/Diarrhea/Nausea/Vomiting  Hypertension  History of cocaine abuse  Obesity  History of pulmonary embolism on Coumadin with Lovenox.  Elevated BUN    HPI:    ID: 51-year-old gentleman with past medical history of CHF, alcohol abuse, cirrhosis and cholelithiasis, PE on Coumadin was admitted with a one-week history of shortness of breath orthopnea,leg swelling abdominal distention.    Patient is a poor historian and follows with a cardiologist at [place].   He claims of being compliant with his medication. He c/o with abdominal swelling for one week.He  He was feeling nauseous and vomited last night. He has cough with phlegm for one week, and had orthopnea, exertional dyspnea, but denied any chest pain, fever. He says because of bloating and abdominal distention he was unable to sleep. He had one episode of watery bowel movement this morning but denies any recent antibiotic use.    He is on Coumadin for PE and per chart review he was prescribed Lovenox bridge which is unusual, but he does not remember history of any having any mechanical aortic valve or mitral valve replacement.     He denied fever, sick contacts, sore throat chills.       Data  BP: 115/91, pulse 105, afebrile, respiratory 20  requiring CPAP for 30 minutes due ti increased WOB, OFF CPAP NOW     Sodium 124, potassium 5.1,  chloride 92, CO2 22, BUN to 33, creatinine 1.31, AST 1:30, , total bilirubin 4.3,  Lipase 37, blood alcohol level 0.0, PT 19.7, ,  WBC 11.4, hemoglobin 15.8, hematocrit 50.9, platelet 145, MCV 81.1, lactic acid 3.3    UA: Positive for protein 30, negative for nitrite and leukocyte esterase, WBCs 0-2  Positive for bilirubin  BNP 2331, troponin 0.05,    Urine drug screen is positive for opiates    Arterial blood gas  7.4, PCO2 31.6, PO2 165, bicarbonate 21.8    CT of abdomen positive for cirrhotic liver with some ABDOMINAL AND PELVIC ASCITES, CHOLELITHIASIS, DIVERTICULOSIS  X-RAY CHEST:no acute cardiopulmonary process     CPAP was initiated which was discontinued after 30 minutes when patient was satting fine on Oxygen 8 L    He received 20 mg IV lasix      Review of Systems   Constitutional: Negative for fever, chills, weight loss and malaise/fatigue.   HENT: Negative for hearing loss.    Eyes: Negative for blurred vision and double vision.   Respiratory: Positive for cough, sputum production and shortness of breath. Negative for hemoptysis.    Cardiovascular: Negative for chest pain, palpitations and orthopnea.   Gastrointestinal: Positive for nausea and vomiting. Negative for heartburn and abdominal pain.   Genitourinary: Negative for dysuria and urgency.   Musculoskeletal: Negative for myalgias and neck pain.   Neurological: Negative for dizziness, tingling and headaches.   Endo/Heme/Allergies: Negative for environmental allergies. Does not bruise/bleed easily.   Psychiatric/Behavioral: Negative for depression, suicidal ideas, hallucinations and substance abuse.             Past Medical History:   Past Medical History   Diagnosis Date   • Ulcer (CMS-HCC)    • CHF (congestive heart failure) (CMS-HCC)    • Hypertension    • Hypertension    • Essential hypertension, benign 8/27/2012   • Other primary cardiomyopathies (CMS-HCC) 8/27/2012   • Cardiomegaly 8/27/2012   • Nonspecific elevation of levels of  "transaminase or lactic acid dehydrogenase (LDH) 8/27/2012   • Obesity, unspecified 8/27/2012   • Cocaine abuse        Past Surgical History:  Past Surgical History   Procedure Laterality Date   • Gastroscopy-endo  4/24/08     Performed by CASPER VILCHIS at [facility]       Current Outpatient Medications:  Home Medications     **Home medications have not yet been reviewed for this encounter**      Home medications   Carvedilol 3.125 mg twice daily, Lasix 60 mg twice daily, lisinopril 2.5 mg daily, metolazone 5 mg daily, spironolactone 25 mg twice daily, Coumadin, Lovenox bridge, gabapentin 300 mg at bedtime, tramadol as needed for pain, potassium chloride 20 mg daily      Medication Allergy/Sensitivities:  No Known Allergies      Family History:  Denies family history of coronary artery disease    Social History:  Social History     Social History   • Marital Status: Single     Spouse Name: N/A   • Number of Children: N/A   • Years of Education: N/A     Occupational History   • Not on file.     Social History Main Topics   • Smoking status: Never Smoker    • Smokeless tobacco: Not on file   • Alcohol Use: Yes      Comment: 3 beers/week   • Drug Use: No   • Sexual Activity: Not on file     Other Topics Concern   • Not on file     Social History Narrative     Living situation:  Family    Smoking: Denied any history of smoking  Alcohol: 3 beers per week  Drugs: Denies however a history of positive cocaine abuse    PCP : Светлана Ortiz M.D.      Physical Exam     Filed Vitals:    01/27/17 0106 01/27/17 0205 01/27/17 0230 01/27/17 0402   BP:       Pulse: 104 107 107 105   Temp:       Resp:    20   Height:       Weight:       SpO2: 100% 100% 99% 99%     Body mass index is 38.66 kg/(m^2).  /91 mmHg  Pulse 105  Temp(Src) 36.3 °C (97.3 °F)  Resp 20  Ht 1.626 m (5' 4\")  Wt 102.2 kg (225 lb 5 oz)  BMI 38.66 kg/m2  SpO2 99%  O2 therapy: Pulse Oximetry: 99 %, O2 Delivery: None (Room Air)    Physical Exam "   Constitutional: He is oriented to person, place, and time and well-developed, well-nourished, and in no distress. No distress.   HENT:   Head: Normocephalic and atraumatic.   Mouth/Throat: No oropharyngeal exudate.   Eyes: Conjunctivae are normal. Pupils are equal, round, and reactive to light. Right eye exhibits no discharge. Left eye exhibits no discharge.   Neck: No JVD present. No tracheal deviation present. No thyromegaly present.   Cardiovascular: Normal rate, regular rhythm and normal heart sounds.  Exam reveals no gallop and no friction rub.    No murmur heard.  Pulmonary/Chest: No respiratory distress. He has no wheezes. He has no rales.   Abdominal: He exhibits no distension. There is no tenderness.   Musculoskeletal: He exhibits edema.   Lymphadenopathy:     He has no cervical adenopathy.   Neurological: He is alert and oriented to person, place, and time.   Skin: No rash noted. He is not diaphoretic. No erythema.             Data Review       Old Records Request:   Completed  Current Records review and summary: Completed    Lab Data Review:  Recent Results (from the past 24 hour(s))   CBC WITH DIFFERENTIAL    Collection Time: 01/27/17  1:23 AM   Result Value Ref Range    WBC 11.4 (H) 4.8 - 10.8 K/uL    RBC 6.28 (H) 4.70 - 6.10 M/uL    Hemoglobin 15.8 14.0 - 18.0 g/dL    Hematocrit 50.9 42.0 - 52.0 %    MCV 81.1 (L) 81.4 - 97.8 fL    MCH 25.2 (L) 27.0 - 33.0 pg    MCHC 31.0 (L) 33.7 - 35.3 g/dL    RDW 51.9 (H) 35.9 - 50.0 fL    Platelet Count 145 (L) 164 - 446 K/uL    MPV 10.8 9.0 - 12.9 fL    Neutrophils-Polys 64.10 44.00 - 72.00 %    Lymphocytes 21.80 (L) 22.00 - 41.00 %    Monocytes 10.80 0.00 - 13.40 %    Eosinophils 1.30 0.00 - 6.90 %    Basophils 0.80 0.00 - 1.80 %    Immature Granulocytes 1.20 (H) 0.00 - 0.90 %    Nucleated RBC 0.40 /100 WBC    Neutrophils (Absolute) 7.27 1.82 - 7.42 K/uL    Lymphs (Absolute) 2.48 1.00 - 4.80 K/uL    Monos (Absolute) 1.23 (H) 0.00 - 0.85 K/uL    Eos (Absolute)  0.15 0.00 - 0.51 K/uL    Baso (Absolute) 0.09 0.00 - 0.12 K/uL    Immature Granulocytes (abs) 0.14 (H) 0.00 - 0.11 K/uL    NRBC (Absolute) 0.04 K/uL   COMP METABOLIC PANEL    Collection Time: 01/27/17  1:23 AM   Result Value Ref Range    Sodium 124 (L) 135 - 145 mmol/L    Potassium 5.1 3.6 - 5.5 mmol/L    Chloride 92 (L) 96 - 112 mmol/L    Co2 22 20 - 33 mmol/L    Anion Gap 10.0 0.0 - 11.9    Glucose 123 (H) 65 - 99 mg/dL    Bun 33 (H) 8 - 22 mg/dL    Creatinine 1.31 0.50 - 1.40 mg/dL    Calcium 9.5 8.5 - 10.5 mg/dL    AST(SGOT) 130 (H) 12 - 45 U/L    ALT(SGPT) 122 (H) 2 - 50 U/L    Alkaline Phosphatase 55 30 - 99 U/L    Total Bilirubin 4.3 (H) 0.1 - 1.5 mg/dL    Albumin 4.0 3.2 - 4.9 g/dL    Total Protein 8.2 6.0 - 8.2 g/dL    Globulin 4.2 (H) 1.9 - 3.5 g/dL    A-G Ratio 1.0 g/dL   LIPASE    Collection Time: 01/27/17  1:23 AM   Result Value Ref Range    Lipase 37 11 - 82 U/L   TROPONIN    Collection Time: 01/27/17  1:23 AM   Result Value Ref Range    Troponin I 0.05 (H) 0.00 - 0.04 ng/mL   BTYPE NATRIURETIC PEPTIDE    Collection Time: 01/27/17  1:23 AM   Result Value Ref Range    B Natriuretic Peptide 2331 (H) 0 - 100 pg/mL   LACTIC ACID    Collection Time: 01/27/17  1:23 AM   Result Value Ref Range    Lactic Acid 3.3 (H) 0.5 - 2.0 mmol/L   DIAGNOSTIC ALCOHOL    Collection Time: 01/27/17  1:23 AM   Result Value Ref Range    Diagnostic Alcohol 0.00 0.00 g/dL   ESTIMATED GFR    Collection Time: 01/27/17  1:23 AM   Result Value Ref Range    GFR If African American >60 >60 mL/min/1.73 m 2    GFR If Non  57 (A) >60 mL/min/1.73 m 2   PROTHROMBIN TIME (INR)    Collection Time: 01/27/17  1:24 AM   Result Value Ref Range    PT 19.7 (H) 12.0 - 14.6 sec    INR 1.62 (H) 0.87 - 1.13   EKG (NOW)    Collection Time: 01/27/17  1:29 AM   Result Value Ref Range    Report       [facility] Emergency Dept[facility]    Test Date:  2017-01-27  Pt Name:    MYNOR HOWELL    Department: ER  MRN:         [id number]                      Room:       [facility]  Gender:     M                            Technician: [id number]  :        1965                   Requested By:BOUCHRA BORDEN  Order #:    [id number]                    Reading MD:    Measurements  Intervals                                Axis  Rate:       106                          P:          51  WI:         180                          QRS:        106  QRSD:       134                          T:          -74  QT:         364  QTc:        484    Interpretive Statements  SINUS TACHYCARDIA  NEEMA, CONSIDER BIATRIAL ABNORMALITIES  NONSPECIFIC INTRAVENTRICULAR CONDUCTION DELAY  Compared to ECG 2016 17:59:21  Intraventricular conduction delay now present  Sinus rhythm no longer present  Left posterior fascicular block no longer present  Right bundle-branch block no longer present  ST (T wave)  deviation no longer present     URINALYSIS CULTURE, IF INDICATED    Collection Time: 17  2:54 AM   Result Value Ref Range    Micro Urine Req Microscopic     Color Yellow     Character Clear     Specific Gravity 1.023 <1.035    Ph 5.5 5.0-8.0    Glucose Negative Negative mg/dL    Ketones Negative Negative mg/dL    Protein 30 (A) Negative mg/dL    Nitrite Negative Negative    Leukocyte Esterase Negative Negative    Occult Blood Negative Negative    Culture Indicated No UA Culture   URINE MICROSCOPIC (W/UA)    Collection Time: 17  2:54 AM   Result Value Ref Range    WBC 0-2 (A) /hpf    RBC 0-2 (A) /hpf    Hyaline Cast >10 (A) /lpf   UR BILI ICTOTEST    Collection Time: 17  2:54 AM   Result Value Ref Range    Bilirubin Positive Negative       Imaging/Procedures Review:    ndependant Imaging Review: Completed  CT-ABDOMEN-PELVIS WITH   Final Result         1. No significant interval change.      2. Cirrhotic appearing liver with small amount of abdominal and pelvic ascites.      3. Cholelithiasis.      4. Diverticulosis.      DX-CHEST-PORTABLE (1 VIEW)   Final  Result         1. No acute cardiopulmonary abnormalities are identified.         EKG:   EKG Independant Review:   Sinus tachycardia, rate 106, , Paced, Unable to comment on ST/T segment since paced     (x) Records reviewed and summarized in current documentation         Assessment/Plan     Acute respiratory failure due to volume overload secondary to CHF exacerbation  Acute CHF exacerbation   Assessment  with cough, phlegm, orthopnea, edema, orthopnea, exertional dyspnea  No fever but had leukocytosi  according to patient he says he is compliant with his medications, which is questionable?  According to patient he does not have any history of coronary artery disease  According to patient he follows a cardiologist at [facility], there is no echo in our system  BNP 2200+, troponin 0.05, no acute ST-T segment changes  Status post left-sided pacemaker/defibrillator? per imaging  Plan  Strict intake and output  Fluid restriction to 1.5 L, 2 g sodium, daily weight  Repeat echocardiogram, trend his troponin and EKGs  Get records from [facility]  Readjust medication bas, diet counseling   Lipid panel, TSH, Glycohemoglobin  Diet education, CHF education  Continue metolazone 5, spironolactone 25 BID?, lisinopril 2.5, coreg 3.125 BID at home dose--changed Lasix to 60 IV twice a day  Monitor Mag and K, on telemetry      Cirrhotic liver with mild ascites/levated liver enzymes with elevated T bilirubin  Patient drinks 3 beers per week  Denies history of hepatitis  We will get hepatitis panel  Ammonia level  He is on Coumadin--Meddrry/MELD score not applicable given skewed INR due to coumadin    Hyponatremia  Likely due to volume overload  If no improvement will get complete workup including serum osmolality, urine osmolality, urine lites    Elevated lactic acid  Leukocytosis  Diarrhea--ONE Loose BM  No signs of infection   Has tachycardia 105 and cough with Phlegm,  no fever  Leukocytosis could be related to CHF,  Chest  x-ray, UA looks okay  Will hold off on to sepsis protocol and antibiotics   repeat lactic acid, CBC  If persistent diarrhea or worsening leukocytosis will get C. Difficile  metoclopramide for nausea    Hypertension  Continue lisinopril and carvedilol and spironolactone    History of cocaine abuse  Obesity  Urine drug screen negative for cocaine,  Encouraged weight loss    History of pulmonary embolism on Coumadin with Lovenox.  The patient does not give a history of mechanical/bioprosthetic mitral and aortic valve but he is on Lovenox, Given his subtherapeutic INR  We will continue Lovenox bridge at this point and will get records from [facility]    Elevated BUN  He is not on steroids, denies any hematemesis melena  Will get fecal occult blood as he is on blood thinners and history of cirrhosis    Anticipated Hospital stay:  >2 midnights        Quality Measures  EKG reviewed, Radiology images reviewed, Labs reviewed and Medications reviewed  [facility] catheter: No [facility]      DVT Prophylaxis: Enoxaparin (Lovenox) and Warfarin (Coumadin)    Ulcer prophylaxis: Yes

## 2017-01-27 NOTE — ED PROVIDER NOTES
ED Provider Note    Scribed for Laverne Leonardo M.D. by Irish Everett. 1/27/2017, 12:22 AM.    Primary care provider: Светлана Ortiz M.D.  Means of arrival: Wheel Chair  History obtained from: Patient  History limited by: None    CHIEF COMPLAINT  Chief Complaint   Patient presents with   • Abdominal Swelling     x1 week. Patient states that everytime he eats something his belly swells up and makes it hard for him to breath.    • N/V       HPI  Obinna Grande is a 51 y.o. male with a history of heart failure who presents to the Emergency Department with abdominal swelling and associated pain onset three weeks ago. The patient tolerated this until he became increasingly nauseated and experienced one episode of vomiting last night. He states that he is unable to sleep as a result of the bloating. The patient reports his last normal bowel movement as a few hours ago with the use of laxatives. He says he uses supplemental oxygen at home but cannot recall the amount, stating he only uses it 'depending on how I feel'. The patient currently complains of shortness of breath. He also reports that he has been compliant with all his medications. The patient denies chest pain. It difficult getting an accurate history from him, he states that he does take all of his medications and has not missed any doses however is not able to tell me what these medications are. He does report taking morphine, family confirms that he does have morphine at home however I do not see this prescribed anywhere on his medication list.    During my initial history he seems very drowsy, suspect this may be medication induced.    REVIEW OF SYSTEMS  Pertinent positives include abdominal swelling, nausea, vomiting, shortness of breath. Pertinent negatives include no chest pain.  All other systems reviewed and negative.    PAST MEDICAL HISTORY   has a past medical history of Ulcer (CMS-[facility]); CHF (congestive heart failure) (CMS-[facility]); Hypertension;  "Hypertension; Essential hypertension, benign (8/27/2012); Other primary cardiomyopathies (CMS-HCC) (8/27/2012); Cardiomegaly (8/27/2012); Nonspecific elevation of levels of transaminase or lactic acid dehydrogenase (LDH) (8/27/2012); Obesity, unspecified (8/27/2012); and Cocaine abuse.    SURGICAL HISTORY   has past surgical history that includes gastroscopy-endo (4/24/08).    SOCIAL HISTORY  Social History   Substance Use Topics   • Smoking status: Never Smoker    • Alcohol Use: Yes      Comment: 3 beers/week      History   Drug Use No       FAMILY HISTORY  No pertinent family history.     CURRENT MEDICATIONS    No current facility-administered medications on file prior to encounter.     Current Outpatient Prescriptions on File Prior to Encounter   Medication Sig Dispense Refill   • enoxaparin (LOVENOX) 100 MG/ML Solution inj Inject 100 mg as instructed every 12 hours. 3 Syringe 0   • lisinopril (PRINIVIL) 2.5 MG Tab Take 1 Tab by mouth every day. 30 Tab 11   • potassium chloride SA (K-DUR) 20 MEQ Tab CR Take 1 Tab by mouth 2 Times a Day. 60 Tab 11   • metolazone (ZAROXOLYN) 5 MG Tab Take 1 Tab by mouth every day. 30 Tab 11   • furosemide (LASIX) 40 MG Tab Take 1.5 Tabs by mouth 2 Times a Day. 90 Tab 3   • gabapentin (NEURONTIN) 300 MG Cap Take 1 Cap by mouth every bedtime. 30 Cap 3   • carvedilol (COREG) 3.125 MG Tab Take 3.125 mg by mouth 2 times a day, with meals.     • warfarin (COUMADIN) 3 MG Tab Take 3 mg by mouth every day. Per patient, no variation.     • tramadol (ULTRAM) 50 MG Tab Take 50 mg by mouth every four hours as needed for Severe Pain.     • spironolactone (ALDACTONE) 25 MG TABS Take 25 mg by mouth 2 times a day.           ALLERGIES  No Known Allergies    PHYSICAL EXAM  Vital Signs: /91 mmHg  Pulse 105  Temp(Src) 36.3 °C (97.3 °F)  Resp 20  Ht 1.626 m (5' 4\")  Wt 102.2 kg (225 lb 5 oz)  BMI 38.66 kg/m2  SpO2 98%    Constitutional: Drowsy appearing, no acute distress  HENT: " Normocephalic, atraumatic, moist mucus membranes  Eyes: Pupils equal and reactive, normal conjunctiva, non-icteric  Neck: Supple, normal range of motion, no stridor  Cardiovascular: Regular rhythm, Normal peripheral perfusion, no cyanosis, Normal cardiac auscultation  Pulmonary: Increased work of breathing with accessory muscle usage, intermittent grunting, patient is not hypoxic with oxygen saturation on room air in the 90s.  Abdomen: soft and distended, mild discomfort to palpation  no peritoneal signs, bowel sounds are present.   Skin: Warm, dry, no rashes or lesions  Back: No pain with active range of motion  Musculoskeletal: Normal range of motion in all extremities, no deformity noted, 2+ bilateral lower extremity edema  Neurologic: Alert, oriented, normal motor function, no speech deficits  Psychiatric: Normal and appropriate mood and affect    DIAGNOSTIC STUDIES/PROCEDURES:    LABS  Labs Reviewed   CBC WITH DIFFERENTIAL - Abnormal; Notable for the following:     WBC 11.4 (*)     RBC 6.28 (*)     MCV 81.1 (*)     MCH 25.2 (*)     MCHC 31.0 (*)     RDW 51.9 (*)     Platelet Count 145 (*)     Lymphocytes 21.80 (*)     Immature Granulocytes 1.20 (*)     Monos (Absolute) 1.23 (*)     Immature Granulocytes (abs) 0.14 (*)     All other components within normal limits    Narrative:     Indicate which anticoagulants the patient is on:->UNKNOWN   COMP METABOLIC PANEL - Abnormal; Notable for the following:     Sodium 124 (*)     Chloride 92 (*)     Glucose 123 (*)     Bun 33 (*)     AST(SGOT) 130 (*)     ALT(SGPT) 122 (*)     Total Bilirubin 4.3 (*)     Globulin 4.2 (*)     All other components within normal limits    Narrative:     Indicate which anticoagulants the patient is on:->UNKNOWN   TROPONIN - Abnormal; Notable for the following:     Troponin I 0.05 (*)     All other components within normal limits    Narrative:     Indicate which anticoagulants the patient is on:->UNKNOWN   BTYPE NATRIURETIC PEPTIDE -  Abnormal; Notable for the following:     B Natriuretic Peptide 2331 (*)     All other components within normal limits    Narrative:     Indicate which anticoagulants the patient is on:->UNKNOWN   LACTIC ACID - Abnormal; Notable for the following:     Lactic Acid 3.3 (*)     All other components within normal limits    Narrative:     Indicate which anticoagulants the patient is on:->UNKNOWN   URINALYSIS,CULTURE IF INDICATED - Abnormal; Notable for the following:     Protein 30 (*)     All other components within normal limits   PROTHROMBIN TIME - Abnormal; Notable for the following:     PT 19.7 (*)     INR 1.62 (*)     All other components within normal limits    Narrative:     Indicate which anticoagulants the patient is on:->UNKNOWN   URINE DRUG SCREEN - Abnormal; Notable for the following:     Opiates Positive (*)     All other components within normal limits   ESTIMATED GFR - Abnormal; Notable for the following:     GFR If Non  57 (*)     All other components within normal limits    Narrative:     Indicate which anticoagulants the patient is on:->UNKNOWN   URINE MICROSCOPIC (W/UA) - Abnormal; Notable for the following:     WBC 0-2 (*)     RBC 0-2 (*)     Hyaline Cast >10 (*)     All other components within normal limits   ISTAT ARTERIAL BLOOD GAS - Abnormal; Notable for the following:     Po2 165 (*)     S02 100 (*)     All other components within normal limits   LIPASE    Narrative:     Indicate which anticoagulants the patient is on:->UNKNOWN   DIAGNOSTIC ALCOHOL    Narrative:     Indicate which anticoagulants the patient is on:->UNKNOWN   UR BILI ICTOTEST   CBC WITH DIFFERENTIAL   LACTIC ACID   TROPONIN   HEMOGLOBIN A1C   AMMONIA   LIPID PROFILE   MAGNESIUM   OCCULT BLOOD X3 (STOOL)   TSH   BASIC METABOLIC PANEL     All labs reviewed by me.    EKG  12 Lead EKG interpreted by me to show:  Sinus tachycardia, rate 106, right bundle branch block present, more pronounced from previous, no ST  elevation or depression    Radiology results revealed:   CT-ABDOMEN-PELVIS WITH   Final Result         1. No significant interval change.      2. Cirrhotic appearing liver with small amount of abdominal and pelvic ascites.      3. Cholelithiasis.      4. Diverticulosis.      DX-CHEST-PORTABLE (1 VIEW)   Final Result         1. No acute cardiopulmonary abnormalities are identified.      ECHOCARDIOGRAM COMP W/O CONT    (Results Pending)     COURSE & MEDICAL DECISION MAKING  Pertinent Labs & Imaging studies reviewed. (See chart for details)    Review of old medical records for continuity of care.  Patient admitted to this facility 6/16/16, discharged 6/22/16. Presented with shortness of breath, history of chronic systolic congestive heart failure status post pacer, last EF noted to be 15-20%. Evidence of pulmonary embolism seen on CT. Hospitalized, given oxygen and bronchodilators. Potassium was low which was replaced. Given weight-based Lovenox and Coumadin. Diuresis with Lasix. Hospital course complicated by hypotension requiring adjustment of diuretics.    Differential diagnoses include but are not limited to: Intra-abdominal infection, bowel obstruction, heart failure exacerbation, pneumonia, coronary syndrome    12:35 AM - Patient seen and examined at bedside.  Ordered Chest X-ray, Ct-Abdomen-Pelvis, PTT, Urine drug screen, Diagnostic alcohol, CBC, CMP, Lipase, Troponin, BNP, Lactic acid, Urinalysis, EKG to evaluate his symptoms.     2:34 AM Patient was reevaluated at bedside. Patient returned from CT and is off BiPAP. His oxygen saturation is currently normal.     3:43 AM Paged [facility].     3:47 AM Spoke with Dr. Kelly, [facility] Team, about the patient's condition. They are aware of the patient and agree to see him.    Decision Making:  This is a 51 y.o. year old male who presents with complaint of abdominal pain, however my concern is that he does appear to be in mild to moderate respiratory distress.  He does have increased work of breathing with visible tachypnea, concern for respiratory fatigue. Breath sounds are clear but shallow bilaterally. He does have a large amount of bilateral symmetric pitting edema to the lower extremities concerning for fluid overload. To assist his work of breathing C Pap was initiated in the emergency department with improvement in his respiratory function. He did become more alert and more comfortable with this intervention.    Urinalysis negative for evidence of infection. Urine drug screen is positive for opiates consistent with the family history the patient taking morphine. INR is 1.6 to, he is supposed to be taking Coumadin, suspect this is the cause. White blood count mildly elevated 11.4, no bands resulted, immature granulocytes elevated at 0.14. Sodium is low at 124, glucose mildly elevated at 123. BNP is 2300 with slight troponin elevation to 0.05. Lactic acid mildly elevated at 3.3.    Chest x-ray and CT abdomen and pelvis are negative for acute process.    Patient was treated with Lasix for heart failure exacerbation. Continued to tolerate the BiPAP well. ABG PCO is 31, doubt this is the cause of his drowsiness. He is hyponatremic, however his fluid overloaded status prevents correcting this with normal saline. After Lasix he did begin to diurese. Uncertain as to the cause of his acute heart failure exacerbation.    Plan at this time is for admission to critical care services the patient still requiring BiPAP for comfort. Case discussed with [facility] team who kindly agreed to admit the patient for respiratory support and electrolyte correction.    DISPOSITION:  Patient will be admitted to [facility] in critical condition.    CRITICAL CARE  The very real possibilty of a deterioration of this patient's condition required the highest level of my preparedness for sudden, emergent intervention.  I provided critical care services, which included medication orders, frequent  reevaluations of the patient's condition and response to treatment, ordering and reviewing test results, and discussing the case with various consultants.  The critical care time associated with the care of the patient was 57 minutes. Review chart for interventions. This time is exclusive of any other billable procedures.     FINAL IMPRESSION  1. Acute on chronic congestive heart failure, unspecified congestive heart failure type (CMS-HCC)    2. Respiratory distress    3. Other ascites          Irish KEEN (Lisseth), am scribing for, and in the presence of, Laverne Leonardo M.D..    Electronically signed by: Irish Everett (Lisseth), 1/27/2017    ILaverne M.D. personally performed the services described in this documentation, as scribed by Irish Everett in my presence, and it is both accurate and complete.    The note accurately reflects work and decisions made by me.  Laverne Leonardo  1/27/2017  6:50 AM

## 2017-01-27 NOTE — CARE PLAN
Problem: Infection  Goal: Will remain free from infection  Intervention: Give CDC handouts for infection prevention (infection prevention/hand washing, disease specific, and device specific) and document in Education  Educate patient and family on the importance of hand washing      Problem: Pain Management  Goal: Pain level will decrease to patient’s comfort goal  Intervention: Follow pain managment plan developed in collaboration with patient and Interdisciplinary Team  Decrease pain by educating pt on nonpharmalogical interventions in addition to pain medication

## 2017-01-27 NOTE — DISCHARGE PLANNING
Palliative Social Work    Spoke with PC RN, Damaris.  Pt is requesting that his children assist with medical decision making but he was unable to provide any contact information for his children.  Left a message for the emergency contact on pts face sheet, Enio Lee at [phone], and requested a return call.  Also completed a [employer] search and no information found.  Will follow up with pt in an attempt to gather more information so a family conference can be scheduled.

## 2017-01-27 NOTE — CONSULTS
"Reason for PC Consult:  Advance Care Planning    Consulted by: Dr Stewart    Assessment:  General: Pt is a 51 year old gentleman with acute respiratory failure due to volume overload secondary to CHF exacerbation, cirrhotic liver with mild ascites with elevated liver enzymes, hyponatremia, elevated lactic acid and BUN. History of left-sided pacemaker/defibrillator, hypertension, cocaine abuse, obesity, pulmonary embolism on Lovenox and Coumadin.    Dyspnea: SOB with exertion 98% on 3L via Mask  Last BM: 01/27/17-    Pain:  Denies    Depression:  \"I am tired of fighting this\"  \"If it's my time to go it's my time\"    Spiritual: No  Is Methodist or spirituality important for coping with this illness?  No  Has a  or spiritual provider visit been requested?  Not at this time    Palliative Performance Scale: 60%    Advance Directive:  No   DPOA:  No   POLST:  No    Code Status: Full      Outcome: Discussion with pt who speaks and understands English.  Pt only willing to talk for a brief time and answered only a few questions.  Pt states that he has two children in the area one 23 and the other 15.  When asked about his health and recent hospitalization pt states \"I'm tired of fighting this\" \"If it's my time to go it's my time\".  When asked about pt goals and plan pt states \"I want to talk about it with my family\"  \"I won't talk about it until they are here\".  Pt asked if he or we could call his family to come visit pt stated he can't call them but they are coming.  Pt up to the bathroom to void with RN stand by.  Pt declined to talk further.    Updated: Angelita Piper RN & Gabby STROUD    Plan: Angelita PC  will call friend listed as contact Enio Jesus [phone] and also conduct a [employer] search for family.  Will set up a care conference when family identified.  Palliative Care will continue to follow to help determine plan of care and goals of care.    Thank you for allowing Palliative Care to " participate in this patient's care. Please call [phone] with any questions or additional needs.

## 2017-01-27 NOTE — ED NOTES
Transport here to take pt to floor, placed on O2 at 8L on Venti mask for transport.  Pt at 10L in room

## 2017-01-27 NOTE — PROGRESS NOTES
Received bedside report from NOC RN.  Patient is AOx4. 1 person assist. Hepatic/card/2 g Na/ 1500 mL fluid restriction Diet. Voids appropriately per bathroom.   PIV access noted. Oxygen 3 L mask.  Pain assessed and pt sleeping comfortably in bed.  Bed alarm on.  Patient updated on plan of care. Bed rails up, bed in low position, call light in place, hourly rounding in place.   Continue to monitor edema

## 2017-01-27 NOTE — ED NOTES
Pt requesting pain medication, pt keeps falling asleep during talking and during request for pain medication, pt informed that he is very drowsy and this is concerning for pain medication administration, dr aware of pt request

## 2017-01-27 NOTE — ED NOTES
Spoke with Dr Kelly and Madisyn in [facility] about pt possibly being a Tele pt instead of ICU, he said that he wanted Dr Duggan to look at pt and make descion then

## 2017-01-28 PROBLEM — R74.8 ELEVATED LIVER ENZYMES: Status: ACTIVE | Noted: 2017-01-01

## 2017-01-28 PROBLEM — K74.60 LIVER CIRRHOSIS (HCC): Status: ACTIVE | Noted: 2017-01-01

## 2017-01-28 NOTE — PROGRESS NOTES
Patient was admitted this am by .  I have seen patient, labs,images with MDR.  Pt has LVEF 15% and has AICD in place, follows with , I have consulted .  Pt has leucocytosis with abd pain, has ascites.Started empirically on iv C3 for possible SBP.  Pt has elevated liver enzymes- U/S abd -Liver cirrhosis mild cbd dilatation, checking HIDA  [place], pt had CT abd pelvis with iv cont-avoid nephrotoxins.  Creatinine improved with iv lasix   Platelets trended down to 98K  Pt is on Lovenox for hx of PE, coumadin on hold.  AGA, lactic acidosis, follow  Highly complex pt. Discussed code status, pt not sure. Palliative medicine consulted for POLST.  Pt is Full code now.

## 2017-01-28 NOTE — PROGRESS NOTES
Cardiology Progress Note               Author: Gigi Castano Date & Time created: 2017  2:23 PM     Interval History:  Patient is seen and examined.  Chart is reviewed.  No significant changes since yesterday evening.    - The patient  is a 51-year-old male with history of CHF (EF 15-20%) with post-AICD placement, hypertension, alcohol use , pulmonary emboli (he is on Coumadin) who presented to ED with chief complaint of recurrent shortness of breath.   According to the patient, he indicated kind of progressed within the last 3 weeks.  He denies chest pain.  He is trying to increase his diuretic, but appeared to be not helping.  Patient decided to be seen at emergency  department for further investigation.  His cardiologist is Dr. Светлана Ortiz (at  [facility]), and last seen by her is approximately 1 year ago.      Review of Systems   Constitutional: Negative.    Eyes: Negative.    Respiratory: Positive for shortness of breath.    Gastrointestinal: Negative.    Genitourinary: Negative.    Skin: Negative.    Psychiatric/Behavioral: Negative.        Physical Exam   Constitutional: He is oriented to person, place, and time. He appears well-developed and well-nourished.   HENT:   Head: Normocephalic and atraumatic.   Eyes: Conjunctivae and EOM are normal. Pupils are equal, round, and reactive to light.   Neck: Normal range of motion. Neck supple.   Cardiovascular: Normal rate, regular rhythm and normal heart sounds.    Pulmonary/Chest: Effort normal and breath sounds normal.   Abdominal: Soft. Bowel sounds are normal.   Neurological: He is alert and oriented to person, place, and time. He has normal reflexes.   Skin: Skin is warm and dry.   Psychiatric: He has a normal mood and affect. His behavior is normal. Judgment and thought content normal.   Nursing note and vitals reviewed.      Hemodynamics:  Temp (24hrs), Av.2 °C (97.2 °F), Min:35.8 °C (96.4 °F), Max:36.5 °C (97.7 °F)  Temperature: (!)  35.8 °C (96.4 °F)  Pulse  Av.4  Min: 90  Max: 107  Blood Pressure: 113/89 mmHg     Respiratory:    Respiration: 17, Pulse Oximetry: 96 %, O2 Daily Delivery Respiratory : Room Air with O2 Available     Given By:: Mask, Work Of Breathing / Effort: Mild  RUL Breath Sounds: Clear, RML Breath Sounds: Clear, RLL Breath Sounds: Clear, BHAVNA Breath Sounds: Clear, LLL Breath Sounds: Clear  Fluids:     Weight: 86.5 kg (190 lb 11.2 oz)  GI/Nutrition:  Orders Placed This Encounter   Procedures   • DIET ORDER     Standing Status: Standing      Number of Occurrences: 1      Standing Expiration Date:      Order Specific Question:  Diet:     Answer:  Cardiac [6]      Comments:  hepatic     Order Specific Question:  Diet:     Answer:  2 Gram Sodium [7]     Order Specific Question:  Consistency/Fluid modifications:     Answer:  1500 ml Fluid Restriction [9]     Lab Results:  Recent Labs      17   0123  17   1305  17   0231   WBC  11.4*  9.4  10.9*   RBC  6.28*  5.50  5.72   HEMOGLOBIN  15.8  14.0  14.4   HEMATOCRIT  50.9  43.3  45.1   MCV  81.1*  78.7*  78.8*   MCH  25.2*  25.5*  25.2*   MCHC  31.0*  32.3*  31.9*   RDW  51.9*  49.1  49.5   PLATELETCT  145*  98*  128*   MPV  10.8  11.4  11.3     Recent Labs      17   0123  17   1635  17   0231   SODIUM  124*  130*  128*   POTASSIUM  5.1  3.9  4.2   CHLORIDE  92*  92*  91*   CO2  22  24  24   GLUCOSE  123*  110*  107*   BUN  33*  32*  34*   CREATININE  1.31  1.08  1.21   CALCIUM  9.5  9.2  9.1     Recent Labs      17   0124   INR  1.62*     Recent Labs      17   0123   BNPBTYPENAT  2331*     Recent Labs      17   0123  17   1305   TROPONINI  0.05*  0.04   BNPBTYPENAT  2331*   --      Recent Labs      17   1304   TRIGLYCERIDE  56   HDL  15*   LDL  31         Medical Decision Making, by Problem:  Active Hospital Problems    Diagnosis   • Acute CHF (congestive heart failure) (CMS-[facility]) [I50.9]       Plan:  *Stable from  cardiac standpoint.   *Check BNP.  Continue gentle diuresis.    *Long discussion with the patient.  When patient recover from this hospitalization, I do believe it would be best for the patient to be establish with [facility], particular Dr. IAN Olivia who is a heart failure specialist.  Patient should be consider for LVAD.  *Follow up on GI work up in read GB issue, which is managed by IM.    EKG reviewed, Labs reviewed, Medications reviewed and Radiology images reviewed

## 2017-01-28 NOTE — CONSULTS
Cardiology Consult  (please see dictation)    A/P  - Recurrent CHF  - Post AICD placement  - Cirrhosis Liver (?)  - HTN  - Etoh usage Hx  - P.E HX    - Patient is responding with diuresis.  Continue current meds  - Follow BNP.

## 2017-01-28 NOTE — PROGRESS NOTES
Received report from day RN assumed care at 1915. Pt A&Ox 4 . Pt states 5 /10 pain in his left foot/leg at this time. Plan of care discussed with Pt, verbalized understanding. No family present at bedside. Assessment completed. All Pt needs met at this time. Call light within reach, bed alarm on , bed locked and in low position. Will continue to monitor.

## 2017-01-28 NOTE — CONSULTS
DATE OF SERVICE:  01/27/2017    INDICATION:  Recurrent CHF.    BRIEF SUMMARY:  The patient is seen and examined on the medical floor at your   request for consultation regarding to recurrent CHF.  As you know, the patient   is a 51-year-old male with history of CHF (EF 15-20%) with post-AICD   placement, hypertension, alcohol use , pulmonary emboli (he is on Coumadin)   who presented to ED with chief complaint of recurrent shortness of breath.    According to the patient, he indicated kind of progressed within the last 3   weeks.  He denies chest pain.  He is trying to increase his diuretic, but   appeared to be not helping.  Patient decided to be seen at emergency   department for further investigation.  His cardiologist is Dr. Светлана Ortiz (at   [facility]), and last seen by her is approximately 1 year ago.    PAST MEDICAL HISTORY:  1.  CHF.  2.  Hypertension.  3.  Alcohol use.  4.  Cocaine use history (per records).  5.  Pulmonary emboli.    PAST SURGICAL HISTORY:  AICD placement.    FAMILY HISTORY:  Noncontributory.    SOCIAL HISTORY:  Alcohol and cocaine use in the past.    MEDICATIONS:  1.  Coreg 3.125 mg b.i.d.  2.  Lasix 60 mg b.i.d.  3.  Spironolactone 25 mg b.i.d.  4.  Metolazone 5 mg.  5.  Lisinopril.  6.  Potassium chloride.    ALLERGIES:  No known drug allergies.    REVIEW OF SYSTEMS:  All are negative according to CMS/AMA criteria except for   what is stated above.    PHYSICAL EXAMINATION:  VITAL SIGNS:  Reviewed, temperature 36.4, pulse is 92, respirations 18, blood   pressure is 103/78.  GENERAL:  Patient appeared to be a pleasant male who is resting comfortably in   bed.  HEENT:  Consists of normocephalic, atraumatic.  Extraocular muscles intact.  SKIN:  Consists of cool and moist.  CARDIOVASCULAR:  Consists of normal rate and rhythm.  S1 and S2, without   murmurs, gallops, or rubs.  PULMONARY:  Clear to auscultation bilaterally.  EXTREMITIES:  Consists of edema.    LABORATORY DATA:   Reviewed.    ASSESSMENT AND PLAN:  1.  Recurrent congestive heart failure.  2.  Questionable compliance to medical treatment.  3.  Alcohol use history.  4.  Hypertension.  5.  Cocaine history use.  6.  Cirrhosis of the liver.  7.  Pulmonary embolism history.    The patient indicated that his breathing has improved since the admission.  We will   continue gentle diuresis which he appears to be responding to.  In regards to   his compliance, he is supposed to follow up with Dr. Ortiz but has not seen   her for approximately 1 year.  I had a lengthy discussion with the patient in   regard to the situation, and he indicated that he was not really in favor of   seeing Dr. Ortiz, his cardiologist; therefore, patient has not been very   compliant of seeing her over the past months.  We would recommend that it   might be best to consider having the patient to establish new cardiologist.    He tends to go to this facility ([facility]) for his medical issue.  There may be   due to establishing with the cardiologist at the [facility] in the near future for   convenience of the patient.  We will continue to follow.    Thank you very much for allowing me to be part of this patient's healthcare.       ____________________________________     DO SELMA Linares / [facility]    DD:  01/27/2017 19:26:48  DT:  01/27/2017 22:12:08    D#:  [id number]  Job#:  [id number]

## 2017-01-28 NOTE — CARE PLAN
Problem: Communication  Goal: The ability to communicate needs accurately and effectively will improve  Outcome: PROGRESSING AS EXPECTED  Pt is alert and oriented x 4 . Pt is oriented to the environment and call light. I have discussed with the pt the plan of care, treatments and medications and the pt verbalizes agreement and understanding. The pt has been able to communicate his needs effectively and has been given the opportunity to ask any questions. All pt needs have been met and questions have been answered at this time. Hourly rounding in place.         Problem: Safety  Goal: Will remain free from injury  Outcome: PROGRESSING AS EXPECTED  Pt assessed at beginning of shift. Pt determined to be standby assist . Fall precautions in place. Call light and personal possessions in reach, bed alarm on . Hourly rounding in place.

## 2017-01-29 PROBLEM — I51.3 LV (LEFT VENTRICULAR) MURAL THROMBUS: Status: ACTIVE | Noted: 2017-01-01

## 2017-01-29 NOTE — CARE PLAN
Problem: Safety  Goal: Will remain free from injury  Intervention: Provide assistance with mobility  Pt is A&O4, calls appropriately, treaded socks are on.          Problem: Knowledge Deficit  Goal: Knowledge of disease process/condition, treatment plan, diagnostic tests, and medications will improve  Intervention: Assess knowledge level of disease process/condition, treatment plan, diagnostic tests, and medications  POC discussed with pt, pt verbalized understanding.

## 2017-01-29 NOTE — THERAPY
"Physical Therapy Evaluation completed.   Bed Mobility:  Supine to Sit:  (NT- already sitting at EOB when arrived)  Transfers: Sit to Stand: Stand by Assist  Gait: Level Of Assist: Stand by Assist with Front-Wheel Walker       Plan of Care: Will benefit from Physical Therapy 3 times per week  Discharge Recommendations: Equipment: No Equipment Needed. Post-acute therapy recommended before discharged home.    See \"Rehab Therapy-Acute\" Patient Summary Report for complete documentation.     Pt demonstrates decrease balance, ROM and activity tolerance due to pain in B LE and swelling. Pt reports that he has family at home that does all the cooking cleaning and shopping. Pt wanted to amb without supplemental O2, after first 25 ft pt took 2 minute standing break to increase SaO2 from 89% to 95%. After return from walk pt was at 99% without supplemental O2. Pt was left off O2.Pt was educated on HEP to improve swelling and maintain strength and ROM. Pt will need skilled acute PT to address deficits at this time.   "

## 2017-01-29 NOTE — PROGRESS NOTES
Cardiology Progress Note               Author: Gigi Castano Date & Time created: 2017  8:20 AM     Interval History:  Patient is seen and examined.  Chart is reviewed.  Patient reports of unable to sleep last night, and more shortness of breath this morning.    - The patient  is a 51-year-old male with history of CHF (EF 15-20%) with post-AICD placement, hypertension, alcohol use , pulmonary emboli (he is on Coumadin) who presented to ED with chief complaint of recurrent shortness of breath.   According to the patient, he indicated kind of progressed within the last 3 weeks.  He denies chest pain.  He is trying to increase his diuretic, but appeared to be not helping.  Patient decided to be seen at emergency  department for further investigation.  His cardiologist is Dr. Светлана Ortiz (at  [facility]), and last seen by her is approximately 1 year ago.        Review of Systems   Constitutional: Negative.    HENT: Negative.    Eyes: Negative.    Cardiovascular: Negative.    Gastrointestinal: Negative.    Genitourinary: Negative.    Skin: Negative.    Neurological: Negative.    Psychiatric/Behavioral: Negative.        Physical Exam   Constitutional: He appears well-developed and well-nourished.   HENT:   Head: Normocephalic and atraumatic.   Eyes: Conjunctivae and EOM are normal. Pupils are equal, round, and reactive to light.   Cardiovascular: Normal rate and regular rhythm.    Pulmonary/Chest: Effort normal and breath sounds normal.   Abdominal: Soft. Bowel sounds are normal.   Neurological: He is alert. He has normal reflexes.   Skin: Skin is warm.   Psychiatric: He has a normal mood and affect. His behavior is normal. Judgment and thought content normal.       Hemodynamics:  Temp (24hrs), Av.1 °C (96.9 °F), Min:35.8 °C (96.4 °F), Max:36.3 °C (97.4 °F)  Temperature: 36.2 °C (97.1 °F)  Pulse  Av.6  Min: 90  Max: 107  Blood Pressure: 101/72 mmHg     Respiratory:    Respiration: 17, Pulse  Oximetry: 99 %, O2 Daily Delivery Respiratory : Room Air with O2 Available     Given By:: Mouthpiece, Work Of Breathing / Effort: Mild  RUL Breath Sounds: Clear, RML Breath Sounds: Clear, RLL Breath Sounds: Diminished, BHAVNA Breath Sounds: Clear, LLL Breath Sounds: Diminished  Fluids:     Weight: 98.4 kg (216 lb 14.9 oz)  GI/Nutrition:  Orders Placed This Encounter   Procedures   • DIET ORDER     Standing Status: Standing      Number of Occurrences: 1      Standing Expiration Date:      Order Specific Question:  Diet:     Answer:  Cardiac [6]      Comments:  hepatic     Order Specific Question:  Diet:     Answer:  2 Gram Sodium [7]     Order Specific Question:  Consistency/Fluid modifications:     Answer:  1500 ml Fluid Restriction [9]     Lab Results:  Recent Labs      01/27/17   1305  01/28/17   0231  01/29/17   0148   WBC  9.4  10.9*  9.0   RBC  5.50  5.72  5.83   HEMOGLOBIN  14.0  14.4  14.6   HEMATOCRIT  43.3  45.1  45.5   MCV  78.7*  78.8*  78.0*   MCH  25.5*  25.2*  25.0*   MCHC  32.3*  31.9*  32.1*   RDW  49.1  49.5  48.2   PLATELETCT  98*  128*  131*   MPV  11.4  11.3  11.4     Recent Labs      01/27/17   1635  01/28/17   0231  01/29/17   0148   SODIUM  130*  128*  126*   POTASSIUM  3.9  4.2  4.0   CHLORIDE  92*  91*  90*   CO2  24  24  26   GLUCOSE  110*  107*  109*   BUN  32*  34*  34*   CREATININE  1.08  1.21  1.16   CALCIUM  9.2  9.1  9.3     Recent Labs      01/27/17   0124   INR  1.62*     Recent Labs      01/27/17   0123  01/29/17   0148   BNPBTYPENAT  2331*  1317*     Recent Labs      01/27/17   0123  01/27/17   1305  01/29/17   0148   TROPONINI  0.05*  0.04   --    BNPBTYPENAT  2331*   --   1317*     Recent Labs      01/27/17   1304   TRIGLYCERIDE  56   HDL  15*   LDL  31         Medical Decision Making, by Problem:  Active Hospital Problems    Diagnosis   • *Acute CHF (congestive heart failure) (CMS-HCC)-decompensated stage 4 -LVEF 15% [I50.9]   • Cocaine abuse- in past, denies current use [F14.10]    • Acute renal insufficiency [N28.9]   • Elevated liver enzymes [R74.8]   • Liver cirrhosis (CMS-HCC) [K74.60]   • Pulmonary embolism (CMS-HCC) [I26.99]   • Thrombocytopenia (CMS-HCC) [D69.6]   • Non compliance w medication regimen [Z91.14]   • Coagulopathy (CMS-HCC) [D68.9]   • Obesity [E66.9]       Plan:  *Stable from cardiac standpoint.  BNP continues to be trending downward.    *Continue gentle diuresis.     *Discuss with the IM team.  As it turn out, the patient is on Hospice.  At this turn, continue Hospice versus patient to be establish with [facility], particular Dr. IAN Olivia who is a heart failure specialist, to see if he is a candidate for LVAD.  *In regard to Coumadin, IM reports that his DVT/PE is not recent event.  Therefore, agree with IM that it would be best for patient to be off the Coumadin.    *Will sign off but available if needed.  Otherwise, recommend to follow up with Dr. TDAEO Ortiz or arrange patient to follow up with Dr. Olivia ([facility]).    EKG reviewed, Labs reviewed, Medications reviewed and Radiology images reviewed

## 2017-01-29 NOTE — PROGRESS NOTES
Hospital Medicine Progress Note, Adult, Complex               Author: Sheila AMY Stewart Date & Time created: 1/29/2017  7:20 AM     ID/CC:51 M with severe cardiomyopathy, LVEF 15%, AICD in place, admitted with acute decompensated exacerbation of CHF.He also had elevated liver enzymes and [place] on CKD.    Consultants:  Cardiology-  Palliative Medicine    Interval History:  Pt has sob today , increased lasix 40 bid with holding parameters for BP.  Started on Spironolactone.  ? LV thrombus per echo tech, awaiting final read. Resume weight based Lovenox .  Discussed with  Cardiology.    Review of Systems:  Review of Systems   Constitutional: Negative for fever and chills.   Respiratory: Negative for cough, hemoptysis, sputum production, shortness of breath and wheezing.    Cardiovascular: Positive for leg swelling. Negative for chest pain.   Gastrointestinal: Negative for nausea, vomiting, abdominal pain, diarrhea and blood in stool.   Genitourinary: Negative for dysuria and urgency.   Musculoskeletal: Negative for myalgias and back pain.   Neurological: Positive for weakness. Negative for dizziness, sensory change, speech change, focal weakness and headaches.   Psychiatric/Behavioral: Negative for depression. The patient is not nervous/anxious.        Physical Exam:  Physical Exam   Constitutional: He is oriented to person, place, and time. No distress.   Obese   HENT:   Head: Normocephalic and atraumatic.   Eyes: EOM are normal. Right eye exhibits no discharge. Left eye exhibits no discharge.   Neck: Neck supple. No JVD present.   Cardiovascular: Normal rate and regular rhythm.    No murmur heard.  Pulmonary/Chest: Effort normal. No stridor. No respiratory distress. He has no wheezes. He has no rales.   Abdominal: Soft. He exhibits distension. There is no tenderness. There is no rebound and no guarding.   Musculoskeletal: He exhibits edema. He exhibits no tenderness.   Neurological: He is alert and  oriented to person, place, and time.   Skin: Skin is warm and dry. He is not diaphoretic.   Psychiatric: He has a normal mood and affect. His behavior is normal.   Nursing note and vitals reviewed.      Labs:  Recent Labs      01/27/17   0430   ISTATAPH  7.447   ISTATAPCO2  31.6   ISTATAPO2  165*   ISTATATCO2  23   HQYZHSS7ARY  100*   ISTATARTHCO3  21.8   ISTATARTBE  -1   ISTATTEMP  see below   ISTATFIO2  40   ISTATSPEC  Arterial     Recent Labs      01/27/17   0123  01/27/17   1305  01/29/17 0148   TROPONINI  0.05*  0.04   --    BNPBTYPENAT  2331*   --   1317*     Recent Labs      01/27/17   1304  01/27/17   1635  01/28/17 0231 01/29/17 0148   SODIUM   --   130*  128*  126*   POTASSIUM   --   3.9  4.2  4.0   CHLORIDE   --   92*  91*  90*   CO2   --   24  24  26   BUN   --   32*  34*  34*   CREATININE   --   1.08  1.21  1.16   MAGNESIUM  1.9   --    --    --    PHOSPHORUS  3.3   --    --    --    CALCIUM   --   9.2  9.1  9.3     Recent Labs      01/27/17   0123  01/27/17   1635  01/28/17 0231 01/29/17 0148   ALTSGPT  122*  114*  105*  84*   ASTSGOT  130*  121*  108*  74*   ALKPHOSPHAT  55  56  64  50   TBILIRUBIN  4.3*  4.8*  4.4*  3.5*   LIPASE  37   --    --    --    GLUCOSE  123*  110*  107*  109*     Recent Labs      01/27/17   0124  01/27/17   1305  01/28/17 0231 01/29/17 0148   RBC   --   5.50  5.72  5.83   HEMOGLOBIN   --   14.0  14.4  14.6   HEMATOCRIT   --   43.3  45.1  45.5   PLATELETCT   --   98*  128*  131*   PROTHROMBTM  19.7*   --    --    --    INR  1.62*   --    --    --      Recent Labs      01/27/17   1305  01/27/17   1635 01/28/17 0231  01/29/17   0148   WBC  9.4   --   10.9*  9.0   NEUTSPOLYS  71.10   --   64.40  55.80   LYMPHOCYTES  15.70*   --   16.30*  25.80   MONOCYTES  9.50   --   11.20  9.50   EOSINOPHILS  2.10   --   6.20  7.10*   BASOPHILS  0.60   --   1.10  1.00   ASTSGOT   --   121*  108*  74*   ALTSGPT   --   114*  105*  84*   ALKPHOSPHAT   --   56  64  50    TBILIRUBIN   --   4.8*  4.4*  3.5*           Hemodynamics:  Temp (24hrs), Av.1 °C (97 °F), Min:35.8 °C (96.4 °F), Max:36.3 °C (97.4 °F)  Temperature: 36.2 °C (97.1 °F)  Pulse  Av.6  Min: 90  Max: 107  Blood Pressure: 101/72 mmHg     Respiratory:    Respiration: 17, Pulse Oximetry: 99 %, O2 Daily Delivery Respiratory : Room Air with O2 Available     Given By:: Mouthpiece, Work Of Breathing / Effort: Mild  RUL Breath Sounds: Clear, RML Breath Sounds: Clear, RLL Breath Sounds: Diminished, BHAVNA Breath Sounds: Clear, LLL Breath Sounds: Diminished  Fluids:    Intake/Output Summary (Last 24 hours) at 17 0720  Last data filed at 17 0600   Gross per 24 hour   Intake    600 ml   Output    800 ml   Net   -200 ml     Weight: 98.4 kg (216 lb 14.9 oz)  GI/Nutrition:  Orders Placed This Encounter   Procedures   • DIET ORDER     Standing Status: Standing      Number of Occurrences: 1      Standing Expiration Date:      Order Specific Question:  Diet:     Answer:  Cardiac [6]      Comments:  hepatic     Order Specific Question:  Diet:     Answer:  2 Gram Sodium [7]     Order Specific Question:  Consistency/Fluid modifications:     Answer:  1500 ml Fluid Restriction [9]     Medical Decision Making, by Problem:  Active Hospital Problems    Diagnosis   • *Acute CHF (congestive heart failure) (CMS-HCC)-decompensated stage 4 -LVEF 15% [I50.9]  On IV lasix 40 increased to BID , resume carvedilol  Spironolactone restarted   Aacei on hold because of BP and renal insuff     • Cocaine abuse- in past, denies current use [F14.10]     • Acute renal insufficiency [N28.9]? Cardio renal  On lasix  Follow strict Is and Os  No hydronephrosis on renal U/S     • Elevated liver enzymes [R74.8]trending down  Likely from chf exacerbation     • Liver cirrhosis (CMS-HCC) [K74.60]?NAFLD  HIDA neg     • Pulmonary embolism (CMS-HCC) [I26.99]On Lovenox   Hx of in 2016  DVT study     • Thrombocytopenia (CMS-HCC) [D69.6]  Better  >120 K      • Non compliance w medication regimen [Z91.14]     • Coagulopathy (CMS-HCC) [D68.9]  On Lovenox now  ? LV thrombus on echo per tech-awaiting for complete read  Continue on Lovenox for now      • Obesity [E66.9]       Radiology images reviewed, Labs reviewed and Medications reviewed  Holt catheter: No Holt      DVT Prophylaxis: Enoxaparin (Lovenox)    Ulcer prophylaxis: Yes  Antibiotics: Treating active infection/contamination beyond 24 hours perioperative coverage  Assessed for rehab: Patient was assess for and/or received rehabilitation services during this hospitalization

## 2017-01-29 NOTE — PROGRESS NOTES
Assumed care of patient, report received from Dayshift RN. Patient is A&O4, denies pain and SOB. Patient has been updated on POC, all questions answered. Patient denies further needs at this time. Encouraged patient to call with questions or concerns, call light within reach.

## 2017-01-29 NOTE — PROGRESS NOTES
Inpatient Anticoagulation Service Note    Date: 1/29/2017  Reason for Anticoagulation: Other (Comments), Pulmonary Embolism (LV thrombus )        Hemoglobin Value: 14.6  Hematocrit Value: 45.5  Lab Platelet Value: 131  Target INR: 2.0 to 3.0    INR from last 7 days     Date/Time INR Value    01/29/17 1351 (!)1.38    01/27/17 0124 (!)1.62        Dose from last 7 days     Date/Time Dose (mg)    01/29/17 1300 5        Average Dose (mg): 3 (3mg/day )  Significant Interactions: Other (Comments), Antibiotics (spironolactone )  Bridge Therapy: Yes  Date of Last VTE Event: 01/29/17  Bridge Therapy Start Date: 01/29/17  Days of Overlap Therapy: 0     Comments:   1. Pt with a history of PE 6/2016   New LV thrombus noted in echo today; enoxaparin bridge therapy initiated    Home regimen detailed above   2. Drug interactions   Noted above   Drug-disease interaction: cirrhosis, CHF (EF ~ 15%)  3. Hematology   H/H are WNL  4. Plan   Will order 5mg po x1 tonight, followed by 3mg po qday thereafter   INR with AM labs   Enoxaparin bridge to continue for a minimum of 5 days and x2 therapeutic INR values, unless the risks of hemorrhage outweigh the benefits of bridge therapy    Today is day #0      Education Material Provided?: No (Pt on [facility] PTA)  Pharmacist suggested discharge dosing: 3mg po qday; repeat INR within 96 hours of DC     Arelis Hernandez, PharmD, BCPS

## 2017-01-29 NOTE — PROGRESS NOTES
Hospital Medicine Progress Note, Adult, Complex               Author: Sheila AMY Stewart Date & Time created: 1/28/2017  9:12 PM     ID/CC:51 M with severe cardiomyopathy, LVEF 15%, AICD in place, admitted with acute decompensated exacerbation of CHF.He also had elevated liver enzymes and ORVILLE on CKD.    Consultants:  Cardiology-  Palliative Medicine    Interval History:  Pt feels and appears better. He is on RA  Continue on iv lasix  CHF education  Abdominal pain resolved, HIDA neg.  PT/OT eval    Review of Systems:  Review of Systems   Constitutional: Negative for fever and chills.   Respiratory: Negative for cough, hemoptysis, sputum production, shortness of breath and wheezing.    Cardiovascular: Positive for leg swelling. Negative for chest pain.   Gastrointestinal: Negative for nausea, vomiting, abdominal pain, diarrhea and blood in stool.   Genitourinary: Negative for dysuria and urgency.   Musculoskeletal: Negative for myalgias and back pain.   Neurological: Positive for weakness. Negative for dizziness, sensory change, speech change, focal weakness and headaches.   Psychiatric/Behavioral: Negative for depression. The patient is not nervous/anxious.        Physical Exam:  Physical Exam   Constitutional: He is oriented to person, place, and time. No distress.   Obese   HENT:   Head: Normocephalic and atraumatic.   Eyes: EOM are normal. Right eye exhibits no discharge. Left eye exhibits no discharge.   Neck: Neck supple. No JVD present.   Cardiovascular: Normal rate and regular rhythm.    No murmur heard.  Pulmonary/Chest: Effort normal. No stridor. No respiratory distress. He has no wheezes. He has no rales.   Abdominal: Soft. He exhibits distension. There is no tenderness. There is no rebound and no guarding.   Musculoskeletal: He exhibits edema. He exhibits no tenderness.   Neurological: He is alert and oriented to person, place, and time.   Skin: Skin is warm and dry. He is not diaphoretic.    Psychiatric: He has a normal mood and affect. His behavior is normal.   Nursing note and vitals reviewed.      Labs:  Recent Labs      17   0430   ISTATAPH  7.447   ISTATAPCO2  31.6   ISTATAPO2  165*   ISTATATCO2  23   [id number]  100*   ISTATARTHCO3  21.8   ISTATARTBE  -1   ISTATTEMP  see below   ISTATFIO2  40   ISTATSPEC  Arterial     Recent Labs      17   0123  17   1305   TROPONINI  0.05*  0.04   BNPBTYPENAT  2331*   --      Recent Labs      17   0123  01/27/17   1304  17   1635  17   0231   SODIUM  124*   --   130*  128*   POTASSIUM  5.1   --   3.9  4.2   CHLORIDE  92*   --   92*  91*   CO2  22   --   24  24   BUN  33*   --   32*  34*   CREATININE  1.31   --   1.08  1.21   MAGNESIUM   --   1.9   --    --    PHOSPHORUS   --   3.3   --    --    CALCIUM  9.5   --   9.2  9.1     Recent Labs      17   0123  01/27/17   1635  17   023   ALTSGPT  122*  114*  105*   ASTSGOT  130*  121*  108*   ALKPHOSPHAT  55  56  64   TBILIRUBIN  4.3*  4.8*  4.4*   LIPASE  37   --    --    GLUCOSE  123*  110*  107*     Recent Labs      17   0123  01/27/17   0124  17   1305  17   0231   RBC  6.28*   --   5.50  5.72   HEMOGLOBIN  15.8   --   14.0  14.4   HEMATOCRIT  50.9   --   43.3  45.1   PLATELETCT  145*   --   98*  128*   PROTHROMBTM   --   19.7*   --    --    INR   --   1.62*   --    --      Recent Labs      17   0123  17   1305  17   1635  17   0231   WBC  11.4*  9.4   --   10.9*   NEUTSPOLYS  64.10  71.10   --   64.40   LYMPHOCYTES  21.80*  15.70*   --   16.30*   MONOCYTES  10.80  9.50   --   11.20   EOSINOPHILS  1.30  2.10   --   6.20   BASOPHILS  0.80  0.60   --   1.10   ASTSGOT  130*   --   121*  108*   ALTSGPT  122*   --   114*  105*   ALKPHOSPHAT  55   --   56  64   TBILIRUBIN  4.3*   --   4.8*  4.4*           Hemodynamics:  Temp (24hrs), Av.2 °C (97.1 °F), Min:35.8 °C (96.4 °F), Max:36.3 °C (97.4 °F)  Temperature: 36.3 °C (97.4  °F)  Pulse  Av.2  Min: 90  Max: 107  Blood Pressure: 107/88 mmHg     Respiratory:    Respiration: 18, Pulse Oximetry: 98 %, O2 Daily Delivery Respiratory : OxyMask     Given By:: Mouthpiece, Work Of Breathing / Effort: Mild  RUL Breath Sounds: Clear, RML Breath Sounds: Clear, RLL Breath Sounds: Diminished, BHAVNA Breath Sounds: Clear, LLL Breath Sounds: Diminished  Fluids:    Intake/Output Summary (Last 24 hours) at 17 2112  Last data filed at 17 1600   Gross per 24 hour   Intake    600 ml   Output    550 ml   Net     50 ml     Weight: 98.4 kg (216 lb 14.9 oz)  GI/Nutrition:  Orders Placed This Encounter   Procedures   • DIET ORDER     Standing Status: Standing      Number of Occurrences: 1      Standing Expiration Date:      Order Specific Question:  Diet:     Answer:  Cardiac [6]      Comments:  hepatic     Order Specific Question:  Diet:     Answer:  2 Gram Sodium [7]     Order Specific Question:  Consistency/Fluid modifications:     Answer:  1500 ml Fluid Restriction [9]     Medical Decision Making, by Problem:  Active Hospital Problems    Diagnosis   • *Acute CHF (congestive heart failure) (CMS-HCC)-decompensated stage 4 -LVEF 15% [I50.9]  On IV lasix 40 , resume carvedilol  Spironolactone and acei on hold because of BP and renal insuff     • Cocaine abuse- in past, denies current use [F14.10]     • Acute renal insufficiency [N28.9]? Cardio renal  On lasix  Follow strict Is and Os  No hydronephrosis on renal U/S     • Elevated liver enzymes [R74.8]trending down  Likely from chf exacerbation     • Liver cirrhosis (CMS-HCC) [K74.60]?NAFLD     • Pulmonary embolism (CMS-HCC) [I26.99]On Lovenox   Hx of in 2016  DVT study     • Thrombocytopenia (CMS-HCC) [D69.6]  Better  >120 K     • Non compliance w medication regimen [Z91.14]     • Coagulopathy (CMS-HCC) [D68.9]  On Lovenox now  Will discuss with cardiology about need for coumadin     • Obesity [E66.9]       Radiology images reviewed, Labs reviewed  and Medications reviewed  Holt catheter: No Holt      DVT Prophylaxis: Enoxaparin (Lovenox)    Ulcer prophylaxis: Yes  Antibiotics: Treating active infection/contamination beyond 24 hours perioperative coverage  Assessed for rehab: Patient was assess for and/or received rehabilitation services during this hospitalization

## 2017-01-29 NOTE — DISCHARGE PLANNING
"Palliative Social Work    Met with pt at bedside to gather information about family contacts so that a family care conference can be arranged to discuss goals of care.  Present in the room was pts niece, Freda Porter, who can be contacted at [phone].  Pt reported feeling confused due to all of the medication that he is on stating, \"I'm so screwed up, I thought I was in halfway this morning when I woke up.\"  Attempted to remind pt about his meeting with PC RN, Damaris on Friday but he was unable to recall.  Pt said that he lives with his two sons, Irvin, who is 27 years old, and Nicholas, who is 14 years old.  Saima provided Irvin's phone number that is [phone] because pt said that he would want Irvin to assist with medical decision making.  Freda reported that there is no additional family that would be involved.  Attempted to call Irvin but there was no answer and the voice message stated that Irvin's mailbox is full.  Will attempt to call Irvin again later so that a family care conference can be arranged.  "

## 2017-01-30 NOTE — PALLIATIVE CARE
"Palliative Care Follow-up  Care Conference held with pt, pt's son Irvin Angelita MARCELINO .  Pt up ambulating to BR.  More alert and focused today than previous interactions.  Pt doesn't recall discussion on Friday.  Pt states he is feeling better today \"I'm on so many  medications sometimes it's too much\".  Pt hospitalization, current condition and treatment discussed.  Pt is aware of the severity of his heart condition and relates to several conversations he has had with medical staff including Cardiology and Hospitalist. Pt confirms he would like his code status to be DNR no tubes down my throught or breathing machines, no compressions.  DNR status explained to both son and pt. When asked about goals of care pt states\" I think I need a new heart but that is expensive.\"  \"I don't really like my other Cardiologist\". \"I'd like to try to see the Heart Failure specialist Dr To that they told me about.\" \" I need to see if they can do anything before I just stop\"  Hospice care described to both pt and his son. Irivn questioning if it's time for hospice although states he is supportive of his Dad's plan.  Irvin signed up pt on program in  which seems to be a Case Management program and not hospice as some have reported. Pt lives with wife and 2 children although Irvin reports his parents are  and live together only because the pt is unable to live alone. Pt wants Irvin to be his agent.   [place] on What is Hospice and When to Start Hospice provided to Irvin. All questions answered.      Updated: Bedside RN,  Dr Stewart updated on initial consult and care conference today.    Plan:   Angelita MARCELINO SW to complete AD with pt and appoint Irvin as his agent.  See Angelita's note for more details.  Dr Stewart's plans to change pt's Code status. Goal is to medically optimize patient's condition and discharge home with support from family and any services available to assist pt to take his medications and " management his disease.  Pt to see Dr To outpatient. Hospice may be an option per the pt after all options for managing his cardiac disease have been explored.     Thank you for allowing Palliative Care to participate in this patient's care. Please call [phone] with any questions or additional needs.

## 2017-01-30 NOTE — DISCHARGE PLANNING
Per Palliative ’s Angelita’s note, she is to meet with son today at 11:00 to discuss goals of care. Notified bedside RN. Reviewed in IDT rounds, and plan was for pt to dc to SNF however pt has no insurance. Pt with CHF, new thrombus, and on Coumadin. Pt is DNR. From rounds, looks like pt was on hospice and now does not want to be on hospice, not ready. Await result of palliative care meeting.

## 2017-01-30 NOTE — PROGRESS NOTES
Report received. Care assumed. Patient A&Ox4. Pt reports feeling 0/10 pain. SOB on exertion, sits up in chair to breath better. Pt denies any  needs.Discussed POC for the day with Pt. Call light within reach, encouraged pt to call for assistance.

## 2017-01-30 NOTE — DISCHARGE PLANNING
Palliative Social Work    Spoke with pts son, Irvin, at [phone], and scheduled to meet today at 11am.

## 2017-01-30 NOTE — PROGRESS NOTES
Report received from day shift nurse.  Patient is A0X4.  Denies any pain.  Medications given.  Assessment done.

## 2017-01-30 NOTE — CARE PLAN
Problem: Safety  Goal: Will remain free from injury  Outcome: PROGRESSING AS EXPECTED  Pt educated on the importance fall prevention methods, such as treaded sock and the bed alarm. Pt stated they will use the call light prior to any attempts of ambulation. Ambulatory ability assessed, treaded socks in place, bed locked and in low position, frequent trips to bathroom offered, IV pole on same side of bed as bathroom, and call light and phone within reach.          Problem: Bowel/Gastric:  Goal: Normal bowel function is maintained or improved  Outcome: PROGRESSING AS EXPECTED  Pt feels constipated. Bowel protocol in place. Pt given lactulose and senna. Pt encourage to drink fluids and eat regular meals to maintain a regular bowel pattern. Pt ambulated and drank prune juice. Pt verbalized understanding.

## 2017-01-30 NOTE — CARE PLAN
Problem: Safety  Goal: Will remain free from injury  Fall precautions in place. Bed wheels locked, bed is in the lowest position. Call light in reach. Bed alarm on and in place. Non-skid socks provided. Patient provides successful verbalization of fall and safety precautions and demonstration of use of call bell. Upper side rails are up. Hourly rounding in progress.     Problem: Skin Integrity  Goal: Risk for impaired skin integrity will decrease  Skin is assessed for integrity throughout the shift. Pt is encouraged to reposition at least every 2 hours. Pt is kept dry and clean.

## 2017-01-30 NOTE — PROGRESS NOTES
Inpatient Anticoagulation Service Note    Date: 1/30/2017  Reason for Anticoagulation: Other (Comments), Pulmonary Embolism (LV thrombus )  Hemoglobin Value: 14.7  Hematocrit Value: 45.6  Lab Platelet Value: 128  Target INR: 2.0 to 3.0  INR from last 7 days     Date/Time INR Value    01/30/17 0352 (!)1.35    01/29/17 1351 (!)1.38    01/27/17 0124 (!)1.62        Dose from last 7 days     Date/Time Dose (mg)    01/30/17 1200 7.5    01/29/17 1300 5        Average Dose (mg): 3 (3mg/day )  Significant Interactions: Other (Comments), Antibiotics (spironolactone )  Bridge Therapy: Yes  Date of Last VTE Event: 01/29/17  Bridge Therapy Start Date: 01/29/17  Days of Overlap Therapy: 1     Comments:    1. Pt with a history of PE 6/2016              New LV thrombus noted in echo yesterday; enoxaparin bridge therapy initiated                Home regimen detailed above    2. Drug interactions              Noted above              Drug-disease interaction: cirrhosis, CHF (EF ~ 15%)  3. Hematology              H/H are WNL   4. Plan   Cardiology would like to give 10mg of warfarin today as no response in INR.  Discussed with hospitalist, this would be a very large dose for this patient in light of his chf/cirrhosis illness and reported home dose.    In setting of cirrhosis usually take some time to show a response then INR increases rapidly. Will give 7.5 mg x1 today as patient is waiting for discharge. Likely restart home dose tomorrow.              INR with AM labs              Enoxaparin bridge to continue for a minimum of 5 days and x2 therapeutic INR values, unless the risks of hemorrhage outweigh the benefits of bridge therapy.              Today is day #1    Education Material Provided?: No (Pt on [facility] PTA)  Pharmacist suggested discharge dosing: 3mg po qday; repeat INR within 96 hours of DC    Marino Hackett, PharmD, BCPS

## 2017-01-30 NOTE — PROGRESS NOTES
Received bedside report from PM nurse. Assumed patient care. Chart reviewed. Pt was resting at the edge of the bed. A&O x 4. No concerns, complaints or distress. Patient denies pain. Pt sat's 97% on RA. POC updated with pt and on the patient communication board. Bed locked and in the lowest position. Call light within reach. Tele box on. Will continue to monitor.

## 2017-01-30 NOTE — PROGRESS NOTES
Hospital Medicine Progress Note, Adult, Complex               Author: Sheila Stewart Date & Time created: 1/30/2017  7:09 AM     ID/CC:51 M with severe cardiomyopathy, LVEF 15%, AICD in place, admitted with acute decompensated exacerbation of CHF.He also had elevated liver enzymes and ORVILLE on CKD.    Consultants:  Cardiology-/ Dr.Mohsen ( [facility])  Palliative Medicine    Interval History:  Pt appears better today, he feels better, anxious to go home, on RA at rest.  Explained to patient that he is not medically cleared,we are optimizing his cardiac status, bridging with Lovenox and coumadin for the LV thrombus. Pt is forgetful and doesn't remember much.  Discussed code status , he wished to be a DNAR . Requested palliative team to talk to him with his son, since pt wanted to discuss his medical problems with son.Discussed again with palliative medicine team.  I also discussed with cardiology Dr.Mohsen .  Continue on lasix + spironolactone, strict Is and Os, daily weights. Added low dose lisinopril .Follow K .  Creatinine trend up, follow, avoid nephrotoxins, follow UO< check PVR X 24 hrs if neg <300 d/c checks  Pt's Na dropped to 120, checked Uosm and Sosm- Fluid restriction to 1.2 L, check Na at am.  Discussed with SW in MDR to assist with possible home health??     Review of Systems:  Review of Systems   Constitutional: Negative for fever and chills.   Respiratory: Negative for cough, hemoptysis, sputum production, shortness of breath and wheezing.    Cardiovascular: Positive for leg swelling. Negative for chest pain.   Gastrointestinal: Negative for nausea, vomiting, abdominal pain, diarrhea and blood in stool.   Genitourinary: Negative for dysuria and urgency.   Musculoskeletal: Negative for myalgias and back pain.   Neurological: Positive for weakness. Negative for dizziness, sensory change, speech change, focal weakness and headaches.   Psychiatric/Behavioral: Negative for depression. The  patient is not nervous/anxious.        Physical Exam:  Physical Exam   Constitutional: He is oriented to person, place, and time. No distress.   Obese   HENT:   Head: Normocephalic and atraumatic.   Eyes: EOM are normal. Right eye exhibits no discharge. Left eye exhibits no discharge.   Neck: Neck supple. No JVD present.   Cardiovascular: Normal rate and regular rhythm.    No murmur heard.  Pulmonary/Chest: Effort normal. No stridor. No respiratory distress. He has no wheezes. He has no rales.   Abdominal: Soft. He exhibits distension. There is no tenderness. There is no rebound and no guarding.   Musculoskeletal: He exhibits edema. He exhibits no tenderness.   Neurological: He is alert and oriented to person, place, and time.   Skin: Skin is warm and dry. He is not diaphoretic.   Psychiatric: He has a normal mood and affect. His behavior is normal.   Nursing note and vitals reviewed.      Labs:        Invalid input(s): [id number]  Recent Labs      01/27/17   1305  01/29/17 0148   TROPONINI  0.04   --    BNPBTYPENAT   --   1317*     Recent Labs      01/27/17   1304   01/28/17   0231  01/29/17 0148  01/30/17   0352   SODIUM   --    < >  128*  126*  120*   POTASSIUM   --    < >  4.2  4.0  4.0   CHLORIDE   --    < >  91*  90*  85*   CO2   --    < >  24  26  27   BUN   --    < >  34*  34*  39*   CREATININE   --    < >  1.21  1.16  1.38   MAGNESIUM  1.9   --    --    --    --    PHOSPHORUS  3.3   --    --    --    --    CALCIUM   --    < >  9.1  9.3  9.0    < > = values in this interval not displayed.     Recent Labs      01/28/17   0231  01/29/17 0148 01/30/17   0352   ALTSGPT  105*  84*  67*   ASTSGOT  108*  74*  66*   ALKPHOSPHAT  64  50  56   TBILIRUBIN  4.4*  3.5*  2.8*   GLUCOSE  107*  109*  135*     Recent Labs      01/27/17   1305  01/28/17   0231  01/29/17   0148  01/29/17   1351  01/30/17   0352   RBC  5.50  5.72  5.83   --    --    HEMOGLOBIN  14.0  14.4  14.6   --    --    HEMATOCRIT  43.3  45.1   45.5   --    --    PLATELETCT  98*  128*  131*   --    --    PROTHROMBTM   --    --    --   17.4*  17.1*   INR   --    --    --   1.38*  1.35*     Recent Labs      17   1305   17   0231  17   0148  17   0352   WBC  9.4   --   10.9*  9.0   --    NEUTSPOLYS  71.10   --   64.40  55.80   --    LYMPHOCYTES  15.70*   --   16.30*  25.80   --    MONOCYTES  9.50   --   11.20  9.50   --    EOSINOPHILS  2.10   --   6.20  7.10*   --    BASOPHILS  0.60   --   1.10  1.00   --    ASTSGOT   --    < >  108*  74*  66*   ALTSGPT   --    < >  105*  84*  67*   ALKPHOSPHAT   --    < >  64  50  56   TBILIRUBIN   --    < >  4.4*  3.5*  2.8*    < > = values in this interval not displayed.           Hemodynamics:  Temp (24hrs), Av.3 °C (97.3 °F), Min:36.1 °C (97 °F), Max:36.6 °C (97.8 °F)  Temperature: 36.1 °C (97 °F)  Pulse  Av  Min: 86  Max: 107  Blood Pressure: 104/83 mmHg     Respiratory:    Respiration: 18, Pulse Oximetry: 98 %     Work Of Breathing / Effort: Mild  RUL Breath Sounds: Clear, RML Breath Sounds: Clear, RLL Breath Sounds: Clear, BHAVNA Breath Sounds: Clear, LLL Breath Sounds: Clear  Fluids:    Intake/Output Summary (Last 24 hours) at 17 0709  Last data filed at 17 0400   Gross per 24 hour   Intake    600 ml   Output   1675 ml   Net  -1075 ml     Weight: 98 kg (216 lb 0.8 oz)  GI/Nutrition:  Orders Placed This Encounter   Procedures   • DIET ORDER     Standing Status: Standing      Number of Occurrences: 1      Standing Expiration Date:      Order Specific Question:  Diet:     Answer:  Cardiac [6]      Comments:  hepatic     Order Specific Question:  Diet:     Answer:  2 Gram Sodium [7]     Order Specific Question:  Consistency/Fluid modifications:     Answer:  1500 ml Fluid Restriction [9]     Medical Decision Making, by Problem:  Active Hospital Problems    Diagnosis   • *Acute CHF (congestive heart failure) (CMS-HCC)-decompensated stage 4 -LVEF 15% [I50.9]  On IV lasix 40  increased to BID , resume carvedilol  Spironolactone + lisinopril restarted.Monitor creatinine and K     • Cocaine abuse- in past, denies current use [F14.10]     • Acute renal insufficiency [N28.9]? Cardio renal  On lasix  Follow strict Is and Os  No hydronephrosis on renal U/S     • Elevated liver enzymes [R74.8]trending down  Likely from chf exacerbation     • Liver cirrhosis (CMS-HCC) [K74.60]?NAFLD  HIDA neg     • Pulmonary embolism (CMS-HCC) [I26.99]  Hx of in 6/2016  DVT study neg   Had completed treatment in past, but now on Lovenox + coumadin bridging for new LV thrombus     • Thrombocytopenia (CMS-HCC) [D69.6]  >120 K     • Non compliance w medication regimen [Z91.14]  Counseled cessation  Need to discuss with family to help him withs med since patient is forget full and also unable to arrange HH      Hyponatremia:  Na 120-trended down from 128   On lasix now  Fluid restriction 1.2 L  Strict Is and Os  Follow serum Na     • LV mural Thrombus:  Started bridging on Lovenox + coumadin on 1/29-INR needs to be therapeutic before he can go home given his non compliance/no insurance and unable to arrange home health     • Obesity [E66.9]     Code status: DNAR    Dispo:  INR to be >2 for 2 consecutive days  Need to arrange CHF clinic, cardiac rehab, coumadin clinic f/u and Home health if possible.  Outpt follow up with - Cardiology in 1-2 weeks after discharge  ?Discharge clinic and pcp follow up appointments before discharge.    Radiology images reviewed, Labs reviewed and Medications reviewed  Holt catheter: No Holt      DVT Prophylaxis: Enoxaparin (Lovenox)    Ulcer prophylaxis: Yes  Antibiotics: Treating active infection/contamination beyond 24 hours perioperative coverage  Assessed for rehab: Patient was assess for and/or received rehabilitation services during this hospitalization

## 2017-01-30 NOTE — DISCHARGE PLANNING
"Palliative Social Work    This  along with PC RN, Damaris, met with pt and his son, Irvin, at bedside to discuss goals of care and advanced care planning.  Pt appears to understand that he has two options, aggressive treatment for his heart condition or end of life care through hospice.  There are notes in Epic that pt was previously on hospice but Irvin and pt confirmed that pt has never been on hospice.  Pt appears to understand that he has a \"bad heart\" and he reported that he needs a heart transplant but does not feel that is a possibility due to the cost.  Pt is currently pending Medicaid.  Code status discussed and pt reaffirmed that he wants his code status to be DNR, stating that he does not want a tube down his throat or chest compressions.  Assisted pt with the completion of an advanced directive because he wants his son, Irvin, making decisions for him if he is unable.  Irvin informed Damaris that pt and his mother are  but  and only live together because pt needs help.  Pt choose #2 and 3 for his statements of desire on the advanced directive and appointed Irvin Ojeda to be DPOA.  Irvin can be contacted at [phone].  Pt would like to continue to explore options for treatment and is not ready for hospice yet.  Damaris provided pt and Irvin with information about hospice as it is likely that hospice will need to be further explored in the future.    Spoke with Irvin after meeting with pt.  Irvin assisted pt with getting a program involved with pt that is out of [place] that assists pt with medication management.  Irvin is unable to remember the name of the program.  Irvin feels that pt is compliant with his medication at home and that he and his mother also assist with assuring pt is complaint with his medication.  Pt gets his medication at [facility] or the [facility] clinic and has also been to the [facility] clinic in the past for ongoing medical care.  Irvin reports that pt is " independent at home.    Dr. Stewart updated about meeting with pt and Irvin.  Dr. Stewart signed the certificate of competency and the advanced directive was notarized by Hailey red, and scanned into epic.  Original provided to pt.

## 2017-01-31 NOTE — PROGRESS NOTES
Hospital Medicine Progress Note, Adult, Complex               Author: Siddhartha Laughlin   Date & Time created: 1/31/2017  8:24 AM     Interval History: 51 M with severe cardiomyopathy, LVEF 15%, AICD in place, admitted with acute decompensated exacerbation of CHF, started on lasix + spironolactone, strict Is and Os, daily weights and added low dose lisinopril . He also had elevated liver enzymes and ORVILLE on CKD. Found to have new LV thrombus, and started on lovenox and coumadin. Na dropped to 119.    1/31/2017 - no overnight events. Remains hemodynamically stable and afebrile. Saturating well on RA. Na 119. K WNL. Crea stable at 1.31. LFT stable. INR 1.35. Day 3 overlap.    > Seen and examined. (+) BL foot pain due to leg swelling. Denies N/V, sob, chest pain.       Consultants:  Cardiology-/ Dr.Mohsen ( [facility])  Palliative Medicine    Review of Systems:  ROS  Pertinent positives/negatives as mentioned above.     A complete review of systems was done. All other systems were negative.       Physical Exam:  Physical Exam   Constitutional: He is oriented to person, place, and time. He appears well-developed. No distress.   Obese   HENT:   Head: Normocephalic and atraumatic.   Mouth/Throat: Oropharynx is clear and moist. No oropharyngeal exudate.   Eyes: EOM are normal. Pupils are equal, round, and reactive to light. Right eye exhibits no discharge. Left eye exhibits no discharge. No scleral icterus.   Neck: Normal range of motion. Neck supple. No JVD present. No thyromegaly present.   Cardiovascular: Normal rate and regular rhythm.  Exam reveals no gallop and no friction rub.    No murmur heard.  Pulmonary/Chest: Effort normal and breath sounds normal. No stridor. No respiratory distress. He has no wheezes. He has no rales. He exhibits no tenderness.   Abdominal: Soft. Bowel sounds are normal. He exhibits no distension. There is no tenderness. There is no rebound and no guarding.   Musculoskeletal:  Normal range of motion. He exhibits edema (2-3+ B/L LE) and tenderness (minimal).   Lymphadenopathy:     He has no cervical adenopathy.   Neurological: He is alert and oriented to person, place, and time.   Skin: Skin is warm and dry. No rash noted. He is not diaphoretic. No erythema.   Psychiatric: He has a normal mood and affect. His behavior is normal. Thought content normal.   Nursing note and vitals reviewed.      Labs:        Invalid input(s): [id number]  Recent Labs      17   BNPBTYPENAT  1317*  1538*     Recent Labs      17   SODIUM  126*  120*  119*   POTASSIUM  4.0  4.0  3.8   CHLORIDE  90*  85*  87*   CO2  26  27  25   BUN  34*  39*  36*   CREATININE  1.16  1.38  1.31   CALCIUM  9.3  9.0  9.3     Recent Labs      17   ALTSGPT  84*  67*  58*   ASTSGOT  74*  66*  60*   ALKPHOSPHAT  50  56  51   TBILIRUBIN  3.5*  2.8*  2.5*   GLUCOSE  109*  135*  137*     Recent Labs      17   1351  17   RBC   --   5.51  5.84  5.69   HEMOGLOBIN   --   14.1  14.7  14.2   HEMATOCRIT   --   44.2  45.6  44.8   PLATELETCT   --   123*  128*  137*   PROTHROMBTM  17.4*  17.1*   --   17.1*   INR  1.38*  1.35*   --   1.35*     Recent Labs      17   WBC  9.0  7.8  8.7  10.0   NEUTSPOLYS  55.80  56.00   --   59.20   LYMPHOCYTES  25.80  22.50   --   21.20*   MONOCYTES  9.50  12.20   --   11.30   EOSINOPHILS  7.10*  7.60*   --   6.70   BASOPHILS  1.00  0.90   --   0.90   ASTSGOT  74*  66*   --   60*   ALTSGPT  84*  67*   --   58*   ALKPHOSPHAT  50  56   --   51   TBILIRUBIN  3.5*  2.8*   --   2.5*           Hemodynamics:  Temp (24hrs), Av.2 °C (97.2 °F), Min:35.9 °C (96.7 °F), Max:36.6 °C (97.8 °F)  Temperature: 36.4 °C (97.5 °F)  Pulse  Av.7  Min: 64  Max: 107  Blood Pressure: 100/69 mmHg      Respiratory:    Respiration: 16, Pulse Oximetry: 92 %     Work Of Breathing / Effort: Mild  RUL Breath Sounds: Clear, RML Breath Sounds: Diminished, RLL Breath Sounds: Diminished, BHAVNA Breath Sounds: Clear, LLL Breath Sounds: Diminished  Fluids:    Intake/Output Summary (Last 24 hours) at 01/31/17 0824  Last data filed at 01/30/17 2300   Gross per 24 hour   Intake    950 ml   Output    830 ml   Net    120 ml     Weight: 99.8 kg (220 lb 0.3 oz)  GI/Nutrition:  Orders Placed This Encounter   Procedures   • DIET ORDER     Standing Status: Standing      Number of Occurrences: 1      Standing Expiration Date:      Order Specific Question:  Diet:     Answer:  Cardiac [6]      Comments:  hepatic     Order Specific Question:  Diet:     Answer:  2 Gram Sodium [7]     Order Specific Question:  Consistency/Fluid modifications:     Answer:  1200 ml Fluid Restriction [8]     Medical Decision Making, by Problem:  Active Hospital Problems    Diagnosis   • *Acute CHF (congestive heart failure) (CMS-HCC)-decompensated stage 4 -LVEF 15% [I50.9]  - still with significant edema. Will change back to lasix 20mg IV BID. Continue PO aldactone.  Continue fluid restriction 1.2L/day, and monitor strict I&O and daily weights.   - continue coreg, lisinopril.  - continue telemetry.   - Outpt follow up with - Cardiology in 1-2 weeks after discharge     • Cocaine abuse- in past, denies current use [F14.10]     • Acute renal insufficiency [N28.9]  - resolved. No hydronephrosis on renal U/S  - follow renal function while on IV lasix.      • Elevated liver enzymes [R74.8]trending down  - Likely due to congestive hepatopathy from acute CHF.   - diuresis as above.   - check cortisol, TSH, ammonia, ferritin, viral hepatitis panel. Repeat LFT in AM.     • Liver cirrhosis (CMS-HCC) [K74.60]?NAFLD  - HIDA neg     • Pulmonary embolism (CMS-HCC) [I26.99]  - Hx of in 6/2016. DVT study neg.  - Had completed treatment in past, but now on Lovenox +  coumadin bridging for new LV thrombus     • Thrombocytopenia (CMS-HCC) [D69.6]  - stable. Monitor.      • Non compliance w medication regimen [Z91.14]  - Counseled.   - Need to discuss with family to help him withs med since patient is forget full and also unable to arrange [facility].       Hyponatremia:  - Stable. Likely hypervolemic hyponatremia.   - lasix as above.  Continue fluid restriction 1.2 L. BMP in AM.       • LV mural Thrombus:  - continue Lovenox bridge to coumadin. Needs 5 days overlap, and until INRtherapeutic x 2 days before he can go home given his non compliance/no insurance and unable to arrange home health     • Obesity [E66.9]     Code status: DNAR      Radiology images reviewed, Labs reviewed and Medications reviewed  Holt catheter: No Holt      DVT Prophylaxis: Enoxaparin (Lovenox)    Ulcer prophylaxis: Yes  Antibiotics: Treating active infection/contamination beyond 24 hours perioperative coverage  Assessed for rehab: Patient was assess for and/or received rehabilitation services during this hospitalization

## 2017-01-31 NOTE — PROGRESS NOTES
Hospitalist Dr Choudhury notified of low sodium. No new orders received. Continuing to monitor pt.

## 2017-01-31 NOTE — PROGRESS NOTES
Pt care assumed. Pt sitting comfortably at bedside. A&O x 4. No distress present. Call light, phone, and bedside table within reach. White board updated and plan of care discussed with patient. Pt up self. No concerns present at this time. Will continue to monitor.

## 2017-01-31 NOTE — CARE PLAN
Problem: Safety  Goal: Will remain free from injury  Outcome: PROGRESSING AS EXPECTED  Pt remains free from falls or injuries. Pt up self. Pt calls for assistance appropriately.     Problem: Venous Thromboembolism (VTW)/Deep Vein Thrombosis (DVT) Prevention:  Goal: Patient will participate in Venous Thrombosis (VTE)/Deep Vein Thrombosis (DVT)Prevention Measures  Outcome: PROGRESSING AS EXPECTED  Pt free from s/sx of DVT. Pt receiving Lovenox and Coumadin for VTE prophylaxis.

## 2017-01-31 NOTE — CARE PLAN
Problem: Safety  Goal: Will remain free from injury  Outcome: PROGRESSING AS EXPECTED  Intervention: Educate patient and significant other/support system about adaptive mobility strategies and safe transfers  Advises to call nurse prior to getting out of bed for safety  And call light within reach.

## 2017-01-31 NOTE — PROGRESS NOTES
AM Assessment completed see Doc flow sheet for reference  A/Ox4. Ambulates tot he restroom byself using FWW for safety.

## 2017-01-31 NOTE — HEART FAILURE PROGRAM
Cardiovascular Nurse Navigator () Progress Note:     This CNN met with patient at bedside to discuss discharge plan. This CNN has left a voicemail for pt's son, Irvin who according to dc planning notes will be his DPOA and providing care. Stated non emergent call, trying to set up time to meet so that formal education can be provided which will enable him to care for his father.    Patient states that he does not want to see Dr. Ortiz any longer and wants to see Dr. Olivia. Explained to patient that because he is uninsured at this time he will have to pay $200 for the visit. Pt states that he is willing to do so and wants for this CNN to facilitate an appt.     Discussed the Complex [facility] at the [facility]. Explained that the purpose of this would be to see primary care and to have INR's drawn as again, without insurance pt would pay OOP at the coumadin clinic.     Pt is agreeable to this. Please see follow up and appointments section for appointment details. These appointments have been pushed out somewhat as Dr. Stewart's note as of 1/30 states that pt's INR needs to be therapeutic prior to d/c. INR today is 1.35.    Discussed case c Dr. Laughlin and LUANNE in IDT rounds on 1/31. Made them aware of concerns about follow up and possible need for help with medications, and of existing appointments.

## 2017-02-01 NOTE — HEART FAILURE PROGRAM
Cardiovascular Nurse Navigator () Progress Note:     This CNN received a phone call that pt's family were here. As of the time of this note's filing, this CNN has still not heard back from Irvin, pt's son. Went to pt's room to find his ex wife and two of his sons, neither of them Irvin.     Attempted to provide HF teaching. Pt not motivated to engage, repeatedly saying that he just wants an appt c Dr. Olivia. This CNN stated that we have arranged this appt as well as one with [facility] for PCP. Further explained to pt that this CNN wants to help pt to be successful after discharge so that we can get him to his appointment with Dr. Olivia.    Was only able to provide very basic overview of SHF and conduction system.    Pt remained disengaged and ex wife left in the middle of the discussion with one of the sons. Gave business card to son who was still present and asked that he please have Irvin phone this CNN.

## 2017-02-01 NOTE — CARE PLAN
Problem: Safety  Goal: Will remain free from injury  Intervention: Provide assistance with mobility  Pt educated on fall precautions. Bed in low, locked position. Call light within reach. Bed alarm on. Treaded socks on pt. Hourly rounding in place.      Problem: Pain Management  Goal: Pain level will decrease to patient’s comfort goal  Intervention: Follow pain managment plan developed in collaboration with patient and Interdisciplinary Team  Medicating per MAR.

## 2017-02-01 NOTE — PROGRESS NOTES
Report received. Pt care assumed. Assessment performed. Pt AOx4. Pt laying supine in bed. Pt c/o L leg  Pain 7/10 and no signs of distress. Medicating per MAR. Bed in low, locked position. Bed alarm on. Call light within reach. Treaded socks on pt.  Hourly rounding in place.

## 2017-02-01 NOTE — PROGRESS NOTES
Hospital Medicine Progress Note, Adult, Complex               Author: Siddhartha Laughlin   Date & Time created: 2/1/2017  8:15 AM     Interval History: 51 M with severe cardiomyopathy, LVEF 15%, AICD in place, admitted with acute decompensated exacerbation of CHF, started on lasix + spironolactone, strict Is and Os, daily weights and added low dose lisinopril . He also had elevated liver enzymes and ORVILLE on CKD. Found to have new LV thrombus, and started on lovenox and coumadin. Na dropped to 119.    2/1/2017 - uneventful night. VSS. Afebrile. Stable on 2L O2 NC.No leukocytosis. Na stable at 123. Crea WNL. INR 1.89. Day 4 of lovenox-coumadin overlap. Hep C Ab reactive. . TSH 4.3, normal T4. Ferritin, cortisol WNL.     > Seen and examined. No CP, SOB, nausea, vomiting. Reports burning at lovenox injection site that radiates over entire abdominal area. Requests minimizing blood draws.    Consultants:  Cardiology-/ Dr.Mohsen ( [facility])  Palliative Medicine    Review of Systems:  ROS  Pertinent positives/negatives as mentioned above.     A complete review of systems was done. All other systems were negative.       Physical Exam:  Physical Exam   Constitutional: He is oriented to person, place, and time. He appears well-developed. No distress.   Obese   HENT:   Head: Normocephalic and atraumatic.   Mouth/Throat: Oropharynx is clear and moist. No oropharyngeal exudate.   Eyes: EOM are normal. Pupils are equal, round, and reactive to light. Right eye exhibits no discharge. Left eye exhibits no discharge. No scleral icterus.   Neck: Normal range of motion. Neck supple. No JVD present. No thyromegaly present.   Cardiovascular: Normal rate and regular rhythm.  Exam reveals no gallop and no friction rub.    No murmur heard.  Pulmonary/Chest: Effort normal and breath sounds normal. No stridor. No respiratory distress. He has no wheezes. He has no rales. He exhibits no tenderness.   Abdominal: Soft. Bowel  sounds are normal. He exhibits no distension. There is no tenderness. There is no rebound and no guarding.   Musculoskeletal: Normal range of motion. He exhibits edema (2-3+ B/L LE) and tenderness (minimal).   Lymphadenopathy:     He has no cervical adenopathy.   Neurological: He is alert and oriented to person, place, and time.   Skin: Skin is warm and dry. Rash (bruising on abdomen over lovenox injections sites) noted. He is not diaphoretic.   Psychiatric: He has a normal mood and affect. His behavior is normal. Thought content normal.   Nursing note and vitals reviewed.      Labs:        Invalid input(s): [id number]  Recent Labs      01/30/17   0806  01/31/17   1918   CPKTOTAL   --   211*   BNPBTYPENAT  1538*   --      Recent Labs      01/31/17   2307 02/01/17   0251  02/01/17   0651   SODIUM  123*  123*  123*   POTASSIUM  3.6  4.1  3.7   CHLORIDE  86*  89*  91*   CO2  30  24  24   BUN  28*  29*  28*   CREATININE  1.14  1.10  1.00   CALCIUM  9.2  9.0  8.9     Recent Labs      01/30/17   0352  01/31/17   0143   01/31/17   2307 02/01/17   0251  02/01/17   0651   ALTSGPT  67*  58*   --    --   52*   --    ASTSGOT  66*  60*   --    --   60*   --    ALKPHOSPHAT  56  51   --    --   52   --    TBILIRUBIN  2.8*  2.5*   --    --   2.2*   --    DBILIRUBIN   --    --    --    --   1.2*   --    GLUCOSE  135*  137*   < >  133*  136*  132*    < > = values in this interval not displayed.     Recent Labs      01/30/17   0806  01/31/17   0143  01/31/17   1409  01/31/17   1920  02/01/17   0251   RBC  5.84  5.69   --    --   5.81   HEMOGLOBIN  14.7  14.2   --    --   14.6   HEMATOCRIT  45.6  44.8   --    --   46.1   PLATELETCT  128*  137*   --    --   142*   PROTHROMBTM   --   17.1*  20.2*   --   22.3*   INR   --   1.35*  1.67*   --   1.89*   FERRITIN   --    --    --   37.5   --      Recent Labs      01/30/17   0352  01/30/17   0806  01/31/17   0143  02/01/17   0251   WBC  7.8  8.7  10.0  9.3   NEUTSPOLYS  56.00   --    59.20  55.90   LYMPHOCYTES  22.50   --   21.20*  24.10   MONOCYTES  12.20   --   11.30  12.10   EOSINOPHILS  7.60*   --   6.70  6.50   BASOPHILS  0.90   --   0.90  0.80   ASTSGOT  66*   --   60*  60*   ALTSGPT  67*   --   58*  52*   ALKPHOSPHAT  56   --   51  52   TBILIRUBIN  2.8*   --   2.5*  2.2*           Hemodynamics:  Temp (24hrs), Av.2 °C (97.2 °F), Min:36 °C (96.8 °F), Max:36.4 °C (97.6 °F)  Temperature: 36.4 °C (97.5 °F)  Pulse  Av.9  Min: 64  Max: 110  Blood Pressure: 115/89 mmHg     Respiratory:    Respiration: 18, Pulse Oximetry: 96 %     Work Of Breathing / Effort: Mild  RUL Breath Sounds: Clear, RML Breath Sounds: Diminished, RLL Breath Sounds: Diminished, BHAVNA Breath Sounds: Clear, LLL Breath Sounds: Diminished  Fluids:    Intake/Output Summary (Last 24 hours) at 17 0815  Last data filed at 17 2300   Gross per 24 hour   Intake    100 ml   Output    300 ml   Net   -200 ml     Weight: 100.8 kg (222 lb 3.6 oz)  GI/Nutrition:  Orders Placed This Encounter   Procedures   • DIET ORDER     Standing Status: Standing      Number of Occurrences: 1      Standing Expiration Date:      Order Specific Question:  Diet:     Answer:  Cardiac [6]      Comments:  hepatic     Order Specific Question:  Diet:     Answer:  2 Gram Sodium [7]     Order Specific Question:  Consistency/Fluid modifications:     Answer:  1200 ml Fluid Restriction [8]     Medical Decision Making, by Problem:  Active Hospital Problems    Diagnosis   • *Acute CHF (congestive heart failure) (CMS-[facility])-decompensated stage 4 -LVEF 15% [I50.9]  - remains with significant edema. Continue lasix 20mg IV BID. Continue PO aldactone.  Continue fluid restriction 1.2L/day, and monitor strict I&O and daily weights.   - continue coreg, lisinopril.  - continue telemetry.   - Outpt follow up with - Cardiology in 1-2 weeks after discharge     • Cocaine abuse- in past, denies current use [F14.10]     • Acute renal insufficiency [N28.9]  -  resolved. No hydronephrosis on renal U/S  - follow renal function while on IV lasix. BMP in AM.     • Elevated liver enzymes [R74.8]  - Likely due to congestive hepatopathy from acute CHF, but HCV Ab reactive.   - diuresis as above.   - will get HCV viral load, and HIV serology.      • Liver cirrhosis (CMS-HCC) [K74.60]?NAFLD  - HIDA neg     • Pulmonary embolism (CMS-HCC) [I26.99]  - Hx of in 6/2016. DVT study neg.  - Had completed treatment in past, but now on Lovenox + coumadin bridging for new LV thrombus     • Thrombocytopenia (CMS-HCC) [D69.6]  - stable. Monitor.      • Non compliance w medication regimen [Z91.14]  - Counseled.   - Need to discuss with family to help him withs med since patient is forget full and also unable to arrange [facility].       Hyponatremia:  - Stable. Likely hypervolemic hyponatremia.   - lasix as above.  Continue fluid restriction 1.2 L. BMP in AM. D/C q4H BMP as long as Na remains above 120s.      • LV mural Thrombus:  - continue Lovenox bridge to coumadin. Needs 5 days overlap (Day 3 today), and until INRtherapeutic x 2 days before he can go home given his non compliance/no insurance and unable to arrange home health     • Obesity [E66.9]     Code status: DNAR      Radiology images reviewed, Labs reviewed and Medications reviewed  Holt catheter: No Holt      DVT Prophylaxis: Enoxaparin (Lovenox)    Ulcer prophylaxis: Yes  Antibiotics: Treating active infection/contamination beyond 24 hours perioperative coverage  Assessed for rehab: Patient was assess for and/or received rehabilitation services during this hospitalization

## 2017-02-01 NOTE — THERAPY
"Physical Therapy Treatment completed.   Bed Mobility:  Supine to Sit: Supervised  Transfers: Sit to Stand: Supervised  Gait: Level Of Assist: Supervised with No Equipment Needed and/or FWW       Plan of Care: Will benefit from Physical Therapy 3 times per week  Discharge Recommendations: Equipment: Will Continue to Assess for Equipment Needs; anticipate no AD needs; see below    Pt appears to be progressing with regards to mobility and OOB activity. Noted pt was up self in room with no AD at time of PT entry. Pt was able to demonstrate hallway ambulation with no AD with no bonnie LOB. He appears somewhat limited 2' to PT concerns with increased WOB post ambulation, though does recover with seated rest. Pt has odd affect throughout and unclear of baseline cognition v. behavior. Unclear of pt's understanding of CHF/volume overload and relation to exertional activity as he demonstrates poor carryover with education. Anticipate that pt will be functionally capable of d/c to home at time of medical clearance, though will continue to see for reinforcement and education re: activity recs and relation of exertion/ambulation to CHF.      See \"Rehab Therapy-Acute\" Patient Summary Report for complete documentation.       "

## 2017-02-01 NOTE — PROGRESS NOTES
Inpatient Anticoagulation Service Note    Date: 1/31/2017  Reason for Anticoagulation: Other (Comments), Pulmonary Embolism (LV thrombus )  Hemoglobin Value: 14.2  Hematocrit Value: 44.8  Lab Platelet Value: 137  Target INR: 2.0 to 3.0  INR from last 7 days     Date/Time INR Value    01/31/17 1409 (!)1.67    01/31/17 0143 (!)1.35    01/30/17 0352 (!)1.35    01/29/17 1351 (!)1.38    01/27/17 0124 (!)1.62        Dose from last 7 days     Date/Time Dose (mg)    01/31/17 1600 1    01/30/17 1200 7.5    01/29/17 1300 5        Average Dose (mg): 3 (3mg/day )  Significant Interactions: Other (Comments), Antibiotics (spironolactone )  Bridge Therapy: Yes  Date of Last VTE Event: 01/29/17  Bridge Therapy Start Date: 01/29/17  Days of Overlap Therapy: 2  - Still on overlap needs 5 days and INR >2 x2    Comments:   Comments:    1. Pt with a history of PE 6/2016              New LV thrombus noted in echo; enoxaparin bridge therapy initiated                Home regimen detailed above    2. Drug interactions              Noted above              Drug-disease interaction: cirrhosis, CHF (EF ~ 15%)  3. Hematology              H/H are WNL    4. Plan              7.5 mg of warfarin was given yesterday, see note from 1/30/17 for reasoning for dose. INR with no improvement to day. This was surprising so I ordered an additional INR today prior to the dose. INR is now up trending very rapidly. Now increased by ~0.4. Still have not seen full effect  of 7.5 mg given yesterday.  7.5 mg was large dose for this patient in light of his chf/cirrhosis illness and reported home dose.                In setting of cirrhosis usually take some time to show a response then INR increases rapidly. Now seeing this in this patient today. Will give warfarin 1 mg x1 today. I expect INR will be therapeutic tomorrow.               INR with AM labs              Enoxaparin bridge to continue for a minimum of 5 days and x2 therapeutic INR values, unless the risks  of hemorrhage outweigh the benefits of bridge therapy.              Today is day #2    Plan: Warfarin 1mg tonight. INR tomorrow.  Education Material Provided?: No (Pt on [facility] PTA)  Pharmacist suggested discharge dosing: 3mg po qday; repeat INR within 48-72 hours of DC     Marino Hackett, PharmD, BCPS

## 2017-02-01 NOTE — PROGRESS NOTES
Inpatient Anticoagulation Service Note    Date: 2/1/2017  Reason for Anticoagulation: Other (Comments), Pulmonary Embolism (LV thrombus )  Hemoglobin Value: 14.6  Hematocrit Value: 46.1  Lab Platelet Value: 142  Target INR: 2.0 to 3.0  INR from last 7 days     Date/Time INR Value    02/01/17 0251 (!)1.89    01/31/17 1409 (!)1.67    01/31/17 0143 (!)1.35    01/30/17 0352 (!)1.35    01/29/17 1351 (!)1.38    01/27/17 0124 (!)1.62        Dose from last 7 days     Date/Time Dose (mg)    02/01/17 1000 3    01/31/17 1600 1    01/30/17 1200 7.5    01/29/17 1300 5        Average Dose (mg): 3 (3mg/day )  Significant Interactions: Other (Comments), Antibiotics (spironolactone )  Bridge Therapy: Yes  Date of Last VTE Event: 01/29/17  Bridge Therapy Start Date: 01/29/17  Days of Overlap Therapy: 3 - Still on overlap needs 5 days and INR >2 x2    Comments:    1. Pt with a history of PE 6/2016              New LV thrombus noted in echo; enoxaparin bridge therapy initiated                Home regimen detailed above    2. Drug interactions              Noted above              Drug-disease interaction: cirrhosis, CHF (EF ~ 15%)  3. Hematology              H/H are WNL    4. Plan              7.5 mg of warfarin was given 2 days ago, see note from 1/30/17 for reasoning for dose. INR  up trending somewhat rapidly, increased by ~0.2. Still have not seen full effect  of 7.5 mg given. Only 1 mg of warfarin was given yesterday.              In setting of cirrhosis usually take some time to show a response then INR increases rapidly. I expect INR will be therapeutic tomorrow. Will restart patient's home dose (3 mg daily).              INR with AM labs              Enoxaparin bridge to continue for a minimum of 5 days and x2 therapeutic INR values, unless the risks of hemorrhage outweigh the benefits of bridge therapy.              Today is day #3    Education Material Provided?: No (Pt on [facility] PTA)  Pharmacist suggested discharge dosing: 3mg  po qday; repeat INR within 48-72 hours of DC     Marino Hackett, PharmD, BCPS

## 2017-02-01 NOTE — DISCHARGE PLANNING
Transitional Care Navigator:    Attempted to call pt's Irvin GODOY, via telephone to discuss transitional care. No answer. VM message left.

## 2017-02-01 NOTE — PROGRESS NOTES
Cardiology Progress Note:    Amr M Mohsen  Date & Time note created:    2/1/2017   2:25 PM       Patient ID:   Name:             Obinna Grande   YOB: 1965  Age:                 51 y.o.  male   MRN:               [id number]                                                               Interval History:    The patient was seen and examined. The chart and telemetry were reviewed. The patient feels good    Review of Systems:       Constitutional: Denies fevers, Denies weight changes  Eyes: Denies changes in vision, no eye pain  Ears/Nose/Throat/Mouth: Denies nasal congestion or sore throat    Cardiovascular:  chest pain,  palpitations    Respiratory:  shortness of breath , Denies cough  Gastrointestinal/Hepatic: Denies abdominal pain, nausea, vomiting, diarrhea, constipation or GI bleeding    Genitourinary: Denies dysuria or frequency  Musculoskeletal/Rheum: Denies  joint pain and swelling  Skin: Denies rash  Neurological: Denies headache, confusion, memory loss or focal weakness/parasthesias  Psychiatric: denies mood disorder    Endocrine: Deirdre thyroid problems  Heme/Oncology/Lymph Nodes: Denies enlarged lymph nodes, denies brusing or known bleeding disorder  All other systems were reviewed and are negative (AMA/CMS criteria)          Past Medical History:   Past Medical History   Diagnosis Date   • Ulcer (CMS-HCC)    • CHF (congestive heart failure) (CMS-HCC)    • Hypertension    • Hypertension    • Essential hypertension, benign 8/27/2012   • Other primary cardiomyopathies (CMS-HCC) 8/27/2012   • Cardiomegaly 8/27/2012   • Nonspecific elevation of levels of transaminase or lactic acid dehydrogenase (LDH) 8/27/2012   • Obesity, unspecified 8/27/2012   • Cocaine abuse      Active Hospital Problems    Diagnosis   • Acute CHF (congestive heart failure) (CMS-HCC)-decompensated stage 4 -LVEF 15% [I50.9]     Priority: High   • Cocaine abuse- in past, denies current use [F14.10]     Priority:  High   • Acute renal insufficiency [N28.9]     Priority: High   • Elevated liver enzymes [R74.8]     Priority: Medium   • Liver cirrhosis (CMS-HCC) [K74.60]     Priority: Medium   • Pulmonary embolism (CMS-HCC) [I26.99]     Priority: Medium   • Thrombocytopenia (CMS-HCC) [D69.6]     Priority: Medium   • Non compliance w medication regimen [Z91.14]     Priority: Low   • Coagulopathy (CMS-HCC) [D68.9]     Priority: Low   • Obesity [E66.9]     Priority: Low   • LV (left ventricular) mural thrombus (CMS-HCC) [I21.3]       Past Surgical History:  Past Surgical History   Procedure Laterality Date   • Gastroscopy-endo  4/24/08     Performed by CASPER VILCHIS at ENDOSCOPY [facility] Medications:    Current facility-administered medications:   •  warfarin (COUMADIN) tablet 3 mg, 3 mg, Oral, COUMADIN-DAILY, Marino Hackett, PHARMD  •  furosemide (LASIX) injection 20 mg, 20 mg, Intravenous, BID DIURETIC, Siddhartha Laughlin M.D., 20 mg at 02/01/17 0530  •  lisinopril (PRINIVIL) tablet 2.5 mg, 2.5 mg, Oral, Q DAY, Sheila Stewart M.D., 2.5 mg at 02/01/17 0828  •  guaifenesin LA (MUCINEX) tablet 600 mg, 600 mg, Oral, Q12HRS, Sheila Stewart M.D., 600 mg at 02/01/17 0828  •  spironolactone (ALDACTONE) tablet 25 mg, 25 mg, Oral, Q DAY, Marino Hackett, PHARMD, 25 mg at 02/01/17 0830  •  enoxaparin (LOVENOX) inj 100 mg, 100 mg, Subcutaneous, Q12HRS, Sheila Stewart M.D., 100 mg at 02/01/17 0830  •  MD ALERT... warfarin (COUMADIN) per pharmacy protocol, , Other, PRN, Sheila Stewart M.D.  •  docusate sodium (COLACE) capsule 100 mg, 100 mg, Oral, QAM, Sheila Stewart M.D., 100 mg at 02/01/17 0828  •  senna-docusate (PERICOLACE or SENOKOT S) 8.6-50 MG per tablet 1 Tab, 1 Tab, Oral, Nightly, Sheila Stewart M.D., 1 Tab at 01/30/17 2158  •  senna-docusate (PERICOLACE or SENOKOT S) 8.6-50 MG per tablet 1 Tab, 1 Tab, Oral, Q24HRS PRN, Sheila Stewart M.D.  •  lactulose 20 GM/30ML solution 30 mL, 30 mL, Oral, Q24HRS PRN, Sheila  AMY Stewart M.D., 30 mL at 01/28/17 1824  •  bisacodyl (DULCOLAX) suppository 10 mg, 10 mg, Rectal, Q24HRS PRN, Sheila Stewart M.D.  •  fleet enema 133 mL, 1 Each, Rectal, Once PRN, Sheila Stewart M.D.  •  Respiratory Care per Protocol, , Nebulization, Continuous RT, Qi Kelly M.D.  •  Action is required: Protocol 1073 Hypoglycemia has been implemented, , , Once **AND** Protocol 1073 Inclusion Criteria, , , CONTINUOUS **AND** Protocol 1073 NOTIFY, , , Once **AND** Protocol 1073 Initiate protocol immediately if FSBG is less than or equal to 70 mg/dL, , , CONTINUOUS **AND** glucose 4 g chewable tablet 16 g, 16 g, Oral, Q15 MIN PRN **AND** dextrose 50% (D50W) injection 25 mL, 25 mL, Intravenous, Q15 MIN PRN, Qi Kelly M.D.  •  carvedilol (COREG) tablet 3.125 mg, 3.125 mg, Oral, BID WITH MEALS, Qi Kelly M.D., 3.125 mg at 02/01/17 0828  •  gabapentin (NEURONTIN) capsule 300 mg, 300 mg, Oral, QHS, Qi Kelly M.D., 300 mg at 01/31/17 2044  •  metoclopramide (REGLAN) injection 5 mg, 5 mg, Intravenous, Q6HRS PRN, Qi Kelly M.D.  •  albuterol (PROVENTIL) 2.5mg/0.5ml nebulizer solution 2.5 mg, 2.5 mg, Nebulization, Q4H PRN (RT), Qi Kelly M.D.  •  guaiFENesin (ROBITUSSIN) 100 MG/5ML solution 200 mg, 10 mL, Oral, Q4HRS PRN, Qi Kelly M.D.  •  colchicine (COLCRYS) tablet 0.6 mg, 0.6 mg, Oral, DAILY, Sheila Stewart M.D., 0.6 mg at 02/01/17 0828  •  lactulose 20 GM/30ML solution 30 mL, 30 mL, Oral, BID, Sheila Stewart M.D., 30 mL at 02/01/17 0830  •  morphine (pf) 4 mg/ml injection 2-4 mg, 2-4 mg, Intravenous, Q4HRS PRN, Sheila Stewart M.D., 4 mg at 02/01/17 0003  •  oxycodone immediate-release (ROXICODONE) tablet 5 mg, 5 mg, Oral, Q4HRS PRN, 5 mg at 01/30/17 1638 **OR** oxycodone immediate release (ROXICODONE) tablet 10 mg, 10 mg, Oral, Q4HRS PRN, Sheila Stewart M.D., 10 mg at 02/01/17 0920    Outpatient Medications:  Prescriptions prior to admission   Medication Sig Dispense Refill  "Last Dose   • enoxaparin (LOVENOX) 100 MG/ML Solution inj Inject 100 mg as instructed every 12 hours. 3 Syringe 0    • lisinopril (PRINIVIL) 2.5 MG Tab Take 1 Tab by mouth every day. 30 Tab 11    • potassium chloride SA (K-DUR) 20 MEQ Tab CR Take 1 Tab by mouth 2 Times a Day. 60 Tab 11    • metolazone (ZAROXOLYN) 5 MG Tab Take 1 Tab by mouth every day. 30 Tab 11    • furosemide (LASIX) 40 MG Tab Take 1.5 Tabs by mouth 2 Times a Day. 90 Tab 3    • gabapentin (NEURONTIN) 300 MG Cap Take 1 Cap by mouth every bedtime. 30 Cap 3    • carvedilol (COREG) 3.125 MG Tab Take 3.125 mg by mouth 2 times a day, with meals.   6/16/2016 at am   • warfarin (COUMADIN) 3 MG Tab Take 3 mg by mouth every day. Per patient, no variation.   6/15/2016 at pm   • tramadol (ULTRAM) 50 MG Tab Take 50 mg by mouth every four hours as needed for Severe Pain.   6/15/2016 at pm   • spironolactone (ALDACTONE) 25 MG TABS Take 25 mg by mouth 2 times a day.   6/16/2016 at am       Medication Allergy:  No Known Allergies    Family History:  History reviewed. No pertinent family history.    Social History:  Social History     Social History   • Marital Status: Single     Spouse Name: N/A   • Number of Children: N/A   • Years of Education: N/A     Occupational History   • Not on file.     Social History Main Topics   • Smoking status: Never Smoker    • Smokeless tobacco: Not on file   • Alcohol Use: Yes      Comment: 3 beers/week   • Drug Use: No   • Sexual Activity: Not on file     Other Topics Concern   • Not on file     Social History Narrative         Physical Exam:  Vitals/ General Appearance:   Weight/BMI: Body mass index is 38.13 kg/(m^2).  Blood pressure 109/75, pulse 85, temperature 36.6 °C (97.9 °F), resp. rate 18, height 1.626 m (5' 4\"), weight 100.8 kg (222 lb 3.6 oz), SpO2 96 %.  Filed Vitals:    02/01/17 0400 02/01/17 0710 02/01/17 1120 02/01/17 1155   BP: 101/87 115/89 110/80 109/75   Pulse: 110 87  85   Temp: 36.1 °C (97 °F) 36.4 °C (97.5 " °F)  36.6 °C (97.9 °F)   Resp: 18 18  18   Height:       Weight:       SpO2: 100% 96% 98% 96%     Oxygen Therapy:  Pulse Oximetry: 96 %, O2 (LPM): 0, O2 Delivery: None (Room Air)    Constitutional:   Well developed, Well nourished, No acute distress  HENMT:  Normocephalic, Atraumatic, Oropharynx moist mucous membranes, No oral exudates, Nose normal.  No thyromegaly.  Eyes:  EOMI, Conjunctiva normal, No discharge.  Neck:  Normal range of motion, No cervical tenderness,  no JVD.  Cardiovascular:  Normal heart rate, Normal rhythm, III/VI systolic murmur over the base murmurs, No rubs, No gallops.   Extremitites with  intact distal pulses, no cyanosis. 1+ BL LE edema.  Lungs:  Normal breath sounds, breath sounds clear to auscultation bilaterally,  no crackles, no wheezing.   Abdomen: Bowel sounds normal, Soft, No tenderness, No guarding, No rebound, No masses, No hepatosplenomegaly.  Skin: Warm, Dry, No erythema, No rash, no induration.  Neurologic: Alert & oriented x 3, No focal deficits noted, cranial nerves II through X are grossly intact.  Psychiatric: Affect normal, Judgment normal, Mood normal.      MDM (Data Review):     Records reviewed and summarized in current documentation    Lab Data Review:  Recent Labs      01/30/17   0806  01/31/17   0143  02/01/17   0251   WBC  8.7  10.0  9.3   RBC  5.84  5.69  5.81   HEMOGLOBIN  14.7  14.2  14.6   HEMATOCRIT  45.6  44.8  46.1   MCV  78.1*  78.7*  79.3*   MCH  25.2*  25.0*  25.1*   MCHC  32.2*  31.7*  31.7*   RDW  47.8  48.9  49.9   PLATELETCT  128*  137*  142*   MPV  10.5  10.8  11.0     Recent Labs      02/01/17   0251  02/01/17   0651  02/01/17   1151   SODIUM  123*  123*  122*   POTASSIUM  4.1  3.7  3.6   CHLORIDE  89*  91*  86*   CO2  24  24  25   GLUCOSE  136*  132*  142*   BUN  29*  28*  27*   CREATININE  1.10  1.00  0.94   CALCIUM  9.0  8.9  9.4     Recent Labs      01/31/17   0143  01/31/17   1409  02/01/17   0251   INR  1.35*  1.67*  1.89*     Recent Labs       01/30/17   0806   BNPBTYPENAT  1538*     Recent Labs      01/30/17   0806  01/31/17   1918   CPKTOTAL   --   211*   BNPBTYPENAT  1538*   --          Recent Labs      01/31/17   1918   02/01/17   0251  02/01/17   0651  02/01/17   1151   SODIUM  122*   < >  123*  123*  122*   POTASSIUM  4.0   < >  4.1  3.7  3.6   CHLORIDE  88*   < >  89*  91*  86*   CO2  24   < >  24  24  25   GLUCOSE  110*   < >  136*  132*  142*   BUN  28*   < >  29*  28*  27*   CPKTOTAL  211*   --    --    --    --     < > = values in this interval not displayed.     Recent Labs      02/01/17   0251  02/01/17   0651  02/01/17   1151   SODIUM  123*  123*  122*   POTASSIUM  4.1  3.7  3.6   CHLORIDE  89*  91*  86*   CO2  24  24  25   BUN  29*  28*  27*   CREATININE  1.10  1.00  0.94   CALCIUM  9.0  8.9  9.4     Recent Labs      01/31/17   0143  01/31/17   1409  02/01/17   0251   INR  1.35*  1.67*  1.89*     Results for orders placed or performed during the hospital encounter of 02/09/14   2-D ECHO W/DOPPLER (ECHOCARDIOGRAM COMP W/O CONT)   Result Value Ref Range    Eject.Frac. MOD BP 21.68     Eject.Frac. MOD 4C 20.7     Eject.Frac. MOD 2C 20.84          Imaging/Procedures Review:    Chest Xray:  Reviewed        MDM (Assessment and Plan):     Active Hospital Problems    Diagnosis   • Acute CHF (congestive heart failure) (CMS-HCC)-decompensated stage 4 -LVEF 15% [I50.9]     Priority: High   • Cocaine abuse- in past, denies current use [F14.10]     Priority: High   • Acute renal insufficiency [N28.9]     Priority: High   • Elevated liver enzymes [R74.8]     Priority: Medium   • Liver cirrhosis (CMS-HCC) [K74.60]     Priority: Medium   • Pulmonary embolism (CMS-HCC) [I26.99]     Priority: Medium   • Thrombocytopenia (CMS-HCC) [D69.6]     Priority: Medium   • Non compliance w medication regimen [Z91.14]     Priority: Low   • Coagulopathy (CMS-HCC) [D68.9]     Priority: Low   • Obesity [E66.9]     Priority: Low   • LV (left ventricular) mural thrombus  (CMS-[facility]) [I21.3]                 51-year-old gentleman with systolic dysfunction [ejection fraction 15%] with ICD who presented with acute pulmonary edema.  He was found to have left ventricular thrombus  We later found out that the patient is on hospice care/palliative care  The patient currently is on optimal medical therapy for heart failure (Coreg, Lisinopril, Spironolactone and Lasix)  He is close to being Euvolemic  Continue Coumadin with goal INR 2-3 with adequate bridging. INR still sub therputic

## 2017-02-01 NOTE — PROGRESS NOTES
Cardiology Progress Note:    Amr M Mohsen  Date & Time note created:    1/31/2017   4:41 PM       Patient ID:   Name:             Obinna Grande   YOB: 1965  Age:                 51 y.o.  male   MRN:               [id number]                                                               Interval History:    The patient was seen and examined. The chart and telemetry were reviewed. The patient feels good with no complaints     Review of Systems:      Constitutional: Denies fevers, Denies weight changes  Eyes: Denies changes in vision, no eye pain  Ears/Nose/Throat/Mouth: Denies nasal congestion or sore throat   Cardiovascular:  chest pain,  palpitations   Respiratory:  shortness of breath , Denies cough  Gastrointestinal/Hepatic: Denies abdominal pain, nausea, vomiting, diarrhea, constipation or GI bleeding   Genitourinary: Denies dysuria or frequency  Musculoskeletal/Rheum: Denies  joint pain and swelling  Skin: Denies rash  Neurological: Denies headache, confusion, memory loss or focal weakness/parasthesias  Psychiatric: denies mood disorder   Endocrine: Deirdre thyroid problems  Heme/Oncology/Lymph Nodes: Denies enlarged lymph nodes, denies brusing or known bleeding disorder  All other systems were reviewed and are negative (AMA/CMS criteria)                Past Medical History:   Past Medical History   Diagnosis Date   • Ulcer (CMS-HCC)    • CHF (congestive heart failure) (CMS-HCC)    • Hypertension    • Hypertension    • Essential hypertension, benign 8/27/2012   • Other primary cardiomyopathies (CMS-HCC) 8/27/2012   • Cardiomegaly 8/27/2012   • Nonspecific elevation of levels of transaminase or lactic acid dehydrogenase (LDH) 8/27/2012   • Obesity, unspecified 8/27/2012   • Cocaine abuse      Active Hospital Problems    Diagnosis   • Acute CHF (congestive heart failure) (CMS-HCC)-decompensated stage 4 -LVEF 15% [I50.9]     Priority: High   • Cocaine abuse- in past, denies current use  [F14.10]     Priority: High   • Acute renal insufficiency [N28.9]     Priority: High   • Elevated liver enzymes [R74.8]     Priority: Medium   • Liver cirrhosis (CMS-HCC) [K74.60]     Priority: Medium   • Pulmonary embolism (CMS-HCC) [I26.99]     Priority: Medium   • Thrombocytopenia (CMS-HCC) [D69.6]     Priority: Medium   • Non compliance w medication regimen [Z91.14]     Priority: Low   • Coagulopathy (CMS-HCC) [D68.9]     Priority: Low   • Obesity [E66.9]     Priority: Low   • LV (left ventricular) mural thrombus (CMS-HCC) [I21.3]       Past Surgical History:  Past Surgical History   Procedure Laterality Date   • Gastroscopy-endo  4/24/08     Performed by CASPER VILCHIS at ENDOSCOPY [facility] Medications:    Current facility-administered medications:   •  furosemide (LASIX) injection 20 mg, 20 mg, Intravenous, BID DIURETIC, Siddhartha Laughlin M.D., 20 mg at 01/31/17 1624  •  warfarin (COUMADIN) tablet 1 mg, 1 mg, Oral, COUMADIN-ONCE, Marino Hackett, PHARMD  •  lisinopril (PRINIVIL) tablet 2.5 mg, 2.5 mg, Oral, Q DAY, Sheila Stewart M.D., 2.5 mg at 01/31/17 0840  •  guaifenesin LA (MUCINEX) tablet 600 mg, 600 mg, Oral, Q12HRS, Sheila Stewart M.D., 600 mg at 01/31/17 0840  •  spironolactone (ALDACTONE) tablet 25 mg, 25 mg, Oral, Q DAY, Marino Hackett, PHARMD, 25 mg at 01/31/17 0840  •  enoxaparin (LOVENOX) inj 100 mg, 100 mg, Subcutaneous, Q12HRS, Sheila Stewart M.D., 100 mg at 01/31/17 0841  •  MD ALERT... warfarin (COUMADIN) per pharmacy protocol, , Other, PRN, Sheila Stewart M.D.  •  docusate sodium (COLACE) capsule 100 mg, 100 mg, Oral, QAM, Sheila Stewart M.D., 100 mg at 01/31/17 0840  •  senna-docusate (PERICOLACE or SENOKOT S) 8.6-50 MG per tablet 1 Tab, 1 Tab, Oral, Nightly, Sheila Stewart M.D., 1 Tab at 01/30/17 2158  •  senna-docusate (PERICOLACE or SENOKOT S) 8.6-50 MG per tablet 1 Tab, 1 Tab, Oral, Q24HRS PRN, Sheila Stewart M.D.  •  lactulose 20 GM/30ML solution 30 mL, 30 mL,  Oral, Q24HRS PRN, Sheila Stewart M.D., 30 mL at 01/28/17 1824  •  bisacodyl (DULCOLAX) suppository 10 mg, 10 mg, Rectal, Q24HRS PRN, Sheila Stewart M.D.  •  fleet enema 133 mL, 1 Each, Rectal, Once PRN, Sheila Stewart M.D.  •  Respiratory Care per Protocol, , Nebulization, Continuous RT, Qi Kelly M.D.  •  Action is required: Protocol 1073 Hypoglycemia has been implemented, , , Once **AND** Protocol 1073 Inclusion Criteria, , , CONTINUOUS **AND** Protocol 1073 NOTIFY, , , Once **AND** Protocol 1073 Initiate protocol immediately if FSBG is less than or equal to 70 mg/dL, , , CONTINUOUS **AND** glucose 4 g chewable tablet 16 g, 16 g, Oral, Q15 MIN PRN **AND** dextrose 50% (D50W) injection 25 mL, 25 mL, Intravenous, Q15 MIN PRN, Qi Kelly M.D.  •  carvedilol (COREG) tablet 3.125 mg, 3.125 mg, Oral, BID WITH MEALS, Qi Kelly M.D., 3.125 mg at 01/31/17 0840  •  gabapentin (NEURONTIN) capsule 300 mg, 300 mg, Oral, QHS, Qi Kelly M.D., 300 mg at 01/30/17 2158  •  metoclopramide (REGLAN) injection 5 mg, 5 mg, Intravenous, Q6HRS PRN, Qi Kelly M.D.  •  albuterol (PROVENTIL) 2.5mg/0.5ml nebulizer solution 2.5 mg, 2.5 mg, Nebulization, Q4H PRN (RT), Qi Kelly M.D.  •  guaiFENesin (ROBITUSSIN) 100 MG/5ML solution 200 mg, 10 mL, Oral, Q4HRS PRN, Qi Kelly M.D.  •  Pharmacy Consult Request 1 Each, 1 Each, Other, PRN, Sheila P Terry, M.D.  •  colchicine (COLCRYS) tablet 0.6 mg, 0.6 mg, Oral, DAILY, Sheila Stewart M.D., 0.6 mg at 01/30/17 0746  •  lactulose 20 GM/30ML solution 30 mL, 30 mL, Oral, BID, Sheila Stewart M.D., 30 mL at 01/31/17 0843  •  morphine (pf) 4 mg/ml injection 2-4 mg, 2-4 mg, Intravenous, Q4HRS PRN, Sheila Stewart M.D., 4 mg at 01/31/17 1601  •  oxycodone immediate-release (ROXICODONE) tablet 5 mg, 5 mg, Oral, Q4HRS PRN, 5 mg at 01/30/17 1638 **OR** oxycodone immediate release (ROXICODONE) tablet 10 mg, 10 mg, Oral, Q4HRS PRN, Sheila Stewart M.D., 10 mg at 01/31/17  "2522    Outpatient Medications:  Prescriptions prior to admission   Medication Sig Dispense Refill Last Dose   • enoxaparin (LOVENOX) 100 MG/ML Solution inj Inject 100 mg as instructed every 12 hours. 3 Syringe 0    • lisinopril (PRINIVIL) 2.5 MG Tab Take 1 Tab by mouth every day. 30 Tab 11    • potassium chloride SA (K-DUR) 20 MEQ Tab CR Take 1 Tab by mouth 2 Times a Day. 60 Tab 11    • metolazone (ZAROXOLYN) 5 MG Tab Take 1 Tab by mouth every day. 30 Tab 11    • furosemide (LASIX) 40 MG Tab Take 1.5 Tabs by mouth 2 Times a Day. 90 Tab 3    • gabapentin (NEURONTIN) 300 MG Cap Take 1 Cap by mouth every bedtime. 30 Cap 3    • carvedilol (COREG) 3.125 MG Tab Take 3.125 mg by mouth 2 times a day, with meals.   6/16/2016 at am   • warfarin (COUMADIN) 3 MG Tab Take 3 mg by mouth every day. Per patient, no variation.   6/15/2016 at pm   • tramadol (ULTRAM) 50 MG Tab Take 50 mg by mouth every four hours as needed for Severe Pain.   6/15/2016 at pm   • spironolactone (ALDACTONE) 25 MG TABS Take 25 mg by mouth 2 times a day.   6/16/2016 at am       Medication Allergy:  No Known Allergies    Family History:  History reviewed. No pertinent family history.    Social History:  Social History     Social History   • Marital Status: Single     Spouse Name: N/A   • Number of Children: N/A   • Years of Education: N/A     Occupational History   • Not on file.     Social History Main Topics   • Smoking status: Never Smoker    • Smokeless tobacco: Not on file   • Alcohol Use: Yes      Comment: 3 beers/week   • Drug Use: No   • Sexual Activity: Not on file     Other Topics Concern   • Not on file     Social History Narrative         Physical Exam:  Vitals/ General Appearance:   Weight/BMI: Body mass index is 37.75 kg/(m^2).  Blood pressure 95/78, pulse 70, temperature 36.4 °C (97.5 °F), resp. rate 16, height 1.626 m (5' 4\"), weight 99.8 kg (220 lb 0.3 oz), SpO2 96 %.  Filed Vitals:    01/31/17 0400 01/31/17 0800 01/31/17 1200 01/31/17 " 1600   BP: 109/67 100/69 102/82 95/78   Pulse: 86 73 82 70   Temp: 36.1 °C (96.9 °F) 36.4 °C (97.5 °F) 36.4 °C (97.6 °F) 36.4 °C (97.5 °F)   Resp: 18 16 16 16   Height:       Weight:       SpO2: 96% 92% 100% 96%     Oxygen Therapy:  Pulse Oximetry: 96 %, O2 (LPM): 0, O2 Delivery: None (Room Air)    Constitutional:   Well developed, Well nourished, No acute distress  HENMT:  Normocephalic, Atraumatic, Oropharynx moist mucous membranes, No oral exudates, Nose normal.  No thyromegaly.  Eyes:  EOMI, Conjunctiva normal, No discharge.  Neck:  Normal range of motion, No cervical tenderness,  no JVD.  Cardiovascular:  Normal heart rate, Normal rhythm, III/VI systolic murmur over the base murmurs, No rubs, No gallops.   Extremitites with  intact distal pulses, no cyanosis. 1+ BL LE edema.  Lungs:  Normal breath sounds, breath sounds clear to auscultation bilaterally,  no crackles, no wheezing.   Abdomen: Bowel sounds normal, Soft, No tenderness, No guarding, No rebound, No masses, No hepatosplenomegaly.  Skin: Warm, Dry, No erythema, No rash, no induration.  Neurologic: Alert & oriented x 3, No focal deficits noted, cranial nerves II through X are grossly intact.  Psychiatric: Affect normal, Judgment normal, Mood normal.      MDM (Data Review):     Records reviewed and summarized in current documentation    Lab Data Review:  Recent Labs      01/30/17   0352  01/30/17   0806  01/31/17   0143   WBC  7.8  8.7  10.0   RBC  5.51  5.84  5.69   HEMOGLOBIN  14.1  14.7  14.2   HEMATOCRIT  44.2  45.6  44.8   MCV  80.2*  78.1*  78.7*   MCH  25.6*  25.2*  25.0*   MCHC  31.9*  32.2*  31.7*   RDW  51.4*  47.8  48.9   PLATELETCT  123*  128*  137*   MPV  11.3  10.5  10.8     Recent Labs      01/31/17   0143  01/31/17   1134  01/31/17   1409   SODIUM  119*  125*  125*   POTASSIUM  3.8  3.3*  3.6   CHLORIDE  87*  89*  88*   CO2  25  26  31   GLUCOSE  137*  177*  119*   BUN  36*  30*  28*   CREATININE  1.31  0.91  1.05   CALCIUM  9.3  8.9  8.7      Recent Labs      01/30/17   0352  01/31/17   0143  01/31/17   1409   INR  1.35*  1.35*  1.67*     Recent Labs      01/29/17   0148  01/30/17   0806   BNPBTYPENAT  1317*  1538*     Recent Labs      01/29/17   0148  01/30/17   0806   BNPBTYPENAT  1317*  1538*         Recent Labs      01/31/17   0143  01/31/17   1134  01/31/17   1409   SODIUM  119*  125*  125*   POTASSIUM  3.8  3.3*  3.6   CHLORIDE  87*  89*  88*   CO2  25  26  31   GLUCOSE  137*  177*  119*   BUN  36*  30*  28*     Recent Labs      01/31/17   0143  01/31/17   1134  01/31/17   1409   SODIUM  119*  125*  125*   POTASSIUM  3.8  3.3*  3.6   CHLORIDE  87*  89*  88*   CO2  25  26  31   BUN  36*  30*  28*   CREATININE  1.31  0.91  1.05   CALCIUM  9.3  8.9  8.7     Recent Labs      01/30/17   0352  01/31/17   0143  01/31/17   1409   INR  1.35*  1.35*  1.67*     Results for orders placed or performed during the hospital encounter of 02/09/14   2-D ECHO W/DOPPLER (ECHOCARDIOGRAM COMP W/O CONT)   Result Value Ref Range    Eject.Frac. MOD BP 21.68     Eject.Frac. MOD 4C 20.7     Eject.Frac. MOD 2C 20.84          Imaging/Procedures Review:    Chest Xray:  Reviewed        MDM (Assessment and Plan):     Active Hospital Problems    Diagnosis   • Acute CHF (congestive heart failure) (CMS-HCC)-decompensated stage 4 -LVEF 15% [I50.9]     Priority: High   • Cocaine abuse- in past, denies current use [F14.10]     Priority: High   • Acute renal insufficiency [N28.9]     Priority: High   • Elevated liver enzymes [R74.8]     Priority: Medium   • Liver cirrhosis (CMS-HCC) [K74.60]     Priority: Medium   • Pulmonary embolism (CMS-HCC) [I26.99]     Priority: Medium   • Thrombocytopenia (CMS-HCC) [D69.6]     Priority: Medium   • Non compliance w medication regimen [Z91.14]     Priority: Low   • Coagulopathy (CMS-HCC) [D68.9]     Priority: Low   • Obesity [E66.9]     Priority: Low   • LV (left ventricular) mural thrombus (CMS-HCC) [I21.3]       51-year-old gentleman with  systolic dysfunction [ejection fraction 15%] with ICD who presented with acute pulmonary edema.  He was found to have left ventricular thrombus  We later found out that the patient is on hospice care/palliative care  The patient currently is on optimal medical therapy for heart failure (Coreg, Lisinopril, Spironolactone and Lasix)  He is close to being Euvolemic  Continue Coumadin with goal INR 2-3

## 2017-02-02 NOTE — PROGRESS NOTES
Received report from nightshift RN, assumed care of patient. Patient is A&O x 4, patient declines use of bed alarm at this time, despite education related to safety and fall prevention. Patient educated on importance of calling if in need of assistance. Verbalizes understanding. Patient experiencing pain at this time, will medicate according to the MAR. Patient updated on plan of care, voices no concerns at this time. Will continue to monitor for safety and comfort.

## 2017-02-02 NOTE — CARE PLAN
Problem: Knowledge Deficit  Goal: Knowledge of disease process/condition, treatment plan, diagnostic tests, and medications will improve  Intervention: Assess knowledge level of disease process/condition, treatment plan, diagnostic tests, and medications  Pt verbalized understanding of 1200 ml fluid restriction

## 2017-02-02 NOTE — PROGRESS NOTES
CRITICAL LAB SODIUM 119  RECEIVED CALL ROM LAB... HOSPITALIST ADVISED VIA...TORMICHELE .....THIS TIME

## 2017-02-02 NOTE — DISCHARGE PLANNING
Received VM from pt's Irvin GODOY, and attempted to return call back. Call went straight to VM. Message left.

## 2017-02-02 NOTE — PROGRESS NOTES
Inpatient Anticoagulation Service Note    Date: 2/2/2017  Reason for Anticoagulation: Other (Comments), Pulmonary Embolism (LV thrombus )        Hemoglobin Value: 14.6  Hematocrit Value: 46.1  Lab Platelet Value: 142  Target INR: 2.0 to 3.0    INR from last 7 days     Date/Time INR Value    02/02/17 0346 (!)1.85    02/01/17 0251 (!)1.89    01/31/17 1409 (!)1.67    01/31/17 0143 (!)1.35    01/30/17 0352 (!)1.35    01/29/17 1351 (!)1.38    01/27/17 0124 (!)1.62        Dose from last 7 days     Date/Time Dose (mg)    02/02/17 1200 4    02/01/17 1000 3    01/31/17 1600 1    01/30/17 1200 7.5    01/29/17 1300 5        Average Dose (mg): 3 (3mg/day )  Significant Interactions: Other (Comments), Antibiotics (spironolactone )  Bridge Therapy: Yes  Date of Last VTE Event: 01/29/17  Bridge Therapy Start Date: 01/29/17  Days of Overlap Therapy: 4     Comments: INR down a little?  Will increase dose to 4 mg daily.  Continue bridge therapy.  INR again in AM.     Plan:  4 mg daily.    Education Material Provided?: No (Pt on [facility] PTA)  Pharmacist suggested discharge dosing: 3-4 mg daily.  To be confirmed further.     Nikhil Newell, PharmD, BCPS

## 2017-02-02 NOTE — CARE PLAN
Problem: Safety  Goal: Will remain free from injury  Intervention: Provide assistance with mobility  Pt ambulates with walker and standby assist.  Gait steady with walker only.

## 2017-02-02 NOTE — PROGRESS NOTES
Hospital Medicine Progress Note, Adult, Complex               Author: Siddhartha Laughlin   Date & Time created: 2/2/2017  9:13 AM     Interval History: 51 M with severe cardiomyopathy, LVEF 15%, AICD in place, admitted with acute decompensated exacerbation of CHF, started on lasix + spironolactone, strict Is and Os, daily weights and added low dose lisinopril . He also had elevated liver enzymes and [place]LE on CKD. Found to have new LV thrombus, and started on lovenox and coumadin. Na dropped to 119. Hep C Ab reactive. . TSH 4.3, normal T4. Ferritin, cortisol WNL.     2/2/2017 - no overnight events. Remains hemodynamically stable and afebrile. Saturating well on RA. Day 4 of lovenox-coumadin overlap. Na 119. INR 1.85.      > Seen and examined. Feeling better. Less pain in abdominal wall area, more pain in feet today. (+) increased BLE edema, discussed keeping feet elevated. No CP, SOB, nausea, vomiting, diarrhea.       Consultants:  Cardiology-/ Dr.Mohsen ( [facility])  Palliative Medicine    Review of Systems:  ROS  Pertinent positives/negatives as mentioned above.     A complete review of systems was done. All other systems were negative.       Physical Exam:  Physical Exam   Constitutional: He is oriented to person, place, and time. He appears well-developed. No distress.   Obese   HENT:   Head: Normocephalic and atraumatic.   Mouth/Throat: Oropharynx is clear and moist. No oropharyngeal exudate.   Eyes: EOM are normal. Pupils are equal, round, and reactive to light. Right eye exhibits no discharge. Left eye exhibits no discharge. No scleral icterus.   Neck: Normal range of motion. Neck supple. No JVD present. No thyromegaly present.   Cardiovascular: Normal rate and regular rhythm.  Exam reveals no gallop and no friction rub.    No murmur heard.  Pulmonary/Chest: Effort normal and breath sounds normal. No stridor. No respiratory distress. He has no wheezes. He has no rales. He exhibits no  tenderness.   Abdominal: Soft. Bowel sounds are normal. He exhibits no distension. There is no tenderness. There is no rebound and no guarding.   Musculoskeletal: Normal range of motion. He exhibits edema (2-3+ B/L LE) and tenderness (minimal).   Lymphadenopathy:     He has no cervical adenopathy.   Neurological: He is alert and oriented to person, place, and time.   Skin: Skin is warm and dry. Rash (bruising on abdomen over lovenox injections sites) noted. He is not diaphoretic.   Psychiatric: He has a normal mood and affect. His behavior is normal. Thought content normal.   Nursing note and vitals reviewed.      Labs:        Invalid input(s): [id number]  Recent Labs      01/31/17   1918   CPKTOTAL  211*     Recent Labs      02/01/17   0651  02/01/17   1151 02/02/17   0346   SODIUM  123*  122*  119*   POTASSIUM  3.7  3.6  4.0   CHLORIDE  91*  86*  88*   CO2  24  25  21   BUN  28*  27*  29*   CREATININE  1.00  0.94  0.86   CALCIUM  8.9  9.4  9.3     Recent Labs      01/31/17   0143   02/01/17   0251  02/01/17   0651  02/01/17   1151  02/02/17   0346   ALTSGPT  58*   --   52*   --    --    --    ASTSGOT  60*   --   60*   --    --    --    ALKPHOSPHAT  51   --   52   --    --    --    TBILIRUBIN  2.5*   --   2.2*   --    --    --    DBILIRUBIN   --    --   1.2*   --    --    --    GLUCOSE  137*   < >  136*  132*  142*  114*    < > = values in this interval not displayed.     Recent Labs      01/31/17   0143  01/31/17   1409  01/31/17   1920  02/01/17   0251  02/02/17   0346   RBC  5.69   --    --   5.81   --    HEMOGLOBIN  14.2   --    --   14.6   --    HEMATOCRIT  44.8   --    --   46.1   --    PLATELETCT  137*   --    --   142*   --    PROTHROMBTM  17.1*  20.2*   --   22.3*  21.9*   INR  1.35*  1.67*   --   1.89*  1.85*   FERRITIN   --    --   37.5   --    --      Recent Labs      01/31/17   0143  02/01/17   0251   WBC  10.0  9.3   NEUTSPOLYS  59.20  55.90   LYMPHOCYTES  21.20*  24.10   MONOCYTES  11.30  12.10    EOSINOPHILS  6.70  6.50   BASOPHILS  0.90  0.80   ASTSGOT  60*  60*   ALTSGPT  58*  52*   ALKPHOSPHAT  51  52   TBILIRUBIN  2.5*  2.2*           Hemodynamics:  Temp (24hrs), Av.3 °C (97.3 °F), Min:36.1 °C (96.9 °F), Max:36.6 °C (97.9 °F)  Temperature: 36.1 °C (97 °F)  Pulse  Av.8  Min: 64  Max: 110  Blood Pressure: 111/79 mmHg     Respiratory:    Respiration: 18, Pulse Oximetry: 95 %     Work Of Breathing / Effort: Mild  RUL Breath Sounds: Clear, RML Breath Sounds: Diminished, RLL Breath Sounds: Diminished, BHAVNA Breath Sounds: Clear, LLL Breath Sounds: Diminished  Fluids:    Intake/Output Summary (Last 24 hours) at 17 0913  Last data filed at 17 0200   Gross per 24 hour   Intake    900 ml   Output   1475 ml   Net   -575 ml     Weight: 101.5 kg (223 lb 12.3 oz)  GI/Nutrition:  Orders Placed This Encounter   Procedures   • DIET ORDER     Standing Status: Standing      Number of Occurrences: 1      Standing Expiration Date:      Order Specific Question:  Diet:     Answer:  Cardiac [6]      Comments:  hepatic     Order Specific Question:  Diet:     Answer:  2 Gram Sodium [7]     Order Specific Question:  Consistency/Fluid modifications:     Answer:  720 ml Fluid Restriction [6]     Medical Decision Making, by Problem:  Active Hospital Problems    Diagnosis   • *Acute CHF (congestive heart failure) (CMS-HCC)-decompensated stage 4 -LVEF 15% [I50.9]  - remains with significant edema. Increase lasix to 40mg IV BID. Continue PO aldactone.  Continue fluid restriction, and monitor strict I&O and daily weights. Counseled to elevate leg.Will get him PETTY hose stockings.   - continue coreg, lisinopril.  - continue telemetry.   - Outpt follow up with - Cardiology in 1-2 weeks after discharge     • Cocaine abuse- in past, denies current use [F14.10]     • Acute renal insufficiency [N28.9]  - resolved. No hydronephrosis on renal U/S  - follow renal function while on IV lasix. BMP in AM.     • Elevated liver  enzymes [R74.8]  - Likely due to congestive hepatopathy from acute CHF, but HCV Ab reactive.   - diuresis as above.   - await HCV viral load. HIV negative.       • Liver cirrhosis (CMS-HCC) [K74.60]?NAFLD  - HIDA neg     • Pulmonary embolism (CMS-HCC) [I26.99]  - Hx of in 6/2016. DVT study neg.  - Had completed treatment in past, but now on Lovenox + coumadin bridging for new LV thrombus     • Thrombocytopenia (CMS-HCC) [D69.6]  - stable. Monitor.      • Non compliance w medication regimen [Z91.14]  - Counseled.   - Need to discuss with family to help him withs med since patient is forget full and also unable to arrange [facility].       Hyponatremia:  - Stable. Likely hypervolemic hyponatremia.   - lasix as above.  Start salt tablets. Continue fluid restriction. BMP in AM.      • LV mural Thrombus:  - continue Lovenox bridge to coumadin. Needs 5 days overlap (Day 4 today), and until INRtherapeutic x 2 days before he can go home given his non compliance/no insurance and unable to arrange home health     • Obesity [E66.9]     Code status: DNAR      Radiology images reviewed, Labs reviewed and Medications reviewed  Holt catheter: No Holt      DVT Prophylaxis: Enoxaparin (Lovenox)    Ulcer prophylaxis: Yes  Antibiotics: Treating active infection/contamination beyond 24 hours perioperative coverage  Assessed for rehab: Patient was assess for and/or received rehabilitation services during this hospitalization

## 2017-02-03 NOTE — PROGRESS NOTES
Cardiology Progress Note:    Amr M Mohsen  Date & Time note created:    2/2/2017   6:06 PM       Patient ID:   Name:             Obinna Grande   YOB: 1965  Age:                 51 y.o.  male   MRN:               [id number]                                                               Interval History:    The patient was seen and examined. The chart and telemetry were reviewed. The patient feels good with no complaints     Review of Systems:       Constitutional: Denies fevers, Denies weight changes  Eyes: Denies changes in vision, no eye pain  Ears/Nose/Throat/Mouth: Denies nasal congestion or sore throat    Cardiovascular:  chest pain,  palpitations    Respiratory:  shortness of breath , Denies cough  Gastrointestinal/Hepatic: Denies abdominal pain, nausea, vomiting, diarrhea, constipation or GI bleeding    Genitourinary: Denies dysuria or frequency  Musculoskeletal/Rheum: Denies  joint pain and swelling  Skin: Denies rash  Neurological: Denies headache, confusion, memory loss or focal weakness/parasthesias  Psychiatric: denies mood disorder    Endocrine: Deirdre thyroid problems  Heme/Oncology/Lymph Nodes: Denies enlarged lymph nodes, denies brusing or known bleeding disorder  All other systems were reviewed and are negative (AMA/CMS criteria)            Past Medical History:   Past Medical History   Diagnosis Date   • Ulcer (CMS-HCC)    • CHF (congestive heart failure) (CMS-HCC)    • Hypertension    • Hypertension    • Essential hypertension, benign 8/27/2012   • Other primary cardiomyopathies (CMS-HCC) 8/27/2012   • Cardiomegaly 8/27/2012   • Nonspecific elevation of levels of transaminase or lactic acid dehydrogenase (LDH) 8/27/2012   • Obesity, unspecified 8/27/2012   • Cocaine abuse      Active Hospital Problems    Diagnosis   • Acute CHF (congestive heart failure) (CMS-HCC)-decompensated stage 4 -LVEF 15% [I50.9]     Priority: High   • Cocaine abuse- in past, denies current use  [F14.10]     Priority: High   • Acute renal insufficiency [N28.9]     Priority: High   • Elevated liver enzymes [R74.8]     Priority: Medium   • Liver cirrhosis (CMS-HCC) [K74.60]     Priority: Medium   • Pulmonary embolism (CMS-HCC) [I26.99]     Priority: Medium   • Thrombocytopenia (CMS-HCC) [D69.6]     Priority: Medium   • Non compliance w medication regimen [Z91.14]     Priority: Low   • Coagulopathy (CMS-HCC) [D68.9]     Priority: Low   • Obesity [E66.9]     Priority: Low   • LV (left ventricular) mural thrombus (CMS-HCC) [I21.3]       Past Surgical History:  Past Surgical History   Procedure Laterality Date   • Gastroscopy-endo  4/24/08     Performed by CASPER VILCHIS at ENDOSCOPY [facility] Medications:    Current facility-administered medications:   •  sodium chloride (SALT) tablet 1 g, 1 g, Oral, TID WITH MEALS, Siddhartha Laughlin M.D., 1 g at 02/02/17 1746  •  furosemide (LASIX) injection 40 mg, 40 mg, Intravenous, BID DIURETIC, Siddhartha Laughlin M.D., 40 mg at 02/02/17 1745  •  warfarin (COUMADIN) tablet 4 mg, 4 mg, Oral, COUMADIN-DAILY, Nikhil Newell, PHARMD, 4 mg at 02/02/17 1746  •  lisinopril (PRINIVIL) tablet 2.5 mg, 2.5 mg, Oral, Q DAY, Sheila Stewart M.D., 2.5 mg at 02/02/17 0807  •  guaifenesin LA (MUCINEX) tablet 600 mg, 600 mg, Oral, Q12HRS, Sheila Stewart M.D., 600 mg at 02/02/17 0806  •  spironolactone (ALDACTONE) tablet 25 mg, 25 mg, Oral, Q DAY, Marino Hackett, PHARMD, 25 mg at 02/02/17 0806  •  enoxaparin (LOVENOX) inj 100 mg, 100 mg, Subcutaneous, Q12HRS, Sheila Stewart M.D., 100 mg at 02/02/17 0806  •  MD ALERT... warfarin (COUMADIN) per pharmacy protocol, , Other, PRN, Sheila Stewart M.D.  •  docusate sodium (COLACE) capsule 100 mg, 100 mg, Oral, QAM, Sheila Stewart M.D., 100 mg at 02/02/17 0806  •  senna-docusate (PERICOLACE or SENOKOT S) 8.6-50 MG per tablet 1 Tab, 1 Tab, Oral, Nightly, Sheila Stewart M.D., 1 Tab at 02/01/17 2216  •  senna-docusate (PERICOLACE or  SENOKOT S) 8.6-50 MG per tablet 1 Tab, 1 Tab, Oral, Q24HRS PRN, Sheila Stewart M.D.  •  lactulose 20 GM/30ML solution 30 mL, 30 mL, Oral, Q24HRS PRN, Sheila Stewart M.D., 30 mL at 01/28/17 1824  •  bisacodyl (DULCOLAX) suppository 10 mg, 10 mg, Rectal, Q24HRS PRN, Sheila Stewart M.D.  •  fleet enema 133 mL, 1 Each, Rectal, Once PRN, Sheila Stewart M.D.  •  Respiratory Care per Protocol, , Nebulization, Continuous RT, Qi Kelly M.D.  •  Action is required: Protocol 1073 Hypoglycemia has been implemented, , , Once **AND** Protocol 1073 Inclusion Criteria, , , CONTINUOUS **AND** Protocol 1073 NOTIFY, , , Once **AND** Protocol 1073 Initiate protocol immediately if FSBG is less than or equal to 70 mg/dL, , , CONTINUOUS **AND** glucose 4 g chewable tablet 16 g, 16 g, Oral, Q15 MIN PRN **AND** dextrose 50% (D50W) injection 25 mL, 25 mL, Intravenous, Q15 MIN PRN, Qi Kelly M.D.  •  carvedilol (COREG) tablet 3.125 mg, 3.125 mg, Oral, BID WITH MEALS, Qi Kelly M.D., 3.125 mg at 02/02/17 1746  •  gabapentin (NEURONTIN) capsule 300 mg, 300 mg, Oral, QHS, Qi Kelly M.D., 300 mg at 02/01/17 2216  •  metoclopramide (REGLAN) injection 5 mg, 5 mg, Intravenous, Q6HRS PRN, Qi Kelly M.D.  •  albuterol (PROVENTIL) 2.5mg/0.5ml nebulizer solution 2.5 mg, 2.5 mg, Nebulization, Q4H PRN (RT), Qi Kelly M.D.  •  guaiFENesin (ROBITUSSIN) 100 MG/5ML solution 200 mg, 10 mL, Oral, Q4HRS PRN, Qi Kelly M.D.  •  colchicine (COLCRYS) tablet 0.6 mg, 0.6 mg, Oral, DAILY, Sheila Stewart M.D., 0.6 mg at 02/02/17 0806  •  lactulose 20 GM/30ML solution 30 mL, 30 mL, Oral, BID, Sheila Stewart M.D., 30 mL at 02/02/17 0805  •  morphine (pf) 4 mg/ml injection 2-4 mg, 2-4 mg, Intravenous, Q4HRS PRN, Sheila Stewart M.D., 4 mg at 02/01/17 0003  •  oxycodone immediate-release (ROXICODONE) tablet 5 mg, 5 mg, Oral, Q4HRS PRN, 5 mg at 02/02/17 1215 **OR** oxycodone immediate release (ROXICODONE) tablet 10 mg, 10 mg,  "Oral, Q4HRS PRN, Sheila Stewart M.D., 10 mg at 02/02/17 1501    Outpatient Medications:  Prescriptions prior to admission   Medication Sig Dispense Refill Last Dose   • enoxaparin (LOVENOX) 100 MG/ML Solution inj Inject 100 mg as instructed every 12 hours. 3 Syringe 0    • lisinopril (PRINIVIL) 2.5 MG Tab Take 1 Tab by mouth every day. 30 Tab 11    • potassium chloride SA (K-DUR) 20 MEQ Tab CR Take 1 Tab by mouth 2 Times a Day. 60 Tab 11    • metolazone (ZAROXOLYN) 5 MG Tab Take 1 Tab by mouth every day. 30 Tab 11    • furosemide (LASIX) 40 MG Tab Take 1.5 Tabs by mouth 2 Times a Day. 90 Tab 3    • gabapentin (NEURONTIN) 300 MG Cap Take 1 Cap by mouth every bedtime. 30 Cap 3    • carvedilol (COREG) 3.125 MG Tab Take 3.125 mg by mouth 2 times a day, with meals.   6/16/2016 at am   • warfarin (COUMADIN) 3 MG Tab Take 3 mg by mouth every day. Per patient, no variation.   6/15/2016 at pm   • tramadol (ULTRAM) 50 MG Tab Take 50 mg by mouth every four hours as needed for Severe Pain.   6/15/2016 at pm   • spironolactone (ALDACTONE) 25 MG TABS Take 25 mg by mouth 2 times a day.   6/16/2016 at am       Medication Allergy:  No Known Allergies    Family History:  History reviewed. No pertinent family history.    Social History:  Social History     Social History   • Marital Status: Single     Spouse Name: N/A   • Number of Children: N/A   • Years of Education: N/A     Occupational History   • Not on file.     Social History Main Topics   • Smoking status: Never Smoker    • Smokeless tobacco: Not on file   • Alcohol Use: Yes      Comment: 3 beers/week   • Drug Use: No   • Sexual Activity: Not on file     Other Topics Concern   • Not on file     Social History Narrative         Physical Exam:  Vitals/ General Appearance:   Weight/BMI: Body mass index is 38.39 kg/(m^2).  Blood pressure 101/68, pulse 89, temperature 36 °C (96.8 °F), resp. rate 18, height 1.626 m (5' 4\"), weight 101.5 kg (223 lb 12.3 oz), SpO2 97 %.  Filed Vitals: "    02/02/17 0400 02/02/17 0732 02/02/17 1142 02/02/17 1537   BP: 103/88 111/79 97/75 101/68   Pulse: 80 91 88 89   Temp: 36.1 °C (97 °F) 36.1 °C (97 °F) 36.1 °C (97 °F) 36 °C (96.8 °F)   Resp: 18 18 18 18   Height:       Weight:       SpO2: 95% 95% 98% 97%     Oxygen Therapy:  Pulse Oximetry: 97 %, O2 (LPM): 2, O2 Delivery: Mask    Constitutional:   Well developed, Well nourished, No acute distress  HENMT:  Normocephalic, Atraumatic, Oropharynx moist mucous membranes, No oral exudates, Nose normal.  No thyromegaly.  Eyes:  EOMI, Conjunctiva normal, No discharge.  Neck:  Normal range of motion, No cervical tenderness,  no JVD.  Cardiovascular:  Normal heart rate, Normal rhythm, III/VI systolic murmur over the base murmurs, No rubs, No gallops.   Extremitites with  intact distal pulses, no cyanosis. 1+ BL LE edema.  Lungs:  Normal breath sounds, breath sounds clear to auscultation bilaterally,  no crackles, no wheezing.   Abdomen: Bowel sounds normal, Soft, No tenderness, No guarding, No rebound, No masses, No hepatosplenomegaly.  Skin: Warm, Dry, No erythema, No rash, no induration.  Neurologic: Alert & oriented x 3, No focal deficits noted, cranial nerves II through X are grossly intact.  Psychiatric: Affect normal, Judgment normal, Mood normal.      MDM (Data Review):     Records reviewed and summarized in current documentation    Lab Data Review:  Recent Labs      01/31/17   0143  02/01/17   0251   WBC  10.0  9.3   RBC  5.69  5.81   HEMOGLOBIN  14.2  14.6   HEMATOCRIT  44.8  46.1   MCV  78.7*  79.3*   MCH  25.0*  25.1*   MCHC  31.7*  31.7*   RDW  48.9  49.9   PLATELETCT  137*  142*   MPV  10.8  11.0     Recent Labs      02/01/17   1151  02/02/17   0346  02/02/17   1037   SODIUM  122*  119*  122*   POTASSIUM  3.6  4.0  3.6   CHLORIDE  86*  88*  89*   CO2  25  21  26   GLUCOSE  142*  114*  143*   BUN  27*  29*  25*   CREATININE  0.94  0.86  0.81   CALCIUM  9.4  9.3  8.8     Recent Labs      01/31/17   1409   02/01/17   0251  02/02/17   0346   INR  1.67*  1.89*  1.85*         Recent Labs      01/31/17 1918   CPKTOTAL  211*         Recent Labs      01/31/17   1918   02/01/17   1151  02/02/17   0346  02/02/17   1037   SODIUM  122*   < >  122*  119*  122*   POTASSIUM  4.0   < >  3.6  4.0  3.6   CHLORIDE  88*   < >  86*  88*  89*   CO2  24   < >  25  21  26   GLUCOSE  110*   < >  142*  114*  143*   BUN  28*   < >  27*  29*  25*   CPKTOTAL  211*   --    --    --    --     < > = values in this interval not displayed.     Recent Labs      02/01/17   1151  02/02/17   0346  02/02/17   1037   SODIUM  122*  119*  122*   POTASSIUM  3.6  4.0  3.6   CHLORIDE  86*  88*  89*   CO2  25  21  26   BUN  27*  29*  25*   CREATININE  0.94  0.86  0.81   CALCIUM  9.4  9.3  8.8     Recent Labs      01/31/17   1409  02/01/17   0251  02/02/17   0346   INR  1.67*  1.89*  1.85*     Results for orders placed or performed during the hospital encounter of 02/09/14   2-D ECHO W/DOPPLER (ECHOCARDIOGRAM COMP W/O CONT)   Result Value Ref Range    Eject.Frac. MOD BP 21.68     Eject.Frac. MOD 4C 20.7     Eject.Frac. MOD 2C 20.84          Imaging/Procedures Review:    Chest Xray:  Reviewed        MDM (Assessment and Plan):     Active Hospital Problems    Diagnosis   • Acute CHF (congestive heart failure) (CMS-HCC)-decompensated stage 4 -LVEF 15% [I50.9]     Priority: High   • Cocaine abuse- in past, denies current use [F14.10]     Priority: High   • Acute renal insufficiency [N28.9]     Priority: High   • Elevated liver enzymes [R74.8]     Priority: Medium   • Liver cirrhosis (CMS-HCC) [K74.60]     Priority: Medium   • Pulmonary embolism (CMS-HCC) [I26.99]     Priority: Medium   • Thrombocytopenia (CMS-HCC) [D69.6]     Priority: Medium   • Non compliance w medication regimen [Z91.14]     Priority: Low   • Coagulopathy (CMS-HCC) [D68.9]     Priority: Low   • Obesity [E66.9]     Priority: Low   • LV (left ventricular) mural thrombus (CMS-[facility]) [I21.3]              51-year-old gentleman with systolic dysfunction [ejection fraction 15%] with ICD who presented with acute pulmonary edema.  He was found to have left ventricular thrombus  We later found out that the patient is on hospice care/palliative care  The patient currently is on optimal medical therapy for heart failure (Coreg, Lisinopril, Spironolactone and Lasix)  He is close to being Euvolemic  Continue Coumadin with goal INR 2-3 with adequate bridging. INR still sub therputic

## 2017-02-03 NOTE — CARE PLAN
Problem: Fluid Volume:  Goal: Will maintain balanced intake and output  Patient with good UO following lasix injection. Patient educated on importance of fluid restriction.    Problem: Pain Management  Goal: Pain level will decrease to patient’s comfort goal  Pain in bilateral feet, medicated according to the MAR.

## 2017-02-03 NOTE — PROGRESS NOTES
Received report from nightshift RN, assumed care of patient. Patient is resting calmly in bed, appears to be comfortable. Bed alarm on, bed locked in lowest position, call light and personal belongings within reach. Will continue to monitor for safety and comfort.

## 2017-02-03 NOTE — CARE PLAN
Problem: Safety  Goal: Will remain free from injury  Intervention: Educate patient and significant other/support system about adaptive mobility strategies and safe transfers  Pt educated on fall precautions. Bed in low, locked position. Call light within reach. Treaded socks on pt. Hourly rounding in place.      Problem: Pain Management  Goal: Pain level will decrease to patient’s comfort goal  Intervention: Follow pain managment plan developed in collaboration with patient and Interdisciplinary Team  Medicating per MAR.

## 2017-02-03 NOTE — PROGRESS NOTES
Report received. Pt care assumed. Assessment performed. Pt AOx4. Pt laying supine in bed. Pt c/o leg pain 8/10 and no signs of distress. Medicating per MAR. Pt ambulating hallway tolerating well. No assistance needed. Bed in low, locked position. Call light within reach. Treaded socks on pt.  Hourly rounding in place.

## 2017-02-03 NOTE — PROGRESS NOTES
"Hospital Medicine Progress Note, Adult, Complex               Author: Siddhartha Laughlin   Date & Time created: 2/3/2017  8:26 AM     Interval History: 51 M with severe cardiomyopathy, LVEF 15%, AICD in place, admitted with acute decompensated exacerbation of CHF, started on lasix + spironolactone, strict Is and Os, daily weights and added low dose lisinopril . He also had elevated liver enzymes and [place] on CKD. Found to have new LV thrombus, and started on lovenox and coumadin. Na dropped to 119. Hep C Ab reactive. HIV nonreactive. . TSH 4.3, normal T4. Ferritin, cortisol WNL.     2/3/2017 - uneventful night. VSS. Afebrile. Stable on RA, saturating well. Na improved to 123. K, Crea WNL. INR 1.69. Await HCV viral load. Day 5 lovenox/coumadin overlap.    > Seen and examined. (+) epistaxis after blowing his nose. No CP, SOB, nausea, vomiting. (+) leg swelling improved with leg elevation and FAREED reports fareed hose helps \"a little bit\".        Consultants:  Cardiology-/ Dr.Mohsen ( [facility] Cardiology)  Palliative Medicine    Review of Systems:  ROS  Pertinent positives/negatives as mentioned above.     A complete review of systems was done. All other systems were negative.       Physical Exam:  Physical Exam   Constitutional: He is oriented to person, place, and time. He appears well-developed. No distress.   Obese   HENT:   Head: Normocephalic and atraumatic.   Mouth/Throat: Oropharynx is clear and moist. No oropharyngeal exudate.   Dry blood in both nares   Eyes: EOM are normal. Pupils are equal, round, and reactive to light. Right eye exhibits no discharge. Left eye exhibits no discharge. No scleral icterus.   Neck: Normal range of motion. Neck supple. No JVD present. No thyromegaly present.   Cardiovascular: Normal rate and regular rhythm.  Exam reveals no gallop and no friction rub.    No murmur heard.  Pulmonary/Chest: Effort normal and breath sounds normal. No stridor. No respiratory distress. He has " no wheezes. He has no rales. He exhibits no tenderness.   Abdominal: Soft. Bowel sounds are normal. He exhibits no distension. There is no tenderness. There is no rebound and no guarding.   Musculoskeletal: Normal range of motion. He exhibits edema (2-3+ B/L LE) and tenderness (minimal).   Lymphadenopathy:     He has no cervical adenopathy.   Neurological: He is alert and oriented to person, place, and time.   Skin: Skin is warm and dry. Rash (bruising on abdomen over lovenox injections sites) noted. He is not diaphoretic.   Psychiatric: He has a normal mood and affect. His behavior is normal. Thought content normal.   Nursing note and vitals reviewed.      Labs:        Invalid input(s): [id number]  Recent Labs      01/31/17 1918   CPKTOTAL  211*     Recent Labs      02/02/17 0346 02/02/17 1037 02/03/17 0218   SODIUM  119*  122*  123*   POTASSIUM  4.0  3.6  3.8   CHLORIDE  88*  89*  88*   CO2  21  26  25   BUN  29*  25*  27*   CREATININE  0.86  0.81  0.80   CALCIUM  9.3  8.8  9.2     Recent Labs      02/01/17   0251 02/02/17 0346  02/02/17   1037  02/03/17 0218   ALTSGPT  52*   --    --    --    --    ASTSGOT  60*   --    --    --    --    ALKPHOSPHAT  52   --    --    --    --    TBILIRUBIN  2.2*   --    --    --    --    DBILIRUBIN  1.2*   --    --    --    --    GLUCOSE  136*   < >  114*  143*  113*    < > = values in this interval not displayed.     Recent Labs      01/31/17   1920 02/01/17 0251 02/02/17 0346 02/03/17 0218   RBC   --   5.81   --    --    HEMOGLOBIN   --   14.6   --    --    HEMATOCRIT   --   46.1   --    --    PLATELETCT   --   142*   --    --    PROTHROMBTM   --   22.3*  21.9*  20.4*   INR   --   1.89*  1.85*  1.69*   FERRITIN  37.5   --    --    --      Recent Labs      02/01/17 0251   WBC  9.3   NEUTSPOLYS  55.90   LYMPHOCYTES  24.10   MONOCYTES  12.10   EOSINOPHILS  6.50   BASOPHILS  0.80   ASTSGOT  60*   ALTSGPT  52*   ALKPHOSPHAT  52   TBILIRUBIN  2.2*            Hemodynamics:  Temp (24hrs), Av.1 °C (96.9 °F), Min:36 °C (96.8 °F), Max:36.1 °C (97 °F)  Temperature: 36.1 °C (97 °F)  Pulse  Av.2  Min: 64  Max: 110  Blood Pressure: 100/74 mmHg     Respiratory:    Respiration: 18, Pulse Oximetry: 95 %     Work Of Breathing / Effort: Mild  RUL Breath Sounds: Clear, RML Breath Sounds: Diminished, RLL Breath Sounds: Diminished, BHAVNA Breath Sounds: Clear, LLL Breath Sounds: Diminished  Fluids:    Intake/Output Summary (Last 24 hours) at 17 0826  Last data filed at 17 0753   Gross per 24 hour   Intake    640 ml   Output   1800 ml   Net  -1160 ml     Weight: 101.4 kg (223 lb 8.7 oz)  GI/Nutrition:  Orders Placed This Encounter   Procedures   • DIET ORDER     Standing Status: Standing      Number of Occurrences: 1      Standing Expiration Date:      Order Specific Question:  Diet:     Answer:  Cardiac [6]      Comments:  hepatic     Order Specific Question:  Diet:     Answer:  2 Gram Sodium [7]     Order Specific Question:  Consistency/Fluid modifications:     Answer:  720 ml Fluid Restriction [6]     Medical Decision Making, by Problem:  Active Hospital Problems    Diagnosis   • *Acute CHF (congestive heart failure) (CMS-HCC)-decompensated stage 4 -LVEF 15% [I50.9]  - Continue lasix 40mg IV BID. Continue PO aldactone.  Continue fluid restriction, and monitor strict I&O and daily weights. Counseled to elevate leg, and maintain PETTY hose stockings.   - continue coreg, lisinopril.  - continue telemetry.   - Outpt follow up with - Cardiology in 1-2 weeks after discharge     • Cocaine abuse- in past, denies current use [F14.10]     • Acute renal insufficiency [N28.9]  - resolved. No hydronephrosis on renal U/S  - follow renal function while on IV lasix. BMP in AM.     • Elevated liver enzymes [R74.8]  - Likely due to congestive hepatopathy from acute CHF, but HCV Ab reactive.   - diuresis as above.   - await HCV viral load. HIV negative.       • Liver  cirrhosis (CMS-HCC) [K74.60]?NAFLD  - HIDA neg     • Pulmonary embolism (CMS-HCC) [I26.99]  - Hx of in 6/2016. DVT study neg.  - Had completed treatment in past, but now on Lovenox + coumadin bridging for new LV thrombus     • Thrombocytopenia (CMS-HCC) [D69.6]  - stable. Monitor.      • Non compliance w medication regimen [Z91.14]  - Counseled.   - Need to discuss with family to help him withs med since patient is forget full and also unable to arrange C.       Hyponatremia:  - Stable. Likely hypervolemic hyponatremia.   - lasix as above.  Start salt tablets. Continue fluid restriction. BMP in AM.      • LV mural Thrombus:  - continue Lovenox bridge to coumadin. Completed 5 days overlap. Needs 2 days of therapeutic INR  before he can go home given his non compliance/no insurance and unable to arrange home health     • Obesity [E66.9]     Code status: DNAR      Radiology images reviewed, Labs reviewed and Medications reviewed  Holt catheter: No Holt      DVT Prophylaxis: Enoxaparin (Lovenox)    Ulcer prophylaxis: Yes  Antibiotics: Treating active infection/contamination beyond 24 hours perioperative coverage  Assessed for rehab: Patient was assess for and/or received rehabilitation services during this hospitalization

## 2017-02-03 NOTE — PROGRESS NOTES
Inpatient Anticoagulation Service Note    Date: 2/3/2017  Reason for Anticoagulation: Other (Comments), Pulmonary Embolism (LV thrombus )        Hemoglobin Value: 14.6  Hematocrit Value: 46.1  Lab Platelet Value: 142  Target INR: 2.0 to 3.0    INR from last 7 days     Date/Time INR Value    17 0218 (!)1.69    17 0346 (!)1.85    17 0251 (!)1.89    17 1409 (!)1.67    17 0143 (!)1.35    17 0352 (!)1.35    17 1351 (!)1.38        Dose from last 7 days     Date/Time Dose (mg)    17 1400 5    17 1200 4    17 1000 3    17 1600 1    17 1200 7.5    17 1300 5        Average Dose (mg): 3 (3mg/day )  Significant Interactions: Other (Comments), Antibiotics (spironolactone )  Bridge Therapy: Yes  Date of Last VTE Event: 17  Bridge Therapy Start Date: 17  Days of Overlap Therapy: 5     Comments: INR down?  Receiving lactulose.  Increase dose to warfarin 5 mg daily.  Continue bridge therapy (enoxaparin) until INR > 2 for at least 24 hours.  INR again in AM.    Plan:  5 mg tonight  Education Material Provided?: No (Pt on [facility] PTA)  Pharmacist suggested discharge dosin mg daily?     Nikhil Newell, PharmD, BCPS

## 2017-02-03 NOTE — CARE PLAN
Problem: Knowledge Deficit  Goal: Knowledge of disease process/condition, treatment plan, diagnostic tests, and medications will improve  Patient updated on POC. Heart failure education provided. Reinforcement necessary.    Problem: Pain Management  Goal: Pain level will decrease to patient’s comfort goal  Pain in bilateral lower extremities. Medicated according to the MAR.    Problem: Mobility  Goal: Risk for activity intolerance will decrease  Patient is ambulatory, steady on feet.

## 2017-02-04 PROBLEM — B19.20 HEPATITIS C VIRUS INFECTION: Status: ACTIVE | Noted: 2017-01-01

## 2017-02-04 NOTE — PROGRESS NOTES
Patient complaining of occasional muscle spasms in back. MD notified during rounds, orders received.

## 2017-02-04 NOTE — CARE PLAN
Problem: Fluid Volume:  Goal: Will maintain balanced intake and output  I&O measured q shift. Lasix increased to TID r/t increased abdominal swelling.

## 2017-02-04 NOTE — PROGRESS NOTES
Hospital Medicine Progress Note, Adult, Complex               Author: Siddhartha Laughlin   Date & Time created: 2/4/2017  8:39 AM     Interval History: 51 M with severe cardiomyopathy, LVEF 15%, AICD in place, admitted with acute decompensated exacerbation of CHF, started on lasix + spironolactone, strict Is and Os, daily weights and added low dose lisinopril . He also had elevated liver enzymes and ORVILLE on CKD. Found to have new LV thrombus, and started on lovenox and coumadin. Na dropped to 119. Hep C Ab reactive. HIV nonreactive. . TSH 4.3, normal T4. Ferritin, cortisol WNL.     2/4/2017 - no overnight events. Remains hemodynamically stable and afebrile. Saturating well on RA. NA stable at 121. INR 1.98. HC viral load 680,000. Completed 5 days of lovenox/coumadin overlap.    > Seen and examined. No sob, nausea, vomiting, coughing. (+) abdomen feels more distended. (+) leg edema      Consultants:  Cardiology-/ Dr.Mohsen ( [facility])  Palliative Medicine    Review of Systems:  ROS  Pertinent positives/negatives as mentioned above.     A complete review of systems was done. All other systems were negative.       Physical Exam:  Physical Exam   Constitutional: He is oriented to person, place, and time. He appears well-developed. No distress.   Obese   HENT:   Head: Normocephalic and atraumatic.   Mouth/Throat: Oropharynx is clear and moist. No oropharyngeal exudate.   Dry blood in both nares   Eyes: EOM are normal. Pupils are equal, round, and reactive to light. Right eye exhibits no discharge. Left eye exhibits no discharge. No scleral icterus.   Neck: Normal range of motion. Neck supple. No JVD present. No thyromegaly present.   Cardiovascular: Normal rate and regular rhythm.  Exam reveals no gallop and no friction rub.    No murmur heard.  Pulmonary/Chest: Effort normal and breath sounds normal. No stridor. No respiratory distress. He has no wheezes. He has no rales. He exhibits no  tenderness.   Abdominal: Soft. Bowel sounds are normal. He exhibits distension. There is no tenderness. There is no rebound and no guarding.   Musculoskeletal: Normal range of motion. He exhibits edema (2-3+ B/L LE) and tenderness (minimal).   Lymphadenopathy:     He has no cervical adenopathy.   Neurological: He is alert and oriented to person, place, and time.   Skin: Skin is warm and dry. Rash (bruising on abdomen over lovenox injections sites) noted. He is not diaphoretic.   Psychiatric: He has a normal mood and affect. His behavior is normal. Thought content normal.   Nursing note and vitals reviewed.      Labs:        Invalid input(s): [id number]      Recent Labs      17   1037  17   0218  17   0256   SODIUM  122*  123*  121*   POTASSIUM  3.6  3.8  4.4   CHLORIDE  89*  88*  89*   CO2  26  25  24   BUN  25*  27*  27*   CREATININE  0.81  0.80  1.09   CALCIUM  8.8  9.2  9.4     Recent Labs      17   1037  17   0218  17   0256   GLUCOSE  143*  113*  96     Recent Labs      17   0346  17   0218  17   0256   PROTHROMBTM  21.9*  20.4*  23.1*   INR  1.85*  1.69*  1.98*               Hemodynamics:  Temp (24hrs), Av.2 °C (97.1 °F), Min:36 °C (96.8 °F), Max:36.6 °C (97.8 °F)  Temperature: 36.1 °C (97 °F)  Pulse  Av.8  Min: 64  Max: 110  Blood Pressure: 101/72 mmHg     Respiratory:    Respiration: 17, Pulse Oximetry: 99 %, O2 Daily Delivery Respiratory : Silicone Nasal Cannula     Given By:: Mouthpiece, Work Of Breathing / Effort: Mild  RUL Breath Sounds: Clear, RML Breath Sounds: Diminished, RLL Breath Sounds: Diminished, BHAVNA Breath Sounds: Clear, LLL Breath Sounds: Diminished  Fluids:    Intake/Output Summary (Last 24 hours) at 17 0839  Last data filed at 17 0600   Gross per 24 hour   Intake    510 ml   Output   1550 ml   Net  -1040 ml     Weight: 102.3 kg (225 lb 8.5 oz)  GI/Nutrition:  Orders Placed This Encounter   Procedures   • DIET  ORDER     Standing Status: Standing      Number of Occurrences: 1      Standing Expiration Date:      Order Specific Question:  Diet:     Answer:  Cardiac [6]      Comments:  hepatic     Order Specific Question:  Diet:     Answer:  2 Gram Sodium [7]     Order Specific Question:  Consistency/Fluid modifications:     Answer:  720 ml Fluid Restriction [6]     Medical Decision Making, by Problem:  Active Hospital Problems    Diagnosis   • *Acute CHF (congestive heart failure) (CMS-HCC)-decompensated stage 4 -LVEF 15% [I50.9]  - Increase lasix 40mg IV TID. D/C salt tablets as may be causing more fluid retention. Continue PO aldactone.  Continue fluid restriction, and monitor strict I&O and daily weights. Counseled to elevate leg, and maintain PETTY hose stockings.   - continue coreg, lisinopril.  - continue telemetry.   - Outpt follow up with - Cardiology in 1-2 weeks after discharge     • Cocaine abuse- in past, denies current use [F14.10]     • Acute renal insufficiency [N28.9]  - resolved. No hydronephrosis on renal U/S  - follow renal function while on IV lasix. BMP in AM.     • Elevated liver enzymes [R74.8]  - Likely due to congestive hepatopathy from acute CHF, but HCV Ab reactive. HIV negative.    - diuresis as above.   - Will need outpatient follow-up with GI for consideration for HCV treatment.       • Liver cirrhosis (CMS-HCC) [K74.60]?NAFLD  - HIDA neg     • Pulmonary embolism (CMS-HCC) [I26.99]  - Hx of in 6/2016. DVT study neg.  - Had completed treatment in past, but now on Lovenox + coumadin bridging for new LV thrombus     • Thrombocytopenia (CMS-HCC) [D69.6]  - stable. Monitor.      • Non compliance w medication regimen [Z91.14]  - Counseled.   - Need to discuss with family to help him withs med since patient is forget full and also unable to arrange C.       Hyponatremia:  - Stable. Likely hypervolemic hyponatremia.   - lasix as above.  D/C salt tablets as likely causing fluid restriction. Continue  fluid restriction. BMP in AM.      • LV mural Thrombus:  - continue Lovenox bridge to coumadin. Completed 5 days overlap. Needs 2 days of therapeutic INR  before he can go home given his non compliance/no insurance and unable to arrange home health     • Obesity [E66.9]     Hepatitis C infection  - Will need outpatient follow-up with GI for consideration for HCV treatment.     Code status: DNAR  Dispo - monitor on telemetry.     Radiology images reviewed, Labs reviewed and Medications reviewed  Holt catheter: No Holt      DVT Prophylaxis: Enoxaparin (Lovenox)    Ulcer prophylaxis: Yes  Antibiotics: Treating active infection/contamination beyond 24 hours perioperative coverage  Assessed for rehab: Patient was assess for and/or received rehabilitation services during this hospitalization

## 2017-02-04 NOTE — CARE PLAN
Problem: Knowledge Deficit  Goal: Knowledge of the prescribed therapeutic regimen will improve  Pt educated regarding plan of care and medications. All questions answered.     Problem: Pain Management  Goal: Pain level will decrease to patient’s comfort goal  Pt assessed for pain regularly and medicated PRN per MAR.

## 2017-02-04 NOTE — CARE PLAN
Problem: Safety  Goal: Will remain free from injury  Outcome: PROGRESSING AS EXPECTED  Patient educated on importance of using the call light if in need of assistance. Patient verbalizes understanding. Bed locked in lowest position, non skid socks on, personal belongings and call light within reach, appropriate sign placed on door.    Problem: Knowledge Deficit  Goal: Knowledge of disease process/condition, treatment plan, diagnostic tests, and medications will improve  Patient updated on POC. All questions answered at this time.

## 2017-02-04 NOTE — PROGRESS NOTES
Assumed care at 1900. Bedside report received from Shelbi. Patient's chart and MAR reviewed. Pt complains of gout pain at this time, is on scheduled pain medications. Pt is A & O 4. Patient was updated on plan of care for the day. Questions answered and concerns addressed.  Pt denies any additional needs at this time. White board updated. Call light, phone and personal belongings within reach.

## 2017-02-04 NOTE — PROGRESS NOTES
Inpatient Anticoagulation Service Note    Date: 2/4/2017  Reason for Anticoagulation: Other (Comments), Pulmonary Embolism (LV thrombus )        Hemoglobin Value: 14.6  Hematocrit Value: 46.1  Lab Platelet Value: 142  Target INR: 2.0 to 3.0    INR from last 7 days     Date/Time INR Value    02/04/17 0256 (!)1.98    02/03/17 0218 (!)1.69    02/02/17 0346 (!)1.85    02/01/17 0251 (!)1.89    01/31/17 1409 (!)1.67    01/31/17 0143 (!)1.35    01/30/17 0352 (!)1.35    01/29/17 1351 (!)1.38        Dose from last 7 days     Date/Time Dose (mg)    02/04/17 1300 5    02/03/17 1400 5    02/02/17 1200 4    02/01/17 1000 3    01/31/17 1600 1    01/30/17 1200 7.5    01/29/17 1300 5        Average Dose (mg): 3 (3mg/day )  Significant Interactions: Other (Comments), Antibiotics (spironolactone )  Bridge Therapy: Yes  Date of Last VTE Event: 01/29/17  Bridge Therapy Start Date: 01/29/17  Days of Overlap Therapy: 6     Comments: INR increased after dose increase yesterday, now at 1.98. Will continue warfarin 5 mg daily for now and trend the INR. Lovenox bridging should continue until INR > 2.0 for 24 hrs.    Education Material Provided?: No (Pt on [facility] PTA)    Pharmacist suggested discharge dosing: possibly warfarin 4 mg daily, recommend pt follow up with his [facility] within 48-72 hrs of discharge.     Keenan Moss, PharmD

## 2017-02-04 NOTE — PROGRESS NOTES
Received report from nightshift RN, assumed care of patient. Patient is A&O x 4, patient refuses bed alarm at this time, despite education related to safety and fall prevention. Patient educated on importance of calling if in need of assistance. Verbalizes understanding. Patient experiencing pain in feet at this time. Patient updated on plan of care, voices no concerns at this time. Will continue to monitor for safety and comfort.

## 2017-02-04 NOTE — FLOWSHEET NOTE
02/03/17 1624   Events/Summary/Plan   Events/Summary/Plan PRN SVN   Non-Invasive Resp Device Site Inspection Completed Intact   Interdisciplinary Plan of Care-Goals (Indications)   Obstructive Ventilatory Defect or Pulmonary Disease without Obvious Obstruction History / Diagnosis   Interdisciplinary Plan of Care-Outcomes    Bronchodilator Outcome Patient at Stable Baseline   Education   Education Yes - Pt. / Family has been Instructed in use of Respiratory Equipment;Yes - Pt. / Family has been Instructed in use of Respiratory Medications and Adverse Reactions   RT Assessment of Delivered Medications   Evaluation of Medication Delivery Daily Yes-- Pt /Family has been Instructed in use of Respiratory Medications and Adverse Reactions   #Demo/Eval outside of SVN, MDI'S Yes   SVN Group   #SVN Performed Yes   Given By: Mouthpiece   Date SVN Last Changed 02/03/17   Date SVN Next Change Due (Q 7 Days) 02/10/17   Respiratory WDL   Respiratory (WDL) X   Chest Exam   Work Of Breathing / Effort Mild   Respiration 18   Pulse 80   Breath Sounds   Pre/Post Intervention Pre Intervention Assessment   RUL Breath Sounds Clear   RML Breath Sounds Diminished   RLL Breath Sounds Diminished   BHAVNA Breath Sounds Clear   LLL Breath Sounds Diminished   Oxygen   Pulse Oximetry 100 %   O2 (LPM) 3   O2 Daily Delivery Respiratory  Silicone Nasal Cannula

## 2017-02-05 PROBLEM — M10.9 ACUTE GOUT: Status: ACTIVE | Noted: 2017-01-01

## 2017-02-05 NOTE — PROGRESS NOTES
Hospital Medicine Progress Note, Adult, Complex               Author: Siddhartha Laughlin   Date & Time created: 2/5/2017  8:22 AM     Interval History: 51 M with severe cardiomyopathy, LVEF 15%, AICD in place, admitted with acute decompensated exacerbation of CHF, started on lasix + spironolactone, strict Is and Os, daily weights and added low dose lisinopril . He also had elevated liver enzymes and ORVILLE on CKD. Found to have new LV thrombus, and started on lovenox and coumadin. Na dropped to 119. Hep C Ab reactive. HIV nonreactive. . TSH 4.3, normal T4. Ferritin, cortisol WNL.   HC viral load 680,000. Completed 5 days of lovenox/coumadin overlap. Increased diuresis.     2/5/2017 - uneventful night. VSS. Afebrile. Saturating well on RA. WBC improved to 127. INR 2.55. Diuresing well, maintaining negative fluid balance.     > Seen and examined. (+) increased pain and swelling on right ankle consistent with his gout. No CP, SOB, nausea, vomiting, diarrhea. Leg swelling and abdominal distention better.       Consultants:  Cardiology-/ Dr.Mohsen ( [facility])  Palliative Medicine    Review of Systems:  ROS  Pertinent positives/negatives as mentioned above.     A complete review of systems was done. All other systems were negative.       Physical Exam:  Physical Exam   Constitutional: He is oriented to person, place, and time. He appears well-developed. No distress.   Obese   HENT:   Head: Normocephalic and atraumatic.   Mouth/Throat: Oropharynx is clear and moist. No oropharyngeal exudate.   Dry blood in both nares   Eyes: EOM are normal. Pupils are equal, round, and reactive to light. Right eye exhibits no discharge. Left eye exhibits no discharge. No scleral icterus.   Neck: Normal range of motion. Neck supple. No JVD present. No thyromegaly present.   Cardiovascular: Normal rate and regular rhythm.  Exam reveals no gallop and no friction rub.    No murmur heard.  Pulmonary/Chest: Effort normal and  breath sounds normal. No stridor. No respiratory distress. He has no wheezes. He has no rales. He exhibits no tenderness.   Abdominal: Soft. Bowel sounds are normal. He exhibits distension (less tight). There is no tenderness. There is no rebound and no guarding.   Musculoskeletal: Normal range of motion. He exhibits edema (2+ B/L LE, less tenuous) and tenderness (right ankle).   Lymphadenopathy:     He has no cervical adenopathy.   Neurological: He is alert and oriented to person, place, and time.   Skin: Skin is warm and dry. Rash (bruising on abdomen over lovenox injections sites) noted. He is not diaphoretic.   Psychiatric: He has a normal mood and affect. His behavior is normal. Thought content normal.   Nursing note and vitals reviewed.      Labs:        Invalid input(s): [id number]      Recent Labs      17   0256  17   0303   SODIUM  123*  121*  127*   POTASSIUM  3.8  4.4  3.8   CHLORIDE  88*  89*  94*   CO2  25  24  23   BUN  27*  27*  27*   CREATININE  0.80  1.09  0.94   CALCIUM  9.2  9.4  9.0     Recent Labs      17   0256  17   0303   GLUCOSE  113*  96  141*     Recent Labs      17   0256  17   0303   PROTHROMBTM  20.4*  23.1*  28.2*   INR  1.69*  1.98*  2.55*               Hemodynamics:  Temp (24hrs), Av.1 °C (97 °F), Min:36 °C (96.8 °F), Max:36.2 °C (97.2 °F)  Temperature: 36.1 °C (97 °F)  Pulse  Av  Min: 64  Max: 110  Blood Pressure: 103/84 mmHg     Respiratory:    Respiration: 18, Pulse Oximetry: 100 %     Work Of Breathing / Effort: Mild  RUL Breath Sounds: Clear, RML Breath Sounds: Diminished, RLL Breath Sounds: Diminished, BHAVNA Breath Sounds: Clear, LLL Breath Sounds: Diminished  Fluids:    Intake/Output Summary (Last 24 hours) at 17 0822  Last data filed at 17 2300   Gross per 24 hour   Intake    440 ml   Output   1125 ml   Net   -685 ml     Weight: 99.3 kg (218 lb 14.7  oz)  GI/Nutrition:  Orders Placed This Encounter   Procedures   • DIET ORDER     Standing Status: Standing      Number of Occurrences: 1      Standing Expiration Date:      Order Specific Question:  Diet:     Answer:  Cardiac [6]      Comments:  hepatic     Order Specific Question:  Diet:     Answer:  2 Gram Sodium [7]     Order Specific Question:  Consistency/Fluid modifications:     Answer:  720 ml Fluid Restriction [6]     Medical Decision Making, by Problem:  Active Hospital Problems    Diagnosis   • *Acute CHF (congestive heart failure) (CMS-HCC)-decompensated stage 4 -LVEF 15% [I50.9]  - continue increased lasix 40mg IV TID. Continue PO aldactone.  Continue fluid restriction, and monitor strict I&O and daily weights. Counseled to elevate leg, and maintain PETTY hose stockings.   - continue coreg, lisinopril.  - continue telemetry.   - Outpt follow up with - Cardiology in 1-2 weeks after discharge     • Cocaine abuse- in past, denies current use [F14.10]     • Acute renal insufficiency [N28.9]  - resolved. No hydronephrosis on renal U/S  - follow renal function while on IV lasix. BMP in AM.     • Elevated liver enzymes [R74.8]  - Likely due to congestive hepatopathy from acute CHF, but HCV Ab reactive. HIV negative.    - diuresis as above.   - Will need outpatient follow-up with GI for consideration for HCV treatment.       • Liver cirrhosis (CMS-HCC) [K74.60]?NAFLD  - HIDA neg     • Pulmonary embolism (CMS-HCC) [I26.99]  - Hx of in 6/2016. DVT study neg.  - Had completed treatment in past, but now on Lovenox + coumadin bridging for new LV thrombus     • Thrombocytopenia (CMS-HCC) [D69.6]  - stable. Monitor.      • Non compliance w medication regimen [Z91.14]  - Counseled.   - Need to discuss with family to help him withs med since patient is forget full and also unable to arrange C.       Hyponatremia:  - Stable. Likely hypervolemic hyponatremia. Improved with higher dose of lasix.   - continue diuresis  with lasix as above.  Off salt tablets as likely causing fluid restriction. Continue fluid restriction. BMP in AM.      • LV mural Thrombus:  - continue Lovenox bridge to coumadin. Completed 5 days overlap. Needs 2 consecutive days of therapeutic INR  before he can go home given his non compliance/no insurance and unable to arrange home health     • Obesity [E66.9]     Hepatitis C infection  - Will need outpatient follow-up with GI for consideration for HCV treatment.     Acute gout  - will give 1.2mg colchicine today, then 0.6 mg daily starting tomorrow. Continue PRN IV morphine and PO oxycodone for pain.     Code status: DNAR  Dispo - monitor on telemetry.     Radiology images reviewed, Labs reviewed and Medications reviewed  Holt catheter: No Holt      DVT Prophylaxis: Enoxaparin (Lovenox)    Ulcer prophylaxis: Yes  Antibiotics: Treating active infection/contamination beyond 24 hours perioperative coverage  Assessed for rehab: Patient was assess for and/or received rehabilitation services during this hospitalization

## 2017-02-05 NOTE — PROGRESS NOTES
Assumed care at 0655 . Report received from night  RN. Patient's chart and MAR reviewed. Assessment complete. Pt is sitting at edge of bed. Patient was updated on plan of care. Questions answered and concerns addressed. Reports pain of 10 on a 0-10 scale. Pt denies any additional needs at this time. White board updated. Call light, hospital room phone and personal belongings within reach.

## 2017-02-05 NOTE — PROGRESS NOTES
Inpatient Anticoagulation Service Note  Date: 2/5/2017  Estimated Creatinine Clearance: 96.7 mL/min (by C-G formula based on Cr of 0.94).  Reason for Anticoagulation: Other (Comments), Pulmonary Embolism (LV thrombus )   Hemoglobin Value: 14.6  Hematocrit Value: 46.1  Lab Platelet Value: 142  Target INR: 2.0 to 3.0  INR from last 7 days     Date/Time INR Value    02/05/17 0303 (!)2.55    02/04/17 0256 (!)1.98    02/03/17 0218 (!)1.69    02/02/17 0346 (!)1.85    02/01/17 0251 (!)1.89    01/31/17 1409 (!)1.67    01/31/17 0143 (!)1.35    01/30/17 0352 (!)1.35    01/29/17 1351 (!)1.38        Dose from last 7 days     Date/Time Dose (mg)    02/05/17 1100 3    02/04/17 1300 5    02/03/17 1400 5    02/02/17 1200 4    02/01/17 1000 3    01/31/17 1600 1    01/30/17 1200 7.5    01/29/17 1300 5        Average Dose (mg): TBD (Home Dose: 3 mg daily per chart review)  Significant Interactions: Other (Comments) (colchicine, spironolactone)  Bridge Therapy: Yes  Date of Last VTE Event: 01/29/17  Bridge Therapy Start Date: 01/29/17  Days of Overlap Therapy: 7  INR Value Greater than 2 Prior to Discontinuation of Parenteral Anticoagulation: Not Applicable  Reversal Agent Administered: Not Applicable  Comments: INR to goal range today. Some warfarin interactions identified. No new H/H or PLT. No overt bleeding noted per chart review. Will give 3 mg tonight and trend INR with AM labs. May need dose adjustment pending course of admission. Currently day 7 of AC bridge with INR in range x1.    Plan:  Warfarin 3 mg 2/5/17  Education Material Provided?: No  Pharmacist suggested discharge dosing: warfarin 3 mg daily (pending clinical disposition)    Perico Moss, PHARMD

## 2017-02-05 NOTE — PROGRESS NOTES
Assumed care at 1900. Bedside report received from Shelbi. Patient's chart and MAR reviewed. Pt complains of ongoing B/L foot pain due to gout and some low back pain as well.  Pt is A & O 4. Patient was updated on plan of care for the day. Questions answered and concerns addressed.  Pt denies any additional needs at this time. White board updated. Call light, phone and personal belongings within reach.

## 2017-02-05 NOTE — CARE PLAN
Problem: Infection  Goal: Will remain free from infection  Outcome: PROGRESSING AS EXPECTED  Standard precautions in place. Clean hands safe hands. Patient and family educated on clean hands safe hands.        Problem: Pain Management  Goal: Pain level will decrease to patient’s comfort goal  Outcome: PROGRESSING AS EXPECTED  Pateint's pain remains controlled. Medications per MAR. Nonpharmacologic methods such as rest and repositioning. Pain management plan discussed.

## 2017-02-06 NOTE — PROGRESS NOTES
Hospital Medicine Progress Note, Adult, Complex               Author: Siddhartha Laughlin   Date & Time created: 2/6/2017  8:13 AM     Interval History: 51 M with severe cardiomyopathy, LVEF 15%, AICD in place, admitted with acute decompensated exacerbation of CHF, started on lasix + spironolactone, strict Is and Os, daily weights and added low dose lisinopril . He also had elevated liver enzymes and ORVILLE on CKD. Found to have new LV thrombus, and started on lovenox and coumadin. Na dropped to 119. Hep C Ab reactive. HIV nonreactive. . TSH 4.3, normal T4. Ferritin, cortisol WNL. HC viral load 680,000. Completed 5 days of lovenox/coumadin overlap. Increased diuresis.     2/6/2017 - no overnight events. Remains hemodynamically stable and afebrile. BP soft but WNL. Saturating well on RA. Na 125. INR 2.54. -1.8L UO yesterday, -7L fluid balance.     > Seen and examined. States he's doing better. Gout better but still painful, less painful to touch. Edema better but still with significant edema. (+) SOB with exertion. No CP, nausea, vomiting, (+) BM with bowel protocol. (+) hemorrhagic blisters on right thigh from scratching at night.       Consultants:  Cardiology-/ Dr.Mohsen ( [facility])  Palliative Medicine    Review of Systems:  ROS  Pertinent positives/negatives as mentioned above.     A complete review of systems was done. All other systems were negative.       Physical Exam:  Physical Exam   Constitutional: He is oriented to person, place, and time. He appears well-developed. No distress.   Obese   HENT:   Head: Normocephalic and atraumatic.   Mouth/Throat: Oropharynx is clear and moist. No oropharyngeal exudate.   Dry blood in both nares   Eyes: EOM are normal. Pupils are equal, round, and reactive to light. Right eye exhibits no discharge. Left eye exhibits no discharge. No scleral icterus.   Neck: Normal range of motion. Neck supple. No JVD present. No thyromegaly present.   Cardiovascular:  Normal rate and regular rhythm.  Exam reveals no gallop and no friction rub.    No murmur heard.  Pulmonary/Chest: Effort normal and breath sounds normal. No stridor. No respiratory distress. He has no wheezes. He has no rales. He exhibits no tenderness.   Abdominal: Soft. Bowel sounds are normal. He exhibits distension (less tight, improved). There is no tenderness. There is no rebound and no guarding.   Musculoskeletal: Normal range of motion. He exhibits edema (1-2+ B/L LE, less tenuous ) and tenderness (right ankle, improved, less tender to touch).   Lymphadenopathy:     He has no cervical adenopathy.   Neurological: He is alert and oriented to person, place, and time.   Skin: Skin is warm and dry. Rash (healing bruise on abdomen over lovenox injections sites. (+) small hemorrhagic bullae on right medial thigh with scratch marks) noted. He is not diaphoretic.   Psychiatric: He has a normal mood and affect. His behavior is normal. Thought content normal.   Nursing note and vitals reviewed.      Labs:        Invalid input(s): [id number]      Recent Labs      17   0256  17   0303  17   0231   SODIUM  121*  127*  125*   POTASSIUM  4.4  3.8  4.0   CHLORIDE  89*  94*  95*   CO2  24  23  20   BUN  27*  27*  26*   CREATININE  1.09  0.94  0.94   CALCIUM  9.4  9.0  9.0     Recent Labs      17   0256  17   0303  17   0231   GLUCOSE  96  141*  136*     Recent Labs      17   0256  17   0303  17   0231   PROTHROMBTM  23.1*  28.2*  28.1*   INR  1.98*  2.55*  2.54*               Hemodynamics:  Temp (24hrs), Av.4 °C (97.5 °F), Min:36.1 °C (97 °F), Max:36.7 °C (98 °F)  Temperature: 36.1 °C (97 °F)  Pulse  Av.1  Min: 64  Max: 110  Blood Pressure: (!) 94/79 mmHg     Respiratory:    Respiration: 20, Pulse Oximetry: 100 %        RUL Breath Sounds: Clear, RML Breath Sounds: Diminished, RLL Breath Sounds: Diminished, BHAVNA Breath Sounds: Clear, LLL Breath Sounds:  Diminished  Fluids:    Intake/Output Summary (Last 24 hours) at 02/06/17 0813  Last data filed at 02/06/17 0700   Gross per 24 hour   Intake    410 ml   Output   2625 ml   Net  -2215 ml     Weight: 97.5 kg (214 lb 15.2 oz)  GI/Nutrition:  Orders Placed This Encounter   Procedures   • DIET ORDER     Standing Status: Standing      Number of Occurrences: 1      Standing Expiration Date:      Order Specific Question:  Diet:     Answer:  Cardiac [6]      Comments:  hepatic     Order Specific Question:  Diet:     Answer:  2 Gram Sodium [7]     Order Specific Question:  Consistency/Fluid modifications:     Answer:  720 ml Fluid Restriction [6]     Medical Decision Making, by Problem:  Active Hospital Problems    Diagnosis   • *Acute CHF (congestive heart failure) (CMS-HCC)-decompensated stage 4 -LVEF 15% [I50.9]  - will need to continue optimizing fluid status, still with significant edema.   - continue lasix 40mg IV TID. Continue PO aldactone.  Continue fluid restriction, and monitor strict I&O and daily weights. Counseled to elevate leg, and maintain PETTY hose stockings.   - continue coreg, lisinopril.  - continue telemetry.   - Outpt follow up with - Cardiology in 1-2 weeks after discharge     • Cocaine abuse- in past, denies current use [F14.10]     • Acute renal insufficiency [N28.9]  - resolved. No hydronephrosis on renal U/S  - follow renal function while on IV lasix. BMP in AM.     • Elevated liver enzymes [R74.8]  - Likely due to congestive hepatopathy from acute CHF, but HCV Ab reactive. HIV negative.    - diuresis as above.   - Will need outpatient follow-up with GI for consideration for HCV treatment.       • Liver cirrhosis (CMS-HCC) [K74.60]?NAFLD  - HIDA neg     • Pulmonary embolism (CMS-HCC) [I26.99]  - Hx of in 6/2016. DVT study neg.  - Had completed treatment in past, but now on Lovenox + coumadin bridging for new LV thrombus     • Thrombocytopenia (CMS-HCC) [D69.6]  - stable. Monitor.      • Non  compliance w medication regimen [Z91.14]  - Counseled.   - Need to discuss with family to help him withs med since patient is forgetful and also unable to arrange C.       Hyponatremia:  - Stable. Likely hypervolemic hyponatremia. Better with diuresis.  - continue lasix as above.  Off salt tablets as causing more fluid restriction. Continue fluid restriction. BMP in AM.      • LV mural Thrombus:  - Completed 5 days overlap and 2 consecutive days of therapeutic INR. D/C lovenox. Continue coumadin per pharmacy dosing.     • Obesity [E66.9]     Hepatitis C infection  - Will need outpatient follow-up with GI for consideration for HCV treatment.     Acute gout  - continue colchicine. Continue PRN IV morphine and PO oxycodone for pain.     Code status: DNAR  Dispo - monitor on telemetry.     Radiology images reviewed, Labs reviewed and Medications reviewed  Holt catheter: No Holt      DVT Prophylaxis: Enoxaparin (Lovenox)    Ulcer prophylaxis: Yes  Antibiotics: Treating active infection/contamination beyond 24 hours perioperative coverage  Assessed for rehab: Patient was assess for and/or received rehabilitation services during this hospitalization

## 2017-02-06 NOTE — CARE PLAN
Problem: Safety  Goal: Will remain free from injury  Pt educated on the importance fall prevention methods, such as treaded sock and the bed alarm. Pt stated they will use the call light prior to any attempts of ambulation. Ambulatory ability assessed, treaded socks in place, bed locked and in low position, frequent trips to bathroom offered, and call light and phone within reach.    Problem: Pain Management  Goal: Pain level will decrease to patient’s comfort goal  Pt assessed for pain regularly and medicated PRN per MAR.

## 2017-02-06 NOTE — PROGRESS NOTES
Assumed care at 1900. Bedside report received from [facility]. Patient's chart and MAR reviewed. Pts gout pain is controlled at this time. Pt is A & O 4. Patient was updated on plan of care for the day. Questions answered and concerns addressed.  Pt denies any additional needs at this time. White board updated. Call light, phone and personal belongings within reach.

## 2017-02-06 NOTE — PROGRESS NOTES
Inpatient Anticoagulation Service Note  Date: 2/6/2017  Estimated Creatinine Clearance: 95.8 mL/min (by C-G formula based on Cr of 0.94).  Reason for Anticoagulation: Other (Comments), Pulmonary Embolism (LV thrombus )   Hemoglobin Value: 14.6  Hematocrit Value: 46.1  Lab Platelet Value: 142  Target INR: 2.0 to 3.0  INR from last 7 days     Date/Time INR Value    02/06/17 0231 (!)2.54    02/05/17 0303 (!)2.55    02/04/17 0256 (!)1.98    02/03/17 0218 (!)1.69    02/02/17 0346 (!)1.85    02/01/17 0251 (!)1.89    01/31/17 1409 (!)1.67    01/31/17 0143 (!)1.35        Dose from last 7 days     Date/Time Dose (mg)    02/06/17 1300 3    02/05/17 1100 3    02/04/17 1300 5    02/03/17 1400 5    02/02/17 1200 4    02/01/17 1000 3    01/31/17 1600 1        Average Dose (mg): TBD (Home Dose: 3 mg daily per chart review)  Significant Interactions: Other (Comments) (colchicine, spironolactone)  Bridge Therapy: Yes  Date of Last VTE Event: 01/29/17  Bridge Therapy Start Date: 01/29/17  Days of Overlap Therapy: 7   INR Value Greater than 2 Prior to Discontinuation of Parenteral Anticoagulation: Yes  Reversal Agent Administered: Not Applicable  Comments: INR at goal range today again x2. AC bridge completed. Multiple warfarin interactions noted. No new H/H or PLT. No overt bleeding noted. Will give 3 mg again tonight and trend INR with AM labs.    Plan:  Warfarin 3 mg 2/6/17  Education Material Provided?: No  Pharmacist suggested discharge dosing: warfarin 3 mg daily (pending clinical disposition)    Perico Moss, PHARMD

## 2017-02-07 NOTE — THERAPY
per nsg, pt has been up self and gout flare appears under control at this time; reports pt is to d/c to home tomorrow and has no further skilled PT needs at this time; he is at baseline with re: cognition per nsg which was primary concern with this PT during last visit; will d/c from services unless further needs arise; would recommend skilled PT after medical d/c to home for higher level balance deficits, though carryover may be limited 2' to baseline behavior/cog

## 2017-02-07 NOTE — PROGRESS NOTES
Bedside report received, assumed pt care @4988. Pt A&Ox4. Pt c/o 3/10 pain in toes related to gout; declines any intervention at this time. He denies any SOB. POC discussed, he verbalized understanding. Pt appears steady on his feet and ambulating around room. Call light within reach.

## 2017-02-07 NOTE — PROGRESS NOTES
Inpatient Anticoagulation Service Note  Date: 2/7/2017  Estimated Creatinine Clearance: 80.3 mL/min (by C-G formula based on Cr of 1.15).  Reason for Anticoagulation: Other (Comments), Pulmonary Embolism (LV thrombus )   Hemoglobin Value: 12.6  Hematocrit Value: 39.4  Lab Platelet Value: 121  Target INR: 2.0 to 3.0  INR from last 7 days     Date/Time INR Value    02/07/17 0246 (!)2.45    02/06/17 0231 (!)2.54    02/05/17 0303 (!)2.55    02/04/17 0256 (!)1.98    02/03/17 0218 (!)1.69    02/02/17 0346 (!)1.85    02/01/17 0251 (!)1.89    01/31/17 1409 (!)1.67        Dose from last 7 days     Date/Time Dose (mg)    02/07/17 1200 3    02/06/17 1300 3    02/05/17 1100 3    02/04/17 1300 5    02/03/17 1400 5    02/02/17 1200 4    02/01/17 1000 3    01/31/17 1600 1        Average Dose (mg): ~3 (Home Dose: 3 mg daily per chart review)  Significant Interactions: Other (Comments) (colchicine, spironolactone)  Bridge Therapy: Yes  Date of Last VTE Event: 01/29/17  Bridge Therapy Start Date: 01/29/17  Days of Overlap Therapy: 7  INR Value Greater than 2 Prior to Discontinuation of Parenteral Anticoagulation: Yes   Reversal Agent Administered: Not Applicable  Comments: INR continues at goal range.Multiple warfarin interactions noted. Latest H/H and PLT low. No overt bleeding noted per chart review. May be related to changes in fluid status. Will give 3 mg again tonight and move INR to QOD draws.      Plan:  Warfarin 3 mg 2/7/17  Education Material Provided?: No  Pharmacist suggested discharge dosing: warfarin 3 mg daily (pending clinical disposition)    Perico Moss, PHARMD

## 2017-02-08 NOTE — PROGRESS NOTES
PT complaining of leg pain, RN noticed blood blisters appearing on bilateral legs/feet. Pt complaining of 10/10 pain

## 2017-02-08 NOTE — PROGRESS NOTES
Inpatient Anticoagulation Service Note    Date: 2/8/2017  Reason for Anticoagulation: Other (Comments), Pulmonary Embolism (LV thrombus )        Hemoglobin Value: 13.8  Hematocrit Value: 43.6  Lab Platelet Value: 118  Target INR: 2.0 to 3.0    INR from last 7 days     Date/Time INR Value    02/08/17 0315 (!)2.1    02/07/17 0246 (!)2.45    02/06/17 0231 (!)2.54    02/05/17 0303 (!)2.55    02/04/17 0256 (!)1.98    02/03/17 0218 (!)1.69    02/02/17 0346 (!)1.85        Dose from last 7 days     Date/Time Dose (mg)    02/08/17 1300 4.5    02/07/17 1200 3    02/06/17 1300 3    02/05/17 1100 3    02/04/17 1300 5    02/03/17 1400 5    02/02/17 1200 4        Average Dose (mg):  (Home Dose: 3 mg daily per chart review)  Significant Interactions: Other (Comments) (colchicine, spironolactone)  Bridge Therapy: Yes  Date of Last VTE Event: 01/29/17  Bridge Therapy Start Date: 01/29/17  Days of Overlap Therapy:  INR Value Greater than 2 Prior to Discontinuation of Parenteral Anticoagulation: Yes   Reversal Agent Administered: Not Applicable  Comments: INR drifting down.  Will increase dose a little with 4.5 mg on Wed then continue the usual home dose of 3 mg daily.  Monitor.    Plan:  4.5 mg tonight then 3 mg daily  Education Material Provided?: Yes (packet provided)  Pharmacist suggested discharge dosing: 3 mg daily when home on home food etc.     Nikhil Newell, PharmD, BCPS

## 2017-02-08 NOTE — PROGRESS NOTES
Hospital Medicine Progress Note, Adult, Complex               Author: Mihai Patel   Date & Time created: 2/7/2017  8:58 PM     Interval History: 51 M with severe cardiomyopathy, LVEF 15%, AICD in place, admitted with acute decompensated exacerbation of CHF, started on lasix + spironolactone, strict Is and Os, daily weights and added low dose lisinopril . He also had elevated liver enzymes and ORVILLE on CKD. Found to have new LV thrombus, and started on lovenox and coumadin. Na dropped to 119. Hep C Ab reactive. HIV nonreactive. . TSH 4.3, normal T4. Ferritin, cortisol WNL. HC viral load 680,000. Completed 5 days of lovenox/coumadin overlap. Increased diuresis.   Patient seen and examined today.    Patient tolerating treatment and therapies.  All Data, Medication data reviewed.  Case discussed with nursing as available.  Plan of Care reviewed with patient and notified of changes.    2/6/2017 - no overnight events. Remains hemodynamically stable and afebrile. BP soft but WNL. Saturating well on RA. Na 125. INR 2.54. -1.8L UO yesterday, -7L fluid balance.  2/7 Pt feels still edematous, feet painful, abdomen distended, denies CP, aware of difficulty with his combined issue's   Distended abdomen, no def. Fluid wave    Consultants:  Cardiology-/ Dr.Mohsen ( [facility])  Palliative Medicine    Review of Systems:  Review of Systems   Constitutional: Positive for malaise/fatigue. Negative for fever and chills.   HENT: Negative.    Eyes: Negative.    Respiratory: Positive for cough.    Cardiovascular: Positive for orthopnea and leg swelling. Negative for chest pain.   Gastrointestinal: Positive for abdominal pain.   Genitourinary: Negative.    Musculoskeletal: Negative.    Skin: Positive for rash.   Neurological: Positive for weakness. Negative for headaches.   Endo/Heme/Allergies: Negative.    Psychiatric/Behavioral: Positive for substance abuse. The patient is nervous/anxious.    All other  systems reviewed and are negative.        Physical Exam:  Physical Exam   Constitutional: He is oriented to person, place, and time. He appears well-developed. No distress.   Obese   HENT:   Head: Normocephalic and atraumatic.   Mouth/Throat: Oropharynx is clear and moist. No oropharyngeal exudate.   Dry blood in both nares   Eyes: EOM are normal. Pupils are equal, round, and reactive to light. Right eye exhibits no discharge. Left eye exhibits no discharge. No scleral icterus.   Neck: Normal range of motion. Neck supple. No JVD present. No thyromegaly present.   Cardiovascular: Normal rate and regular rhythm.  Exam reveals no gallop and no friction rub.    No murmur heard.  Pulmonary/Chest: Effort normal and breath sounds normal. No stridor. No respiratory distress. He has no wheezes. He has no rales. He exhibits no tenderness.   Abdominal: Soft. Bowel sounds are normal. He exhibits distension (less tight, improved). There is no tenderness. There is no rebound and no guarding.   Musculoskeletal: Normal range of motion. He exhibits edema (1-2+ B/L LE, less tenuous ) and tenderness (right ankle, improved, less tender to touch).   Lymphadenopathy:     He has no cervical adenopathy.   Neurological: He is alert and oriented to person, place, and time.   Skin: Skin is warm and dry. Rash (healing bruise on abdomen over lovenox injections sites. (+) small hemorrhagic bullae on right medial thigh with scratch marks) noted. He is not diaphoretic.   Psychiatric: He has a normal mood and affect. His behavior is normal. Thought content normal.   Nursing note and vitals reviewed.      Labs:        Invalid input(s): [id number]      Recent Labs      02/05/17   0303  02/06/17   0231  02/07/17   0246   SODIUM  127*  125*  128*   POTASSIUM  3.8  4.0  3.6   CHLORIDE  94*  95*  96   CO2  23  20  26   BUN  27*  26*  26*   CREATININE  0.94  0.94  1.15   CALCIUM  9.0  9.0  8.8     Recent Labs      02/05/17   0303  02/06/17   0231   17   0246   GLUCOSE  141*  136*  114*     Recent Labs      17   0303  17   0231  17   0246   RBC   --    --   4.94   HEMOGLOBIN   --    --   12.6*   HEMATOCRIT   --    --   39.4*   PLATELETCT   --    --   121*   PROTHROMBTM  28.2*  28.1*  27.3*   INR  2.55*  2.54*  2.45*     Recent Labs      17   0246   WBC  7.6   NEUTSPOLYS  63.80   LYMPHOCYTES  18.40*   MONOCYTES  12.20   EOSINOPHILS  3.60   BASOPHILS  1.50           Hemodynamics:  Temp (24hrs), Av.6 °C (97.8 °F), Min:36 °C (96.8 °F), Max:36.8 °C (98.3 °F)  Temperature: 36.8 °C (98.3 °F)  Pulse  Av.6  Min: 63  Max: 110  Blood Pressure: 107/77 mmHg     Respiratory:    Respiration: 18, Pulse Oximetry: 93 %     Work Of Breathing / Effort: Mild  RUL Breath Sounds: Diminished, RML Breath Sounds: Diminished, RLL Breath Sounds: Diminished, BHAVNA Breath Sounds: Diminished, LLL Breath Sounds: Diminished  Fluids:    Intake/Output Summary (Last 24 hours) at 17  Last data filed at 17 1800   Gross per 24 hour   Intake    858 ml   Output   1610 ml   Net   -752 ml        GI/Nutrition:  Orders Placed This Encounter   Procedures   • DIET ORDER     Standing Status: Standing      Number of Occurrences: 1      Standing Expiration Date:      Order Specific Question:  Diet:     Answer:  Cardiac [6]      Comments:  hepatic     Order Specific Question:  Diet:     Answer:  2 Gram Sodium [7]     Order Specific Question:  Consistency/Fluid modifications:     Answer:  720 ml Fluid Restriction [6]     Medical Decision Making, by Problem:  Active Hospital Problems    Diagnosis   • *Acute CHF (congestive heart failure) (CMS-HCC)-decompensated stage 4 -LVEF 15% [I50.9]  - will need to continue optimizing fluid status, still with significant edema.   - continue lasix 40mg IV TID. Continue PO aldactone.  Continue fluid restriction, and monitor strict I&O and daily weights. Counseled to elevate leg, and maintain PETTY hose stockings.   - continue  coreg, lisinopril.  - continue telemetry.   - Outpt follow up with - Cardiology in 1-2 weeks after discharge  -will add zaroxolyn, daily weights     • Cocaine abuse- in past, denies current use [F14.10]     • Acute renal insufficiency [N28.9]  - resolved. No hydronephrosis on renal U/S  - follow renal function while on IV lasix. BMP in AM.     • Elevated liver enzymes [R74.8]  - Likely due to congestive hepatopathy from acute CHF, but HCV Ab reactive. HIV negative.    - diuresis as above.   - Will need outpatient follow-up with GI for consideration for HCV treatment.       • Liver cirrhosis (CMS-HCC) [K74.60]?NAFLD  - HIDA neg  - Neg U/s for ascited   • Pulmonary embolism (CMS-HCC) [I26.99]  - Hx of in 6/2016. DVT study neg.  - Had completed treatment in past, but now on Lovenox + coumadin bridging for new LV thrombus     • Thrombocytopenia (CMS-HCC) [D69.6]  - stable. Monitor.      • Non compliance w medication regimen [Z91.14]  - Counseled.   - Need to discuss with family to help him withs med since patient is forgetful and also unable to arrange [facility].       Hyponatremia:  - Stable. Likely hypervolemic hyponatremia. Better with diuresis.  - continue lasix as above.  Off salt tablets as causing more fluid restriction. Continue fluid restriction. BMP in AM.      • LV mural Thrombus:  - Completed 5 days overlap and 2 consecutive days of therapeutic INR. D/C lovenox. Continue coumadin per pharmacy dosing.     • Obesity [E66.9]     Hepatitis C infection  - Will need outpatient follow-up with GI for consideration for HCV treatment.     Acute gout  - continue colchicine. Continue PRN IV morphine and PO oxycodone for pain.     Code status: DNAR  Dispo - monitor on telemetry.   Plan  Augment Lasix  F/u ammonia  C/w Tx for gout  See orders   am labs  Time spent 35 min  Med . complex  Radiology images reviewed, Labs reviewed and Medications reviewed  Holt catheter: No Holt      DVT Prophylaxis: Enoxaparin  (Lovenox)    Ulcer prophylaxis: Yes  Antibiotics: Treating active infection/contamination beyond 24 hours perioperative coverage  Assessed for rehab: Patient was assess for and/or received rehabilitation services during this hospitalization

## 2017-02-08 NOTE — PROGRESS NOTES
Pt has dressing on blood blister on R inner thigh. New blisters appearing on bilateral legs, opened LDA, wound consult placed, pictures taken.

## 2017-02-08 NOTE — WOUND TEAM
Wound Team consulted for sanguinous fluid filled bullae over the patient's calves. There are no open wounds that require advanced wound care. No interventions needed from Wound Team at this time.

## 2017-02-08 NOTE — PROGRESS NOTES
Assumed care of patient, report received from [facility] RN. Patient is A&O4, reports pain in back, will medicate per MAR and denies SOB. Patient has been updated on POC, all questions answered. Patient denies further needs at this time. Encouraged patient to call with questions or concerns, call light within reach.

## 2017-02-08 NOTE — PROGRESS NOTES
Bedside report received, assumed pt care @4349. Pt A&Ox4. He c/o 10/10 pain in feet related to gout; medicated pt per mar. POC discussed, he verbalized understanding. Pt has pitting edema in BLE. Pt up self and appears steady on his feet. Call light within reach.

## 2017-02-09 NOTE — DISCHARGE PLANNING
Transitional Care Navigator:    Met with patient at bedside regarding home health follow up.  Patient declined  services.  SW aware.

## 2017-02-09 NOTE — PROGRESS NOTES
Assumed care of patient, report received from [facility] RN. Patient is A&O4, reports pain 5/10 in legs, will medicate per MAR and denies SOB. Patient has been updated on POC, all questions answered. Patient denies further needs at this time. Encouraged patient to call with questions or concerns, call light within reach.

## 2017-02-09 NOTE — PROGRESS NOTES
Hospital Medicine Progress Note, Adult, Complex               Author: Mihai Patel   Date & Time created: 2/8/2017  9:43 PM     Interval History: 51 M with severe cardiomyopathy, LVEF 15%, AICD in place, admitted with acute decompensated exacerbation of CHF, started on lasix + spironolactone, strict Is and Os, daily weights and added low dose lisinopril . He also had elevated liver enzymes and [place] on CKD. Found to have new LV thrombus, and started on lovenox and coumadin. Na dropped to 119. Hep C Ab reactive. HIV nonreactive. . TSH 4.3, normal T4. Ferritin, cortisol WNL. HC viral load 680,000. Completed 5 days of lovenox/coumadin overlap. Increased diuresis.   Patient seen and examined today.    Patient tolerating treatment and therapies.  All Data, Medication data reviewed.  Case discussed with nursing as available.  Plan of Care reviewed with patient and notified of changes.    2/6/2017 - no overnight events. Remains hemodynamically stable and afebrile. BP soft but WNL. Saturating well on RA. Na 125. INR 2.54. -1.8L UO yesterday, -7L fluid balance.  2/7 Pt feels still edematous, feet painful, abdomen distended, denies CP, aware of difficulty with his combined issue's   Distended abdomen, no def. Fluid wave  2/8 Pt with continued leg pain and scratching legs with blood blisters, has not been able to decrease weight much, resp. Status better, agree's to try fareed-hose, NH3 ok  Consultants:  Cardiology-/ Dr.Mohsen ( [facility])  Palliative Medicine    Review of Systems:  Review of Systems   Constitutional: Positive for malaise/fatigue. Negative for fever and chills.   HENT: Negative.    Eyes: Negative.    Respiratory: Positive for cough.    Cardiovascular: Positive for orthopnea and leg swelling. Negative for chest pain.   Gastrointestinal: Positive for abdominal pain.   Genitourinary: Negative.    Musculoskeletal: Negative.    Skin: Positive for rash.   Neurological: Positive for  weakness. Negative for headaches.   Endo/Heme/Allergies: Negative.    Psychiatric/Behavioral: Positive for substance abuse. The patient is nervous/anxious.    All other systems reviewed and are negative.        Physical Exam:  Physical Exam   Constitutional: He is oriented to person, place, and time. He appears well-developed. No distress.   Obese   HENT:   Head: Normocephalic and atraumatic.   Mouth/Throat: Oropharynx is clear and moist. No oropharyngeal exudate.   Dry blood in both nares   Eyes: EOM are normal. Pupils are equal, round, and reactive to light. Right eye exhibits no discharge. Left eye exhibits no discharge. No scleral icterus.   Neck: Normal range of motion. Neck supple. No JVD present. No thyromegaly present.   Cardiovascular: Normal rate and regular rhythm.  Exam reveals no gallop and no friction rub.    No murmur heard.  Pulmonary/Chest: Effort normal and breath sounds normal. No stridor. No respiratory distress. He has no wheezes. He has no rales. He exhibits no tenderness.   Abdominal: Soft. Bowel sounds are normal. He exhibits distension (less tight, improved). There is no tenderness. There is no rebound and no guarding.   Musculoskeletal: Normal range of motion. He exhibits edema (1-2+ B/L LE, less tenuous ) and tenderness (right ankle, improved, less tender to touch).   Lymphadenopathy:     He has no cervical adenopathy.   Neurological: He is alert and oriented to person, place, and time.   Skin: Skin is warm and dry. Rash (healing bruise on abdomen over lovenox injections sites. (+) small hemorrhagic bullae on right medial thigh with scratch marks) noted. He is not diaphoretic.   Psychiatric: He has a normal mood and affect. His behavior is normal. Thought content normal.   Nursing note and vitals reviewed.      Labs:        Invalid input(s): [id number]  Recent Labs      02/08/17   0315   BNPBTYPENAT  1025*     Recent Labs      02/06/17   0231  02/07/17   0246  02/08/17   0315   SODIUM   125*  128*  127*   POTASSIUM  4.0  3.6  3.7   CHLORIDE  95*  96  92*   CO2  20  26  26   BUN  26*  26*  33*   CREATININE  0.94  1.15  1.32   MAGNESIUM   --    --   1.8   PHOSPHORUS   --    --   3.4   CALCIUM  9.0  8.8  9.7     Recent Labs      17   ALTSGPT   --    --   35   ASTSGOT   --    --   56*   ALKPHOSPHAT   --    --   72   TBILIRUBIN   --    --   2.0*   GLUCOSE  136*  114*  114*     Recent Labs      17   RBC   --   4.94  5.43   HEMOGLOBIN   --   12.6*  13.8*   HEMATOCRIT   --   39.4*  43.6   PLATELETCT   --   121*  118*   PROTHROMBTM  28.1*  27.3*  24.2*   INR  2.54*  2.45*  2.10*     Recent Labs      17   WBC  7.6  6.7   NEUTSPOLYS  63.80   --    LYMPHOCYTES  18.40*   --    MONOCYTES  12.20   --    EOSINOPHILS  3.60   --    BASOPHILS  1.50   --    ASTSGOT   --   56*   ALTSGPT   --   35   ALKPHOSPHAT   --   72   TBILIRUBIN   --   2.0*           Hemodynamics:  Temp (24hrs), Av.9 °C (96.7 °F), Min:35.6 °C (96.1 °F), Max:36.4 °C (97.6 °F)  Temperature: 36.4 °C (97.6 °F)  Pulse  Av.9  Min: 63  Max: 110  Blood Pressure: (!) 93/65 mmHg (RN notified)     Respiratory:    Respiration: 18, Pulse Oximetry: 100 %     Work Of Breathing / Effort: Mild  RUL Breath Sounds: Diminished, RML Breath Sounds: Diminished, RLL Breath Sounds: Diminished, BHAVNA Breath Sounds: Diminished, LLL Breath Sounds: Diminished  Fluids:    Intake/Output Summary (Last 24 hours) at 17  Last data filed at 17   Gross per 24 hour   Intake    720 ml   Output   3550 ml   Net  -2830 ml        GI/Nutrition:  Orders Placed This Encounter   Procedures   • DIET ORDER     Standing Status: Standing      Number of Occurrences: 1      Standing Expiration Date:      Order Specific Question:  Diet:     Answer:  Cardiac [6]      Comments:  hepatic     Order Specific Question:  Diet:     Answer:  2 Gram Sodium [7]      Order Specific Question:  Consistency/Fluid modifications:     Answer:  720 ml Fluid Restriction [6]     Medical Decision Making, by Problem:  Active Hospital Problems    Diagnosis   • *Acute CHF (congestive heart failure) (CMS-HCC)-decompensated stage 4 -LVEF 15% [I50.9]  - will need to continue optimizing fluid status, still with significant edema.   - continue lasix 40mg IV TID. Continue PO aldactone.  Continue fluid restriction, and monitor strict I&O and daily weights. Counseled to elevate leg, and maintain PETTY hose stockings.   - continue coreg, lisinopril.  - continue telemetry.   - Outpt follow up with - Cardiology in 1-2 weeks after discharge  -will add zaroxolyn, daily weights     • Cocaine abuse- in past, denies current use [F14.10]     • Acute renal insufficiency [N28.9]  - resolved. No hydronephrosis on renal U/S  - follow renal function while on IV lasix. BMP in AM.     • Elevated liver enzymes [R74.8]  - Likely due to congestive hepatopathy from acute CHF, but HCV Ab reactive. HIV negative.    - diuresis as above.   - Will need outpatient follow-up with GI for consideration for HCV treatment.       • Liver cirrhosis (CMS-HCC) [K74.60]?NAFLD  - HIDA neg  - Neg U/s for ascited   • Pulmonary embolism (CMS-HCC) [I26.99]  - Hx of in 6/2016. DVT study neg.  - Had completed treatment in past, but now on Lovenox + coumadin bridging for new LV thrombus     • Thrombocytopenia (CMS-HCC) [D69.6]  - stable. Monitor.      • Non compliance w medication regimen [Z91.14]  - Counseled.   - Need to discuss with family to help him withs med since patient is forgetful and also unable to arrange [facility].       Hyponatremia:  - Stable. Likely hypervolemic hyponatremia. Better with diuresis.  - continue lasix as above.  Off salt tablets as causing more fluid restriction. Continue fluid restriction. BMP in AM.      • LV mural Thrombus:  - Completed 5 days overlap and 2 consecutive days of therapeutic INR. D/C lovenox.  Continue coumadin per pharmacy dosing.     • Obesity [E66.9]     Hepatitis C infection  - Will need outpatient follow-up with GI for consideration for HCV treatment.     Acute gout  - continue colchicine. Continue PRN IV morphine and PO oxycodone for pain.     Code status: DNAR  Dispo - monitor on telemetry.   Plan  Augment Lasix and follow weights  C/w Tx for gout  Anthony Fernandez  See orders  Am labs  Time spent 35 min  Med . complex  Radiology images reviewed, Labs reviewed and Medications reviewed  Holt catheter: No Holt      DVT Prophylaxis: Enoxaparin (Lovenox)    Ulcer prophylaxis: Yes  Antibiotics: Treating active infection/contamination beyond 24 hours perioperative coverage  Assessed for rehab: Patient was assess for and/or received rehabilitation services during this hospitalization

## 2017-02-09 NOTE — TELEPHONE ENCOUNTER
[facility]    Called and left message for pt to call clinic ASAP to set up initial appointment.    Estuardo Alcaraz, PHARMD

## 2017-02-09 NOTE — PROGRESS NOTES
Inpatient Anticoagulation Service Note    Date: 2/9/2017  Reason for Anticoagulation: Other (Comments), Pulmonary Embolism (LV thrombus )        Hemoglobin Value: 13.6  Hematocrit Value: 43.2  Lab Platelet Value: 148  Target INR: 2.0 to 3.0    INR from last 7 days     Date/Time INR Value    02/08/17 0315 (!)2.1    02/07/17 0246 (!)2.45    02/06/17 0231 (!)2.54    02/05/17 0303 (!)2.55    02/04/17 0256 (!)1.98    02/03/17 0218 (!)1.69        Dose from last 7 days     Date/Time Dose (mg)    02/09/17 1200 3    02/08/17 1300 4.5    02/07/17 1200 3    02/06/17 1300 3    02/05/17 1100 3    02/04/17 1300 5    02/03/17 1400 5        Average Dose (mg):  (Home Dose: 3 mg daily per chart review)  Significant Interactions: Other (Comments) (colchicine, spironolactone)  Bridge Therapy: Yes  Date of Last VTE Event: 01/29/17  Bridge Therapy Start Date: 01/29/17  Days of Overlap Therapy:   INR Value Greater than 2 Prior to Discontinuation of Parenteral Anticoagulation: Yes     Reversal Agent Administered: Not Applicable  Comments: Next INR scheduled for tomorrow.  Continue with 3 mg tonight.  Adjust dose if needed.    Plan:  3 mg as scheduled  Education Material Provided?: Yes (packet provided)  Pharmacist suggested discharge dosing: 3 mg daily when on home food,  INR within 48 hours of discharge.     Nikhil Newell, PharmD, BCPS

## 2017-02-09 NOTE — PROGRESS NOTES
Pt refusing PETTY hose at this time, pt states he will reconsider if the pain pill he just took helps his pain.

## 2017-02-09 NOTE — PROGRESS NOTES
Report received, pt care assumed.  Pt resting comfortably in bed, denies needs.  POC reviewed with pt, pt denies questions.  Bed in low position, call light in reach, bed alarm on, will continue to monitor.

## 2017-02-09 NOTE — PROGRESS NOTES
Assumed pt care at 0700. Received bedside report from RIYA Kennedy. AM assessment completed. AAOx4. Pt denies SOB; c/o pain of 7 on 0 to 10 pain scale (Will administer medication PRN - see MAR). Provided pt with RN and CNA extension numbers on white board and encouraged pt to call when needed. Discussed plan of care for the day, pt verbalizes understanding. VSS. Denies any additional needs at this time. Call light, belongings, and phone within reach. Hourly rounding in effect. Will continue to monitor.

## 2017-02-10 NOTE — PROGRESS NOTES
Received report of patient from RIYA Wagner at bedside and assumed care.  Patient was resting comfortably with all needs currently met. Family is at bedside. Patient explained plan of care and verbalized understanding. Questions answered. Bed alarm on, bed in low position, call light within reach, hourly rounding in place, room/goal board updated and current. Patient reports pain on 8/10 scale and requesting pain meds with evening medication pass.  Will medicate patient per MAR.

## 2017-02-10 NOTE — DISCHARGE INSTRUCTIONS
Discharge Instructions    Discharged to home by car with relative. Discharged via wheelchair, hospital escort: Yes.  Special equipment needed: Not Applicable    Be sure to schedule a follow-up appointment with your primary care doctor or any specialists as instructed.     Discharge Plan:   Influenza Vaccine Indication: Patient Refuses    I understand that a diet low in cholesterol, fat, and sodium is recommended for good health. Unless I have been given specific instructions below for another diet, I accept this instruction as my diet prescription.     Special Instructions: None    · Is patient discharged on Warfarin / Coumadin?   No     · Is patient Post Blood Transfusion?  No    Depression / Suicide Risk    As you are discharged from this [facility] facility, it is important to learn how to keep safe from harming yourself.    Recognize the warning signs:  · Abrupt changes in personality, positive or negative- including increase in energy   · Giving away possessions  · Change in eating patterns- significant weight changes-  positive or negative  · Change in sleeping patterns- unable to sleep or sleeping all the time   · Unwillingness or inability to communicate  · Depression  · Unusual sadness, discouragement and loneliness  · Talk of wanting to die  · Neglect of personal appearance   · Rebelliousness- reckless behavior  · Withdrawal from people/activities they love  · Confusion- inability to concentrate     If you or a loved one observes any of these behaviors or has concerns about self-harm, here's what you can do:  · Talk about it- your feelings and reasons for harming yourself  · Remove any means that you might use to hurt yourself (examples: pills, rope, extension cords, firearm)  · Get professional help from the community (Mental Health, Substance Abuse, psychological counseling)  · Do not be alone:Call your Safe Contact- someone whom you trust who will be there for you.  · Call your local CRISIS HOTLINE  265-2078 or 173-953-3277  · Call your local Children's Mobile Crisis Response Team [place] (727) 284-3350 or www.[facility]  · Call the toll free National Suicide Prevention Hotlines   · National Suicide Prevention Lifeline 874-650-EWPC (6303)  · National Hope Line Network 800-SUICIDE (451-4923)      HF Patient Discharge Instructions  · Monitor your weight daily, and maintain a weight chart, to track your weight changes.   · Activity as tolerated, unless your Doctor has ordered otherwise.   · Follow a low fat, low cholesterol, low salt diet unless instructed otherwise by your Doctor. Read the labels on the back of food products and track your intake of fat, cholesterol and salt.   · Fluid Restriction Yes. If a Fluid Restriction has been ordered by your Doctor, measure fluids with a measuring cup to ensure that you are not exceeding the restriction.   · No smoking.  · Oxygen No. If your Doctor has ordered that you wear Oxygen at home, it is important to wear it as ordered.  · Did you receive an explanation from staff on the importance of taking each of your medications and why it is necessary to keep taking them unless your doctor says to stop? Yes  · Were all of your questions answered about how to manage your heart failure and what to do if you have increased signs and symptoms after you go home? Yes  · Do you feel like your heart failure care team involved you in the care treatment plan and allowed you to make decisions regarding your care while in the hospital and addressed any discharge needs you might have? Yes    See the educational handout provided at discharge for more information on monitoring your daily weight, activity and diet. This also explains more about Heart Failure, symptoms of a flare-up and some of the tests that you have undergone.     Warning Signs of a Flare-Up include:  · Swelling in the ankles or lower legs.  · Shortness of breath, while at rest, or while doing normal activities.    · Shortness of breath at night when in bed, or coughing in bed.   · Requiring more pillows to sleep at night, or needing to sit up at night to sleep.  · Feeling weak, dizzy or fatigued.     When to call your Doctor:  · Call [facility] 7th floor about questions regarding the discharge instructions you were given [phone].  · Call your Primary Care Physician or Cardiologist if:   1. You experience any pain radiating to your jaw or neck.  2. You have any difficulty breathing.  3. You experience weight gain of 3 lbs in a day or 5 lbs in a week.   4. You feel any palpitations or irregular heartbeats.  5. You become dizzy or lose consciousness.   If you have had an angiogram or had a pacemaker or AICD placed, and experience:  1. Bleeding, drainage or swelling at the surgical / puncture site.  2. Fever greater than 100.0 F  3. Shock from internal defibrillator.  4. Cool and / or numb extremities.      Oxycodone extended-release tablets  What is this medicine?  OXYCODONE (ox i KOE done) is a pain reliever. It is used to treat constant pain that lasts for more than a few days.  This medicine may be used for other purposes; ask your health care provider or pharmacist if you have questions.  COMMON BRAND NAME(S): OxyContin  What should I tell my health care provider before I take this medicine?  They need to know if you have any of these conditions:  -[place]'s disease  -brain tumor  -drug abuse or addiction  -gallbladder disease  -head injury  -heart disease  -if you frequently drink alcohol-containing drinks  -kidney disease or problems going to the bathroom  -liver disease  -lung disease, asthma, or breathing problems  -mental problems  -pancreatic disease  -seizures  -stomach or intestine problems  -thyroid disease  -trouble swallowing  -an unusual or allergic reaction to oxycodone, codeine, hydrocodone, morphine, other medicines, foods, dyes, or preservatives  -pregnant or trying to get  pregnant  -breast-feeding  How should I use this medicine?  Take this medicine by mouth with a full glass of water. Follow the directions on the prescription label. Do not cut, crush or chew this medicine. Swallow only one tablet at a time. Do not wet, soak, or lick the tablet before you take it. You can take it with or without food. If it upsets your stomach, take it with food. Take your medicine at regular intervals. Do not take it more often than directed. Do not stop taking except on your doctor's advice.  A special MedGuide will be given to you by the pharmacist with each prescription and refill. Be sure to read this information carefully each time.  Talk to your pediatrician regarding the use of this medicine in children. Special care may be needed.  Overdosage: If you think you have taken too much of this medicine contact a poison control center or emergency room at once.  NOTE: This medicine is only for you. Do not share this medicine with others.  What if I miss a dose?  If you miss a dose, take it as soon as you can. If it is almost time for your next dose, take only that dose. Do not take double or extra doses.  What may interact with this medicine?  -alcohol  -antihistamines  -carbamazepine  -certain medicines used for nausea like chlorpromazine, droperidol  -erythromycin  -ketoconazole  -medicines for depression, anxiety, or psychotic disturbances  -medicines for sleep  -muscle relaxants  -naloxone  -naltrexone  -narcotic medicines (opiates) for pain  -nilotinib  -phenobarbital  -phenytoin  -rifampin  -ritonavir  -voriconazole  This list may not describe all possible interactions. Give your health care provider a list of all the medicines, herbs, non-prescription drugs, or dietary supplements you use. Also tell them if you smoke, drink alcohol, or use illegal drugs. Some items may interact with your medicine.  What should I watch for while using this medicine?  Tell your doctor or health care  professional if your pain does not go away, if it gets worse, or if you have new or a different type of pain. You may develop tolerance to the medicine. Tolerance means that you will need a higher dose of the medication for pain relief. Tolerance is normal and is expected if you take this medicine for a long time.  Do not suddenly stop taking your medicine because you may develop a severe reaction. Your body becomes used to the medicine. This does NOT mean you are addicted. Addiction is a behavior related to getting and using a drug for a non-medical reason. If you have pain, you have a medical reason to take pain medicine. Your doctor will tell you how much medicine to take. If your doctor wants you to stop the medicine, the dose will be slowly lowered over time to avoid any side effects.  You may get drowsy or dizzy when you first start taking the medicine or change doses. Do not drive, use machinery, or do anything that may be dangerous until you know how the medicine affects you. Stand or sit up slowly.  There are different types of narcotic medicines (opiates) for pain. If you take more than one type at the same time, you may have more side effects. Give your health care provider a list of all medicines you use. Your doctor will tell you how much medicine to take. Do not take more medicine than directed. Call emergency for help if you have problems breathing.  This medicine will cause constipation. Try to have a bowel movement at least every 2 to 3 days. If you do not have a bowel movement for 3 days, call your doctor or health care professional.  You may see empty tablets in your stool. The tablet shell for some brands of this medicine does not dissolve. This is normal.  Your mouth may get dry. Drinking water, chewing sugarless gum, or sucking on hard candy may help. See your dentist every 6 months.  What side effects may I notice from receiving this medicine?  Side effects that you should report to your  doctor or health care professional as soon as possible:  -allergic reactions like skin rash, itching or hives, swelling of the face, lips, or tongue  -breathing problems  -confusion  -feeling faint or lightheaded, falls  -trouble passing urine or change in the amount of urine  -trouble swallowing  -unusually weak or tired  Side effects that usually do not require medical attention (report to your doctor or health care professional if they continue or are bothersome):  -constipation  -dizziness  -dry mouth  -itching  -nausea, vomiting  -stomach pain  -tiredness  -upset stomach  This list may not describe all possible side effects. Call your doctor for medical advice about side effects. You may report side effects to FDA at 5-491-FDA-5627.  Where should I keep my medicine?  Keep out of the reach of children. This medicine can be abused. Keep your medicine in a safe place to protect it from theft. Do not share this medicine with anyone. Selling or giving away this medicine is dangerous and against the law.  Store at room temperature between 15 and 30 degrees C (59 and 86 degrees F). Protect from light. Keep container tightly closed.  Discard unused medicine and used packaging carefully. Pets and children can be harmed if they find used or lost packages. Flush any unused medicines down the toilet. Do not use the medicine after the expiration date. Follow the directions in the MedGuide.  NOTE: This sheet is a summary. It may not cover all possible information. If you have questions about this medicine, talk to your doctor, pharmacist, or health care provider.  © 2014, [employer]. (10/7/2013 12:04:38 PM)    Lorazepam tablets  What is this medicine?  LORAZEPAM (bryan A ze dick) is a benzodiazepine. It is used to treat anxiety.  This medicine may be used for other purposes; ask your health care provider or pharmacist if you have questions.  COMMON BRAND NAME(S): Ativan  What should I tell my health care provider  before I take this medicine?  They need to know if you have any of these conditions:  -alcohol or drug abuse problem  -bipolar disorder, depression, psychosis or other mental health condition  -glaucoma  -kidney or liver disease  -lung disease or breathing difficulties  -myasthenia gravis  -Parkinson's disease  -seizures or a history of seizures  -suicidal thoughts  -an unusual or allergic reaction to lorazepam, other benzodiazepines, foods, dyes, or preservatives  -pregnant or trying to get pregnant  -breast-feeding  How should I use this medicine?  Take this medicine by mouth with a glass of water. Follow the directions on the prescription label. If it upsets your stomach, take it with food or milk. Take your medicine at regular intervals. Do not take it more often than directed. Do not stop taking except on the advice of your doctor or health care professional.  Talk to your pediatrician regarding the use of this medicine in children. Special care may be needed.  Overdosage: If you think you have taken too much of this medicine contact a poison control center or emergency room at once.  NOTE: This medicine is only for you. Do not share this medicine with others.  What if I miss a dose?  If you miss a dose, take it as soon as you can. If it is almost time for your next dose, take only that dose. Do not take double or extra doses.  What may interact with this medicine?  -barbiturate medicines for inducing sleep or treating seizures, like phenobarbital  -clozapine  -medicines for depression, mental problems or psychiatric disturbances  -medicines for sleep  -phenytoin  -probenecid  -theophylline  -valproic acid  This list may not describe all possible interactions. Give your health care provider a list of all the medicines, herbs, non-prescription drugs, or dietary supplements you use. Also tell them if you smoke, drink alcohol, or use illegal drugs. Some items may interact with your medicine.  What should I watch  for while using this medicine?  Visit your doctor or health care professional for regular checks on your progress. Your body may become dependent on this medicine, ask your doctor or health care professional if you still need to take it. However, if you have been taking this medicine regularly for some time, do not suddenly stop taking it. You must gradually reduce the dose or you may get severe side effects. Ask your doctor or health care professional for advice before increasing or decreasing the dose. Even after you stop taking this medicine it can still affect your body for several days.  You may get drowsy or dizzy. Do not drive, use machinery, or do anything that needs mental alertness until you know how this medicine affects you. To reduce the risk of dizzy and fainting spells, do not stand or sit up quickly, especially if you are an older patient. Alcohol may increase dizziness and drowsiness. Avoid alcoholic drinks.  Do not treat yourself for coughs, colds or allergies without asking your doctor or health care professional for advice. Some ingredients can increase possible side effects.  What side effects may I notice from receiving this medicine?  Side effects that you should report to your doctor or health care professional as soon as possible:  -changes in vision  -confusion  -depression  -mood changes, excitability or aggressive behavior  -movement difficulty, staggering or jerky movements  -muscle cramps  -restlessness  -weakness or tiredness  Side effects that usually do not require medical attention (report to your doctor or health care professional if they continue or are bothersome):  -constipation or diarrhea  -difficulty sleeping, nightmares  -dizziness, drowsiness  -headache  -nausea, vomiting  This list may not describe all possible side effects. Call your doctor for medical advice about side effects. You may report side effects to FDA at 7-859-FDA-3168.  Where should I keep my medicine?  Keep  out of the reach of children. This medicine can be abused. Keep your medicine in a safe place to protect it from theft. Do not share this medicine with anyone. Selling or giving away this medicine is dangerous and against the law.  Store at room temperature between 20 and 25 degrees C (68 and 77 degrees F). Protect from light. Keep container tightly closed. Throw away any unused medicine after the expiration date.  NOTE: This sheet is a summary. It may not cover all possible information. If you have questions about this medicine, talk to your doctor, pharmacist, or health care provider.  © 2014, [employer]. (6/19/2009 2:58:20 PM)    Spironolactone tablets  What is this medicine?  SPIRONOLACTONE (dawit on oh LAK tone) is a diuretic. It helps you make more urine and to lose excess water from your body. This medicine is used to treat high blood pressure, and edema or swelling from heart, kidney, or liver disease. It is also used to treat patients who make too much aldosterone or have low potassium.  This medicine may be used for other purposes; ask your health care provider or pharmacist if you have questions.  COMMON BRAND NAME(S): Aldactone  What should I tell my health care provider before I take this medicine?  They need to know if you have any of these conditions:  -high blood level of potassium  -kidney disease or trouble making urine  -liver disease  -an unusual or allergic reaction to spironolactone, other medicines, foods, dyes, or preservatives  -pregnant or trying to get pregnant  -breast-feeding  How should I use this medicine?  Take this medicine by mouth with a drink of water. Follow the directions on your prescription label. You can take it with or without food. If it upsets your stomach, take it with food. Do not take your medicine more often than directed. Remember that you will need to pass more urine after taking this medicine. Do not take your doses at a time of day that will cause you  problems. Do not take at bedtime.  Talk to your pediatrician regarding the use of this medicine in children. While this drug may be prescribed for selected conditions, precautions do apply.  Overdosage: If you think you have taken too much of this medicine contact a poison control center or emergency room at once.  NOTE: This medicine is only for you. Do not share this medicine with others.  What if I miss a dose?  If you miss a dose, take it as soon as you can. If it is almost time for your next dose, take only that dose. Do not take double or extra doses.  What may interact with this medicine?  Do not take this medicine with any of the following medications:  -eplerenone  This medicine may also interact with the following medications:  -corticosteroids  -digoxin  -lithium  -medicines for high blood pressure like ACE inhibitors  -skeletal muscle relaxants like tubocurarine  -NSAIDs, medicines for pain and inflammation, like ibuprofen or naproxen  -potassium products like salt substitute or supplements  -pressor amines like norepinephrine  -some diuretics  This list may not describe all possible interactions. Give your health care provider a list of all the medicines, herbs, non-prescription drugs, or dietary supplements you use. Also tell them if you smoke, drink alcohol, or use illegal drugs. Some items may interact with your medicine.  What should I watch for while using this medicine?  Visit your doctor or health care professional for regular checks on your progress. Check your blood pressure as directed. Ask your doctor what your blood pressure should be, and when you should contact them.  You may need to be on a special diet while taking this medicine. Ask your doctor. Also, ask how many glasses of fluid you need to drink a day. You must not get dehydrated.  This medicine may make you feel confused, dizzy or lightheaded. Drinking alcohol and taking some medicines can make this worse. Do not drive, use  machinery, or do anything that needs mental alertness until you know how this medicine affects you. Do not sit or stand up quickly.  What side effects may I notice from receiving this medicine?  Side effects that you should report to your doctor or health care professional as soon as possible:  -allergic reactions such as skin rash or itching, hives, swelling of the lips, mouth, tongue, or throat  -black or tarry stools  -fast, irregular heartbeat  -fever  -muscle pain, cramps  -numbness, tingling in hands or feet  -trouble breathing  -trouble passing urine  -unusual bleeding  -unusually weak or tired  Side effects that usually do not require medical attention (report to your doctor or health care professional if they continue or are bothersome):  -change in voice or hair growth  -confusion  -dizzy, drowsy  -dry mouth, increased thirst  -enlarged or tender breasts  -headache  -irregular menstrual periods  -sexual difficulty, unable to have an erection  -stomach upset  This list may not describe all possible side effects. Call your doctor for medical advice about side effects. You may report side effects to FDA at 9-689-FDA-3953.  Where should I keep my medicine?  Keep out of the reach of children.  Store below 25 degrees C (77 degrees F). Throw away any unused medicine after the expiration date.  NOTE: This sheet is a summary. It may not cover all possible information. If you have questions about this medicine, talk to your doctor, pharmacist, or health care provider.  © 2014, [employer]. (8/30/2011 12:51:30 PM)    Allopurinol tablets  What is this medicine?  ALLOPURINOL (al oh PURE i nole) reduces the amount of uric acid the body makes. It is used to treat the symptoms of gout. It is also used to treat or prevent high uric acid levels that occur as a result of certain types of chemotherapy. This medicine may also help patients who frequently have kidney stones.  This medicine may be used for other  purposes; ask your health care provider or pharmacist if you have questions.  COMMON BRAND NAME(S): Zyloprim  What should I tell my health care provider before I take this medicine?  They need to know if you have any of these conditions:  -kidney or liver disease  -an unusual or allergic reaction to allopurinol, other medicines, foods, dyes, or preservatives  -pregnant or trying to get pregnant  -breast feeding  How should I use this medicine?  Take this medicine by mouth with a glass of water. Follow the directions on the prescription label. If this medicine upsets your stomach, take it with food or milk. Take your doses at regular intervals. Do not take your medicine more often than directed.  Talk to your pediatrician regarding the use of this medicine in children. Special care may be needed. While this drug may be prescribed for children as young as 6 years for selected conditions, precautions do apply.  Overdosage: If you think you have taken too much of this medicine contact a poison control center or emergency room at once.  NOTE: This medicine is only for you. Do not share this medicine with others.  What if I miss a dose?  If you miss a dose, take it as soon as you can. If it is almost time for your next dose, take only that dose. Do not take double or extra doses.  What may interact with this medicine?  Do not take this medicine with the following medication:  -didanosine, ddI  This medicine may also interact with the following medications:  -amoxicillin or ampicillin  -azathioprine  -certain medicines used to treat gout  -certain types of diuretics  -chlorpropamide  -cyclosporine  -dicumarol  -mercaptopurine  -tolbutamide  -warfarin  This list may not describe all possible interactions. Give your health care provider a list of all the medicines, herbs, non-prescription drugs, or dietary supplements you use. Also tell them if you smoke, drink alcohol, or use illegal drugs. Some items may interact with your  medicine.  What should I watch for while using this medicine?  Visit your doctor or health care professional for regular checks on your progress. If you are taking this medicine to treat gout, you may not have less frequent attacks at first. Keep taking your medicine regularly and the attacks should get better within 2 to 6 weeks. Drink plenty of water (10 to 12 full glasses a day) while you are taking this medicine. This will help to reduce stomach upset and reduce the risk of getting gout or kidney stones.  Call your doctor or health care professional at once if you get a skin rash together with chills, fever, sore throat, or nausea and vomiting, if you have blood in your urine, or difficulty passing urine.  Do not take vitamin C without asking your doctor or health care professional. Too much vitamin C can increase the chance of getting kidney stones.  You may get drowsy or dizzy. Do not drive, use machinery, or do anything that needs mental alertness until you know how this drug affects you. Do not stand or sit up quickly, especially if you are an older patient. This reduces the risk of dizzy or fainting spells. Alcohol can make you more drowsy and dizzy. Alcohol can also increase the chance of stomach problems and increase the amount of uric acid in your blood. Avoid alcoholic drinks.  What side effects may I notice from receiving this medicine?  Side effects that you should report to your doctor or health care professional as soon as possible:  -allergic reactions like skin rash, itching or hives, swelling of the face, lips, or tongue  -breathing problems  -muscle aches or pains  -redness, blistering, peeling or loosening of the skin, including inside the mouth  Side effects that usually do not require medical attention (report to your doctor or health care professional if they continue or are bothersome):  -changes in taste  -diarrhea  -indigestion  -stomach pain or cramps  This list may not describe all  possible side effects. Call your doctor for medical advice about side effects. You may report side effects to FDA at 8-670-FDA-6465.  Where should I keep my medicine?  Keep out of the reach of children.  Store at room temperature between 15 and 25 degrees C (59 and 77 degrees F). Protect from light and moisture. Throw away any unused medicine after the expiration date.  NOTE: This sheet is a summary. It may not cover all possible information. If you have questions about this medicine, talk to your doctor, pharmacist, or health care provider.  © 2014, [employer]vier/[employer] Standard. (6/22/2009 2:26:54 PM)    Colchicine tablets  What is this medicine?  COLCHICINE (KOL chi seen) is for joint pain and swelling due to attacks of acute gouty arthritis. The medicine is also used to treat familial Mediterranean fever.  This medicine may be used for other purposes; ask your health care provider or pharmacist if you have questions.  COMMON BRAND NAME(S): Colcrys   What should I tell my health care provider before I take this medicine?  They need to know if you have any of these conditions:  -anemia  -blood disorders like leukemia or lymphoma  -heart disease  -immune system problems  -intestinal disease  -kidney disease  -liver disease  -muscle pain or weakness  -take other medicines  -stomach problems  -an unusual or allergic reaction to colchicine, other medicines, lactose, foods, dyes, or preservatives  -pregnant or trying to get pregnant  -breast-feeding  How should I use this medicine?  Take this medicine by mouth with a full glass of water. Follow the directions on the prescription label. You can take it with or without food. If it upsets your stomach, take it with food. Take your medicine at regular intervals. Do not take your medicine more often than directed.  A special MedGuide will be given to you by the pharmacist with each prescription and refill. Be sure to read this information carefully each time.  Talk to your  pediatrician regarding the use of this medicine in children. While this drug may be prescribed for children as young as 4 years old for selected conditions, precautions do apply.  Patients over 65 years old may have a stronger reaction and need a smaller dose.  Overdosage: If you think you have taken too much of this medicine contact a poison control center or emergency room at once.  NOTE: This medicine is only for you. Do not share this medicine with others.  What if I miss a dose?  If you miss a dose, take it as soon as you can. If it is almost time for your next dose, take only that dose. Do not take double or extra doses.  What may interact with this medicine?  -alcohol  -certain medicines for cholesterol like atorvastatin  -certain medicines for coughs and colds  -certain medicines to help you breathe better  -cyclosporine  -digoxin  -epinephrine  -grapefruit or grapefruit juice  -methenamine  -sodium bicarbonate  -some antibiotics like clarithromycin, erythromycin, and telithromycin  -some medicines for an irregular heartbeat or other heart problems  -some medicines for cancer, like lapatinib and tamoxifen  -some medicines for fungal infection  -some medicines for HIV  This list may not describe all possible interactions. Give your health care provider a list of all the medicines, herbs, non-prescription drugs, or dietary supplements you use. Also tell them if you smoke, drink alcohol, or use illegal drugs. Some items may interact with your medicine.  What should I watch for while using this medicine?  Visit your doctor or health care professional for regular checks on your progress. You may need periodic blood checks.  Alcohol can increase the chance of getting stomach problems and gout attacks. Do not drink alcohol.  What side effects may I notice from receiving this medicine?  Side effects that you should report to your doctor or health care professional as soon as possible:  -allergic reactions like skin  rash, itching or hives, swelling of the face, lips, or tongue  -fever, chills, or sore throat  -muscle tenderness, pain, or weakness  -numbness or tingling in hands or feet  -unusual bleeding or bruising  -unusually weak or tired  -vomiting  Side effects that usually do not require medical attention (report to your doctor or health care professional if they continue or are bothersome):  -diarrhea  -hair loss  -loss of appetite  -stomach pain or nausea  This list may not describe all possible side effects. Call your doctor for medical advice about side effects. You may report side effects to FDA at 3-883-FDA-5013.  Where should I keep my medicine?  Keep out of the reach of children.  Store at room temperature between 15 and 30 degrees C (59 and 86 degrees F). Keep container tightly closed. Protect from light. Throw away any unused medicine after the expiration date.  NOTE: This sheet is a summary. It may not cover all possible information. If you have questions about this medicine, talk to your doctor, pharmacist, or health care provider.  © 2014, Elsevier/[employer] Standard. (8/9/2011 12:57:48 PM)    Bumetanide tablets  What is this medicine?  BUMETANIDE (byoo MET a nide) is a diuretic. It helps you make more urine and to lose salt and excess water from your body. This medicine is used to treat high blood pressure, and edema or swelling from heart, kidney, or liver disease.  This medicine may be used for other purposes; ask your health care provider or pharmacist if you have questions.  COMMON BRAND NAME(S): Bumex  What should I tell my health care provider before I take this medicine?  They need to know if you have any of these conditions:  -abnormal blood electrolytes  -diarrhea or vomiting  -gout  -heart disease  -kidney disease, small amounts of urine, or difficulty passing urine  -liver disease  -an unusual or allergic reaction to bumetanide, sulfa drugs, other medicines, foods, dyes, or preservatives  -pregnant or  trying to get pregnant  -breast-feeding  How should I use this medicine?  Take this medicine by mouth with a glass of water. Follow the directions on the prescription label. You may take this medicine with or without food but if it upsets your stomach, take it with food or milk. Do not take it more often than directed. Remember that you will need to pass more urine after taking this medicine. Do not take it at a time of day that will cause you problems. Do not take at bedtime.  Talk to your pediatrician regarding the use of this medicine in children. Special care may be needed.  Overdosage: If you think you have taken too much of this medicine contact a poison control center or emergency room at once.  NOTE: This medicine is only for you. Do not share this medicine with others.  What if I miss a dose?  If you miss a dose, take it as soon as you can. If it is almost time for your next dose, take only that dose. Do not take double or extra doses.  What may interact with this medicine?  -alcohol  -certain antibiotics given by injection  -diuretics  -heart medicines like digoxin and dofetilide  -hormones like cortisone, fludrocortisone, hydrocortisone  -lithium  -medicines for diabetes  -medicines for high blood pressure  -medicines for inflammation like indomethacin  -OTC supplements like ginseng and ephedra  This list may not describe all possible interactions. Give your health care provider a list of all the medicines, herbs, non-prescription drugs, or dietary supplements you use. Also tell them if you smoke, drink alcohol, or use illegal drugs. Some items may interact with your medicine.  What should I watch for while using this medicine?  Visit your doctor or health care professional for regular check ups. Check your blood pressure regularly. Ask your doctor or health care professional what your blood pressure should be, and when you should contact him or her. If you are a diabetic, check your blood sugar as  directed.  You may need to be on a special diet while taking this medicine. Ask your doctor. Also, ask how many glasses of fluid you need to drink each day. You must not get dehydrated.  You may get drowsy or dizzy. Do not drive, use machinery, or do anything that needs mental alertness until you know how this drug affects you. Do not stand or sit up quickly, especially if you are an older patient. This reduces the risk of dizzy or fainting spells. Alcohol can make you more drowsy and dizzy. Avoid alcoholic drinks.  What side effects may I notice from receiving this medicine?  Side effects that you should report to your doctor or health care professional as soon as possible:  -allergic reactions like skin rash, itching or hives, swelling of the face, lips, or tongue  -blood in the urine or stools  -blurred vision  -dry mouth  -fever or chills  -hearing loss or ringing in the ears  -irregular heartbeat  -muscle cramps, pain or weakness  -unusually weak or tired  -vomiting or diarrhea  Side effects that usually do not require medical attention (report to your doctor or health care professional if they continue or are bothersome):  -headache  -loss of appetite  -unusual bleeding or bruising  This list may not describe all possible side effects. Call your doctor for medical advice about side effects. You may report side effects to FDA at 7-395-FDA-5220.  Where should I keep my medicine?  Keep out of the reach of children.  Store at room temperature between 15 and 30 degrees C (59 and 86 degrees F). Throw away any unused medicine after the expiration date.  NOTE: This sheet is a summary. It may not cover all possible information. If you have questions about this medicine, talk to your doctor, pharmacist, or health care provider.  © 2014, [employer]vier/Gold Standard. (3/18/2009 3:55:56 PM)

## 2017-02-10 NOTE — CARE PLAN
Problem: Safety  Goal: Will remain free from falls  Intervention: Assess risk factors for falls  Patient educated on fall precautions and regarding safety. Patient verbalizes understanding. Call light within reach. Patient verbalizes will call for assistance and demonstrates this understanding to RN. Bed in lowest position, alarm on, treaded socks on. Patient belongings within reach, hourly rounding in place.           Problem: Pain Management  Goal: Pain level will decrease to patient’s comfort goal  Intervention: Follow pain managment plan developed in collaboration with patient and Interdisciplinary Team  Patient reports moderate to severe pain.  Working with patient and MAR to meet patient's comfort goals pertaining to this hospitalization.

## 2017-02-10 NOTE — PROGRESS NOTES
"Assessment completed. Patient A&O x 4. Pt c/o BLE pain and back spasms, pt declines pain medication at this time and states \"later.\" POC discussed, pt understands that he is on a 720 mL fluid restriction. Strict I&O's closely monitored and documented in EPIC. Call light & bedside table within reach. Bed locked and in lowest position. Bed alarm on and fall precautions in place. Hourly rounding in place.  "

## 2017-02-10 NOTE — PROGRESS NOTES
Pt cleared for d/c after extensive conversation with Dr. Patel regarding plan of care. Pt would like to go home and declined possible hospice arrangement set up consult. Pt and son understand the risks and benefits of continuing hospitalization and pt continues to prefer to discharge home with family. Prescription scripts received from MD and provided education on each med. Pt still has f/u appointment with Dr. Olivia on 2/15. Pt  Understands MD recommends f/u with PCP within the week. Pt refused vaccines.Tele monitor off and tech notified. IV d/c'd. Awaiting transport.

## 2017-02-10 NOTE — WOUND TEAM
Wound Team consulted to place compression wraps on patient's BLE. Pulses heard with doppler, patient no longer in acute CHF exacerbation. BLE cleaned, dried, and daily moisturizer applied avoiding placing lotion in between toes. 2-layer compression wrap applied. Ordered XXLG treaded slipper socks to be placed over wraps/feet.

## 2017-02-10 NOTE — PROGRESS NOTES
Hospital Medicine Progress Note, Adult, Complex               Author: Mihai Patel   Date & Time created: 2/9/2017  6:30 PM     Interval History: 51 M with severe cardiomyopathy, LVEF 15%, AICD in place, admitted with acute decompensated exacerbation of CHF, started on lasix + spironolactone, strict Is and Os, daily weights and added low dose lisinopril . He also had elevated liver enzymes and [place] on CKD. Found to have new LV thrombus, and started on lovenox and coumadin. Na dropped to 119. Hep C Ab reactive. HIV nonreactive. . TSH 4.3, normal T4. Ferritin, cortisol WNL. HC viral load 680,000. Completed 5 days of lovenox/coumadin overlap. Increased diuresis.   Patient seen and examined today.    Patient tolerating treatment and therapies.  All Data, Medication data reviewed.  Case discussed with nursing as available.  Plan of Care reviewed with patient and notified of changes.    2/6/2017 - no overnight events. Remains hemodynamically stable and afebrile. BP soft but WNL. Saturating well on RA. Na 125. INR 2.54. -1.8L UO yesterday, -7L fluid balance.  2/7 Pt feels still edematous, feet painful, abdomen distended, denies CP, aware of difficulty with his combined issue's   Distended abdomen, no def. Fluid wave  2/8 Pt with continued leg pain and scratching legs with blood blisters, has not been able to decrease weight much, resp. Status better, agree's to try fareed-hose, NH3 ok  2/9 Pt with continued pain and swelling in LE despite maximum efforts to diurese, has blood blisters with scratch marks, LE erythema and edema  Feel he is compliant with water restriction, though Na lower and continued edema    Consultants:  Cardiology-/ Dr.Mohsen ( [facility])  Palliative Medicine    Review of Systems:  Review of Systems   Constitutional: Positive for malaise/fatigue. Negative for fever and chills.   HENT: Negative.    Eyes: Negative.    Respiratory: Positive for cough.    Cardiovascular:  Positive for orthopnea and leg swelling. Negative for chest pain.   Gastrointestinal: Positive for abdominal pain.   Genitourinary: Negative.    Musculoskeletal: Negative.    Skin: Positive for rash.   Neurological: Positive for weakness. Negative for headaches.   Endo/Heme/Allergies: Negative.    Psychiatric/Behavioral: Positive for substance abuse. The patient is nervous/anxious.    All other systems reviewed and are negative.        Physical Exam:  Physical Exam   Constitutional: He is oriented to person, place, and time. He appears well-developed. No distress.   Obese   HENT:   Head: Normocephalic and atraumatic.   Mouth/Throat: Oropharynx is clear and moist. No oropharyngeal exudate.   Dry blood in both nares   Eyes: EOM are normal. Pupils are equal, round, and reactive to light. Right eye exhibits no discharge. Left eye exhibits no discharge. No scleral icterus.   Neck: Normal range of motion. Neck supple. No JVD present. No thyromegaly present.   Cardiovascular: Normal rate and regular rhythm.  Exam reveals no gallop and no friction rub.    No murmur heard.  Pulmonary/Chest: Effort normal and breath sounds normal. No stridor. No respiratory distress. He has no wheezes. He has no rales. He exhibits no tenderness.   Abdominal: Soft. Bowel sounds are normal. He exhibits distension (less tight, improved). There is no tenderness. There is no rebound and no guarding.   Musculoskeletal: Normal range of motion. He exhibits edema (1-2+ B/L LE, less tenuous ) and tenderness (right ankle, improved, less tender to touch).   Lymphadenopathy:     He has no cervical adenopathy.   Neurological: He is alert and oriented to person, place, and time.   Skin: Skin is warm and dry. Rash (healing bruise on abdomen over lovenox injections sites. (+) small hemorrhagic bullae on right medial thigh with scratch marks) noted. He is not diaphoretic.   Psychiatric: He has a normal mood and affect. His behavior is normal. Thought content  normal.   Nursing note and vitals reviewed.      Labs:        Invalid input(s): [id number]  Recent Labs      17   BNPBTYPENAT  1025*  870*     Recent Labs      17   SODIUM  128*  127*  123*   POTASSIUM  3.6  3.7  3.3*   CHLORIDE  96  92*  88*   CO2  26  26  26   BUN  26*  33*  39*   CREATININE  1.15  1.32  1.43*   MAGNESIUM   --   1.8   --    PHOSPHORUS   --   3.4   --    CALCIUM  8.8  9.7  9.6     Recent Labs      17   ALTSGPT   --   35   --    ASTSGOT   --   56*   --    ALKPHOSPHAT   --   72   --    TBILIRUBIN   --   2.0*   --    GLUCOSE  114*  114*  114*     Recent Labs      17   RBC  4.94  5.43  5.49   HEMOGLOBIN  12.6*  13.8*  13.6*   HEMATOCRIT  39.4*  43.6  43.2   PLATELETCT  121*  118*  148*   PROTHROMBTM  27.3*  24.2*   --    INR  2.45*  2.10*   --      Recent Labs      17   WBC  7.6  6.7  7.8   NEUTSPOLYS  63.80   --    --    LYMPHOCYTES  18.40*   --    --    MONOCYTES  12.20   --    --    EOSINOPHILS  3.60   --    --    BASOPHILS  1.50   --    --    ASTSGOT   --   56*   --    ALTSGPT   --   35   --    ALKPHOSPHAT   --   72   --    TBILIRUBIN   --   2.0*   --            Hemodynamics:  Temp (24hrs), Av.4 °C (97.6 °F), Min:35.8 °C (96.5 °F), Max:37.1 °C (98.8 °F)  Temperature: 35.8 °C (96.5 °F)  Pulse  Av.8  Min: 63  Max: 110  Blood Pressure: 103/79 mmHg     Respiratory:    Respiration: 16, Pulse Oximetry: 94 %     Work Of Breathing / Effort: Mild  RUL Breath Sounds: Diminished, RML Breath Sounds: Diminished, RLL Breath Sounds: Diminished, BHAVNA Breath Sounds: Diminished, LLL Breath Sounds: Diminished  Fluids:    Intake/Output Summary (Last 24 hours) at 17 1830  Last data filed at 17 1330   Gross per 24 hour   Intake   1040 ml   Output   3775 ml   Net  -2735 ml         GI/Nutrition:  Orders Placed This Encounter   Procedures   • DIET ORDER     Standing Status: Standing      Number of Occurrences: 1      Standing Expiration Date:      Order Specific Question:  Diet:     Answer:  Cardiac [6]      Comments:  hepatic     Order Specific Question:  Diet:     Answer:  2 Gram Sodium [7]     Order Specific Question:  Consistency/Fluid modifications:     Answer:  720 ml Fluid Restriction [6]     Medical Decision Making, by Problem:  Active Hospital Problems    Diagnosis   • *Acute CHF (congestive heart failure) (CMS-HCC)-decompensated stage 4 -LVEF 15% [I50.9]  - will need to continue optimizing fluid status, still with significant edema.   - continue lasix 40mg IV TID. Continue PO aldactone.  Continue fluid restriction, and monitor strict I&O and daily weights. Counseled to elevate leg, and maintain PETTY hose stockings.   - continue coreg, lisinopril.  - continue telemetry.   - Outpt follow up with - Cardiology in 1-2 weeks after discharge  -will add zaroxolyn, daily weights     • Cocaine abuse- in past, denies current use [F14.10]     • Acute renal insufficiency [N28.9]  - resolved. No hydronephrosis on renal U/S  - follow renal function while on IV lasix. BMP in AM.     • Elevated liver enzymes [R74.8]  - Likely due to congestive hepatopathy from acute CHF, but HCV Ab reactive. HIV negative.    - diuresis as above.   - Will need outpatient follow-up with GI for consideration for HCV treatment.       • Liver cirrhosis (CMS-HCC) [K74.60]?NAFLD  - HIDA neg  - Neg U/s for ascited   • Pulmonary embolism (CMS-HCC) [I26.99]  - Hx of in 6/2016. DVT study neg.  - Had completed treatment in past, but now on Lovenox + coumadin bridging for new LV thrombus     • Thrombocytopenia (CMS-HCC) [D69.6]  - stable. Monitor.      • Non compliance w medication regimen [Z91.14]  - Counseled.   - Need to discuss with family to help him withs med since patient is forgetful and also unable to arrange C.        Hyponatremia:  - Stable. Likely hypervolemic hyponatremia. Better with diuresis.  - continue lasix as above.  Off salt tablets as causing more fluid restriction. Continue fluid restriction. BMP in AM.      • LV mural Thrombus:  - Completed 5 days overlap and 2 consecutive days of therapeutic INR. D/C lovenox. Continue coumadin per pharmacy dosing.     • Obesity [E66.9]     Hepatitis C infection  - Will need outpatient follow-up with GI for consideration for HCV treatment.     Acute gout  - continue colchicine. Continue PRN IV morphine and PO oxycodone for pain.     Code status: DNAR  Dispo - monitor on telemetry.   Plan  Will change to bumex  Aldactone  Leg wraps  C/w Tx for gout    See orders  Am labs  Time spent 35 min  Med . complex  Radiology images reviewed, Labs reviewed and Medications reviewed  Holt catheter: No Ohlt      DVT Prophylaxis: Enoxaparin (Lovenox)    Ulcer prophylaxis: Yes  Antibiotics: Treating active infection/contamination beyond 24 hours perioperative coverage  Assessed for rehab: Patient was assess for and/or received rehabilitation services during this hospitalization

## 2017-02-11 NOTE — DISCHARGE SUMMARY
DISCHARGE DISPOSITION:  To home.    DISCHARGE CONDITION:  1.  Improved overall critical secondary to ongoing severe congestive heart   failure with advanced nonischemic cardiomyopathy and renal insufficiency,   intracardiac clots, and history of cocaine abuse where the patient is   currently being discharged to home with overall poor prognosis and the patient   was not a candidate for hospice.  The patient, at this time, to be discharged   to home with primarily palliative care in a home family setting.  2.  Status post treatment for acute compensated congestive heart failure with   an ejection fraction of 15%, again nonischemic.  The patient has failed   aggressive diuresis therapy.  3.  History of cocaine abuse.  4.  Acute-on-chronic renal insufficiency.  5.  Abnormal liver function tests with hepatitis C, positive status.  6.  Liver cirrhosis secondary to the above.  7.  History of pulmonary embolism.  The patient currently also found with   intracardiac clots.  8.  History of medical noncompliance.  9.  Hyponatremia secondary to chronic free water overload from his congestive   heart failure.  10.  Left ventricular mural thrombus.  11.  Gout.  12.  DO NOT RESUSCITATE status and the patient is very much aware about his   limited life expectancy.  The patient is unable to get referred to a hospice   at this time.  He prefers to be discharged to home and possibly transfer to   [place] after discharge versus home palliative care with family mainly   involved.  13.  History of obesity.    DISCHARGE MEDICATIONS:  As follows:  1.  Allopurinol 100 mg daily.  2.  Bumex 3 mg p.o. b.i.d.  3.  Carvedilol 3.125 mg p.o. b.i.d.  4.  Colchicine 0.6 mg daily p.r.n. gout.  5.  Neurontin 300 mg p.o. at bedtime.  6.  Lisinopril 2.5 mg daily.  7.  Ativan 0.5 mg q. 4 hours p.r.n. anxiety.  8.  Potassium 20 mEq p.o. b.i.d.  9.  Aldactone 50 mg daily.  10.  Tramadol 50 mg q. 4 hours p.r.n.  11.  Warfarin 3 mg daily.    For presenting  symptoms, HPI and physical findings, please refer to the   dictated H and P.    CONSULTATIONS OBTAINED:  Dr. Gigi Castano from cardiology.  Palliative care   team with Damaris Castro.    HOSPITAL COURSE:  The patient was admitted with congestive heart failure   exacerbation where the patient was identified to have been what appears to be   a nonischemic cardiomyopathy with ejection fraction of 50%.  The patient was   medically managed.  He was aggressively diuresed with the limited effect.  The   patient was found to have intracardiac clots.  He was anticoagulated.  He   continued to be on telemetry with various complaints including an episode of   gout.  The patient was to some degree improving, but then plateaued with   ongoing hyponatremia and edema.  At this time, the patient is unable to get   much more improvement.  He insists on going home and palliative care consult   was obtained.  The patient is aware of his limited life expectancy and after   discussion with the family, he wishes to be discharged to home with ongoing   family care and possible transfer to [place], [place].  The patient,   at this time, is stable.  He will be discharged in the above-mentioned   medication regimen.  If he continues to take Coumadin, he should follow up   with the Coumadin clinic as outlined and arrange for next week medically with   the family and the patient, at this time, is certainly very ill and has a   certain chance of deteriorating _____ sooner than later.  The patient's son   seems to be comfortable with the decision of the patient to be discharged to   home with home care with family.  For full further details, please refer to   the computer system and paper chart.    DISCHARGE CONDITION:  Medically improved, but overall still difficult   discharge scenario with best case scenario that would be a home hospice   arrangement, which the patient is unable to enroll into.  For full further   details, please  refer to the computer system and paper chart.    Time spent on discharge is 45 minutes.       ____________________________________     MD YADY MARIANO / S[facility]TH    DD:  02/10/2017 15:56:37  DT:  02/11/2017 08:28:03    D#:  [id number]  Job#:  [id number]

## 2017-02-15 NOTE — TELEPHONE ENCOUNTER
[facility]    Left VM for pt to contact clinic ASAP.    Estuardo Alcaraz, PHARMD

## 2017-02-15 NOTE — PROGRESS NOTES
Subjective:   Patient is supposed to see Dr. Gigi Castano who saw him in the hospital. He was placed on palliative care in the hospital.    Past Medical History   Diagnosis Date   • Ulcer (CMS-HCC)    • CHF (congestive heart failure) (CMS-HCC)    • Hypertension    • Hypertension    • Essential hypertension, benign 8/27/2012   • Other primary cardiomyopathies (CMS-HCC) 8/27/2012   • Cardiomegaly 8/27/2012   • Nonspecific elevation of levels of transaminase or lactic acid dehydrogenase (LDH) 8/27/2012   • Obesity, unspecified 8/27/2012   • Cocaine abuse      Past Surgical History   Procedure Laterality Date   • Gastroscopy-endo  4/24/08     Performed by CASPER VILCHIS at ENDOSCOPY [facility] ORS     History reviewed. No pertinent family history.  History   Smoking status   • Never Smoker    Smokeless tobacco   • Not on file     No Known Allergies  Outpatient Encounter Prescriptions as of 2/15/2017   Medication Sig Dispense Refill   • sennosides (SENOKOT) 8.6 MG Tab Take 8.6 mg by mouth 1 time daily as needed.     • amoxicillin-clavulanate (AUGMENTIN) 875-125 MG Tab Take 1 Tab by mouth 2 times a day.     • Docusate Sodium 100 MG Tab Take  by mouth.     • morphine 20 MG/5ML solution Take 5 mg by mouth every 2 hours as needed.     • spironolactone (ALDACTONE) 25 MG Tab Take 2 Tabs by mouth every day. 60 Tab 2   • oxycodone immediate release (ROXICODONE) 10 MG immediate release tablet Take 1 Tab by mouth every four hours as needed for Moderate Pain. 40 Tab 0   • allopurinol (ZYLOPRIM) 100 MG Tab Take 1 Tab by mouth every day. (Patient taking differently: Take 300 mg by mouth every day.) 30 Tab 2   • colchicine (COLCRYS) 0.6 MG Tab Take 1 Tab by mouth 1 time daily as needed (gout pain). 30 Tab 2   • lorazepam (ATIVAN) 0.5 MG Tab Take 1 Tab by mouth every four hours as needed for Anxiety. 30 Tab 0   • lisinopril (PRINIVIL) 2.5 MG Tab Take 1 Tab by mouth every day. 30 Tab 11   • potassium chloride SA (K-DUR) 20 MEQ Tab CR  "Take 1 Tab by mouth 2 Times a Day. 60 Tab 11   • gabapentin (NEURONTIN) 300 MG Cap Take 1 Cap by mouth every bedtime. 30 Cap 3   • carvedilol (COREG) 3.125 MG Tab Take 3.125 mg by mouth 2 times a day, with meals.     • warfarin (COUMADIN) 3 MG Tab Take 3 mg by mouth every day. Per patient, no variation.     • tramadol (ULTRAM) 50 MG Tab Take 50 mg by mouth every four hours as needed for Severe Pain.     • bumetanide (BUMEX) 1 MG Tab Take 3 Tabs by mouth 2 Times a Day. (Patient not taking: Reported on 2/15/2017) 60 Tab 2     No facility-administered encounter medications on file as of 2/15/2017.     ROS     Objective:   BP 90/60 mmHg  Pulse 98  Ht 1.676 m (5' 6\")  Wt 90.719 kg (200 lb)  BMI 32.30 kg/m2  SpO2 94%    Physical Exam    Assessment:     1. ACC/AHA stage D systolic heart failure (CMS-HCC)         Medical Decision Making:  Today's Assessment / Status / Plan:       "

## 2017-02-15 NOTE — MR AVS SNAPSHOT
"        Obinna Grande   2/15/2017 9:30 AM   Office Visit   MRN: [id number]    Department:  [facility]   Dept Phone:  [phone]    Description:  Male : 1965   Provider:  Chaya Olivia M.D.           Reason for Visit     Hospital Follow-up           Allergies as of 2/15/2017     No Known Allergies      Vital Signs     Blood Pressure Pulse Height Weight Body Mass Index Oxygen Saturation    90/60 mmHg 98 1.676 m (5' 6\") 90.719 kg (200 lb) 32.30 kg/m2 94%    Smoking Status                   Never Smoker            Basic Information     Date Of Birth Sex Race Ethnicity Preferred Language Language for Written Material    1965 Male  or   Origin (Sami,Turkish,Russian,[place]er, etc) English Sami      Problem List              ICD-10-CM Priority Class Noted - Resolved    Essential hypertension, benign I10 Medium  2012 - Present    Other primary cardiomyopathies (CMS-[facility]) I42.8 Medium  2012 - Present    Cardiomegaly I51.7   2012 - Present    Nonspecific elevation of levels of transaminase or lactic acid dehydrogenase (LDH) R74.0   2012 - Present    Obesity E66.9 Low  2012 - Present    Acute on chronic systolic congestive heart failure, NYHA class 4, present on admission (EF 15-20%) I50.23 High  2014 - Present    Acute renal insufficiency N28.9 High  2/10/2014 - Present    Thrombocytopenia (CMS-[facility]) D69.6 Medium  2/10/2014 - Present    Coagulopathy (CMS-[facility]) D68.9 Low  2/10/2014 - Present    Hypokalemia E87.6   2014 - Present    Non compliance w medication regimen Z91.14 Low  2014 - Present    Cocaine abuse- in past, denies current use F14.10 High  2014 - Present    CHF (congestive heart failure) (CMS-HCC) I50.9   2016 - Present    Pulmonary embolism (CMS-[facility]) I26.99 Medium  2016 - Present    Acute CHF (congestive heart failure) (CMS-[facility])-decompensated stage 4 -LVEF 15% I50.9 High  2017 - Present   " Elevated liver enzymes R74.8 Medium  1/28/2017 - Present    Liver cirrhosis (CMS-HCC) K74.60 Medium  1/28/2017 - Present    LV (left ventricular) mural thrombus (CMS-HCC) I21.3   1/29/2017 - Present    Hepatitis C virus infection B19.20 Medium  2/4/2017 - Present    Acute gout M10.9 Medium  2/5/2017 - Present      Health Maintenance        Date Due Completion Dates    IMM DTaP/Tdap/Td Vaccine (1 - Tdap) 4/10/1982 ---    IMM PNEUMOCOCCAL 19-64 (ADULT) MEDIUM RISK SERIES (1 of 1 - PPSV23) 4/10/1982 ---    COLONOSCOPY 4/10/2013 ---    IMM INFLUENZA (1) 9/1/2016 ---            Current Immunizations     No immunizations on file.      Below and/or attached are the medications your provider expects you to take. Review all of your home medications and newly ordered medications with your provider and/or pharmacist. Follow medication instructions as directed by your provider and/or pharmacist. Please keep your medication list with you and share with your provider. Update the information when medications are discontinued, doses are changed, or new medications (including over-the-counter products) are added; and carry medication information at all times in the event of emergency situations     Allergies:  No Known Allergies          Medications  Valid as of: February 15, 2017 -  9:43 AM    Generic Name Brand Name Tablet Size Instructions for use    Allopurinol (Tab) ZYLOPRIM 100 MG Take 1 Tab by mouth every day.        Amoxicillin-Pot Clavulanate (Tab) AUGMENTIN 875-125 MG Take 1 Tab by mouth 2 times a day.        Bumetanide (Tab) BUMEX 1 MG Take 3 Tabs by mouth 2 Times a Day.        Carvedilol (Tab) COREG 3.125 MG Take 3.125 mg by mouth 2 times a day, with meals.        Colchicine (Tab) COLCRYS 0.6 MG Take 1 Tab by mouth 1 time daily as needed (gout pain).        Docusate Sodium (Tab) Docusate Sodium 100 MG Take  by mouth.        Gabapentin (Cap) NEURONTIN 300 MG Take 1 Cap by mouth every bedtime.        Lisinopril (Tab)  PRINIVIL 2.5 MG Take 1 Tab by mouth every day.        LORazepam (Tab) ATIVAN 0.5 MG Take 1 Tab by mouth every four hours as needed for Anxiety.        Morphine Sulfate (Solution) morphine 20 MG/5ML Take 5 mg by mouth every 2 hours as needed.        OxyCODONE HCl (Tab) ROXICODONE 10 MG Take 1 Tab by mouth every four hours as needed for Moderate Pain.        Potassium Chloride Rajani CR (Tab CR) Kdur 20 MEQ Take 1 Tab by mouth 2 Times a Day.        Sennosides (Tab) SENOKOT 8.6 MG Take 8.6 mg by mouth 1 time daily as needed.        Spironolactone (Tab) ALDACTONE 25 MG Take 2 Tabs by mouth every day.        TraMADol HCl (Tab) ULTRAM 50 MG Take 50 mg by mouth every four hours as needed for Severe Pain.        Warfarin Sodium (Tab) COUMADIN 3 MG Take 3 mg by mouth every day. Per patient, no variation.        .                 Medicines prescribed today were sent to:     [facility] [address] [place], [place] [address] [address] [facility] [place] [id number]    Phone: [phone] Fax: [phone]    Open 24 Hours?: No      Medication refill instructions:       If your prescription bottle indicates you have medication refills left, it is not necessary to call your provider’s office. Please contact your pharmacy and they will refill your medication.    If your prescription bottle indicates you do not have any refills left, you may request refills at any time through one of the following ways: The online Pixifly system (except Urgent Care), by calling your provider’s office, or by asking your pharmacy to contact your provider’s office with a refill request. Medication refills are processed only during regular business hours and may not be available until the next business day. Your provider may request additional information or to have a follow-up visit with you prior to refilling your medication.   *Please Note: Medication refills are assigned a new Rx number when refilled electronically. Your pharmacy may indicate that no refills  were authorized even though a new prescription for the same medication is available at the pharmacy. Please request the medicine by name with the pharmacy before contacting your provider for a refill.           BuzzCity Access Code: [email or web address]  Expires: 3/9/2017 11:59 AM    BuzzCity  A secure, online tool to manage your health information     [employer] BuzzCity® is a secure, online tool that connects you to your personalized health information from the privacy of your home -- day or night - making it very easy for you to manage your healthcare. Once the activation process is completed, you can even access your medical information using the BuzzCity wilian, which is available for free in the Apple Wilian store or Google Play store.     BuzzCity provides the following levels of access (as shown below):   My Chart Features   [facility] Doctor [facility]  Specialists [facility]  Urgent  Care Non-[facility]  Primary Care  Doctor   Email your healthcare team securely and privately 24/7 X X X    Manage appointments: schedule your next appointment; view details of past/upcoming appointments X      Request prescription refills. X      View recent personal medical records, including lab and immunizations X X X X   View health record, including health history, allergies, medications X X X X   Read reports about your outpatient visits, procedures, consult and ER notes X X X X   See your discharge summary, which is a recap of your hospital and/or ER visit that includes your diagnosis, lab results, and care plan. X X       How to register for BuzzCity:  1. Go to  [email or web address].  2. Click on the Sign Up Now box, which takes you to the New Member Sign Up page. You will need to provide the following information:  a. Enter your BuzzCity Access Code exactly as it appears at the top of this page. (You will not need to use this code after you’ve completed the sign-up process. If you do not sign up before the expiration date,  you must request a new code.)   b. Enter your date of birth.   c. Enter your home email address.   d. Click Submit, and follow the next screen’s instructions.  3. Create a [employer] ID. This will be your [employer] login ID and cannot be changed, so think of one that is secure and easy to remember.  4. Create a [employer] password. You can change your password at any time.  5. Enter your Password Reset Question and Answer. This can be used at a later time if you forget your password.   6. Enter your e-mail address. This allows you to receive e-mail notifications when new information is available in [employer].  7. Click Sign Up. You can now view your health information.    For assistance activating your [employer] account, call [phone]

## 2017-02-17 NOTE — TELEPHONE ENCOUNTER
[facility]     Left another message for the pt to contact the clinic ASAP.    Estuardo Alcaraz, CHEPED

## 2017-02-23 NOTE — TELEPHONE ENCOUNTER
[facility]    Called pt again and sent a letter of compliance to establish care.    Estuardo Alcaraz, PHARMD

## 2017-03-06 NOTE — ADDENDUM NOTE
Encounter addended by: Venus Moseley R.N. on: 3/6/2017  2:32 PM<BR>     Documentation filed: Inpatient Document Flowsheet

## 2017-03-13 PROBLEM — R57.9 SHOCK (HCC): Status: ACTIVE | Noted: 2017-01-01

## 2017-03-13 NOTE — ED NOTES
GREG GRIFFITH from [facility] with   Chief Complaint   Patient presents with   • Peripheral Edema   • Weight Gain     10 lbs weight gain over the last 2 weeks   • Other     CHF exaserbation. Not compliant with medications.    • ALOC     Disoriented to event and time.    Hx of CHF. C/o above symptoms increasing over the last 2 weeks. Was seen by [facility] today to establish primary care. Unknown medication compliance. 4+ weeping and pitting edema of bilateral LE. Moderate increased WOB. Sepsis score is a 4. Sepsis order set ordered and initiated. Attempting PIV access. Placed on 3.5 liters NC. Pulse ox is 94%. RR 27.

## 2017-03-14 PROBLEM — L03.90 CELLULITIS: Status: ACTIVE | Noted: 2017-01-01

## 2017-03-14 PROBLEM — E87.1 HYPONATREMIA: Status: ACTIVE | Noted: 2017-01-01

## 2017-03-14 PROBLEM — E87.5 HYPERKALEMIA: Status: ACTIVE | Noted: 2017-01-01

## 2017-03-14 NOTE — CONSULTS
Reason for PC Consult: Advance Care Planning    Consulted by: Dr Dc Garcia    Assessment:  General: Patient is a year 51 year old gentleman with cardiomyopathy (EF 15%) suspected drug induced, chronic kidney disease, hepatitis C with cirrhosis, history of PE, left ventricular thrombus, hyponatremia, gout, AICD/pacer placement, EGD.    Dyspnea: Yes-  BiPAP mask in place difficult to communicate with pt Saturation 97%  Last BM:  None documented since admission 3/13/2017    Pain: Unable to determine- BiPAP mask in place difficult to communicate with pt    Depression: Unable to determine- BiPAP mask in place difficult to communicate with pt       Spiritual:  Is Caodaism or spirituality important for coping with this illness?  Unable to determine- BiPAP mask in place difficult to communicate with pt    Has a  or spiritual provider visit been requested? No    Palliative Performance Scale: 30%    Advance Directive: Advance Directive executed 1/30/2017  DPOA:  Irvin Ojeda [phone] pt's son  Declaration (Living Will):  Yes to Statements #2 If in a coma which is reasonably concluded to be irreversible no life sustaining or prolonging treatments #3 If pt has an incurable or terminal condition or illness with no reasonably chance of recovery or survival no life sustaining or prolonging treatment to be used.  POLST: No      Code Status: DNR     Outcome: Pt well known to Palliative Care team from previous admission.  Irvin pt's son and agent at bedside.  Also present is pt's wife and sister-in-law who do not speak English.  Pt and wife are estranged. Pt lives with wife and family because he can't live alone. Difficult to talk to pt due to BiPAP.  Pt's son Irvin states pt was getting progressively worse over the past few weeks particularity swelling in his lower extremities.  Irvin states that the pt's legs look better now.  Irvin is hopeful pt may recover from this crisis similar to past  episodes.    Updated: Dr Garcia & Jose STROUD.     Plan: Case reviewed with Dr Garcia.  Dr Garcia will discuss prognosis and option of hospice with pt and family. (Hospice option discussed with pt and son during last admission) Per Jose STROUD pt now has Medicaid which would provide a payer for home hospice or hospice in a SNF should this be the decision of the pt and family.  Dr Garcia will call Palliative Care for any assistance needed.    Thank you for allowing Palliative Care to participate in this patient's care. Please call [phone] with any questions or additional needs.

## 2017-03-14 NOTE — ED PROVIDER NOTES
ED Provider Note    CHIEF COMPLAINT  Chief Complaint   Patient presents with   • Peripheral Edema   • Weight Gain     10 lbs weight gain over the last 2 weeks   • Other     CHF exaserbation. Not compliant with medications.    • ALOC     Disoriented to event and time.        HPI  Obinna Grande is a 51 y.o. male who presents to the emergency department because of difficulty breathing and severe edema of the body. The patient himself really cannot provide any helpful history but he is here with his son who lives with him and apparently cares for him and the patient was taken to the [facility] today for a new appointment and was found to be profoundly ill and the ambulance was called and the patient was transferred to the emergency department. The patient's son tells me that over the last 2 or 3 weeks the patient has had progressive swelling of both legs which has become very extreme he's developed some blistering on the lower legs and is seeping fluid. The patient has been very short of breath and he has had a fever but the duration and severity of the fever is not known. There are no recognized exacerbating or alleviating factors or precipitating events. It is not entirely clear that the patient has been taking his medications. The patient was apparently discharged from the hospital last month after a similar presentation and his family understands that he has heart failure.    REVIEW OF SYSTEMS the patient himself can provide no review of systems information    PAST MEDICAL HISTORY  Past Medical History   Diagnosis Date   • Ulcer (CMS-HCC)    • CHF (congestive heart failure) (CMS-[facility])    • Hypertension    • Hypertension    • Essential hypertension, benign 8/27/2012   • Other primary cardiomyopathies (CMS-[facility]) 8/27/2012   • Cardiomegaly 8/27/2012   • Nonspecific elevation of levels of transaminase or lactic acid dehydrogenase (LDH) 8/27/2012   • Obesity, unspecified 8/27/2012   • Cocaine abuse   "      FAMILY HISTORY  History reviewed. No pertinent family history.    SOCIAL HISTORY  Social History     Social History   • Marital Status: Single     Spouse Name: N/A   • Number of Children: N/A   • Years of Education: N/A     Social History Main Topics   • Smoking status: Never Smoker    • Smokeless tobacco: Never Used   • Alcohol Use: No      Comment: 3 beers/week   • Drug Use: No   • Sexual Activity: Not Asked     Other Topics Concern   • None     Social History Narrative       SURGICAL HISTORY  Past Surgical History   Procedure Laterality Date   • Gastroscopy-endo  4/24/08     Performed by CASPER VILCHIS at ENDOSCOPY [facility]       CURRENT MEDICATIONS  Home Medications     Reviewed by Loi Ponce (Pharmacy Tech) on 03/13/17 at 1730  Med List Status: Complete    Medication Last Dose Status    carvedilol (COREG) 3.125 MG Tab 3/13/2017 Active    Docusate Sodium 100 MG Tab unk Active    furosemide (LASIX) 40 MG Tab 3/13/2017 Active    gabapentin (NEURONTIN) 300 MG Cap unk Active    sennosides (SENOKOT) 8.6 MG Tab 3/12/2017 Active    spironolactone (ALDACTONE) 50 MG Tab 3/13/2017 Active                ALLERGIES  No Known Allergies    PHYSICAL EXAM  VITAL SIGNS: /76 mmHg  Pulse 111  Temp(Src) 37.4 °C (99.4 °F)  Resp 23  Ht 1.676 m (5' 5.98\")  Wt 113.399 kg (250 lb)  BMI 40.37 kg/m2  SpO2 93%   Oxygen saturation is interpreted as adequate with supplemental oxygen  Constitutional: The patient is arousable he is protecting his own airway he does answer some simple yes no questions but really cannot carry on a conversation he looks profoundly ill  HENT: No sign of acute trauma to the head, mucous membranes are extremely dry  Eyes: Slightly jaundiced no erythema or discharge  Neck: Trachea midline no JVD  Cardiovascular: Regular tachycardia  Lungs: Very distant breath sounds bilaterally  Abdomen/Back: Obese moderately distended the abdominal wall feels edematous there are rare bowel " sounds  Skin: Warm and dry  Musculoskeletal: The lower extremities are tremendously tensely edematous with blistering especially just above the ankle on the right side and weeping of fluid bilaterally  Neurologic: The patient is awake he is moving his upper extremities a little he is maintaining his airway he is able to answer some animal questions but does not converse and he seems confused    Laboratory  A CBC shows an elevated white blood for count of 17.9 hemoglobin and crit 13.9 basic metabolic panel shows a severely low sodium of 118, a low bicarb of 18 with an elevated BUN of 50 and an elevated creatinine of 2.11. Ammonia level is 52 lactic acid level is elevated at 4.0 troponin is elevated at 0.09 the BMP is elevated at 3495, blood alcohol level is 0, blood cultures were sent to the laboratory.    EKG interpretation  I reviewed an EMS EKG which showed a sinus rhythm there is an S wave in lead 1 suggesting right bundle branch block pattern there were diffuse nonspecific ST and T-wave changes    Here in the emergency department a repeat EKG shows a sinus tachycardia 114 bpm there is an S wave in lead V1 consistent with right bundle-branch block [place] ST and T-wave changes throughout the cardiogram findings are generally similar to cardiogram from January 27, 2017    Radiology  DX-CHEST-PORTABLE (1 VIEW)   Final Result      New right IJ line tip overlies the cavoatrial junction with no pneumothorax or other change identified      DX-CHEST-PORTABLE (1 VIEW)   Final Result      1.  Enlarged cardiac silhouette with findings of probable mild interstitial edema.      ECHOCARDIOGRAM COMP W/O CONT    (Results Pending)   DX-CHEST-PORTABLE (1 VIEW)    (Results Pending)     MEDICAL DECISION MAKING and DISPOSITION  In the emergency department IVs were established the patient was placed on the cardiac monitor he was placed on BiPAP and a Holt catheter was placed which drained very dark urine. After seeing the very low  sodium of 118 the patient was started on intravenous normal saline. The patient also received intravenous Lasix in the emergency department. I reviewed the case with Dr. Gonda who has seen the patient for pulmonary consultation and I have reviewed the case with Dr. Hamilton and the patient is admitted to the hospitalist service to the ICU for further evaluation and treatment I have reviewed with the patient's family that the patient is profoundly ill and the prognosis is extremely poor. The patient is critically ill.    IMPRESSION  1. Congestive heart failure exacerbation  2. Acute renal failure  3. Sepsis  4. Severe hyponatremia  5. Critical care time with this patient is 35 minutes         Electronically signed by: Adriel Haney, 3/13/2017 10:36 PM

## 2017-03-14 NOTE — DISCHARGE PLANNING
Admit Increasing shortness of breath. BIPAP overnight.      Now NC 02.  3L    Dobutamine and hep drip.  Good urine output.  Holt.  IV ABX.  Clindamycin        53 y/o single male [place] resident with family.  Medicaid FFS insurance and PCP for post acute needs. History of  dilated cardiomyopathy, EF of 15%, hx drug abuse (Cocaine), cirrhosis with hepatitis C,    chronic kidney disease, left ventricle mural thrombus, obesity.     Will follow.   Son Irvin is POA.  Patient is DNR

## 2017-03-14 NOTE — ED NOTES
JAY Cutler notified that pt is becoming hypotensive, last bp 89/58. MD Hamilton placed order for levophed. Per MD Cutler titrate dobutamine to 10 mcg/kg/min and see whether pt bp improves.     Pharmacist verified levophed to be sent to ED.     MD Hamilton at bedside.

## 2017-03-14 NOTE — CARE PLAN
Problem: Ventilation Defect:  Goal: Ability to achieve and maintain unassisted ventilation or tolerate decreased levels of ventilator support  Intervention: Support and monitor invasive and noninvasive mechanical ventilation  Pt. Remained on Bipap 14/10 Fio2 35%.

## 2017-03-14 NOTE — DISCHARGE PLANNING
Care Transition Team Assessment    Information Source  Orientation : Unable to Assess  Information Given By: Relative  Informant's Name: Irvin  Who is responsible for making decisions for patient? : Patient  Name(s) of Primary Decision Maker: Willie    Readmission Evaluation  Is this a readmission?: Yes - unplanned readmission    Elopement Risk  Legal Hold: No  Ambulatory or Self Mobile in Wheelchair: No-Not an Elopement Risk  Elopement Risk: Not at Risk for Elopement    Interdisciplinary Discharge Planning  Primary Care Physician: needs a new one  Lives with - Patient's Self Care Capacity: Adult Children  Support Systems: Children  Housing / Facility: [address]  Mobility Issues: No  Prior Services: Skilled Home Health Services  Patient Expects to be Discharged to:: Home  Assistance Needed: Yes  Durable Medical Equipment: Home Oxygen  DME Provider / Phone: Pt son was unsure    Discharge Preparedness  What is your plan after discharge?: Home with help  What are your discharge supports?: Child  Prior Functional Level: Independent with Activities of Daily Living  Difficulity with ADLs: None  Difficulity with IADLs: Cooking, Driving, Laundry, Shopping, Managing medication, Using the telephone or computer  Difficulity with IADL Comments: Irvin does all these things for pt    Functional Assesment  Prior Functional Level: Independent with Activities of Daily Living    Finances  Financial Barriers to Discharge: Yes  Average Monthly Income:  (none)  Prescription Coverage: Yes              Advance Directive  Advance Directive?: DPOA for Health Care    Domestic Abuse  Have you ever been the victim of abuse or violence?: No    Psychological Assessment  History of Substance Abuse: Cocaine    Discharge Risks or Barriers  Discharge risks or barriers?: Complex medical needs  Patient risk factors: Readmission, Substance abuse    Anticipated Discharge Information  Anticipated discharge disposition: Home  [address] [place] [address]  Discharge Contact Phone Number: [phone]      Met with pt son Irvin at bedside. Irvin stated that Pt does not have a pcp, and is trying to establish with a new one. PT son also said pt has no source of income. He stated that during Pt's last admission to the ER here at [facility], pt ID was lost, and when he talked to security about they said they found it, but when Irvin came to retreive it, the ID had been miss placed.

## 2017-03-14 NOTE — H&P
PRIMARY CARE PHYSICIAN:  Unable to obtain.    CHIEF COMPLAINT:  Increasing shortness of breath.    HISTORY OF PRESENT ILLNESS:  Patient is a 51-year-old male with history of   dilated cardiomyopathy, EF of 15%, drug abuse, cirrhosis with hepatitis C,   chronic kidney disease, left ventricle mural thrombus, obesity, evaluated here   at [facility] with increased shortness of breath, bilateral lower extremity   swelling with redness, abdominal distention and pain.  Patient's history   received by the son at bedside and currently with acute hypoxic respiratory   failure requiring BiPAP.  He reports lower extremity redness and swelling with   a blister in the right shin, recently popped over the past 2-1/2 weeks,   abdominal distention over 2 weeks.  He had shortness of breath, no cough, no   reported chest pain.  No nausea or vomiting.  The family notes urine has been   dark; at times he has been constipated.  No bloody stools.    In the ER, patient with findings of acute respiratory failure, decompensated   systolic heart failure with bilateral lower extremity cellulitis, possible   evolving sepsis.  Patient will be admitted acute to the ICU, planned for   inotropic pressor support with dobutamine, Bumex diuresis.  Institution of IV   antibiotics for lower extremity cellulitis.  Dr. Gonda, critical care has been   contacted for consultation.    REVIEW OF SYSTEMS:  Obtained by son and family at bedside, otherwise unable to   obtain due to respiratory failure.    PAST MEDICAL HISTORY:  1.  Dilated cardiomyopathy, EF of 15%, suspected drug induced.  2.  Chronic kidney disease.  3.  Hepatitis C with cirrhosis.  4.  History of PE per records.  5.  Left ventricular thrombus.  6.  History of medical noncompliance.  7.  Hyponatremia.  8.  Gout.    PAST SURGICAL HISTORY:  AICD/pacer placement, EGD.    MEDICATIONS:  Include Coreg 3.125 b.i.d., Colace 100 b.i.d., Lasix 40 b.i.d.,   Aldactone 50 mg daily, Senokot 8.6 mg 2 times a  day.    ALLERGIES:  No known drug allergies.    SOCIAL HISTORY:  Currently lives with his son.  Quit cocaine use possibly   several years ago.  No reported tobacco or alcohol currently.    FAMILY HISTORY:  Heart disease in family members according to the son,   unspecified.    PHYSICAL EXAMINATION:  VITAL SIGNS:  Current vitals, temperature 37.4, pulse in the one-teens to   120s, respiratory rate 20-25, blood pressure of 117/89-89/58, 96% on BiPAP,   113 kilograms.  GENERAL:  Patient was lethargic, briefly opened eyes to voice, using accessory   muscles, not following commands, moving all extremities spontaneously.  HEENT:  There was scleral icterus.  Extraocular movements intact.  BiPAP mask   present.  NECK:  No cervical or supraclavicular adenopathy.  Trachea midline.  He had a   thick neck.  CARDIOVASCULAR:  Tachycardic, regular, without murmurs, somewhat distant.  LUNGS:  Diminished breath sounds at bases.  No rales or wheeze.  No chest wall   tenderness, increased work of breathing.  ABDOMEN:  Severely obese, soft, nontender.  No appreciable fluid wave.  No   pulsatile masses or appreciable hepatosplenomegaly.  EXTREMITIES:  He had 2+ lower extremity edema with hyperpigmentation, venous   stasis changes.  Right shin with denuded skin lesion at a blister site.  There   is bilateral erythema with increased warmth over his shins.  NEUROLOGIC:  Limited secondary to respiratory failure.  He sporadically was   moving all extremities.  SKIN:  Warm, mildly diaphoretic without pallor.  No distal cyanosis, 2+   symmetric radial pulses.  Capillary refill greater than 2 seconds, delayed.    LABORATORY DATA:  White count 17, hemoglobin 13.9, platelet count 157,000.  A   BUN and creatinine of 50/2.11, bicarbonate of 18, anion gap of 15, sodium of   118, potassium 5.8, chloride 85.  AST of 121, ALT of 101, total bili 8.2.    Lactic acid 4.0.  Troponin 0.09.  BNP of 3495.  INR 1.82.    IMAGING:  Chest x-ray revealed large  cardiac silhouette with bilateral edema.    IMPRESSION AND PLAN:  1.  Acute on chronic decompensated systolic heart failure.  Patient will be   admitted acute to CICU.  Plan for dobutamine inotropic support with IV Bumex   drip diuresis.  He has had hypotensive episodes in the ER with concern for   cardiogenic shock.  We will add Levophed drip, titrate to MAP greater than 65.    Monitor I's and O's, follow up echocardiogram to reassess ejection fraction.  2.  Sepsis attributed to bilateral lower extremity cellulitis.  Patient has   been started on IV clindamycin.  We will check wound culture.  Low blood   pressure, likely cardiogenic source.  We will plan pressor support as above.    We will refrain from IV fluid boluses with concern for causing accelerated   decompensation of his heart failure and respiratory failure.  3.  Bilateral lower extremity cellulitis.  Patient has been started on IV   clindamycin.  Dr. Gonda will follow up culture results.  4.  Hyponatremia, likely attributed to end-stage heart failure, cirrhosis.  We   will follow up sodium levels.  5.  Hyperkalemia.  We will start IV D50 with insulin and oral Kayexalate if   tolerates.  Follow serial potassium levels.  6.  Acute renal failure with chronic kidney disease, attributed to shock.  We   will follow up renal function, electrolytes, urine output with planned pressor   support and diuresis.  7.  Acute respiratory failure, attributed to systolic heart failure.  Plan,   continue with BiPAP support per Dr. Gonda.  We will place on respiratory and   O2 protocols and continue diuresis.  8.  Lactic acidosis, attributed to systolic heart failure.  9.  History of cirrhosis with increasing transaminitis, possibly from   cardiohepatic congestion.  We will follow liver enzymes.  Ammonia level is   slightly high.  If persistent lethargy despite correction of respiratory   failure, consider trial of lactulose with rifaximin.  10.  Elevated troponin,  indeterminate range, likely attributed to demand   ischemia from respiratory failure, sepsis.  11.  History of left ventricle thrombus with anticoagulation use,   noncompliance, will be started on heparin drip for now, although we will need   further discussion with family regarding activation and risk with history of   poor compliance.  12.  We will start gastrointestinal stress prophylaxis with Prilosec.    I had a lengthy discussion with the son, Irvin, at bedside and other family   members.  Patient's critical condition including end-stage cardiomyopathy with   multiorgan failure with very poor prognosis discussed.  After discussion   amongst his son and family members, we will plan change to do not intubate, no   CPR, shock/cardiac resuscitation.  A future discussion regarding comfort   care, possible hospice would be reasonable.    Total critical care time 45 minutes.       ____________________________________     TADEO NAVARRO MD    QBV / NTS    DD:  03/13/2017 21:07:02  DT:  03/13/2017 23:38:02    D#:  [id number]  Job#:  [id number]

## 2017-03-14 NOTE — PROGRESS NOTES
Pulmonary Critical Care Progress Note        DOS:  3/14/2017    Chief Complaint: resp failure    History of Present Illness: 51 y.o. M, h/o severe CM, EF 15% secondary to cocaine use; intraventricular thrombus, PE, cirrhosis; transferred to ED from [facility] with increasing body edema, wt gain, confusion; started on BiPAP and pressors in ED; DNR/I confirmed.      ROS:  Respiratory: positive shortness of breath, Cardiac: negative chest pain, GI: bloating.  All other systems negative.    Interval Events:  24 hour interval history reviewed    - NTG gtt   -    - NE   - bumex 0.5/hr   - heparin gtt   - 4+ anasarca   - desaturates quickly on room air   - does not sumit mobility - severe leg pain   - BiPAP all night, 10/14, now 3 L    - CXR mild pulmonary edema   - clinda for ? Cellulitis    - Echo in progress   -     PFSH:  No change.    Respiratory:     Pulse Oximetry: 99 %  Chest Tube Drains:          Exam: rhonchi bibasilar and diminished breath sounds moderate  ImagingAvailable data reviewed   Recent Labs      03/13/17   1908   ISTATAPH  7.405   ISTATAPCO2  26.8   ISTATAPO2  167*   ISTATATCO2  18*   [id number]  100*   ISTATARTHCO3  16.8*   ISTATARTBE  -6*   ISTATTEMP  see below   ISTATFIO2  40   ISTATSPEC  Arterial       HemoDynamics:  Pulse: (!) 113, Heart Rate (Monitored): (!) 113  Blood Pressure: 108/76 mmHg, NIBP: (!) 89/57 mmHg       Exam: SR/ST  Imaging: Available data reviewed  Recent Labs      03/13/17   1624  03/13/17   1636   TROPONINI  0.09*   --    BNPBTYPENAT   --   3495*       Neuro:  GCS Total Crary Coma Score: 15       Exam: confused; follows, moves all 4 ext equally  Imaging: Available data reviewed    Fluids:  Intake/Output       03/12/17 0700 - 03/13/17 0659 (Not Admitted) 03/13/17 0700 - 03/14/17 0659 03/14/17 0700 - 03/15/17 0659      7944-4754 3561-2010 Total 3752-3917 8093-4566 Total 1040-0405 9478-2573 Total       Intake    I.V.  --  -- --  --  1003.5 1003.5  --  -- --    Nitroglycerin Volume --  -- -- -- 11 11 -- -- --    Norepinephrine Volume -- -- -- -- 184.8 184.8 -- -- --    Heparin Volume -- -- -- -- 198 198 -- -- --    Dobutamine Volume -- -- -- -- 609.7 609.7 -- -- --    Total Intake -- -- -- -- 1003.5 1003.5 -- -- --       Output    Urine  --  -- --  --  2540 2540  --  -- --    Indwelling Cathether -- -- -- -- 2540 2540 -- -- --    Total Output -- -- -- -- 2540 2540 -- -- --       Net I/O     -- -- -- -- -1536.5 -1536.5 -- -- --        Weight: 101.7 kg (224 lb 3.3 oz)  Recent Labs      17   SODIUM  118*  122*   POTASSIUM  5.8*  4.1   CHLORIDE  85*  90*   CO2  18*  20   BUN  50*  49*   CREATININE  2.11*  1.96*   CALCIUM  9.5  8.6       GI/Nutrition:  Exam: distended; soft  Imaging: Available data reviewed  taking PO  Liver Function  Recent Labs      17   ALTSGPT  101*  81*   ASTSGOT  121*  111*   ALKPHOSPHAT  69  51   TBILIRUBIN  8.2*  7.5*   GLUCOSE  78  103*       Heme:  Recent Labs      17   1636 17   0305   RBC  5.70   --   4.86   --    HEMOGLOBIN  13.9*   --   12.1*   --    HEMATOCRIT  43.8   --   36.2*   --    PLATELETCT  157*   --   99*   --    PROTHROMBTM   --   21.6*   --    --    APTT   --   35.7   --   182.4*   INR   --   1.82*   --    --        Infectious Disease:  Temp  Av.4 °C (99.3 °F)  Min: 36.4 °C (97.6 °F)  Max: 38.1 °C (100.5 °F)  Micro: antibiotics reviewed  Recent Labs      17   WBC  17.9*  12.4*   NEUTSPOLYS  81.80*  71.00   LYMPHOCYTES  8.50*  2.60*   MONOCYTES  8.20  5.30   EOSINOPHILS  0.10  0.00   BASOPHILS  0.40  0.00   ASTSGOT  121*  111*   ALTSGPT  101*  81*   ALKPHOSPHAT  69  51   TBILIRUBIN  8.2*  7.5*     Current Facility-Administered Medications   Medication Dose Frequency Provider Last Rate Last Dose   • DOBUTamine (DOBUTREX) 1 mg/mL premix infusion  5 mcg/kg/min Continuous Jeremy M Gonda, M.D. 68 mL/hr at 17 06 10 mcg/kg/min at 17   •  clindamycin (CLEOCIN) IVPB premix 600 mg  600 mg Q8HRS Jeremy M Gonda, M.D.   Stopped at 03/14/17 0613   • nitroglycerin 50 mg in D5W 250 ml infusion  5 mcg/min Continuous Jeremy M Gonda, M.D. 1.5 mL/hr at 03/14/17 0442 5 mcg/min at 03/14/17 0442   • bumetanide (BUMEX) 10 mg in  mL infusion  0.5-2 mg/hr Continuous Jeremy M Gonda, M.D. 5 mL/hr at 03/14/17 0240 0.5 mg/hr at 03/14/17 0240   • Respiratory Care per Protocol   Continuous RT Alexis Hamilton M.D.       • ondansetron (ZOFRAN) syringe/vial injection 4 mg  4 mg Q4HRS PRN Alexis Hamilton M.D.       • ondansetron (ZOFRAN ODT) dispertab 4 mg  4 mg Q4HRS PRN Alexis Hamilton M.D.       • promethazine (PHENERGAN) tablet 12.5-25 mg  12.5-25 mg Q4HRS PRN Alexis Hamilton M.D.       • promethazine (PHENERGAN) suppository 12.5-25 mg  12.5-25 mg Q4HRS PRN Alexis Hamilton M.D.       • prochlorperazine (COMPAZINE) injection 5-10 mg  5-10 mg Q4HRS PRN Alexis Hamilton M.D.       • acetaminophen (TYLENOL) tablet 650 mg  650 mg Q6HRS PRN Alexis Hamilton M.D.       • norepinephrine (LEVOPHED) 8 mg in  mL Infusion  0.5-30 mcg/min Continuous Alexis Hamilton M.D. 31.9 mL/hr at 03/14/17 0852 17 mcg/min at 03/14/17 0852   • senna-docusate (PERICOLACE or SENOKOT S) 8.6-50 MG per tablet 2 Tab  2 Tab BID Alexis Hamilton M.D.   Stopped at 03/13/17 2100    And   • polyethylene glycol/lytes (MIRALAX) PACKET 1 Packet  1 Packet QDAY PRN Alexis Hamilton M.D.        And   • magnesium hydroxide (MILK OF MAGNESIA) suspension 30 mL  30 mL QDAY PRN Alexis Hamilton M.D.        And   • bisacodyl (DULCOLAX) suppository 10 mg  10 mg QDAY PRN Alexis Hamilton M.D.       • heparin 1000 units/mL injection 3,200 Units  3,200 Units PRN Catherine Hillman PHARMD        And   • heparin infusion 25,000 units in 500 ml 0.45% nacl   Continuous Catherine Hillman PHARMD 24 mL/hr at 03/14/17 0356 1,200 Units/hr at 03/14/17 0356   • POLYMEM ALGINATE DRESSING PADS 1 Each  1 Each QDAY PRN Jeremy M Gonda, M.D.         Last reviewed  on 3/13/2017  5:30 PM by Loi Ponce    Quality  Measures:  Core Measures & Quality Metrics    Problems:  Acute-on-chronic hypoxemic respiratory failure   - multifactorial    - BiPAP started for resp distress   - continue with breaks as tolerated    - DNR/I confirmed  Decompensated severe systolic heart failure, end stage  Acute cardiogenic pulmonary edema   - Bumex gtt   - I<O   - titrated pressors  Anasarca  Acute metabolic/toxic encephalopathy  Leukocytosis with left shift on the differential  Bilateral lower extremity cellulitis with open ulcerations  Thrombocytopenia  Anemia of chronic disease  Acute-on-chronic kidney disease  Hyperkalemia  Lactic acidosis with resultant anion gap  Elevated liver enzymes concerning for hepatic congestion  Hyperammonemia & hyperbili  Elevated troponin secondary to demand ischemia  Hypercoagulable state   - noncompliant with his Coumadin with known pulmonary embolism and intraventricular clot in the past  Overall prognosis very poor; d/w'd SO and patient; DNR/I  Consider palliative re-eval  Discussed patient condition and risk of morbidity and/or mortality with RN, RT and QA team.    The patient remains critically ill.  Critical care time = 35 minutes in directly providing and coordinating critical care and extensive data review.  No time overlap and excludes procedures.

## 2017-03-14 NOTE — ED NOTES
"Pt lying in stretcher, bipap in place. Pt opens eyes when asked. When asked if bipap is helping pt states \"uh huh.\" pt has Unasyn infusing. +4 edema to bilateral LE, weeping.   "

## 2017-03-14 NOTE — PROGRESS NOTES
Patient transported with this ACLS RN, CNA, and RT from ED to T-[id number] on bipap. Multiple family members at bedside.

## 2017-03-14 NOTE — ED NOTES
Lab called with critical result of sodium of 118 at 1645. Critical lab result read back to lab.   Dr. Haney notified of critical lab result at 1700.  Critical lab result read back by Dr. Haney.

## 2017-03-14 NOTE — ED NOTES
The Medication Reconciliation process has been completed by interviewing the patient's family    Allergies have been reviewed

## 2017-03-14 NOTE — CONSULTS
DATE OF SERVICE:  03/13/2017    REQUESTING PHYSICIAN:  Dr. Haney.    REASON FOR CONSULTATION:  Acute-on-chronic hypoxemic respiratory failure.    HISTORY OF PRESENT ILLNESS:  The patient is a very unfortunate 51-year-old   male with a past medical history significant for end-stage heart failure with   an ejection fraction less than 15% secondary to cocaine abuse with associated   chronic pulmonary edema, intraventricular clot, cirrhosis of the liver as well   as pulmonary embolism.  He was brought into the emergency department by EMS   from the [facility] where he presented to follow up with a new primary care   doctor and was found to have abnormal vital signs and EMS was called.  Per   report, he has had over the last 2-3 weeks, worsening lower extremity as well   as abdominal swelling with a 10-pound unintentional weight gain and   intermittent confusion.  He has also noted increased shortness of breath over   the last several days.  In the emergency department, patient was started on   BiPAP by the emergency physician.  We were consulted for assistance in his   management.  He was maintaining normal blood pressure at the time of my   evaluation, but had other signs of decompensated heart failure.  A bedside   limited ultrasound of his heart revealed a dilated inferior vena cava of 2.2   cm without respiratory variability and biventricular enlargement as well   severely depressed function.  No obvious pericardial effusion was seen.  Of   note, he was recently admitted back in January of this year for similar   symptoms including decompensated heart failure and he was initially planned to   enter hospice; however, there was some discussion with the nurse per the   family that he would follow up with Dr. Ortiz who might have additional   options for his improvement and buy him another year or two of life.  He had   not followed up with her and per his son, they did not even feel all the   medications he was  discharged on including blood thinners and his Aldactone   because of the price and they were hoping to see Dr. Oritz beforehand to ask   her about it.  Patient denies any recent chest pain, no recent travel and he   has been sedentary for the last month and a half.    PAST MEDICAL HISTORY:  Includes:  1.  Severe systolic heart failure with an ejection fraction of 15%.  2.  History of cocaine abuse.  3.  Chronic kidney disease stage III.  4.  Hepatitis C cirrhosis.  5.  Pulmonary embolism.  6.  Intraventricular clots.  7.  History of medical noncompliance.  8.  Morbid obesity.  9.  Gout.  10.  Hypokalemia.    PAST SURGICAL HISTORY:  Includes AICD placement.    SOCIAL HISTORY:  Patient has significant alcohol, cocaine abuse in the past.    Lives with his children and wife here in [place], was recently looking for a new   primary care doctor.    FAMILY HISTORY:  Noncontributory.    OUTPATIENT MEDICATIONS:  That he was taking most recently included Lasix,   Aldactone, Senokot, Neurontin, docusate and Coreg.    ALLERGIES:  No known drug allergies.    REVIEW OF SYSTEMS:  Please see history of present illness, otherwise, positive   for wheezing, bilateral lower extremity wounds, worse on the right with a   blister that recently popped.  Remainder review of systems is negative.    PHYSICAL EXAMINATION:  VITAL SIGNS:  Temperature is 37.4 degrees Celsius, heart rate of 113,   respiratory rate of 25, oxygen saturation 96% on 100% BiPAP, and a blood   pressure of 117/89.  GENERAL:  Morbidly obese male in moderate respiratory distress, on BiPAP.  HEENT:  Normocephalic, atraumatic.  Pupils are equal, round and reactive to   light with scleral icterus and no conjunctival pallor.  Moist oral mucosal   membranes without oropharyngeal erythema and/or swelling.  NECK:  Supple.  Trachea is midline.  There is no stridor.  There is jugular   venous distention to the angle of the jaw.  CARDIOVASCULAR:  Tachycardic, laterally displaced PMI.   Thready pulses,   delayed capillary refill, holosystolic murmur 4/6 in the left sternal border.  PULMONARY:  Coarse crackles throughout bilateral lung fields.  Tachypneic, on   the BiPAP with an inspiratory pressure of 15 and expiratory pressure of 10,   and an FiO2 of 100%, achieving tidal volumes in the 600-800 mL per breath.    Normal AP diameter of the chest.  No wheezing.  ABDOMEN:  Distended, soft, anasarca of the wall, mildly tender in the   epigastrium.  No rebound or guarding.  Hypoactive bowel sounds.  GENITOURINARY:  Normal external male genitalia.  Holt catheter in place with   edema.  EXTREMITIES:  A 3+ pitting edema in bilateral lower extremities, tender to the   touch, overlying erythema with blistering and chronic venous stasis changes.    Mild clubbing.  NEUROLOGIC:  Patient is awake and confused other than to person and place and   does not have any focal deficits noted.  SKIN:  Please see extremity reported above, otherwise, delayed capillary   refill.    LABORATORY DATA:  CBC with a white count of 18,000; hemoglobin 14; platelets   of 157 with a left shift on the differential.  Chemistry:  Sodium 118,   potassium 5.8, chloride of 85, bicarbonate of 18, BUN 50, creatinine 2.11 with   a glucose of 78, anion gap of 15.  Calcium of 9.5, , , alkaline   phosphatase of 69, total bilirubin of 8.2.  Ammonia 52.  Lactic acid of 4.0,   alcohol 0.  Troponin of 0.09.  Brain natriuretic peptide of 3495.  INR 1.82   with a PTT of 36.  Arterial blood gas pH of 7.41, pCO2 of 27 and PaO2 of 167.    Urinalysis showing small occult blood, negative nitrite, negative leukocyte   esterase and 0-2 white cells.    IMAGIN.  Chest x-ray showing enlarged cardiac silhouette with a retrocardiac   opacity, mild pulmonary edema, and an AICD in place.  2.  EKG showing sinus tachycardia with right bundle-branch block, left atrial   enlargement, ST depression diffusely, QTc interval 480.  3.  Bedside limited  ultrasound of his heart reveals an inferior vena cava   measuring 2.4 cm in diameter without respiratory variability.  No pericardial   effusion and biventricular and biatrial enlargement with severely depressed   function and MR and TR noted.    ASSESSMENT:  1.  Acute-on-chronic hypoxemic respiratory failure requiring noninvasive   mechanical ventilatory support.  2.  Decompensated severe systolic heart failure, end stage.  3.  Acute cardiogenic pulmonary edema.  4.  Anasarca.  5.  Acute metabolic/toxic encephalopathy.  6.  Leukocytosis with left shift on the differential.  7.  Bilateral lower extremity cellulitis with open ulcerations.  8.  Thrombocytopenia.  9.  Anemia of chronic disease.  10.  Acute-on-chronic kidney disease.  11.  Hyperkalemia.  12.  Lactic acidosis with resultant anion gap.  13.  Elevated liver enzymes concerning for hepatic congestion.  14.  Hyperammonemia as well as bilirubin, again likely secondary to heart   failure and hepatic congestion.  15.  Elevated troponin secondary to demand ischemia.  16.  Hypercoagulable state, noncompliant with his Coumadin with known   pulmonary embolism and intraventricular clot in the past.    PLAN:  Patient is critically ill at this time with a dismal prognosis.  I had   a long discussion with patient's son at bedside given patient's lack of   capacity for medical decision-making.  Discussed patient's dismal prognosis in   the setting of preexisting end-stage heart failure and prior palliative care   and hospice discussions.  Recommended to him transition to comfort care,   however, he wanted to continue medical management at this time pending arrival   of other family members and ongoing discussions.  I had a code status   discussion with patient's son as well and he wants to get back to us on that   as well.  Thus in the meantime, we will continue him on BiPAP, titrating FIO2   to keep saturations greater than 90% and increase the expiratory positive    pressure levels to help recruit alveoli and decrease preload.  He will be   placed on a dobutamine drip for inotropic support as well as nitroglycerin   drip for afterload reduction and a Bumex drip for aggressive forced diuresis.    He has a history of being resistant to Lasix in the past.  He will be started   on clindamycin for cellulitis coverage and he will need wound care for his   right lower extremity open blister.  He will be kept n.p.o. at this time given   his tenuous respiratory status and we will monitor his kidney function,   potassium and other electrolytes as well as his elevated liver enzymes as we   hopefully improve his cardiac function.  He will need to be resumed on   anticoagulation and there is a possibility that he might have developed   recurrent thromboembolism; however, given his clinical condition and acute   kidney injury, he is not a candidate for CT scan at this time with contrast.    Patient is critically ill at this time with a dismal prognosis and we   appreciate the opportunity to assist in his care and we will continue to   follow along closely with you.    Critical care time 89 minutes.  No time overlap.  Procedures are not included   in this time.       ____________________________________     Jeremy Gonda, MD [facility] / [facility]    DD:  03/13/2017 20:29:56  DT:  03/14/2017 00:02:35    D#:  [id number]  Job#:  [id number]

## 2017-03-14 NOTE — PROGRESS NOTES
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  ROS limited by BiPAP    Overnight remains on BiPAP  Levo 17  Dobutamine 10  Bumex 0.5  Nitro 5  Heparin gtts  UOP 2400ml/12hrs      Review of Systems:  Review of Systems   Unable to perform ROS: other       Physical Exam:  Physical Exam   Constitutional: He is oriented to person, place, and time. He appears well-developed and well-nourished. No distress.   HENT:   Head: Normocephalic and atraumatic.   Eyes: Conjunctivae are normal.   Neck: No JVD present.   Cardiovascular: Normal rate.  Exam reveals no gallop.    Murmur heard.  Pulmonary/Chest: No stridor. No respiratory distress. He has no wheezes. He has rales.   Retracting  On BiPAP   Abdominal: Soft. There is no tenderness. There is no rebound and no guarding.   Musculoskeletal: He exhibits edema.   Neurological: He is oriented to person, place, and time.   Skin: Skin is warm and dry. Rash noted. He is not diaphoretic.   Psychiatric: He has a normal mood and affect. Thought content normal.   Nursing note and vitals reviewed.      Labs:  Recent Labs      03/13/17   1908   ISTATAPH  7.405   ISTATAPCO2  26.8   ISTATAPO2  167*   ISTATATCO2  18*   [id number]  100*   ISTATARTHCO3  16.8*   ISTATARTBE  -6*   ISTATTEMP  see below   ISTATFIO2  40   ISTATSPEC  Arterial     Recent Labs      03/13/17   1624  03/13/17   1636   TROPONINI  0.09*   --    BNPBTYPENAT   --   3495*     Recent Labs      03/13/17   1547 03/14/17   SODIUM  118*  122*   POTASSIUM  5.8*  4.1   CHLORIDE  85*  90*   CO2  18*  20   BUN  50*  49*   CREATININE  2.11*  1.96*   CALCIUM  9.5  8.6     Recent Labs      03/13/17   1547 03/14/17   ALTSGPT  101*  81*   ASTSGOT  121*   111*   ALKPHOSPHAT  69  51   TBILIRUBIN  8.2*  7.5*   GLUCOSE  78  103*     Recent Labs      17   1547  17   1636 17   0305   RBC  5.70   --   4.86   --    HEMOGLOBIN  13.9*   --   12.1*   --    HEMATOCRIT  43.8   --   36.2*   --    PLATELETCT  157*   --   99*   --    PROTHROMBTM   --   21.6*   --    --    APTT   --   35.7   --   182.4*   INR   --   1.82*   --    --      Recent Labs      17   1547 17   WBC  17.9*  12.4*   NEUTSPOLYS  81.80*  71.00   LYMPHOCYTES  8.50*  2.60*   MONOCYTES  8.20  5.30   EOSINOPHILS  0.10  0.00   BASOPHILS  0.40  0.00   ASTSGOT  121*  111*   ALTSGPT  101*  81*   ALKPHOSPHAT  69  51   TBILIRUBIN  8.2*  7.5*           Hemodynamics:  Temp (24hrs), Av.4 °C (99.3 °F), Min:36.4 °C (97.6 °F), Max:38.1 °C (100.5 °F)  Temperature: (!) 38.1 °C (100.5 °F)  Pulse  Av.1  Min: 105  Max: 117Heart Rate (Monitored): (!) 115  Blood Pressure: 108/76 mmHg, NIBP: (!) 89/57 mmHg     Respiratory:    Respiration: (!) 21, Pulse Oximetry: 96 %, O2 Daily Delivery Respiratory : Silicone Nasal Cannula     Work Of Breathing / Effort: Moderate  RUL Breath Sounds: Diminished, RML Breath Sounds: Diminished, RLL Breath Sounds: Diminished, BHAVNA Breath Sounds: Diminished, LLL Breath Sounds: Diminished  Fluids:    Intake/Output Summary (Last 24 hours) at 17 0656  Last data filed at 17 0600   Gross per 24 hour   Intake 1003.53 ml   Output   2540 ml   Net -1536.47 ml     Weight: 101.7 kg (224 lb 3.3 oz)  GI/Nutrition:  Orders Placed This Encounter   Procedures   • Diet Order     Standing Status: Standing      Number of Occurrences: 1      Standing Expiration Date:      Order Specific Question:  Diet:     Answer:  Cardiac [6]     Medical Decision Making, by Problem:  Active Hospital Problems    Diagnosis   • Acute renal insufficiency [N28.9]  Cardiogenic shock  Avoid nephrotoxins  Follow daily BMP   • Acute on chronic systolic congestive heart failure, NYHA class  4, present on admission (EF 15-20%) [I50.23]  Dobutamine gtts  Bumex gtts  :LVEF 15%  DCM coccaine induced now theoretically clean   • Hepatitis C virus infection [B19.20]   • Essential hypertension, benign [I10]  Not an acute issue   • Hyponatremia [E87.1]  Chronic  4 point correction in last 18hrs  Goal>125   • Hyperkalemia [E87.5]  Resolved  Follow closely   • Cellulitis [L03.90]  Wound care consulted  Clindamycin  Cultures pending   • Shock (CMS-HCC) [R57.9]  Shock vs septic  Dobutamine gtts  Levophed gtts  Following MAP, CVP  Cultures pending   • LV (left ventricular) mural thrombus (CMS-HCC) [I21.3]  Non compliant with home anticoagulation  Heparin gtts for now  Once pt has stabilized will need to consider po vs comfort care   -Acute Hypoxic Respiratory failure: BiPAP, Bumex gtts with good UOP.  Pulmonology following.  Pt is DNR/I    DW ICU staff, Pulmonology, Cards    Critical care time 40min's managing vasoactive gtts,     Medications reviewed, EKG reviewed, Labs reviewed and Radiology images reviewed  Holt catheter: Strict Intake and Output During Sepisis or Shock      DVT Prophylaxis: Heparin  DVT prophylaxis - mechanical: SCDs  Ulcer prophylaxis: Yes  Antibiotics: Treating active infection/contamination beyond 24 hours perioperative coverage

## 2017-03-14 NOTE — PROCEDURES
Procedure Note    Date: 3/13/2017  Time: 1800    Procedure: Central Venous Line placement  Site: R IJ vein    Indication: Cardiogenic shock needing vasoactive meds and CVP monitoring  Consent: Informed consent obtained from patient or designated decision maker after explaining the benefits/risks of the procedure including but not limited to bleeding, infection, nerve or other deep structure injury, pneumothorax/hemothorax, arrythmia, or death. Patient or surrogate expressed understanding and agreement and signed consent which can be found in the patient's chart.    Procedure: After obtaining consent, a time-out was performed. Appropriate site confirmed with ultrasound and patient positioned, prepped, and draped in sterile fashion. All those present wearing cap and mask and those physically participating remained adhering to sterile fashion with cap, mask, gloves, and gown. 5 mL of local anesthetic injected (1% lidocaine without epinephrine) achieving appropriate comfort level for patient. Vein localized and accessed using continuous ultrasound guidance and a 7 Fr Triple lumen catheter placed using Seldinger technique. Able to aspirate dark, non-pulsatile blood through each lumen and sterile saline flushed easily before capping. Line secured and dressed in sterile fashion. Patient tolerated procedure well without any difficulties and remains in care of bedside nurse. CXR will be performed to confirm appropriate placement for internal jugular or subclavian CVLs.    EBL: minimal  Complications: none  CXR: pending    Jeremy Gonda, MD  Critical Care Medicine

## 2017-03-14 NOTE — PROGRESS NOTES
Two RN skin assessment completed with Opal RITTER. Redness, heat and swelling noted to LLE. Wounds, cool skin, swelling, weeping, and dark discoloration noted to RLE. Wound pics taken.

## 2017-03-14 NOTE — PALLIATIVE CARE
Spiritual Care Note           Patient's Name: Obinna Grande   MRN: [id number]    Age and Gender: 51 y.o. male   YOB: 1965   Place of Residence: [place], [place]   Family/Friends/Others Present: son  wife   Unit: Cardiac ICU   Room (and Bed): [place] [address]   Ethnicity or Nationality:    Primary Language: Croatian, also speaks English   Medical Diagnosis/Procedure: congestive heart failure exacerbation  cardiomyopathy, suspected drug induced  chronic kidney disease  hepatitis C with cirrhosis  history of pulmonary embolism  left ventricular thrombus  hyponatremia  gout  AICD/pacemaker placement   Jehovah's witness Affiliation: [employer]   Code Status: DNR    Date of Admission: 3/13/2017    Length of Stay: 1 day   Date of Visit: 3/14/2017   Length of Visit: 5 minutes       Situation/Reason for Visit:   Patient referred to palliative care.    Receptivity to Visit:  Patient lethargic, appeared to hear me but did not respond verbally.  Son and wife asked for  to bring communion tomorrow morning.    Consultations/Referrals:  Will have  come tomorrow morning (Wednesday  3/15).      Continuing Care:  Will continue to follow.    ADDENDUM  (Friday  3/17):   was unable to visit until yesterday (Thursday  3/16) but there was no family present and patient was unable to communicate effectively.  He asked me to let him know when family was present.  Communicated to him today that family was present.      Contact Information:  Chaplain KIMO Renteria  [phone]   [email or web address]

## 2017-03-15 NOTE — PROGRESS NOTES
"Pharmacy Kinetics 51 y.o. male on vancomycin day # 1 3/15/2017    Currently on Vancomycin new start  Other antibiotics: Zosyn 4.5g IV q6h, Clindamycin 900mg IV q8h    Indication for Treatment: r/o necrotizing fasciitis    Pertinent history per medical record: Admitted on 3/13/2017 for worsening abdominal swelling and weight gain.  Pt has a h/o cardiomyopathy and a reduced EF of 15% 2/2 cocaine use.  He also has a history of intraventricular thrombus, PE, and cirrhosis.  He required BiPaP initially as well as ionotropic support.    Allergies: Review of patient's allergies indicates no known allergies.     List concerns for renal function: BMI ~37, SCr 1.33 (improving), EF ~20%, cirrhosis, pressors    Pertinent cultures to date:   3/13/17 PBC x 2: NGTD  3/13/17 urine: negative  3/13/17 right leg wound: NGTD    Recent Labs      17   1547 03/14/17  03/15/17   0500   WBC  17.9*  12.4*  14.1*   NEUTSPOLYS  81.80*  71.00  73.20*   BANDSSTABS   --   19.30*  16.10*     Recent Labs      17   1547 03/14/17  03/15/17   0500   BUN  50*  49*  36*   CREATININE  2.11*  1.96*  1.33   ALBUMIN  3.6  2.8*  2.8*     No results for input(s): VANCOTROUGH, VANCOPEAK, VANCORANDOM in the last 72 hours.  Intake/Output Summary (Last 24 hours) at 03/15/17 1658  Last data filed at 03/15/17 0900   Gross per 24 hour   Intake 2027.08 ml   Output   2325 ml   Net -297.92 ml      Blood pressure 108/76, pulse 125, temperature 36.5 °C (97.7 °F), resp. rate 18, height 1.676 m (5' 5.98\"), weight 103.1 kg (227 lb 4.7 oz), SpO2 99 %. Temp (24hrs), Av.5 °C (97.7 °F), Min:36.4 °C (97.5 °F), Max:36.8 °C (98.2 °F)      A/P   1. Vancomycin dose change: give 2600mg IV x 1 LD  2. Next vancomycin level: random level tomorrow at 0600  3. Goal trough: 16-20 for now   4. Comments: Antibiotics have been escalated due to concern of nec fasc.  Multiple concerns for accumulation.  Will dose vanco x 1 and check a random level.  Plan ID and surgery " consult.      Terry Yang, PharmD, BCPS, BCCCP

## 2017-03-15 NOTE — PALLIATIVE CARE
"Palliative Care Follow-up:  Pt off BiPAP saturation 97% dyspnea noted when pt talks.  Pt very drowsy.  Opens eyes to name.  States he is \"OK' \"but maybe not really OK\".  Answers yes to pain but that he just took pain meds.  Difficult to have conversation as pt keeps falling asleep.  Discussion with son Irvin LEYVA.  Reviewed role of PC and discussion from pt's last admission.  Hospice was discussed but pt had no insurance at the time and wanted to continue to pursue treatment with Cardiologist.  Pt now has Medicaid which will pay for hospice. Son provided with PC contact information.     Updated: Manuela Giles RN    Plan: Dr Garcia planning to talk to pt and family about prognosis and goals of care. Will contact PC if we can provide assistance.     Thank you for allowing Palliative Care to participate in this patient's care. Please call [phone] with any questions or additional needs.  "

## 2017-03-15 NOTE — CONSULTS
Cardiology Consult Note:    Jayla Hunter  Date & Time note created:    3/15/2017   9:32 AM     Referring MD:  Dr. Haney    Patient ID:   Name:             Obinna Grande   YOB: 1965  Age:                 51 y.o.  male   MRN:               [id number]                                                             Chief Complaint:      CHF, CM    History of Present Illness:    51 y.o. male who presents to the emergency department because of difficulty breathing and severe edema of the body x 2-3 week and med compliance issue is questioned; He has a history of  dilated cardiomyopathy, EF of 15%, drug abuse, cirrhosis with hepatitis C,    chronic kidney disease, left ventricle mural thrombus, obesity  Currently on pressors with Sinus tachycardia and hypotension;    3/14/2017 Echo: Compared to the images of the prior study done on 01/29/17-  there has   been no significant change.   Severely reduced left ventricular systolic function.  Left ventricular ejection fraction is visually estimated to be 20%.  Global hypokinesis.  Diastolic function is difficult to assess.  Moderately dilated right ventricle.  Pacer/ICD wire seen in right ventricle.  Moderate mitral regurgitation.  Mild tricuspid regurgitation.  Right ventricular systolic pressure is estimated to be 40 mmHg.    Review of Systems:      Constitutional: Denies fevers, Denies weight changes  Eyes: Denies changes in vision, no eye pain  Ears/Nose/Throat/Mouth: Denies nasal congestion or sore throat   Cardiovascular: - chest pain, - palpitations   Respiratory: - shortness of breath , Denies cough  Gastrointestinal/Hepatic: Denies abdominal pain, nausea, vomiting, diarrhea, constipation or GI bleeding   Genitourinary: Denies dysuria or frequency  Musculoskeletal/Rheum: Denies  joint pain and swelling   Skin: Denies rash  Neurological: Denies headache, confusion, memory loss or focal weakness/parasthesias  Psychiatric: denies mood disorder    Endocrine: Deirdre thyroid problems  Heme/Oncology/Lymph Nodes: Denies enlarged lymph nodes, denies brusing or known bleeding disorder  All other systems were reviewed and are negative (AMA/CMS criteria)                Past Medical History:   Past Medical History   Diagnosis Date   • Ulcer (CMS-HCC)    • CHF (congestive heart failure) (CMS-HCC)    • Hypertension    • Hypertension    • Essential hypertension, benign 8/27/2012   • Other primary cardiomyopathies (CMS-HCC) 8/27/2012   • Cardiomegaly 8/27/2012   • Nonspecific elevation of levels of transaminase or lactic acid dehydrogenase (LDH) 8/27/2012   • Obesity, unspecified 8/27/2012   • Cocaine abuse      Active Hospital Problems    Diagnosis   • Acute renal insufficiency [N28.9]     Priority: High   • Acute on chronic systolic congestive heart failure, NYHA class 4, present on admission (EF 15-20%) [I50.23]     Priority: High   • Hepatitis C virus infection [B19.20]     Priority: Medium   • Essential hypertension, benign [I10]     Priority: Medium   • Hyponatremia [E87.1]   • Hyperkalemia [E87.5]   • Cellulitis [L03.90]   • Shock (CMS-HCC) [R57.9]   • LV (left ventricular) mural thrombus (CMS-HCC) [I21.3]       Past Surgical History:  Past Surgical History   Procedure Laterality Date   • Gastroscopy-endo  4/24/08     Performed by CASPER VILCHIS at ENDOSCOPY [facility] Medications:    Current facility-administered medications:   •  diphenhydrAMINE (BENADRYL) tablet/capsule 50 mg, 50 mg, Oral, HS PRN, Tate Hogan M.D., 50 mg at 03/15/17 0306  •  potassium chloride in water (KCL) ivpb 10 mEq, 10 mEq, Intravenous, Q HOUR, Dc Hung D.OLouluo, 10 mEq at 03/15/17 0812  •  ampicillin/sulbactam (UNASYN) 3 g in  mL IVPB, 3 g, Intravenous, Q6HRS, Dc Hung D.O.  •  oxycodone immediate-release (ROXICODONE) tablet 2.5-5 mg, 2.5-5 mg, Oral, Q4HRS PRN, ROLAND BassOLoulou, 5 mg at 03/15/17 0452  •  DOBUTamine  (DOBUTREX) 1 mg/mL premix infusion, 5 mcg/kg/min, Intravenous, Continuous, Jeremy M Gonda, M.D., Last Rate: 34 mL/hr at 03/15/17 0549, 5 mcg/kg/min at 03/15/17 0549  •  Respiratory Care per Protocol, , Nebulization, Continuous RT, Alexis Hamilton M.D.  •  ondansetron (ZOFRAN) syringe/vial injection 4 mg, 4 mg, Intravenous, Q4HRS PRN, Alexis Hamilton M.D.  •  ondansetron (ZOFRAN ODT) dispertab 4 mg, 4 mg, Oral, Q4HRS PRN, Alexis Hamilton M.D.  •  promethazine (PHENERGAN) tablet 12.5-25 mg, 12.5-25 mg, Oral, Q4HRS PRN, Alexis Hamilton M.D.  •  promethazine (PHENERGAN) suppository 12.5-25 mg, 12.5-25 mg, Rectal, Q4HRS PRN, Alexis Hamilton M.D.  •  prochlorperazine (COMPAZINE) injection 5-10 mg, 5-10 mg, Intravenous, Q4HRS PRN, Alexis Hamilton M.D.  •  acetaminophen (TYLENOL) tablet 650 mg, 650 mg, Oral, Q6HRS PRN, Alexis Hamilton M.D.  •  norepinephrine (LEVOPHED) 8 mg in  mL Infusion, 0.5-30 mcg/min, Intravenous, Continuous, Alexis Hamilton M.D., Last Rate: 22.5 mL/hr at 03/15/17 0851, 12 mcg/min at 03/15/17 0851  •  senna-docusate (PERICOLACE or SENOKOT S) 8.6-50 MG per tablet 2 Tab, 2 Tab, Oral, BID, 2 Tab at 03/15/17 0850 **AND** polyethylene glycol/lytes (MIRALAX) PACKET 1 Packet, 1 Packet, Oral, QDAY PRN **AND** magnesium hydroxide (MILK OF MAGNESIA) suspension 30 mL, 30 mL, Oral, QDAY PRN **AND** bisacodyl (DULCOLAX) suppository 10 mg, 10 mg, Rectal, QDAY PRN, Alexis Hamilton M.D.  •  [COMPLETED] heparin 1000 units/mL injection 6,000 Units, 6,000 Units, Intravenous, Once, 6,000 Units at 03/13/17 2123 **AND** heparin 1000 units/mL injection 3,200 Units, 3,200 Units, Intravenous, PRN, 3,200 Units at 03/15/17 0456 **AND** heparin infusion 25,000 units in 500 ml 0.45% nacl, , Intravenous, Continuous, Last Rate: 22 mL/hr at 03/15/17 0457, 1,100 Units/hr at 03/15/17 0457 **AND** Action is required: Protocol 440 Heparin Weight Based has been implemented, , , Once **AND** Protocol 440 Heparin Weight Based DO NOT GIVE ANY HEPARIN  BOLUS TO STROKE PATIENT, , , CONTINUOUS **AND** Protocol 440 Heparin Weight Based Discontinue Enoxaparin (Lovenox), Dabigatran (Pradaxa), Rivaroxaban (Xarelto), Apixaban (Eliquis), Edoxaban (Savaysa, Lixiana), Fondaparinux (Arixtra) and Argatroban prior to heparin administration, , , CONTINUOUS **AND** Protocol 440 Heparin Weight Based Draw baseline aPTT, PT, and platelet count if not already done, , , CONTINUOUS **AND** Protocol 440 Heparin Weight Based Draw aPTT 6 hours after beginning infusion. , , , CONTINUOUS **AND** Protocol 440 Heparin Weight Based Draw Platelet count every three days. Contact MD if platelet is 50% lower than baseline count., , , CONTINUOUS **AND** Protocol 440 Heparin Weight Based Record Patient Data, , , CONTINUOUS **AND** Protocol 440 Heparin Weight Based INSTRUCTIONS, , , CONTINUOUS **AND** Protocol 440 Heparin Weight Based Review aPTT results 6 hours after infusion is begun as detailed, , , CONTINUOUS **AND** Protocol 440 Heparin Weight Based Adjust heparin to maintain aPTT between 55-96 sec, , , CONTINUOUS **AND** Protocol 440 Heparin Weight Based Order aPTT 6 hours after any rate change or hold until aPTT is therapeutic (55-96 seconds), , , CONTINUOUS **AND** Protocol 440 Heparin Weight Based Documentation and verification, , , CONTINUOUS, Catherine Hillman, PHARMD  •  POLYMEM ALGINATE DRESSING PADS 1 Each, 1 Each, Apply externally, QDAY PRN, Jeremy M Gonda, M.D.    Current Outpatient Medications:  Prescriptions prior to admission   Medication Sig Dispense Refill Last Dose   • furosemide (LASIX) 40 MG Tab Take 40 mg by mouth 2 Times a Day.   3/13/2017 at am   • spironolactone (ALDACTONE) 50 MG Tab Take 50 mg by mouth every day.   3/13/2017 at am   • sennosides (SENOKOT) 8.6 MG Tab Take 8.6 mg by mouth 2 times a day.   3/12/2017 at unk   • gabapentin (NEURONTIN) 300 MG Cap Take 300 mg by mouth 2 Times a Day.   unk   • Docusate Sodium 100 MG Tab Take 100 mg by mouth 2 times a day as  "needed.   unk   • carvedilol (COREG) 3.125 MG Tab Take 3.125 mg by mouth 2 times a day, with meals.   3/13/2017 at am       Medication Allergy:  No Known Allergies    Family History:  History reviewed. No pertinent family history.    Social History:  Social History     Social History   • Marital Status: Single     Spouse Name: N/A   • Number of Children: N/A   • Years of Education: N/A     Occupational History   • Not on file.     Social History Main Topics   • Smoking status: Never Smoker    • Smokeless tobacco: Never Used   • Alcohol Use: No      Comment: 3 beers/week   • Drug Use: No   • Sexual Activity: Not on file     Other Topics Concern   • Not on file     Social History Narrative         Physical Exam:  Vitals  Weight/BMI: Body mass index is 36.7 kg/(m^2).  Blood pressure 108/76, pulse 120, temperature 36.8 °C (98.2 °F), resp. rate 17, height 1.676 m (5' 5.98\"), weight 103.1 kg (227 lb 4.7 oz), SpO2 95 %.  Filed Vitals:    03/15/17 0630 03/15/17 0700 03/15/17 0800 03/15/17 0900   BP:       Pulse: 119 118 118 120   Temp:   36.8 °C (98.2 °F)    Resp: 16 15 16 17   Height:       Weight:       SpO2: 92% 93% 96% 95%     Oxygen Therapy:  Pulse Oximetry: 95 %, O2 (LPM): 3, O2 Delivery: None (Room Air)  General Appearance:  Obese;  Well developed, Well nourished, No acute distress, Non-toxic appearance.   HENT:  Normocephalic, Atraumatic, Oropharynx moist mucous membranes, Dentition: , Nose normal.    Eyes:  PERRLA, EOMI, Conjunctiva normal, No discharge.  Neck:  Normal range of motion, No cervical tenderness, Supple, No stridor, no JVD .  No thyromegaly.  No carotid bruit.  Cardiovascular:  Fast heart rate, Normal rhythm,  S1, S2, no S3,  S4; No gallops; No murmurs, No rubs, .   Extremitites with intact distal pulses, no cyanosis, clubbing or edema.  No heaves, thrills, HJR;  Peripheral pulses: carotid 2+, brachial 2+, radial 2+, ulnar 2+, femoral 2+, popliteal 2+, PT 2+, DP 2+;  Lungs:  Respiratory effort is " normal. Normal breath sounds, breath sounds clear to auscultation bilaterally,  no rales, no rhonchi, no wheezing.   Abdomen: Bowel sounds normal, Soft, No tenderness, No guarding, No rebound, No masses, No hepatosplenomegaly.  Skin: Warm, Dry, No erythema, No rash, no induration or crepitus.  Neurologic:Unable  Psychiatric: Unable    MDM (Data Review):     Records reviewed and summarized in current documentation    Lab Data Review:  Recent Results (from the past 24 hour(s))   APTT    Collection Time: 03/14/17 11:18 AM   Result Value Ref Range    APTT 121.6 (HH) 24.7 - 36.0 sec   ACCU-CHEK GLUCOSE    Collection Time: 03/14/17 11:25 AM   Result Value Ref Range    Glucose - Accu-Ck 163 (H) 65 - 99 mg/dL   ACCU-CHEK GLUCOSE    Collection Time: 03/14/17  5:39 PM   Result Value Ref Range    Glucose - Accu-Ck 154 (H) 65 - 99 mg/dL   APTT    Collection Time: 03/14/17  9:30 PM   Result Value Ref Range    APTT 50.1 (H) 24.7 - 36.0 sec   APTT    Collection Time: 03/15/17  3:30 AM   Result Value Ref Range    APTT 51.8 (H) 24.7 - 36.0 sec   LACTIC ACID    Collection Time: 03/15/17  5:00 AM   Result Value Ref Range    Lactic Acid 2.7 (H) 0.5 - 2.0 mmol/L   CBC WITH DIFFERENTIAL    Collection Time: 03/15/17  5:00 AM   Result Value Ref Range    WBC 14.1 (H) 4.8 - 10.8 K/uL    RBC 5.04 4.70 - 6.10 M/uL    Hemoglobin 12.5 (L) 14.0 - 18.0 g/dL    Hematocrit 37.6 (L) 42.0 - 52.0 %    MCV 74.6 (L) 81.4 - 97.8 fL    MCH 24.8 (L) 27.0 - 33.0 pg    MCHC 33.2 (L) 33.7 - 35.3 g/dL    RDW 50.4 (H) 35.9 - 50.0 fL    Platelet Count 91 (L) 164 - 446 K/uL    MPV 9.7 9.0 - 12.9 fL    Nucleated RBC 0.10 /100 WBC    NRBC (Absolute) 0.02 K/uL    Neutrophils-Polys 73.20 (H) 44.00 - 72.00 %    Lymphocytes 4.40 (L) 22.00 - 41.00 %    Monocytes 4.50 0.00 - 13.40 %    Eosinophils 0.00 0.00 - 6.90 %    Basophils 0.90 0.00 - 1.80 %    Neutrophils (Absolute) 12.59 (H) 1.82 - 7.42 K/uL    Lymphs (Absolute) 0.62 (L) 1.00 - 4.80 K/uL    Monos (Absolute) 0.63  0.00 - 0.85 K/uL    Eos (Absolute) 0.00 0.00 - 0.51 K/uL    Baso (Absolute) 0.13 (H) 0.00 - 0.12 K/uL    Anisocytosis 1+     Macrocytosis 1+    COMP METABOLIC PANEL    Collection Time: 03/15/17  5:00 AM   Result Value Ref Range    Sodium 123 (L) 135 - 145 mmol/L    Potassium 3.1 (L) 3.6 - 5.5 mmol/L    Chloride 88 (L) 96 - 112 mmol/L    Co2 23 20 - 33 mmol/L    Anion Gap 12.0 (H) 0.0 - 11.9    Glucose 125 (H) 65 - 99 mg/dL    Bun 36 (H) 8 - 22 mg/dL    Creatinine 1.33 0.50 - 1.40 mg/dL    Calcium 8.3 (L) 8.5 - 10.5 mg/dL    AST(SGOT) 77 (H) 12 - 45 U/L    ALT(SGPT) 71 (H) 2 - 50 U/L    Alkaline Phosphatase 51 30 - 99 U/L    Total Bilirubin 9.3 (H) 0.1 - 1.5 mg/dL    Albumin 2.8 (L) 3.2 - 4.9 g/dL    Total Protein 6.4 6.0 - 8.2 g/dL    Globulin 3.6 (H) 1.9 - 3.5 g/dL    A-G Ratio 0.8 g/dL   ESTIMATED GFR    Collection Time: 03/15/17  5:00 AM   Result Value Ref Range    GFR If African American >60 >60 mL/min/1.73 m 2    GFR If Non  56 (A) >60 mL/min/1.73 m 2   DIFFERENTIAL MANUAL    Collection Time: 03/15/17  5:00 AM   Result Value Ref Range    Bands-Stabs 16.10 (H) 0.00 - 10.00 %    Myelocytes 0.90 %    Manual Diff Status PERFORMED    PERIPHERAL SMEAR REVIEW    Collection Time: 03/15/17  5:00 AM   Result Value Ref Range    Peripheral Smear Review see below    PLATELET ESTIMATE    Collection Time: 03/15/17  5:00 AM   Result Value Ref Range    Plt Estimation Decreased    MORPHOLOGY    Collection Time: 03/15/17  5:00 AM   Result Value Ref Range    RBC Morphology Present     Poikilocytosis 1+     Echinocytes 1+    MAGNESIUM    Collection Time: 03/15/17  5:00 AM   Result Value Ref Range    Magnesium 1.3 (L) 1.5 - 2.5 mg/dL       Imaging/Procedures Review:    Chest Xray:  Reviewed    EKG:   As in HPI. SINUS TACHYCARDIA   LEFT ATRIAL ABNORMALITY   NONSPECIFIC INTRAVENTRICULAR CONDUCTION DELAY   MINIMAL ST DEPRESSION, DIFFUSE LEADS   Compared to ECG 01/27/2017 01:29:59   ST (T wave) deviation now present      MDM (Assessment and Plan):     Active Hospital Problems    Diagnosis   • Acute renal insufficiency [N28.9]     Priority: High   • Acute on chronic systolic congestive heart failure, NYHA class 4, present on admission (EF 15-20%) [I50.23]     Priority: High   • Hepatitis C virus infection [B19.20]     Priority: Medium   • Essential hypertension, benign [I10]     Priority: Medium   • Hyponatremia [E87.1]   • Hyperkalemia [E87.5]   • Cellulitis [L03.90]   • Shock (CMS-HCC) [R57.9]   • LV (left ventricular) mural thrombus (CMS-HCC) [I21.3]  Obesity  Anemia  Iron deficiency  Non-compliance  H/o drug use     Agree with pressor to support perfusion and brent with fluid  BNP 3495 diurese needed  ACEI/ARB, BB, Diuretic when more stable and off pressor  Consider ischemia evaluation  Social issue and compliance issue to be addressed  ICD in place  Code status to address  Will follow  Thx

## 2017-03-15 NOTE — PROGRESS NOTES
Pulmonary Critical Care Progress Note        DOS:  3/15/2017    Chief Complaint: resp failure    History of Present Illness: 51 y.o. M, h/o severe CM, EF 15% secondary to cocaine use; intraventricular thrombus, PE, cirrhosis; transferred to ED from [facility] with increasing body edema, wt gain, confusion; started on BiPAP and pressors in ED; DNR/I confirmed.      ROS:  Respiratory: positive shortness of breath, Cardiac: negative chest pain, GI: bloating.  All other systems negative.    Interval Events:  24 hour interval history reviewed   - restless overnight; leg pain  - increased delirium overnight  - NE 12 mcg/min  - off ntg  -   - Bumex gtt, good UOP  - room air; CXR min edema; CM unchanged    - off BiPAP all night  - heparin gtt  - clinda for cellulitis; not covering GBS; changing to Unasyn  - h/o cellulitis  - replace K, f/u Mg  - cardiology eval, follow    PFSH:  No change.    Respiratory:     Pulse Oximetry: 99 %  Chest Tube Drains:          Exam: rhonchi bibasilar and diminished breath sounds moderate  ImagingAvailable data reviewed   Recent Labs      03/13/17   1908   ISTATAPH  7.405   ISTATAPCO2  26.8   ISTATAPO2  167*   ISTATATCO2  18*   [id number]  100*   ISTATARTHCO3  16.8*   ISTATARTBE  -6*   ISTATTEMP  see below   ISTATFIO2  40   ISTATSPEC  Arterial       HemoDynamics:  Pulse: (!) 125, Heart Rate (Monitored): (!) 125  NIBP: (!) 92/73 mmHg  CVP (mm Hg): (!) 28 MM HG    Exam: SR/ST  Imaging: Available data reviewed  Recent Labs      03/13/17   1624  03/13/17   1636  03/15/17   0500   TROPONINI  0.09*   --    --    BNPBTYPENAT   --   3495*  1389*       Neuro:  GCS Total Tong Coma Score: 15       Exam: confused; follows, moves all 4 ext equally  Imaging: Available data reviewed    Fluids:  Intake/Output       03/13/17 0700 - 03/14/17 0659 03/14/17 0700 - 03/15/17 0659 03/15/17 0700 - 03/16/17 0659      3455-3963 7491-0259 Total 7134-5252 1676-0635 Total 0700-1859 1900-0659 Total       Intake    P.O.   --  -- --  720  250 970  --  -- --    P.O. -- -- -- 720 250 970 -- -- --    I.V.  --  1003.5 1003.5  1250  1206.1 2456.1  359  -- 359    Nitroglycerin Volume -- 11 11 18 18 36 1 -- 1    Norepinephrine Volume -- 184.8 184.8 182 359 541 46 -- 46    Heparin Volume -- 198 198 240 221.1 461.1 44 -- 44    Dobutamine Volume -- 609.7 609.7  68 -- 68    IV Piggyback Volume (Antibiotics) -- -- -- -- 200 200 200 -- 200    Total Intake -- 1003.5 1003.5 1970 1456.1 3426.1 359 -- 359       Output    Urine  --  2540 2540  2165  1800 3965  210  -- 210    Indwelling Cathether -- 2540 2540 2165 1800 3965 210 -- 210    Stool  --  -- --  --  -- --  --  -- --    Number of Times Stooled -- -- -- -- 2 x 2 x -- -- --    Total Output -- 2540 2540 2165 1800 3965 210 -- 210       Net I/O     -- -1536.5 -1536.5 -195 -343.9 -538.9 149 -- 149        Weight: 103.1 kg (227 lb 4.7 oz)  Recent Labs      03/13/17   1547 03/14/17  03/15/17   0500   SODIUM  118*  122*  123*   POTASSIUM  5.8*  4.1  3.1*   CHLORIDE  85*  90*  88*   CO2  18*  20  23   BUN  50*  49*  36*   CREATININE  2.11*  1.96*  1.33   MAGNESIUM   --    --   1.3*   CALCIUM  9.5  8.6  8.3*       GI/Nutrition:  Exam: distended; soft  Imaging: Available data reviewed  taking PO  Liver Function  Recent Labs      03/13/17   1547 03/14/17  03/15/17   0500   ALTSGPT  101*  81*  71*   ASTSGOT  121*  111*  77*   ALKPHOSPHAT  69  51  51   TBILIRUBIN  8.2*  7.5*  9.3*   GLUCOSE  78  103*  125*       Heme:  Recent Labs      03/13/17   1547  03/13/17   1636 03/14/17 03/14/17   2130  03/15/17   0330  03/15/17   0500  03/15/17   0930   RBC  5.70   --   4.86   --    --    --   5.04   --    HEMOGLOBIN  13.9*   --   12.1*   --    --    --   12.5*   --    HEMATOCRIT  43.8   --   36.2*   --    --    --   37.6*   --    PLATELETCT  157*   --   99*   --    --    --   91*   --    PROTHROMBTM   --   21.6*   --    --    --    --    --    --    APTT   --   35.7   --    < >  50.1*  51.8*   --   56.5*    INR   --   1.82*   --    --    --    --    --    --     < > = values in this interval not displayed.       Infectious Disease:  Temp  Av.5 °C (97.7 °F)  Min: 36.4 °C (97.5 °F)  Max: 36.8 °C (98.2 °F)  Micro: antibiotics reviewed  Recent Labs      17   1547 03/14/17  03/15/17   0500   WBC  17.9*  12.4*  14.1*   NEUTSPOLYS  81.80*  71.00  73.20*   LYMPHOCYTES  8.50*  2.60*  4.40*   MONOCYTES  8.20  5.30  4.50   EOSINOPHILS  0.10  0.00  0.00   BASOPHILS  0.40  0.00  0.90   ASTSGOT  121*  111*  77*   ALTSGPT  101*  81*  71*   ALKPHOSPHAT  69  51  51   TBILIRUBIN  8.2*  7.5*  9.3*     Current Facility-Administered Medications   Medication Dose Frequency Provider Last Rate Last Dose   • diphenhydrAMINE (BENADRYL) tablet/capsule 50 mg  50 mg HS PRN Tate Hogan M.D.   50 mg at 03/15/17 0306   • ampicillin/sulbactam (UNASYN) 3 g in  mL IVPB  3 g Q6HRS Dc Hung D.OLoulou   Stopped at 03/15/17 1207   • famotidine (PEPCID) tablet 20 mg  20 mg BID Everton Paredes M.D.        Or   • famotidine (PEPCID) injection 20 mg  20 mg BID Everton Paredes M.D.   20 mg at 03/15/17 1136   • oxycodone immediate-release (ROXICODONE) tablet 2.5-5 mg  2.5-5 mg Q4HRS PRN Alexia Munoz D.O.   5 mg at 03/15/17 1136   • DOBUTamine (DOBUTREX) 1 mg/mL premix infusion  5 mcg/kg/min Continuous Jeremy M Gonda, M.D. 34 mL/hr at 03/15/17 1316 5 mcg/kg/min at 03/15/17 1316   • Respiratory Care per Protocol   Continuous RT Alexis Hamilton M.D.       • ondansetron (ZOFRAN) syringe/vial injection 4 mg  4 mg Q4HRS PRN Alexis Hamilton M.D.       • ondansetron (ZOFRAN ODT) dispertab 4 mg  4 mg Q4HRS PRN Alexis Hamilton M.D.       • promethazine (PHENERGAN) tablet 12.5-25 mg  12.5-25 mg Q4HRS PRN Alexis Hamilton M.D.       • promethazine (PHENERGAN) suppository 12.5-25 mg  12.5-25 mg Q4HRS PRN Alexis Hamilton M.D.       • prochlorperazine (COMPAZINE) injection 5-10 mg  5-10 mg Q4HRS PRN Alexis Hamilton M.D.       • acetaminophen  (TYLENOL) tablet 650 mg  650 mg Q6HRS PRN Alexis Hamilton M.D.       • norepinephrine (LEVOPHED) 8 mg in  mL Infusion  0.5-30 mcg/min Continuous Alexis Hamilton M.D. 16.9 mL/hr at 03/15/17 1515 9 mcg/min at 03/15/17 1515   • senna-docusate (PERICOLACE or SENOKOT S) 8.6-50 MG per tablet 2 Tab  2 Tab BID Alexis Hamilton M.D.   2 Tab at 03/15/17 0850    And   • polyethylene glycol/lytes (MIRALAX) PACKET 1 Packet  1 Packet QDAY PRN Alexis Hamilton M.D.        And   • magnesium hydroxide (MILK OF MAGNESIA) suspension 30 mL  30 mL QDAY PRN Alexis Hamilton M.D.        And   • bisacodyl (DULCOLAX) suppository 10 mg  10 mg QDAY PRN Alexis Hamilton M.D.       • heparin 1000 units/mL injection 3,200 Units  3,200 Units PRN Catherine Hillman PHARMD   3,200 Units at 03/15/17 0456    And   • heparin infusion 25,000 units in 500 ml 0.45% nacl   Continuous CHEPE LimaD   Stopped at 03/15/17 1314   • POLYMEM ALGINATE DRESSING PADS 1 Each  1 Each QDAY PRN Jeremy M Gonda, M.D.         Last reviewed on 3/13/2017  5:30 PM by Yolanda Lewis T    Quality  Measures:  Core Measures & Quality Metrics    Problems:  Acute-on-chronic hypoxemic respiratory failure   - multifactorial    - BiPAP started for resp distress   - continue with breaks as tolerated    - DNR/I confirmed  Decompensated severe systolic heart failure, end stage   - d/w'd cardiolgoy  Acute cardiogenic pulmonary edema   - Bumex gtt   - I<O   - titrated pressors  Anasarca  Acute metabolic/toxic encephalopathy   - minimize benzos, narcotics   - f/u ammonia   - add lactulose and rifaximin   Bilateral lower extremity cellulitis with open ulcerations   - clinda GBS; changing to Unasyn   - bullae worse today; ongoing pain   - d/w'd pharmacy; will escalate to zosyn/vanco/cllinda - d/w ID, surgery unless plan for comfort measures  Thrombocytopenia  Anemia of chronic disease  Acute-on-chronic kidney disease  Hyperkalemia  Lactic acidosis with resultant anion gap  Elevated  liver enzymes concerning for hepatic congestion  Hyperammonemia & hyperbili  Elevated troponin secondary to demand ischemia  Hypercoagulable state   - noncompliant with his Coumadin with known pulmonary embolism and intraventricular clot in the past  Overall prognosis very poor; d/w'd SO and patient; DNR/I  Consider palliative re-eval  Discussed patient condition and risk of morbidity and/or mortality with RN, RT and QA team.    The patient remains critically ill.  Critical care time = 35 minutes in directly providing and coordinating critical care and extensive data review.  No time overlap and excludes procedures.

## 2017-03-15 NOTE — CARE PLAN
Problem: Ventilation Defect:  Goal: Ability to achieve and maintain unassisted ventilation or tolerate decreased levels of ventilator support  Intervention: Support and monitor invasive and noninvasive mechanical ventilation  Pt. Not wearing Bipap mask.  So2 of 100% on cannula.  Bipap made prn.

## 2017-03-15 NOTE — PROGRESS NOTES
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  ROS limited by BiPAP    Off BiPAP and on RA  Levo 12  Dobutamine 10  Bumex 2  Nitro 5  Heparin gtts  UOP 2000ml/24hrs      Review of Systems:  Review of Systems   Unable to perform ROS: other       Physical Exam:  Physical Exam   Constitutional: He is oriented to person, place, and time. He appears well-developed and well-nourished. No distress.   HENT:   Head: Normocephalic and atraumatic.   Eyes: Conjunctivae are normal.   Neck: No JVD present.   Cardiovascular: Normal rate.  Exam reveals no gallop.    Murmur heard.  Pulmonary/Chest: No stridor. No respiratory distress. He has no wheezes. He has rales.   Retracting  On BiPAP   Abdominal: Soft. There is no tenderness. There is no rebound and no guarding.   Musculoskeletal: He exhibits edema.   Neurological: He is oriented to person, place, and time.   Skin: Skin is warm and dry. Rash noted. He is not diaphoretic.   Psychiatric: He has a normal mood and affect. Thought content normal.   Nursing note and vitals reviewed.      Labs:  Recent Labs      03/13/17   1908   ISTATAPH  7.405   ISTATAPCO2  26.8   ISTATAPO2  167*   ISTATATCO2  18*   [id number]  100*   ISTATARTHCO3  16.8*   ISTATARTBE  -6*   ISTATTEMP  see below   ISTATFIO2  40   ISTATSPEC  Arterial     Recent Labs      03/13/17   1624  03/13/17   1636   TROPONINI  0.09*   --    BNPBTYPENAT   --   3495*     Recent Labs      03/13/17   1547 03/14/17  03/15/17   0500   SODIUM  118*  122*  123*   POTASSIUM  5.8*  4.1  3.1*   CHLORIDE  85*  90*  88*   CO2  18*  20  23   BUN  50*  49*  36*   CREATININE  2.11*  1.96*  1.33   CALCIUM  9.5  8.6  8.3*     Recent Labs      03/13/17   1547  03/14/17  03/15/17   0500   ALTSGPT  101*  81*  71*   ASTSGOT  121*  111*  77*   ALKPHOSPHAT  69  51  51   TBILIRUBIN  8.2*  7.5*  9.3*   GLUCOSE  78  103*  125*     Recent Labs      17   1547  17   1636 17   1118  17   2130  03/15/17   0330  03/15/17   0500   RBC  5.70   --   4.86   --    --    --    --   5.04   HEMOGLOBIN  13.9*   --   12.1*   --    --    --    --   12.5*   HEMATOCRIT  43.8   --   36.2*   --    --    --    --   37.6*   PLATELETCT  157*   --   99*   --    --    --    --   91*   PROTHROMBTM   --   21.6*   --    --    --    --    --    --    APTT   --   35.7   --    < >  121.6*  50.1*  51.8*   --    INR   --   1.82*   --    --    --    --    --    --     < > = values in this interval not displayed.     Recent Labs      17   1547 03/14/17  03/15/17   0500   WBC  17.9*  12.4*  14.1*   NEUTSPOLYS  81.80*  71.00  73.20*   LYMPHOCYTES  8.50*  2.60*  4.40*   MONOCYTES  8.20  5.30  4.50   EOSINOPHILS  0.10  0.00  0.00   BASOPHILS  0.40  0.00  0.90   ASTSGOT  121*  111*  77*   ALTSGPT  101*  81*  71*   ALKPHOSPHAT  69  51  51   TBILIRUBIN  8.2*  7.5*  9.3*           Hemodynamics:  Temp (24hrs), Av.5 °C (97.7 °F), Min:36.5 °C (97.7 °F), Max:36.5 °C (97.7 °F)  Temperature: 36.5 °C (97.7 °F)  Pulse  Av.9  Min: 50  Max: 126Heart Rate (Monitored): (!) 118  NIBP: (!) 93/69 mmHg  CVP (mm Hg): (!) 21 MM HG  Respiratory:    Respiration: 15, Pulse Oximetry: 92 %, O2 Daily Delivery Respiratory : Silicone Nasal Cannula     Work Of Breathing / Effort: Moderate  RUL Breath Sounds: Clear, RML Breath Sounds: Diminished, RLL Breath Sounds: Diminished, BHAVNA Breath Sounds: Clear, LLL Breath Sounds: Diminished  Fluids:    Intake/Output Summary (Last 24 hours) at 03/15/17 0737  Last data filed at 03/15/17 0600   Gross per 24 hour   Intake 3426.08 ml   Output   3965 ml   Net -538.92 ml     Weight: 103.1 kg (227 lb 4.7 oz)  GI/Nutrition:  Orders Placed This Encounter   Procedures    • Diet Order     Standing Status: Standing      Number of Occurrences: 1      Standing Expiration Date:      Order Specific Question:  Diet:     Answer:  Cardiac [6]     Medical Decision Making, by Problem:  Active Hospital Problems    Diagnosis   • Acute renal insufficiency [N28.9]  Cardiogenic shock  Avoid nephrotoxins  Follow daily BMP   • Acute on chronic systolic congestive heart failure, NYHA class 4, present on admission (EF 15-20%) [I50.23]  Dobutamine gtts  Dc Bumex and Nitro drips  :LVEF 15%  DCM coccaine induced now theoretically clean   • Hepatitis C virus infection [B19.20]   • Essential hypertension, benign [I10]  Not an acute issue   • Hyponatremia [E87.1]  Chronic  Correcting slowly  Goal>125   • Hyperkalemia [E87.5]  Resolved  Follow closely   • Cellulitis [L03.90]  Wound care consulted  Worsening on exam today with bullae formation not presenty yesterday  Escalate to cover Nec Fasc  Cultures neg  Pt is an exceedingly high risk surgical candidate   • Shock (CMS-HCC) [R57.9]  Shock vs septic  Dobutamine gtts  Levophed gtts  Following MAP, CVP  Cultures neg   • LV (left ventricular) mural thrombus (CMS-HCC) [I21.3]  Non compliant with home anticoagulation  Heparin gtts for now  Once pt has stabilized will need to consider po vs comfort care   -Acute Hypoxic Respiratory failure: BiPAP, Bumex gtts with good UOP.  Pulmonology following.  Pt is DNR/I  -HypoK: replace IV, check Mg, follow daily     DW ICU staff, Pulmonology, Cards    Family conference with son, daughter and wife.  Reviewed very grave prognosis.  They wish to cont to support pt as we have been.  Agree with DNR/I.  They feel he sometimes wants to die and at other wishes to keep fighting.  For that reason they are not ready for Comfort Care status until the pt is more clear and consistent    Critical care time 35min's managing vasoactive gtts,     Medications reviewed, EKG reviewed, Labs reviewed and Radiology images reviewed  Jonathon  catheter: Strict Intake and Output During Sepisis or Shock      DVT Prophylaxis: Heparin  DVT prophylaxis - mechanical: SCDs  Ulcer prophylaxis: Yes  Antibiotics: Treating active infection/contamination beyond 24 hours perioperative coverage

## 2017-03-15 NOTE — CARE PLAN
Problem: Safety  Goal: Will remain free from injury  Outcome: PROGRESSING AS EXPECTED  Pt's family in room. Pt unable to reposition himself w/o extensive assitance.     Problem: Respiratory:  Goal: Respiratory status will improve  Outcome: PROGRESSING AS EXPECTED  Pt is now on room air. Denies SOA have not observed dyspnea.     Problem: Fluid Volume:  Goal: Will maintain balanced intake and output  Outcome: PROGRESSING AS EXPECTED  Monitoring weight, lab values and physical assessments for S/SX of fluid volume imbalance.    Problem: Skin Integrity  Goal: Risk for impaired skin integrity will decrease  Outcome: PROGRESSING SLOWER THAN EXPECTED  Integuma of the RLE has become more impairment with multiple bulla forming through the night

## 2017-03-15 NOTE — CARE PLAN
Problem: Infection  Goal: Will remain free from infection  Intervention: Assess signs and symptoms of infection  Pt bilateral lower extremities examined Q2. Wounds assessed and dressing changed. Labs monitored.       Problem: Pain Management  Goal: Pain level will decrease to patient’s comfort goal  Intervention: Educate and implement non-pharmacologic comfort measures. Examples: relaxation, distration, play therapy, activity therapy, massage, etc.  Pt comfort measures of dim lighting, quiet area and support positioning implemented. Pt educated to deep breathe during painful event.

## 2017-03-16 NOTE — DISCHARGE PLANNING
Oriented x3.  Legs look better.  Palliative following.  On Vanco and Zosyn.     SNF w/ Hospice.  Has Medicaid which will address both SNF and Hopsice.

## 2017-03-16 NOTE — CARE PLAN
Problem: Skin Integrity  Goal: Risk for impaired skin integrity will decrease  Pt's legs swollen, red, and weeping bilaterally- fluid filled blisters in place. Appropriate dressings applied to burst blister areas. Wound photos up to date in computer, wound consult placed. Dressings changed PRN, kept clean and dry. Absorbant pads placed underneath legs to absorb moisture and keep linens dry     Problem: Psychosocial Needs:  Goal: Level of anxiety will decrease  Pt's family at bedside- anxious and tearful. All questions answered, pt and family frequently rounded on. Emotional support provided

## 2017-03-16 NOTE — CARE PLAN
Problem: Nutritional:  Goal: Achieve adequate nutritional intake  Patient will consume 50% of meals and supplements.  Outcome: NOT MET

## 2017-03-16 NOTE — CONSULTS
ADULT  INFECTIOUS DISEASES INPATIENT CONSULT NOTE     Date of Service: 03/16/17    Consult Requested By: Alexis Hamilton M.D.    Reason for Consultation: Bilateral lower extremity cellulitis    History of Present Illness:   Obinna Grande is a 51 y.o. Man with a history of cocaine-induced cardiomyopathy, cirrhosis, HCV, alcohol abuse, CKD admitted 3/13/2017 for worsening shortness of breath. He is a poor historian so history is obtained from the medical records. Per report, the patient developed acute onset of shortness of breath associated with chest pain but no cough. He also developed bilateral lower extremity erythema and swelling with a blister formation on his RLE that opened up recently. Patient denies any recent trauma, injury or skin breakdown. He also denies any recent fevers, chills, abdominal pain or diarrhea. Last drug use was approximately 4 months ago with crystal meth. Given worsening shortness of breath, family brought him to the hospital. On presentation, he was afebrile with leukocytosis of 17.9.  He was found to be in acute respiratory failure and cardiogenic shock with bilateral lower extremity cellulitis and transferred to the ICU. He is currently on pressors and broad spectrum abx. Wound cultures were obtained but are negative to date. Blood cultures are also negative. Per RN at bedside, lower extremity cellulitis has improved from yesterday with decreased edema and erythema. ID service was consulted for further management.    Review Of Systems:  ROS are negative except as noted above in the HPI.    PMH:   Past Medical History   Diagnosis Date   • Ulcer (CMS-HCC)    • CHF (congestive heart failure) (CMS-HCC)    • Hypertension    • Hypertension    • Essential hypertension, benign 8/27/2012   • Other primary cardiomyopathies (CMS-HCC) 8/27/2012   • Cardiomegaly 8/27/2012   • Nonspecific elevation of levels of transaminase or lactic acid dehydrogenase (LDH) 8/27/2012   • Obesity,  unspecified 8/27/2012   • Cocaine abuse          PSH:  Past Surgical History   Procedure Laterality Date   • Gastroscopy-endo  4/24/08     Performed by CASPER VILCHIS at ENDOSCOPY [facility]       FAMILY HX:  Negative for DM, heart disease or cancer    SOCIAL HX:  Social History     Social History   • Marital Status: Single     Spouse Name: N/A   • Number of Children: N/A   • Years of Education: N/A     Occupational History   • Not on file.     Social History Main Topics   • Smoking status: Never Smoker    • Smokeless tobacco: Never Used   • Alcohol Use: No      Comment: 3 beers/week   • Drug Use: No   • Sexual Activity: Not on file     Other Topics Concern   • Not on file     Social History Narrative     History   Smoking status   • Never Smoker    Smokeless tobacco   • Never Used     History   Alcohol Use No     Comment: 3 beers/week       Allergies/Intolerances:  No Known Allergies    History reviewed with the patient    Other Current Medications:    Current facility-administered medications:   •  furosemide (LASIX) injection 40 mg, 40 mg, Intravenous, BID DIURETIC, Dc Hung D.O.  •  rifaximin (XIFAXAN) tablet 550 mg, 550 mg, Oral, BID, Everton Paredes M.D.  •  diphenhydrAMINE (BENADRYL) tablet/capsule 50 mg, 50 mg, Oral, HS PRN, Tate Hogan M.D., 50 mg at 03/15/17 0306  •  famotidine (PEPCID) tablet 20 mg, 20 mg, Oral, BID, 20 mg at 03/15/17 2010 **OR** famotidine (PEPCID) injection 20 mg, 20 mg, Intravenous, BID, Everton Paredes M.D., 20 mg at 03/16/17 0823  •  piperacillin-tazobactam (ZOSYN) 4.5 g in  mL IVPB, 4.5 g, Intravenous, Q6HRS, Everton Paredes M.D., Stopped at 03/16/17 0605  •  clindamycin (CLEOCIN) IVPB premix 900 mg, 900 mg, Intravenous, Q8HRS, Everton Paredes M.D., Stopped at 03/16/17 0721  •  MD ALERT... vancomycin per pharmacy protocol, , Other, PRN, Everton Paredes M.D.  •  oxycodone immediate-release (ROXICODONE) tablet 2.5-5 mg, 2.5-5 mg, Oral, Q4HRS PRN,  Alexia Munoz D.O., 5 mg at 03/16/17 0537  •  DOBUTamine (DOBUTREX) 1 mg/mL premix infusion, 5 mcg/kg/min, Intravenous, Continuous, Jeremy M Gonda, M.D., Last Rate: 34 mL/hr at 03/16/17 0248, 5 mcg/kg/min at 03/16/17 0248  •  Respiratory Care per Protocol, , Nebulization, Continuous RT, Alexis Hamilton M.D.  •  ondansetron (ZOFRAN) syringe/vial injection 4 mg, 4 mg, Intravenous, Q4HRS PRN, Alexis Hamilton M.D.  •  ondansetron (ZOFRAN ODT) dispertab 4 mg, 4 mg, Oral, Q4HRS PRN, Alexis Hamilton M.D.  •  promethazine (PHENERGAN) tablet 12.5-25 mg, 12.5-25 mg, Oral, Q4HRS PRN, Alexis Hamilton M.D.  •  promethazine (PHENERGAN) suppository 12.5-25 mg, 12.5-25 mg, Rectal, Q4HRS PRN, Alexis Hamilton M.D.  •  prochlorperazine (COMPAZINE) injection 5-10 mg, 5-10 mg, Intravenous, Q4HRS PRN, Alexis Hamilton M.D.  •  acetaminophen (TYLENOL) tablet 650 mg, 650 mg, Oral, Q6HRS PRN, Alexis Hamilton M.D.  •  norepinephrine (LEVOPHED) 8 mg in  mL Infusion, 0.5-30 mcg/min, Intravenous, Continuous, Alexis Hamilton M.D., Last Rate: 20.6 mL/hr at 03/16/17 0510, 11 mcg/min at 03/16/17 0510  •  senna-docusate (PERICOLACE or SENOKOT S) 8.6-50 MG per tablet 2 Tab, 2 Tab, Oral, BID, 2 Tab at 03/16/17 0822 **AND** polyethylene glycol/lytes (MIRALAX) PACKET 1 Packet, 1 Packet, Oral, QDAY PRN **AND** magnesium hydroxide (MILK OF MAGNESIA) suspension 30 mL, 30 mL, Oral, QDAY PRN **AND** bisacodyl (DULCOLAX) suppository 10 mg, 10 mg, Rectal, QDAY PRN, Alexis Hamilton M.D.  •  [COMPLETED] heparin 1000 units/mL injection 6,000 Units, 6,000 Units, Intravenous, Once, 6,000 Units at 03/13/17 2123 **AND** heparin 1000 units/mL injection 3,200 Units, 3,200 Units, Intravenous, PRN, 3,200 Units at 03/16/17 0747 **AND** heparin infusion 25,000 units in 500 ml 0.45% nacl, , Intravenous, Continuous, Last Rate: 30 mL/hr at 03/16/17 0742, 1,500 Units/hr at 03/16/17 0742 **AND** Action is required: Protocol 440 Heparin Weight Based has been implemented, , , Once **AND**  "Protocol 440 Heparin Weight Based DO NOT GIVE ANY HEPARIN BOLUS TO STROKE PATIENT, , , CONTINUOUS **AND** Protocol 440 Heparin Weight Based Discontinue Enoxaparin (Lovenox), Dabigatran (Pradaxa), Rivaroxaban (Xarelto), Apixaban (Eliquis), Edoxaban (Savaysa, Lixiana), Fondaparinux (Arixtra) and Argatroban prior to heparin administration, , , CONTINUOUS **AND** Protocol 440 Heparin Weight Based Draw baseline aPTT, PT, and platelet count if not already done, , , CONTINUOUS **AND** Protocol 440 Heparin Weight Based Draw aPTT 6 hours after beginning infusion. , , , CONTINUOUS **AND** Protocol 440 Heparin Weight Based Draw Platelet count every three days. Contact MD if platelet is 50% lower than baseline count., , , CONTINUOUS **AND** Protocol 440 Heparin Weight Based Record Patient Data, , , CONTINUOUS **AND** Protocol 440 Heparin Weight Based INSTRUCTIONS, , , CONTINUOUS **AND** Protocol 440 Heparin Weight Based Review aPTT results 6 hours after infusion is begun as detailed, , , CONTINUOUS **AND** Protocol 440 Heparin Weight Based Adjust heparin to maintain aPTT between 55-96 sec, , , CONTINUOUS **AND** Protocol 440 Heparin Weight Based Order aPTT 6 hours after any rate change or hold until aPTT is therapeutic (55-96 seconds), , , CONTINUOUS **AND** Protocol 440 Heparin Weight Based Documentation and verification, , , CONTINUOUS, Catherine Hillman, PHARMD  •  POLYMEM ALGINATE DRESSING PADS 1 Each, 1 Each, Apply externally, CINDY LOPEZ, Jeremy M Gonda, M.D.  [unfilled]    Most Recent Vital Signs:  /76 mmHg  Pulse 120  Temp(Src) 36.5 °C (97.7 °F)  Resp 13  Ht 1.676 m (5' 5.98\")  Wt 105.3 kg (232 lb 2.3 oz)  BMI 37.49 kg/m2  SpO2 99%  Temp  Av.8 °C (98.2 °F)  Min: 36.2 °C (97.2 °F)  Max: 38.1 °C (100.5 °F)    Physical Exam:  General: ill-appearing, no acute distress  HEENT: sclera anicteric, PERRL, EOMI, MMM, no oral lesions  Neck: supple, no lymphadenopathy  Chest: decreased breath sounds, few " rales  Cardiac: RRR, normal S1 S2,+ murmur  Abdomen: + bowel sounds, soft, non-tender, non-distended, no HSM  Extremities: bilateral lower extremity edema with erythema from below the knees to toes. Toes are purplish in color. Legs wrapped. Wound photos reviewed  Skin: see above  Neuro: Alert but easily falls to sleep    Pertinent Lab Results:  Recent Labs     03/14/17  03/15/17   0500  03/16/17   0558   WBC  12.4*  14.1*  14.2*      Recent Labs     03/14/17  03/15/17   0500  03/16/17   0558   HEMOGLOBIN  12.1*  12.5*  12.6*   HEMATOCRIT  36.2*  37.6*  39.3*   MCV  74.5*  74.6*  76.5*   MCH  24.9*  24.8*  24.5*   MACROCYTOSIS  1+  1+   --    ANISOCYTOSIS  1+  1+   --    PLATELETCT  99*  91*  108*         Recent Labs     03/14/17  03/15/17   0500  03/16/17   0558   SODIUM  122*  123*  120*   POTASSIUM  4.1  3.1*  3.7   CHLORIDE  90*  88*  88*   CO2  20  23  21   CREATININE  1.96*  1.33  1.42*        Recent Labs     03/14/17  03/15/17   0500  03/16/17   0558   ALBUMIN  2.8*  2.8*  2.8*        Pertinent Micro:  Results     Procedure Component Value Units Date/Time    CULTURE WOUND W/ GRAM STAIN [[id number]] Collected:  03/13/17 2230    Order Status:  Completed Specimen Information:  Wound from Right Leg Updated:  03/16/17 0954     Significant Indicator NEG      Source WND      Site RIGHT LEG      Culture Result Wound No growth at 48 hours.      Gram Stain Result No organisms seen.     URINE CULTURE(NEW) [[id number]] Collected:  03/13/17 1659    Order Status:  Completed Specimen Information:  Urine Updated:  03/15/17 0816     Significant Indicator NEG      Source UR      Site --      Urine Culture No growth at 48 hours     Narrative:      Indication for culture:->Emergency Room Patient    GRAM STAIN [[id number]] Collected:  03/13/17 2230    Order Status:  Completed Specimen Information:  Wound Updated:  03/14/17 1608     Significant Indicator .      Source WND      Site RIGHT LEG      Gram Stain Result No organisms seen.   "   BLOOD CULTURE [[id number]] Collected:  03/13/17 1547    Order Status:  Completed Specimen Information:  Blood from Peripheral Updated:  03/14/17 0736     Significant Indicator NEG      Source BLD      Site PERIPHERAL      Blood Culture --      Result:        No Growth    Note: Blood cultures are incubated for 5 days and  are monitored continuously.Positive blood cultures  are called to the RN and reported as soon as  they are identified.      Narrative:      Per Hospital Policy: Only change Specimen Src: to \"Line\" if  specified by physician order.    BLOOD CULTURE [[id number]] Collected:  03/13/17 1550    Order Status:  Completed Specimen Information:  Blood from Peripheral Updated:  03/14/17 0736     Significant Indicator NEG      Source BLD      Site PERIPHERAL      Blood Culture --      Result:        No Growth    Note: Blood cultures are incubated for 5 days and  are monitored continuously.Positive blood cultures  are called to the RN and reported as soon as  they are identified.      Narrative:      Per Hospital Policy: Only change Specimen Src: to \"Line\" if  specified by physician order.    URINALYSIS [[id number]]  (Abnormal) Collected:  03/13/17 1659    Order Status:  Completed Specimen Information:  Urine Updated:  03/13/17 1739     Micro Urine Req Microscopic      Color Yellow      Character Sl Cloudy (A)      Specific Gravity 1.013      Ph 5.0      Glucose Negative mg/dL      Ketones Negative mg/dL      Protein Negative mg/dL      Nitrite Negative      Leukocyte Esterase Negative      Occult Blood Small (A)     Narrative:      Indication for culture:->Emergency Room Patient        BLOOD CULTURE   Date Value Ref Range Status   03/13/2017   Preliminary    No Growth    Note: Blood cultures are incubated for 5 days and  are monitored continuously.Positive blood cultures  are called to the RN and reported as soon as  they are identified.       BLOOD CULTURE HOLD   Date Value Ref Range Status   02/09/2014 " Collected  Final        Studies: CXR negative for pneumonia    IMPRESSION:   1. Bilateral lower extremity cellulitis  2. Cardiogenic shock  3. Cocaine-induced cardiomyopathy  4. Polysubstance abuse  5. Acute kidney injury  6. Cirrhosis  7. Hepatitis C        PLAN:   Obinna Grande is a 51 y.o. Man with a history of cocaine induced cardiomyopathy, polysubstance abuse, cirrhosis and hepatitis C admitted for worsening shortness of breath and worsening bilateral lower extremity erythema and swelling found to be in cardiogenic shock and bilateral lower extremity cellulitis. He remains on pressors and dobutamine for cardiac support. Will discontinue vancomycin as no evidence of MRSA but continue zosyn and clindamycin for now. Continue wound care. Further recommendations per clinical course. Overall, patient has a poor/grim prognosis.      Discussed with IM. Will continue to follow    Bing Brumfield M.D.

## 2017-03-16 NOTE — DIETARY
Nutrition Services: Admit day 3, pt no longer taking PO diet per RN report during Rounds. Palliative Care consulted 3/15. Overall prognosis and POC being discussed. Cardiac diet in place, and Nutrition Representative seeing pt daily for menu choices as long as he is able to participate. Will trial Boost supplements for additional nutrition @ this time. Wound Team to see. RD now following.

## 2017-03-16 NOTE — PROGRESS NOTES
Pulmonary Critical Care Progress Note        DOS:  3/16/2017    Chief Complaint: resp failure    History of Present Illness: 51 y.o. M, h/o severe CM, EF 15% secondary to cocaine use; intraventricular thrombus, PE, cirrhosis; transferred to ED from [facility] with increasing body edema, wt gain, confusion; started on BiPAP and pressors in ED; DNR/I confirmed.      ROS:  Respiratory: positive shortness of breath, Cardiac: negative chest pain, GI: bloating.  All other systems negative.    Interval Events:  24 hour interval history reviewed    - more alert and less confused today; oriented x 3 now   - less anxious   - abx escalated yesterday - vanco/zosyn/clinda   -  at 5, NE 12   - decreased UOP   - off bumex and ntg   - heparin gtt   - no further BiPAP   - CXR no edema   - start lasix 40 BID IV   - KCL- follow closely   - DNR/I confirmed but not ready for comfort   PFSH:  No change.    Respiratory:     Pulse Oximetry: 99 %  Chest Tube Drains:          Exam: rhonchi bibasilar and diminished breath sounds moderate  ImagingAvailable data reviewed   Recent Labs      03/13/17   1908   ISTATAPH  7.405   ISTATAPCO2  26.8   ISTATAPO2  167*   ISTATATCO2  18*   RRTZBCL5CTD  100*   ISTATARTHCO3  16.8*   ISTATARTBE  -6*   ISTATTEMP  see below   ISTATFIO2  40   ISTATSPEC  Arterial       HemoDynamics:  Pulse: (!) 120, Heart Rate (Monitored): (!) 120  NIBP: (!) 88/66 mmHg  CVP (mm Hg): (!) 17 MM HG    Exam: SR/ST  Imaging: Available data reviewed  Recent Labs      03/13/17   1624  03/13/17   1636  03/15/17   0500   TROPONINI  0.09*   --    --    BNPBTYPENAT   --   3495*  1389*       Neuro:  GCS Total [place] Coma Score: 15       Exam: confused; follows, moves all 4 ext equally  Imaging: Available data reviewed    Fluids:  Intake/Output       03/14/17 0700 - 03/15/17 0659 03/15/17 0700 - 03/16/17 0659 03/16/17 0700 - 03/17/17 0659      7717-1843 9917-7907 Total 6618-0744 3144-9538 Total 0700-1859 1900-0659 Total       Intake    P.O.   720  250 970  --  300 300  --  -- --    P.O. 720 250 970 -- 300 300 -- -- --    I.V.  1250  1206.1 2456.1  1613  1919.7 3532.7  --  -- --    Nitroglycerin Volume 18 18 36 1 -- 1 -- -- --    Norepinephrine Volume 182 359 541 240 206.6 446.6 -- -- --    Heparin Volume 240 221.1 461.1 264 305.2 569.2 -- -- --    Dobutamine Volume  408 408 816 -- -- --    IV Piggyback Volume (Antibiotics) -- 200  1700 -- -- --    Enteral  --  -- --  --  60 60  --  -- --    Free Water / Tube Flush -- -- -- -- 60 60 -- -- --    Total Intake 1970 1456.1 3426.1 1613 2279.7 3892.7 -- -- --       Output    Urine  2165  1800 3965  575  1000 1575  --  -- --    Indwelling Cathether 2165 1800 3965 210 1000 1210 -- -- --    Void (ml) -- -- -- 365 -- 365 -- -- --    Stool  --  -- --  --  -- --  --  -- --    Number of Times Stooled -- 2 x 2 x -- 3 x 3 x -- -- --    Total Output 2165 1800 3965 575 1000 1575 -- -- --       Net I/O     -195 -343.9 -538.9 1038 1279.7 2317.7 -- -- --        Weight: 105.3 kg (232 lb 2.3 oz)  Recent Labs     03/14/17  03/15/17   0500  03/16/17   0558   SODIUM  122*  123*  120*   POTASSIUM  4.1  3.1*  3.7   CHLORIDE  90*  88*  88*   CO2  20  23  21   BUN  49*  36*  37*   CREATININE  1.96*  1.33  1.42*   MAGNESIUM   --   1.3*   --    CALCIUM  8.6  8.3*  8.1*       GI/Nutrition:  Exam: distended; soft  Imaging: Available data reviewed  taking PO  Liver Function  Recent Labs     03/14/17  03/15/17   0500  03/16/17   0558   ALTSGPT  81*  71*  53*   ASTSGOT  111*  77*  60*   ALKPHOSPHAT  51  51  44   TBILIRUBIN  7.5*  9.3*  9.7*   GLUCOSE  103*  125*  120*       Heme:  Recent Labs      03/13/17   1636 03/14/17   03/15/17   0500   03/15/17   1600  03/16/17   0058  03/16/17   0558   RBC   --   4.86   --   5.04   --    --    --   5.14   HEMOGLOBIN   --   12.1*   --   12.5*   --    --    --   12.6*   HEMATOCRIT   --   36.2*   --   37.6*   --    --    --   39.3*   PLATELETCT   --   99*   --   91*   --    --     --   108*   PROTHROMBTM  21.6*   --    --    --    --    --    --    --    APTT  35.7   --    < >   --    < >  41.2*  63.9*  53.3*   INR  1.82*   --    --    --    --    --    --    --     < > = values in this interval not displayed.       Infectious Disease:  Temp  Av.6 °C (97.8 °F)  Min: 36.2 °C (97.2 °F)  Max: 36.8 °C (98.2 °F)  Micro: antibiotics reviewed  Recent Labs     03/14/17  03/15/17   0500  17   0558   WBC  12.4*  14.1*  14.2*   NEUTSPOLYS  71.00  73.20*  74.60*   LYMPHOCYTES  2.60*  4.40*  9.90*   MONOCYTES  5.30  4.50  12.10   EOSINOPHILS  0.00  0.00  1.50   BASOPHILS  0.00  0.90  0.80   ASTSGOT  111*  77*  60*   ALTSGPT  81*  71*  53*   ALKPHOSPHAT  51  51  44   TBILIRUBIN  7.5*  9.3*  9.7*     Current Facility-Administered Medications   Medication Dose Frequency Provider Last Rate Last Dose   • diphenhydrAMINE (BENADRYL) tablet/capsule 50 mg  50 mg HS PRN Tate Hogan M.D.   50 mg at 03/15/17 0306   • famotidine (PEPCID) tablet 20 mg  20 mg BID Everton Paredes M.D.   20 mg at 03/15/17 2010    Or   • famotidine (PEPCID) injection 20 mg  20 mg BID Everton Paredes M.D.   20 mg at 17 0823   • piperacillin-tazobactam (ZOSYN) 4.5 g in  mL IVPB  4.5 g Q6HRS Everton Paredes M.D.   Stopped at 17 0605   • clindamycin (CLEOCIN) IVPB premix 900 mg  900 mg Q8HRS Everton Paredes M.D.   Stopped at 17 0721   • MD ALERT... vancomycin per pharmacy protocol   PRN Everton Paredes M.D.       • oxycodone immediate-release (ROXICODONE) tablet 2.5-5 mg  2.5-5 mg Q4HRS PRN Alexia uMnoz D.O.   5 mg at 17 0537   • DOBUTamine (DOBUTREX) 1 mg/mL premix infusion  5 mcg/kg/min Continuous Jeremy M Gonda, M.D. 34 mL/hr at 17 0248 5 mcg/kg/min at 17 0248   • Respiratory Care per Protocol   Continuous RT Alexis Hamilton M.D.       • ondansetron (ZOFRAN) syringe/vial injection 4 mg  4 mg Q4HRS PRN Alexis Hamilton M.D.       • ondansetron (ZOFRAN ODT) dispertab 4 mg  4 mg Q4HRS  PRN Alexis Hamilton M.D.       • promethazine (PHENERGAN) tablet 12.5-25 mg  12.5-25 mg Q4HRS PRN Alexis Hamilton M.D.       • promethazine (PHENERGAN) suppository 12.5-25 mg  12.5-25 mg Q4HRS PRN Alexis Hamilton M.D.       • prochlorperazine (COMPAZINE) injection 5-10 mg  5-10 mg Q4HRS PRN Alexis Hamilton M.D.       • acetaminophen (TYLENOL) tablet 650 mg  650 mg Q6HRS PRN Alexis Hamilton M.D.       • norepinephrine (LEVOPHED) 8 mg in  mL Infusion  0.5-30 mcg/min Continuous Alexis Hamilton M.D. 20.6 mL/hr at 03/16/17 0510 11 mcg/min at 03/16/17 0510   • senna-docusate (PERICOLACE or SENOKOT S) 8.6-50 MG per tablet 2 Tab  2 Tab BID Alexis Hamilton M.D.   2 Tab at 03/16/17 0822    And   • polyethylene glycol/lytes (MIRALAX) PACKET 1 Packet  1 Packet QDAY PRN Alexis Hamilton M.D.        And   • magnesium hydroxide (MILK OF MAGNESIA) suspension 30 mL  30 mL QDAY PRN Alexis Hamilton M.D.        And   • bisacodyl (DULCOLAX) suppository 10 mg  10 mg QDAY PRN Alexis Hamilton M.D.       • heparin 1000 units/mL injection 3,200 Units  3,200 Units PRN Catherine Hillman PHARMD   3,200 Units at 03/16/17 0747    And   • heparin infusion 25,000 units in 500 ml 0.45% nacl   Continuous CHEPE LimaD 30 mL/hr at 03/16/17 0742 1,500 Units/hr at 03/16/17 0742   • POLYMEM ALGINATE DRESSING PADS 1 Each  1 Each QDAY PRN Jeremy M Gonda, M.D.         Last reviewed on 3/13/2017  5:30 PM by Loi Ponce    Quality  Measures:  Core Measures & Quality Metrics    Problems:  Acute-on-chronic hypoxemic respiratory failure   - multifactorial    - BiPAP started for resp distress   - continue with breaks as tolerated    - DNR/I confirmed  Decompensated severe systolic heart failure, end stage   - d/w'd cardiolgoy  Acute cardiogenic pulmonary edema   - Bumex gtt   - I<O   - titrated pressors  Anasarca  Acute metabolic/toxic encephalopathy   - minimize benzos, narcotics   - f/u ammonia   - add lactulose and rifaximin   Bilateral lower extremity  cellulitis with open ulcerations   - clinda GBS; changing to Unasyn   - bullae worse today; ongoing pain   - d/w'd pharmacy; will escalate to zosyn/vanco/cllinda - d/w ID, surgery unless plan for comfort measures  Thrombocytopenia  Anemia of chronic disease  Acute-on-chronic kidney disease  Hyperkalemia  Lactic acidosis with resultant anion gap  Elevated liver enzymes concerning for hepatic congestion  Hyperammonemia & hyperbili  Elevated troponin secondary to demand ischemia  Hypercoagulable state   - noncompliant with his Coumadin with known pulmonary embolism and intraventricular clot in the past  Overall prognosis very poor; d/w'd SO and patient; DNR/I  Consider palliative re-eval  Discussed patient condition and risk of morbidity and/or mortality with RN, RT and QA team.    The patient remains critically ill.  Critical care time = 35 minutes in directly providing and coordinating critical care and extensive data review.  No time overlap and excludes procedures.

## 2017-03-16 NOTE — PROGRESS NOTES
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  ROS limited by BiPAP    Off BiPAP and on RA  Levo 12  Dobutamine 5  Heparin gtts  UOP 2000ml/24hrs      Review of Systems:  Review of Systems   Unable to perform ROS: other       Physical Exam:  Physical Exam   Constitutional: He is oriented to person, place, and time. He appears well-developed and well-nourished. No distress.   HENT:   Head: Normocephalic and atraumatic.   Eyes: Conjunctivae are normal.   Neck: No JVD present.   Cardiovascular: Normal rate.  Exam reveals no gallop.    Murmur heard.  Pulmonary/Chest: No stridor. No respiratory distress. He has no wheezes. He has rales.   Retracting  On BiPAP   Abdominal: Soft. There is no tenderness. There is no rebound and no guarding.   Musculoskeletal: He exhibits edema.   Neurological: He is oriented to person, place, and time.   Skin: Skin is warm and dry. Rash noted. He is not diaphoretic.   Psychiatric: He has a normal mood and affect. Thought content normal.   Nursing note and vitals reviewed.      Labs:  Recent Labs      03/13/17   1908   ISTATAPH  7.405   ISTATAPCO2  26.8   ISTATAPO2  167*   ISTATATCO2  18*   [id number]  100*   ISTATARTHCO3  16.8*   ISTATARTBE  -6*   ISTATTEMP  see below   ISTATFIO2  40   ISTATSPEC  Arterial     Recent Labs      03/13/17   1624  03/13/17   1636  03/15/17   0500   TROPONINI  0.09*   --    --    BNPBTYPENAT   --   3495*  1389*     Recent Labs     03/14/17  03/15/17   0500  03/16/17   0558   SODIUM  122*  123*  120*   POTASSIUM  4.1  3.1*  3.7   CHLORIDE  90*  88*  88*   CO2  20  23  21   BUN  49*  36*  37*   CREATININE  1.96*  1.33  1.42*   MAGNESIUM   --   1.3*   --    CALCIUM  8.6  8.3*   8.1*     Recent Labs     03/14/17  03/15/17   0500  17   0558   ALTSGPT  81*  71*  53*   ASTSGOT  111*  77*  60*   ALKPHOSPHAT  51  51  44   TBILIRUBIN  7.5*  9.3*  9.7*   GLUCOSE  103*  125*  120*     Recent Labs      17   1636 03/14/17   03/15/17   0500   03/15/17   1600  17   0058  17   0558   RBC   --   4.86   --   5.04   --    --    --   5.14   HEMOGLOBIN   --   12.1*   --   12.5*   --    --    --   12.6*   HEMATOCRIT   --   36.2*   --   37.6*   --    --    --   39.3*   PLATELETCT   --   99*   --   91*   --    --    --   108*   PROTHROMBTM  21.6*   --    --    --    --    --    --    --    APTT  35.7   --    < >   --    < >  41.2*  63.9*  53.3*   INR  1.82*   --    --    --    --    --    --    --     < > = values in this interval not displayed.     Recent Labs     03/14/17  03/15/17   0500  17   0558   WBC  12.4*  14.1*  14.2*   NEUTSPOLYS  71.00  73.20*  74.60*   LYMPHOCYTES  2.60*  4.40*  9.90*   MONOCYTES  5.30  4.50  12.10   EOSINOPHILS  0.00  0.00  1.50   BASOPHILS  0.00  0.90  0.80   ASTSGOT  111*  77*  60*   ALTSGPT  81*  71*  53*   ALKPHOSPHAT  51  51  44   TBILIRUBIN  7.5*  9.3*  9.7*           Hemodynamics:  Temp (24hrs), Av.6 °C (97.8 °F), Min:36.2 °C (97.2 °F), Max:36.8 °C (98.2 °F)  Temperature: 36.5 °C (97.7 °F)  Pulse  Av.1  Min: 50  Max: 127Heart Rate (Monitored): (!) 120  NIBP: (!) 88/66 mmHg  CVP (mm Hg): (!) 17 MM HG  Respiratory:    Respiration: 13, Pulse Oximetry: 99 %     Work Of Breathing / Effort: Mild  RUL Breath Sounds: Clear, RML Breath Sounds: Diminished, RLL Breath Sounds: Diminished, BHAVNA Breath Sounds: Clear, LLL Breath Sounds: Diminished  Fluids:    Intake/Output Summary (Last 24 hours) at 17 0920  Last data filed at 17 0600   Gross per 24 hour   Intake 3533.71 ml   Output   1225 ml   Net 2308.71 ml     Weight: 105.3 kg (232 lb 2.3 oz)  GI/Nutrition:  Orders Placed This Encounter   Procedures   • Diet Order     Standing Status:  Standing      Number of Occurrences: 1      Standing Expiration Date:      Order Specific Question:  Diet:     Answer:  Cardiac [6]     Medical Decision Making, by Problem:  Active Hospital Problems    Diagnosis   • Acute renal insufficiency [N28.9]  Cardiogenic shock  Avoid nephrotoxins  Follow daily BMP   • Acute on chronic systolic congestive heart failure, NYHA class 4, present on admission (EF 15-20%) [I50.23]  Dobutamine gtts  Off bumex and NTG gtts  Start BID lasix  :LVEF 15%  DCM coccaine induced now theoretically clean   • Hepatitis C virus infection [B19.20]   • Essential hypertension, benign [I10]  Not an acute issue   • Hyponatremia [E87.1]  Chronic  Correcting slowly  Goal>125   • Hyperkalemia [E87.5]  Resolved  Follow closely   • Cellulitis [L03.90]  Wound care consulted  errythema diminished today particularly in upper thighs  Escalate to cover Nec Fasc  Cultures neg  Pt is an exceedingly high risk surgical candidate  ID consulted   • Shock (CMS-HCC) [R57.9]  Shock vs septic  Dobutamine gtts  Levophed gtts  Following MAP, CVP  Cultures neg   • LV (left ventricular) mural thrombus (CMS-[facility]) [I21.3]  Non compliant with home anticoagulation  Heparin gtts for now  Once pt has stabilized will need to consider po vs comfort care   -Acute Hypoxic Respiratory failure: BiPAP, Bumex gtts with good UOP.  Pulmonology following.  Pt is DNR/I  -HypoK: replace IV, check Mg, follow daily     DW ICU staff, Pulmonology, Cards    Critical care time 36min's managing vasoactive gtts,     Medications reviewed, EKG reviewed, Labs reviewed and Radiology images reviewed  Holt catheter: Strict Intake and Output During Sepisis or Shock      DVT Prophylaxis: Heparin  DVT prophylaxis - mechanical: SCDs  Ulcer prophylaxis: Yes  Antibiotics: Treating active infection/contamination beyond 24 hours perioperative coverage

## 2017-03-16 NOTE — PROGRESS NOTES
"Interval History:  51 y.o. male who presents to the emergency department because of difficulty breathing and severe edema of the body x 2-3 week and med compliance issue is questioned; He has a history of  dilated cardiomyopathy, EF of 15%, drug abuse, cirrhosis with hepatitis C,      3/14/2017 Echo: Compared to the images of the prior study done on 01/29/17-  there has   been no significant change.   Severely reduced left ventricular systolic function.  Left ventricular ejection fraction is visually estimated to be 20%.  Global hypokinesis.  Diastolic function is difficult to assess.  Moderately dilated right ventricle.  Pacer/ICD wire seen in right ventricle.  Moderate mitral regurgitation.  Mild tricuspid regurgitation.  Right ventricular systolic pressure is estimated to be 40 mmHg.      Physical Exam   Blood pressure 108/76, pulse 120, temperature 36.5 °C (97.7 °F), resp. rate 13, height 1.676 m (5' 5.98\"), weight 105.3 kg (232 lb 2.3 oz), SpO2 99 %.S    Constitutional: A/O x 3  He appears well-developed.   HENT: Normocephalic and atraumatic. No scleral icterus.   Neck: No JVD present.   Cardiovascular: Normal rate. RRR; Exam reveals no gallop and no friction rub. No murmur heard.   Pulmonary/Chest: CTAB coarse   Abdominal: S/NT/ND BS+   Musculoskeletal: He exhibits no edema. Pulses present.  Skin: Skin is warm and dry. ; LE edema and prob infection      Intake/Output Summary (Last 24 hours) at 03/16/17 1011  Last data filed at 03/16/17 0600   Gross per 24 hour   Intake 3179.71 ml   Output   1170 ml   Net 2009.71 ml       LABS:  Lab Results   Component Value Date/Time    CHOLESTEROL,TOT 57* 01/27/2017 01:04 PM    LDL 31 01/27/2017 01:04 PM    HDL 15* 01/27/2017 01:04 PM    TRIGLYCERIDES 56 01/27/2017 01:04 PM       Lab Results   Component Value Date/Time    WBC 14.2* 03/16/2017 05:58 AM    RBC 5.14 03/16/2017 05:58 AM    HEMOGLOBIN 12.6* 03/16/2017 05:58 AM    HEMATOCRIT 39.3* 03/16/2017 05:58 AM    MCV 76.5* " 03/16/2017 05:58 AM    NEUTROPHILS-POLYS 74.60* 03/16/2017 05:58 AM    LYMPHOCYTES 9.90* 03/16/2017 05:58 AM    MONOCYTES 12.10 03/16/2017 05:58 AM    EOSINOPHILS 1.50 03/16/2017 05:58 AM    BASOPHILS 0.80 03/16/2017 05:58 AM    ANISOCYTOSIS 1+ 03/15/2017 05:00 AM     Lab Results   Component Value Date/Time    SODIUM 120* 03/16/2017 05:58 AM    POTASSIUM 3.7 03/16/2017 05:58 AM    CHLORIDE 88* 03/16/2017 05:58 AM    CO2 21 03/16/2017 05:58 AM    GLUCOSE 120* 03/16/2017 05:58 AM    BUN 37* 03/16/2017 05:58 AM    CREATININE 1.42* 03/16/2017 05:58 AM     Lab Results   Component Value Date    HBA1C 6.3* 01/27/2017      Lab Results   Component Value Date/Time    ALKALINE PHOSPHATASE 44 03/16/2017 05:58 AM    AST(SGOT) 60* 03/16/2017 05:58 AM    ALT(SGPT) 53* 03/16/2017 05:58 AM    TOTAL BILIRUBIN 9.7* 03/16/2017 05:58 AM      Lab Results   Component Value Date/Time    B NATRIURETIC PEPTIDE 1389* 03/15/2017 05:00 AM      No results found for: TSH  Lab Results   Component Value Date/Time    PT 21.6* 03/13/2017 04:36 PM    INR 1.82* 03/13/2017 04:36 PM        Medications reviewed    Imaging reviewed  IMPRESSION AND RECOMMENDATIONS:    •  Acute renal insufficiency [N28.9]        Priority: High    •  Acute on chronic systolic congestive heart failure, NYHA class 4, present on admission (EF 15-20%) [I50.23]        Priority: High    •  Hepatitis C virus infection [B19.20]        Priority: Medium    •  Essential hypertension, benign [I10]        Priority: Medium    •  Hyponatremia [E87.1]    •  Hyperkalemia [E87.5]    •  Cellulitis [L03.90]    •  Shock (CMS-HCC) [R57.9]    •  LV (left ventricular) mural thrombus (CMS-HCC) [I21.3]  Obesity  Anemia  Iron deficiency  Non-compliance  H/o drug use      Agree with pressor to support perfusion and brent with fluid  BNP 3495 diurese needed and responding well with Dobutamine gtt and Bumex  ACEI/ARB, BB, Diuretic when more stable and off pressor  Consider ischemia evaluation; Likely CM  from his EtOH and Crystal Meth use  Social issue and compliance issue to be addressed  ICD in place  Code status DNR/DNI  Will follow  Thx

## 2017-03-16 NOTE — PALLIATIVE CARE
"Palliative Care Follow-up  Report from Parker RITTER.  No family at bedside.  Pt opens eyes to name.  States he is \"OK\".  Pt very lethargic with slurred speech. Asked pt what he hoped would happen or what his goal is and he said \"he'll decide if it's time\".  When asked who he was pt said Irvin his son.  Pt not able to focus for additional conversation.       Updated: Don SW    Plan: Will follow up with Medical team on plan of care and discuss options with son Irvin tomorrow.    Thank you for allowing Palliative Care to participate in this patient's care. Pleasecall [phone] with any questions or for additional needs.          "

## 2017-03-16 NOTE — WOUND TEAM
[facility]  Inpatient Services  Initial Wound and Skin Care Evaluation    Admission Date:  03/13/17     HPI, PMH, SH: Reviewed  Unit where seen by Wound Team:  [facility]    WOUND CONSULT RELATED TO:  BLE      SUBJECTIVE:  Mumbles           Self Report / Pain Level:  Pain with movement of legs      OBJECTIVE:  adaptic in place.     WOUND TYPE, LOCATION, CHARACTERISTICS (Pressure ulcers: location, stage, POA or date identified)    Location and type of wound:  RLE, drained bullae partial thickness           Periwound:  Edematous       Drainage:   Min after drained    Tissue Type and %:  100% pink    Wound Edges:  Peeling    Odor:    None    Exposed structure(s): None    S&S of Infection:  Cellulitis       Measurements:  (Taken 03/16/17)      Scattered-approximately 2 -3     Largest anterior 6.5x6x0.1 smallest 1x1    Location and type of wound:  LLE, drained bullae partial thickness            Periwound:  Edematous       Drainage:   Min after drained    Tissue Type and %:  100% pink    Wound Edges:  Peeling    Odor:    None    Exposed structure(s): None    S&S of Infection:  Cellulitis       Measurements:  (Taken 03/16/17)      Scattered-approximately 2 -3     Largest anterior 8.5x4.5x0.1 smallest 1x1    INTERVENTIONS BY WOUND TEAM:  Large bullae BLE, skin open.  Completed draining already open bullae.  Cleansed with NS, covered with petrolatum gauze, covered with ABD pad and secured with roll gauze.       Interdisciplinary consultation:  Dr. Brumfield, RN, patient      EVALUATION:  Patient admitted with cocaine-induced cardiomyopathy, cirrhosis, HCV, alcohol abuse, CKD admitted 3/13/2017 for worsening shortness of breath.  Patient has EF of 17% and had BLE swelling.  Per RN, redness has greatly improved.  Wounds appear to be edema blisters from swelling and do not appear infected.  Continue to keep clean and moist.        Factors affecting wound healing:  Cellulitis, cardiogenic shock, polysubstance abuse,  cirrhosis, Hep C      Goals:  Decrease in wound size by 1% each week.     NURSING PLAN OF CARE ORDERS (X):    Dressing changes: See Dressing Maintenance orders: X  Skin care: See Skin Care orders:   Rectal tube care: See Rectal Tube Care orders:   Other orders:    RSKIN: CURRENT (X) ORDERED (O)  Q shift Jakob:  X  Q shift pressure point assessments:  X  Pressure redistribution mattress        COLETTE    X  Bariatric COLETTE      Bariatric foam        Heel float boots       Heels floated on pillows    X  Barrier wipes      Barrier Cream      Barrier paste      Sacral silicone dressing      Silicone O2 tubing      Anchorfast      Trach with Optifoam split foam       Waffle cushion      Rectal tube or BMS      Antifungal tx    Turn q 2 hours   X  Up to chair     Ambulate   PT/OT     Dietician      PO     TF X  TPN     PVN    NPO   # days   Other       WOUND TEAM PLAN OF CARE (X):   NPWT change 3 x week:        Dressing changes by wound team:       Follow up as needed:     X  Other (explain):    Anticipated discharge plans (X):  SNF:         X  Home Care:           Outpatient Wound Center:            Self Care:            Other:

## 2017-03-17 NOTE — PROGRESS NOTES
"Interval History:  51 y.o. male who presents to the emergency department because of difficulty breathing and severe edema of the body x 2-3 week and med compliance issue is questioned; He has a history of  dilated cardiomyopathy, EF of 15%, drug abuse, cirrhosis with hepatitis C,    Pressor increased overnight LE little better    Physical Exam   Blood pressure 108/76, pulse 120, temperature 36.4 °C (97.6 °F), resp. rate 21, height 1.676 m (5' 5.98\"), weight 107.8 kg (237 lb 10.5 oz), SpO2 99 %.    Constitutional:  He appears well-developed.   HENT: Normocephalic and atraumatic. No scleral icterus.   Neck: No JVD present.   Cardiovascular: Normal rate. RRR; Exam reveals no gallop and no friction rub. No murmur heard.   Pulmonary/Chest: CTAB coarse   Abdominal: S/NT/ND BS+   Musculoskeletal: He exhibits 1-2+ LE edema, wrapped. Pulses present.  Skin: Skin is warm and dry.       Intake/Output Summary (Last 24 hours) at 03/17/17 0841  Last data filed at 03/17/17 0600   Gross per 24 hour   Intake 3166.48 ml   Output   1150 ml   Net 2016.48 ml       LABS:  Lab Results   Component Value Date/Time    CHOLESTEROL,TOT 57* 01/27/2017 01:04 PM    LDL 31 01/27/2017 01:04 PM    HDL 15* 01/27/2017 01:04 PM    TRIGLYCERIDES 56 01/27/2017 01:04 PM       Lab Results   Component Value Date/Time    WBC 14.2* 03/16/2017 05:58 AM    RBC 5.14 03/16/2017 05:58 AM    HEMOGLOBIN 12.6* 03/16/2017 05:58 AM    HEMATOCRIT 39.3* 03/16/2017 05:58 AM    MCV 76.5* 03/16/2017 05:58 AM    NEUTROPHILS-POLYS 74.60* 03/16/2017 05:58 AM    LYMPHOCYTES 9.90* 03/16/2017 05:58 AM    MONOCYTES 12.10 03/16/2017 05:58 AM    EOSINOPHILS 1.50 03/16/2017 05:58 AM    BASOPHILS 0.80 03/16/2017 05:58 AM    ANISOCYTOSIS 1+ 03/15/2017 05:00 AM     Lab Results   Component Value Date/Time    SODIUM 118* 03/17/2017 03:53 AM    POTASSIUM 3.8 03/17/2017 03:53 AM    CHLORIDE 87* 03/17/2017 03:53 AM    CO2 21 03/17/2017 03:53 AM    GLUCOSE 122* 03/17/2017 03:53 AM    BUN 39* " 03/17/2017 03:53 AM    CREATININE 1.46* 03/17/2017 03:53 AM     Lab Results   Component Value Date    HBA1C 6.3* 01/27/2017      Lab Results   Component Value Date/Time    ALKALINE PHOSPHATASE 44 03/16/2017 05:58 AM    AST(SGOT) 60* 03/16/2017 05:58 AM    ALT(SGPT) 53* 03/16/2017 05:58 AM    TOTAL BILIRUBIN 9.7* 03/16/2017 05:58 AM      Lab Results   Component Value Date/Time    B NATRIURETIC PEPTIDE 1389* 03/15/2017 05:00 AM      No results found for: TSH  Lab Results   Component Value Date/Time    PT 21.6* 03/13/2017 04:36 PM    INR 1.82* 03/13/2017 04:36 PM        Medications reviewed    Imaging reviewed    ECHO(3/13/2017):Compared to the images of the prior study done on 01/29/17-  there has   been no significant change.   Severely reduced left ventricular systolic function.  Left ventricular ejection fraction is visually estimated to be 20%.  Global hypokinesis.  Diastolic function is difficult to assess.  Moderately dilated right ventricle.  Pacer/ICD wire seen in right ventricle.  Moderate mitral regurgitation.  Mild tricuspid regurgitation.  Right ventricular systolic pressure is estimated to be 40 mmHg.    IMPRESSION AND RECOMMENDATIONS:      •   Acute renal insufficiency [N28.9]           Priority: High     •   Acute on chronic systolic congestive heart failure, NYHA class 4, present on admission (EF 15-20%) [I50.23]           Priority: High     •   Hepatitis C virus infection [B19.20]           Priority: Medium     •   Essential hypertension, benign [I10]           Priority: Medium     •   Hyponatremia [E87.1]     •   Hyperkalemia [E87.5]     •   Cellulitis [L03.90]     •   Shock (CMS-HCC) [R57.9]     •   LV (left ventricular) mural thrombus (CMS-HCC) [I21.3]  Obesity  Anemia  Iron deficiency  Non-compliance  H/o drug use       Agree with pressor to support perfusion and brent with fluid  BNP 3495 diurese needed and responding well with Dobutamine gtt and Bumex  ACEI/ARB, BB, Diuretic when more stable  and off pressor  Consider ischemia evaluation; Likely CM from his EtOH and Crystal Meth use  Social issue and compliance issue to be addressed  ICD in place  Code status DNR/DNI  Poor prognosis  Discussed with RN to initiate hospice care consult  Will follow  Sejal

## 2017-03-17 NOTE — CARE PLAN
Problem: Ventilation Defect:  Goal: Ability to achieve and maintain unassisted ventilation or tolerate decreased levels of ventilator support  Intervention: Support and monitor invasive and noninvasive mechanical ventilation  BiPAP PRN. PT has not worn BiPAP at all today. Tolerating well.

## 2017-03-17 NOTE — PROGRESS NOTES
Infectious Disease Progress Note    Author: Bing Brumfield M.D. DOS & Time created: 3/17/2017  11:15 AM    Chief Complaint   Patient presents with   • Peripheral Edema   • Weight Gain     10 lbs weight gain over the last 2 weeks   • Other     CHF exaserbation. Not compliant with medications.    • ALOC     Disoriented to event and time.    FU for bilateral LE cellulitis    Interval History:  3/17 AF, no CBC, lethargic but arousable to voice then quickly falls back to sleep  Labs Reviewed, Medications Reviewed, Radiology Reviewed and Wound Reviewed.    Review of Systems:  Review of Systems   Unable to perform ROS: mental acuity   Pt very lethargic    Hemodynamics:  Temp (24hrs), Av.6 °C (97.8 °F), Min:36.4 °C (97.5 °F), Max:36.8 °C (98.2 °F)  Temperature: 36.4 °C (97.6 °F)  Pulse  Av.6  Min: 50  Max: 127Heart Rate (Monitored): (!) 124  NIBP: 104/67 mmHg  CVP (mm Hg): (!) 25 MM HG    Physical Exam:  Physical Exam   Constitutional: He appears well-developed.   Chronically ill    HENT:   Head: Normocephalic and atraumatic.   Eyes: Pupils are equal, round, and reactive to light.   Neck: Neck supple.   Cardiovascular:   Murmur heard.  Tachy   Pulmonary/Chest: Effort normal. He has no rales.   Abdominal: Soft. There is no tenderness.   Musculoskeletal: He exhibits edema.   Bilateral LE erythema and edema.  Toes purplish  B/L wrapped    Neurological:   Lethargic   Skin: There is erythema.       Labs:  Recent Labs      03/15/17   0500  17   0558   WBC  14.1*  14.2*   RBC  5.04  5.14   HEMOGLOBIN  12.5*  12.6*   HEMATOCRIT  37.6*  39.3*   MCV  74.6*  76.5*   MCH  24.8*  24.5*   RDW  50.4*  54.2*   PLATELETCT  91*  108*   MPV  9.7  9.8   NEUTSPOLYS  73.20*  74.60*   LYMPHOCYTES  4.40*  9.90*   MONOCYTES  4.50  12.10   EOSINOPHILS  0.00  1.50   BASOPHILS  0.90  0.80   RBCMORPHOLO  Present   --      Recent Labs      03/15/17   0500  17   0558  17   0353   SODIUM  123*  120*  118*   POTASSIUM  3.1*   3.7  3.8   CHLORIDE  88*  88*  87*   CO2  23  21  21   GLUCOSE  125*  120*  122*   BUN  36*  37*  39*     Recent Labs      03/15/17   0500  03/16/17   0558  03/17/17   0353   ALBUMIN  2.8*  2.8*   --    TBILIRUBIN  9.3*  9.7*   --    ALKPHOSPHAT  51  44   --    TOTPROTEIN  6.4  6.4   --    ALTSGPT  71*  53*   --    ASTSGOT  77*  60*   --    CREATININE  1.33  1.42*  1.46*       BLOOD CULTURE   Date Value Ref Range Status   03/13/2017   Preliminary    No Growth    Note: Blood cultures are incubated for 5 days and  are monitored continuously.Positive blood cultures  are called to the RN and reported as soon as  they are identified.       BLOOD CULTURE HOLD   Date Value Ref Range Status   02/09/2014 Collected  Final      :       Fluids:  Intake/Output       03/15/17 0700 - 03/16/17 0659 03/16/17 0700 - 03/17/17 0659 03/17/17 0700 - 03/18/17 0659      6729-9019 7149-0118 Total 4444-9473 8132-8528 Total 9660-9360 0363-2992 Total       Intake    P.O.  --  300 300  600  300 900  180  -- 180    P.O. -- 300 300 600 300 900 180 -- 180    I.V.  1613  1919.7 3532.7  1093  1340.5 2433.5  369.8  -- 369.8    Nitroglycerin Volume 1 -- 1 -- -- -- -- -- --    Norepinephrine Volume 240 206.6 446.6 205 240.5 445.5 93.8 -- 93.8    Heparin Volume 264 305.2 569.2 298 410 708 140 -- 140    Dobutamine Volume 408 408 816 340 340 680 136 -- 136    IV Piggyback Volume (Antibiotics) 700 1000 1700 250 350 600 -- -- --    Enteral  --  60 60  --  -- --  --  -- --    Free Water / Tube Flush -- 60 60 -- -- -- -- -- --    Total Intake 1613 2279.7 3892.7 1693 1640.5 3333.5 549.8 -- 549.8       Output    Urine  575  1000 1575  565  700 1265  160  -- 160    Indwelling Cathether 210 1000 1210  160 -- 160    Void (ml) 365 -- 365 -- -- -- -- -- --    Stool  --  -- --  --  -- --  --  -- --    Number of Times Stooled -- 3 x 3 x 1 x 1 x 2 x -- -- --    Total Output 575 1000 1575  160 -- 160       Net I/O     [facility] 1279.7 2317.7 1128  940.5 2068.5 389.8 -- 389.8        Weight: 107.8 kg (237 lb 10.5 oz)    Assessment:  Active Hospital Problems    Diagnosis   • Acute renal insufficiency [N28.9]   • Acute on chronic systolic congestive heart failure, NYHA class 4, present on admission (EF 15-20%) [I50.23]   • Hepatitis C virus infection [B19.20]   • Essential hypertension, benign [I10]   • Hyponatremia [E87.1]   • Hyperkalemia [E87.5]   • Cellulitis [L03.90]   • Shock (CMS-HCC) [R57.9]   • LV (left ventricular) mural thrombus (CMS-HCC) [I21.3]       Plan:  Bilateral lower extremity cellulitis  Wound cx - mixed skin patricia  Continue clindamycin and zosyn  Will consider de-escalating to unasyn in the next few days if wounds improving  Wound care    Cardiogenic shock  Remains on pressors and dobutamine  2/2 med compliance with hx cocaine-induced dilated CM EF 15%    Leukocytosis  Multifactorial  Abx per above    Hepatitis C/cirrhosis    Polysubstance abuse    Prognosis - poor    DW IM

## 2017-03-17 NOTE — CARE PLAN
Problem: Skin Integrity  Goal: Risk for impaired skin integrity will decrease  Pt's dressings changed on bilateral LE PRN. Legs red and swollen- documented in epic. Sheets/linen kept clean and dry. Pt turned Q2h, pillows used for positioning     Problem: Psychosocial Needs:  Goal: Level of anxiety will decrease  Pt anxious- reassurance provided. Family at bedside, pt and family frequently rounded on. Television utilized for distraction. Comfort measures in place

## 2017-03-17 NOTE — PROGRESS NOTES
Lab called with critical result of sodium. Critical lab result read back.    Dr. Hogan notified of critical lab result.  Critical lab result read back by Dr. Hogan. No new orders received

## 2017-03-17 NOTE — PROGRESS NOTES
Pulmonary Critical Care Progress Note        DOS:  3/17/2017    Chief Complaint: resp failure    History of Present Illness: 51 y.o. 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      ROS:  Respiratory: positive shortness of breath, Cardiac: negative chest pain, GI: bloating.  All other systems negative.    Interval Events:  24 hour interval history reviewed    - confused; follows   - levophed 12,  5   - no BiPAP 30 hours; room air, 100%   - heparin gtt   - zosyn/clinda for cellulitis; ID following   - Lasix BID   - sodium   PFSH:  No change.    Respiratory:     Pulse Oximetry: 99 %  Chest Tube Drains:          Exam: rhonchi bibasilar and diminished breath sounds moderate  ImagingAvailable data reviewed         Invalid input(s): [id number]    HemoDynamics:  Pulse: (!) 120, Heart Rate (Monitored): (!) 120  NIBP: 101/75 mmHg  CVP (mm Hg): (!) 25 MM HG    Exam: SR/ST  Imaging: Available data reviewed  Recent Labs      03/15/17   0500   BNPBTYPENAT  1389*       Neuro:  GCS Total Tong Coma Score: 13       Exam: confused; follows, moves all 4 ext equally  Imaging: Available data reviewed    Fluids:  Intake/Output       03/15/17 0700 - 03/16/17 0659 03/16/17 0700 - 03/17/17 0659 03/17/17 0700 - 03/18/17 0659      0070-0643 1043-4619 Total 5657-4664 5031-3100 Total 8658-7551 4507-0200 Total       Intake    P.O.  --  300 300  600  300 900  120  -- 120    P.O. -- 300 300 600 300 900 120 -- 120    I.V.  1613  1919.7 3532.7  1093  1340.5 2433.5  186.8  -- 186.8    Nitroglycerin Volume 1 -- 1 -- -- -- -- -- --    Norepinephrine Volume 240 206.6 446.6 205 240.5 445.5 48.8 -- 48.8    Heparin Volume 264 305.2 569.2 298 410 708 70 -- 70    Dobutamine Volume 408 408 816 340 340 680 68 -- 68    IV Piggyback Volume (Antibiotics) 700 1000 1704 250  350 600 -- -- --    Enteral  --  60 60  --  -- --  --  -- --    Free Water / Tube Flush -- 60 60 -- -- -- -- -- --    Total Intake 1613 2279.7 3892.7 1693 1640.5 3333.5 306.8 -- 306.8       Output    Urine  575  1000 1575  565  700 1265  100  -- 100    Indwelling Cathether 210 1000 1210  100 -- 100    Void (ml) 365 -- 365 -- -- -- -- -- --    Stool  --  -- --  --  -- --  --  -- --    Number of Times Stooled -- 3 x 3 x 1 x 1 x 2 x -- -- --    Total Output 575 1000 1575  100 -- 100       Net I/O     1038 1279.7 2317.7 1128 940.5 2068.5 206.8 -- 206.8        Weight: 107.8 kg (237 lb 10.5 oz)  Recent Labs      03/15/17   0500  17   0558  17   0353   SODIUM  123*  120*  118*   POTASSIUM  3.1*  3.7  3.8   CHLORIDE  88*  88*  87*   CO2  23  21  21   BUN  36*  37*  39*   CREATININE  1.33  1.42*  1.46*   MAGNESIUM  1.3*   --    --    CALCIUM  8.3*  8.1*  8.4*       GI/Nutrition:  Exam: distended; soft  Imaging: Available data reviewed  taking PO  Liver Function  Recent Labs      03/15/17   0500  17   0558  17   0353   ALTSGPT  71*  53*   --    ASTSGOT  77*  60*   --    ALKPHOSPHAT  51  44   --    TBILIRUBIN  9.3*  9.7*   --    GLUCOSE  125*  120*  122*       Heme:  Recent Labs      03/15/17   0500   17   0558  17   1408  17   2150  17   0353   RBC  5.04   --   5.14   --    --    --    HEMOGLOBIN  12.5*   --   12.6*   --    --    --    HEMATOCRIT  37.6*   --   39.3*   --    --    --    PLATELETCT  91*   --   108*   --    --    --    APTT   --    < >  53.3*  45.0*  88.1*  80.3*    < > = values in this interval not displayed.       Infectious Disease:  Temp  Av.6 °C (97.8 °F)  Min: 36.4 °C (97.5 °F)  Max: 36.8 °C (98.2 °F)  Micro: antibiotics reviewed  Recent Labs      03/15/17   0500  17   0558   WBC  14.1*  14.2*   NEUTSPOLYS  73.20*  74.60*   LYMPHOCYTES  4.40*  9.90*   MONOCYTES  4.50  12.10   EOSINOPHILS  0.00  1.50   BASOPHILS  0.90  0.80    ASTSGOT  77*  60*   ALTSGPT  71*  53*   ALKPHOSPHAT  51  44   TBILIRUBIN  9.3*  9.7*     Current Facility-Administered Medications   Medication Dose Frequency Provider Last Rate Last Dose   • furosemide (LASIX) injection 40 mg  40 mg BID DIURETIC Dc Hung D.OLoulou   40 mg at 03/17/17 0513   • rifaximin (XIFAXAN) tablet 550 mg  550 mg BID Everton Paredes M.D.   550 mg at 03/17/17 0755   • diphenhydrAMINE (BENADRYL) tablet/capsule 50 mg  50 mg HS PRN Tate Hogan M.D.   50 mg at 03/15/17 0306   • famotidine (PEPCID) tablet 20 mg  20 mg BID Everton Paredes M.D.   20 mg at 03/16/17 2000    Or   • famotidine (PEPCID) injection 20 mg  20 mg BID Everton Paredes M.D.   20 mg at 03/17/17 0754   • piperacillin-tazobactam (ZOSYN) 4.5 g in  mL IVPB  4.5 g Q6HRS Everton Paredes M.D.   Stopped at 03/17/17 0609   • clindamycin (CLEOCIN) IVPB premix 900 mg  900 mg Q8HRS Everton Paredes M.D.   Stopped at 03/17/17 0712   • oxycodone immediate-release (ROXICODONE) tablet 2.5-5 mg  2.5-5 mg Q4HRS PRN Alexia Munoz DLoulouOLoulou   5 mg at 03/17/17 0002   • DOBUTamine (DOBUTREX) 1 mg/mL premix infusion  5 mcg/kg/min Continuous Jeremy M Gonda, M.D. 34 mL/hr at 03/17/17 0827 5 mcg/kg/min at 03/17/17 0827   • Respiratory Care per Protocol   Continuous RT Alexis Hamilton M.D.       • ondansetron (ZOFRAN) syringe/vial injection 4 mg  4 mg Q4HRS PRN Alexis Hamilton M.D.       • ondansetron (ZOFRAN ODT) dispertab 4 mg  4 mg Q4HRS PRN Alexis Hamilton M.D.       • promethazine (PHENERGAN) tablet 12.5-25 mg  12.5-25 mg Q4HRS PRN Alexis Hamilton M.D.       • promethazine (PHENERGAN) suppository 12.5-25 mg  12.5-25 mg Q4HRS PRN Alexis Hamilton M.D.       • prochlorperazine (COMPAZINE) injection 5-10 mg  5-10 mg Q4HRS PRN Alexis Hamilton M.D.       • acetaminophen (TYLENOL) tablet 650 mg  650 mg Q6HRS PRN Alexis Hamilton M.D.       • norepinephrine (LEVOPHED) 8 mg in  mL Infusion  0.5-30 mcg/min Continuous Alexis Hamilton M.D. 24.4 mL/hr at  03/17/17 0533 13 mcg/min at 03/17/17 0533   • senna-docusate (PERICOLACE or SENOKOT S) 8.6-50 MG per tablet 2 Tab  2 Tab BID Alexis Hamilton M.D.   Stopped at 03/16/17 2100    And   • polyethylene glycol/lytes (MIRALAX) PACKET 1 Packet  1 Packet QDAY PRN Alexis Haimlton M.D.        And   • magnesium hydroxide (MILK OF MAGNESIA) suspension 30 mL  30 mL QDAY PRN Alexis Hamilton M.D.        And   • bisacodyl (DULCOLAX) suppository 10 mg  10 mg QDAY PRN Alexis Hamilton M.D.       • heparin 1000 units/mL injection 3,200 Units  3,200 Units PRN CHEPE LimaD   3,200 Units at 03/16/17 1553    And   • heparin infusion 25,000 units in 500 ml 0.45% nacl   Continuous Catherine Hillman PHARMD 35 mL/hr at 03/17/17 0616 1,750 Units/hr at 03/17/17 0616   • POLYMEM ALGINATE DRESSING PADS 1 Each  1 Each QDAY PRN Jeremy M Gonda, M.D.         Last reviewed on 3/13/2017  5:30 PM by Loi Ponce    Quality  Measures:  Core Measures & Quality Metrics    Problems:  Acute-on-chronic hypoxemic respiratory failure   - multifactorial    - BiPAP started for resp distress; off now - marginal resp status   - continue with breaks as tolerated    - DNR/I confirmed  Decompensated severe systolic heart failure, end stage   - d/w'd cardiolgoy  Acute cardiogenic pulmonary edema   - Bumex gtt   - I<O   - titrated pressors  Anasarca  Acute metabolic/toxic encephalopathy   - minimize benzos, narcotics   - f/u ammonia   - add lactulose and rifaximin   Bilateral lower extremity cellulitis with open ulcerations   - bullae ongoing pain   - abx per ID zosyn/cllinda  Thrombocytopenia  Anemia of chronic disease  Acute-on-chronic kidney disease  Hyperkalemia  Lactic acidosis with resultant anion gap  Elevated liver enzymes concerning for hepatic congestion  Hyperammonemia & hyperbili  Elevated troponin secondary to demand ischemia  Hypercoagulable state   - noncompliant with his Coumadin with known pulmonary embolism and intraventricular clot in the  past  Overall prognosis very poor; d/w'd SO and patient; DNR/I  Keep in ICU  Discussed patient condition and risk of morbidity and/or mortality with RN, RT and QA team.    The patient remains critically ill.  Critical care time = 35 minutes in directly providing and coordinating critical care and extensive data review.  No time overlap and excludes procedures.

## 2017-03-17 NOTE — DISCHARGE PLANNING
Palliative Social Work    Message left for Irvin on his cell phone requesting a return phone call so that a family meeting can be arranged.

## 2017-03-17 NOTE — PROGRESS NOTES
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      Still confused remains Off BiPAP on levo and dobutamine drips  Na 118      Review of Systems:  Review of Systems   Unable to perform ROS: other       Physical Exam:  Physical Exam   Constitutional: He appears well-developed and well-nourished.   HENT:   Head: Normocephalic and atraumatic.   Right Ear: External ear normal.   Left Ear: External ear normal.   Mouth/Throat: No oropharyngeal exudate.   Eyes: Conjunctivae are normal. Pupils are equal, round, and reactive to light. Right eye exhibits no discharge. Left eye exhibits no discharge.   Neck: Neck supple. No JVD present.   Cardiovascular: Normal rate.  Exam reveals no gallop.    Murmur heard.  Pulmonary/Chest: Effort normal. No stridor. No respiratory distress. He has no wheezes. He has rales. He exhibits no tenderness.   Abdominal: Soft. There is no tenderness. There is no rebound and no guarding.   Musculoskeletal: He exhibits edema. He exhibits no tenderness.   Neurological:   Lethargic no gross focal deficits   Skin: Skin is warm and dry. He is not diaphoretic.   LE wounds dressed   Psychiatric: His speech is delayed. He is slowed.   Nursing note and vitals reviewed.      Labs:        Invalid input(s): [id number]  Recent Labs      03/15/17   0500   BNPBTYPENAT  1389*     Recent Labs      03/15/17   0500  03/16/17   0558  03/17/17   0353   SODIUM  123*  120*  118*   POTASSIUM  3.1*  3.7  3.8   CHLORIDE  88*  88*  87*   CO2  23  21  21   BUN  36*  37*  39*   CREATININE  1.33  1.42*  1.46*   MAGNESIUM  1.3*   --    --    CALCIUM  8.3*  8.1*  8.4*     Recent Labs      03/15/17   0500  03/16/17   0558  03/17/17   0353   ALTSGPT   71*  53*   --    ASTSGOT  77*  60*   --    ALKPHOSPHAT  51  44   --    TBILIRUBIN  9.3*  9.7*   --    GLUCOSE  125*  120*  122*     Recent Labs      03/15/17   0500   17   0558  17   1408  17   2150  17   0353   RBC  5.04   --   5.14   --    --    --    HEMOGLOBIN  12.5*   --   12.6*   --    --    --    HEMATOCRIT  37.6*   --   39.3*   --    --    --    PLATELETCT  91*   --   108*   --    --    --    APTT   --    < >  53.3*  45.0*  88.1*  80.3*    < > = values in this interval not displayed.     Recent Labs      03/15/17   0500  17   0558   WBC  14.1*  14.2*   NEUTSPOLYS  73.20*  74.60*   LYMPHOCYTES  4.40*  9.90*   MONOCYTES  4.50  12.10   EOSINOPHILS  0.00  1.50   BASOPHILS  0.90  0.80   ASTSGOT  77*  60*   ALTSGPT  71*  53*   ALKPHOSPHAT  51  44   TBILIRUBIN  9.3*  9.7*           Hemodynamics:  Temp (24hrs), Av.6 °C (97.9 °F), Min:36.4 °C (97.6 °F), Max:36.8 °C (98.2 °F)  Temperature: 36.4 °C (97.6 °F)  Pulse  Av.6  Min: 50  Max: 127Heart Rate (Monitored): (!) 124  NIBP: 104/67 mmHg  CVP (mm Hg): (!) 25 MM HG  Respiratory:    Respiration: (!) 24, Pulse Oximetry: 100 %     Work Of Breathing / Effort: Mild  RUL Breath Sounds: Diminished, RML Breath Sounds: Diminished, RLL Breath Sounds: Diminished, BHAVNA Breath Sounds: Diminished, LLL Breath Sounds: Diminished  Fluids:    Intake/Output Summary (Last 24 hours) at 17 1405  Last data filed at 17 1000   Gross per 24 hour   Intake 2967.28 ml   Output   1120 ml   Net 1847.28 ml     Weight: 107.8 kg (237 lb 10.5 oz)  GI/Nutrition:  Orders Placed This Encounter   Procedures   • Diet Order     Standing Status: Standing      Number of Occurrences: 1      Standing Expiration Date:      Order Specific Question:  Diet:     Answer:  Cardiac [6]     Medical Decision Making, by Problem:  Active Hospital Problems    Diagnosis   • Acute renal insufficiency [N28.9]  Cardiogenic shock   • Acute on chronic systolic congestive heart  failure, NYHA class 4, present on admission (EF 15-20%) [I50.23]    Continue levophed and Dobutamine drip  Continue lasix as tolerated  Monitor I/O and discussed with pharmacy to concentrate drips     • Hepatitis C virus infection [B19.20]       • Hyponatremia [E87.1]  Chronic  Diurese as tolerated   • Hyperkalemia [E87.5]  Resolved      • Cellulitis [L03.90]  Wound care    Continue clindamycin and zosyn  ID following     -Acute Hypoxic Respiratory failure: off  BiPAP discussed with Dr kenny    • LV (left ventricular) mural thrombus (CMS-HCC) [I21.3]  Non compliant with home anticoagulation  Continue Heparin drip  Cardiology following    * Metabolic/hepatic encephalopathy  -minimize sedating agents  -rifaximin add lactulose      >Patient is critically ill.   >The patient is having :shock  >The vital organ system that is effected is the:CV  >If untreated there is a high chance of deterioration into: death  >The critical care that I am providing today is: pressor management  >The critical care that has been undertaken is medically complex.   >There has been no overlap in critical care time.   Critical care time not including procedures, no overlap: 35 minutes        Overall prognosis guarded, palliative care eval                   Medications reviewed, EKG reviewed, Labs reviewed and Radiology images reviewed  Holt catheter: Strict Intake and Output During Sepisis or Shock      DVT Prophylaxis: Heparin  DVT prophylaxis - mechanical: SCDs  Ulcer prophylaxis: Yes  Antibiotics: Treating active infection/contamination beyond 24 hours perioperative coverage

## 2017-03-18 NOTE — CARE PLAN
Problem: Pain Management  Goal: Pain level will decrease to patient’s comfort goal  Outcome: PROGRESSING SLOWER THAN EXPECTED  Frequent resting and repositioning are being implemented. Patient denies desire for pain medication at this time.     Problem: Hemodynamic Status  Goal: Vital Signs and Fluid Balance Management  Outcome: PROGRESSING SLOWER THAN EXPECTED  Intervention: Cardiac Monitoring, Pulse Oximetry  Continuous Telemetry monitoring being provided as well as CVP monitoring.   Intervention: Hemodynamic Monitoring  Continuous CVP measurements, documented in EPIC.   Intervention: Monitor I & O, Manage IV fluids and IV infusions  Patient has a henao in place and strict Is and Os are being collected. Documented every two hours.   Intervention: Daily Weights  Completed and documented in EPIC at 0400.   Intervention: Assess peripheral pulses and capillary refill  Assessed and documented every 4 hours. Please see I/Os flow sheet.    Intervention: Assess Color and Body Temperature  Completed and documented in EPIC.   Intervention: Assess for edema (eg. peripheral, sacral, periorbital, abdominal edema)  Completed and documented in EPIC.

## 2017-03-18 NOTE — PROGRESS NOTES
Lida from Lab called with critical result of Sodium at 117. Critical lab result read back to Lida.   This critical lab result is within parameters established by  for this patient

## 2017-03-18 NOTE — PROGRESS NOTES
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      More alert remains on levo and dobutamine drips  Increased abd distention and persistent edema      Review of Systems:  Review of Systems   Constitutional: Negative for fever and chills.   HENT: Negative for congestion and nosebleeds.    Eyes: Negative for discharge and redness.   Respiratory: Positive for shortness of breath. Negative for cough and wheezing.    Cardiovascular: Positive for orthopnea and leg swelling. Negative for chest pain.   Gastrointestinal: Negative for nausea, vomiting, blood in stool and melena.        Abd distention   Genitourinary: Negative for hematuria and flank pain.   Musculoskeletal: Negative for back pain and falls.   Neurological: Negative for focal weakness, seizures, loss of consciousness and headaches.   Psychiatric/Behavioral: Positive for substance abuse. The patient is not nervous/anxious.        Physical Exam:  Physical Exam   Constitutional: He appears well-developed and well-nourished.   HENT:   Head: Normocephalic and atraumatic.   Right Ear: External ear normal.   Left Ear: External ear normal.   Mouth/Throat: No oropharyngeal exudate.   Eyes: Conjunctivae are normal. Pupils are equal, round, and reactive to light. Right eye exhibits no discharge. Left eye exhibits no discharge.   Neck: Neck supple. No JVD present.   Cardiovascular: Normal rate.  Exam reveals no gallop.    Murmur heard.  Pulmonary/Chest: Effort normal. No stridor. No respiratory distress. He has no wheezes. He has rales. He exhibits no tenderness.   Abdominal: Soft. Bowel sounds are normal. He exhibits distension. There is tenderness (generalized). There is no rebound and no  guarding.   Musculoskeletal: He exhibits edema (2-3+). He exhibits no tenderness.   Neurological:   Lethargic no gross focal deficits   Skin: Skin is warm and dry. He is not diaphoretic.   LE wounds dressed   Psychiatric: His speech is delayed. He is slowed.   Nursing note and vitals reviewed.      Labs:        Invalid input(s): [id number]      Recent Labs      17   SODIUM  120*  118*  117*   POTASSIUM  3.7  3.8  4.0   CHLORIDE  88*  87*  86*   CO2  21  21  18*   BUN  37*  39*  45*   CREATININE  1.42*  1.46*  2.17*   CALCIUM  8.1*  8.4*  8.7     Recent Labs      17   ALTSGPT  53*   --   41   ASTSGOT  60*   --   50*   ALKPHOSPHAT  44   --   40   TBILIRUBIN  9.7*   --   10.1*   GLUCOSE  120*  122*  127*     Recent Labs      17   RBC  5.14   --    --    --   5.21   HEMOGLOBIN  12.6*   --    --    --   13.0*   HEMATOCRIT  39.3*   --    --    --   38.8*   PLATELETCT  108*   --    --    --   142*   APTT  53.3*   < >  88.1*  80.3*  62.3*    < > = values in this interval not displayed.     Recent Labs      17   WBC  14.2*  15.5*   NEUTSPOLYS  74.60*  67.50   LYMPHOCYTES  9.90*  13.20*   MONOCYTES  12.10  8.80   EOSINOPHILS  1.50  0.90   BASOPHILS  0.80  0.90   ASTSGOT  60*  50*   ALTSGPT  53*  41   ALKPHOSPHAT  44  40   TBILIRUBIN  9.7*  10.1*           Hemodynamics:  Temp (24hrs), Av °C (98.6 °F), Min:36.8 °C (98.2 °F), Max:37.2 °C (99 °F)  Temperature: 37.2 °C (99 °F)  Pulse  Av.1  Min: 50  Max: 129Heart Rate (Monitored): (!) 119  NIBP: 101/78 mmHg  CVP (mm Hg): (!) 28 MM HG  Respiratory:    Respiration: (!) 28, Pulse Oximetry: 96 %     Work Of Breathing / Effort: Moderate  RUL Breath Sounds: Diminished, RML Breath Sounds: Diminished, RLL Breath Sounds: Diminished, BHAVNA Breath Sounds: Diminished, LLL Breath Sounds:  Diminished  Fluids:    Intake/Output Summary (Last 24 hours) at 03/18/17 0917  Last data filed at 03/18/17 0600   Gross per 24 hour   Intake 2494.61 ml   Output    800 ml   Net 1694.61 ml     Weight: 108 kg (238 lb 1.6 oz)  GI/Nutrition:  Orders Placed This Encounter   Procedures   • Diet Order     Standing Status: Standing      Number of Occurrences: 1      Standing Expiration Date:      Order Specific Question:  Diet:     Answer:  Cardiac [6]     Medical Decision Making, by Problem:  Active Hospital Problems    Diagnosis   • [place]  Cardiogenic shock   • Acute on chronic systolic congestive heart failure, NYHA class 4, present on admission (EF 15-20%) [I50.23]    Continue levophed and Dobutamine drips  Continue lasix but decrease dose given worsening renal function  Monitor I/O   Paracentesis with albumen     • Hepatitis C virus infection [B19.20]       • Hyponatremia [E87.1]  Chronic  Sec to fluid overload, Diurese as tolerated   • Hyperkalemia [E87.5]  Resolved      • Cellulitis [L03.90]  Wound care    Continue clindamycin and zosyn  ID following       -Acute Hypoxic Respiratory failure: off  BiPAP discussed with Dr kenny    • LV (left ventricular) mural thrombus (CMS-HCC) [I21.3]  Non compliant with home anticoagulation  Continue Heparin drip  Cardiology following      * Metabolic/hepatic encephalopathy  -minimize sedating agents  -rifaximin add lactulose  -improved      Overall prognosis extremely guarded discussed with pt treatment options at this time he wants to continue current level of care      >Patient is critically ill.   >The patient is having :shock  >The vital organ system that is effected is the:CV  >If untreated there is a high chance of deterioration into: death  >The critical care that I am providing today is: pressor management  >The critical care that has been undertaken is medically complex.   >There has been no overlap in critical care time.   Critical care time not including procedures, no  overlap: 37 minutes                           Medications reviewed, EKG reviewed, Labs reviewed and Radiology images reviewed  Holt catheter: Strict Intake and Output During Sepisis or Shock      DVT Prophylaxis: Heparin  DVT prophylaxis - mechanical: SCDs  Ulcer prophylaxis: Yes  Antibiotics: Treating active infection/contamination beyond 24 hours perioperative coverage

## 2017-03-18 NOTE — PROGRESS NOTES
Infectious Disease Progress Note    Author: Bing Brumfield M.D. DOS & Time created: 3/18/2017  11:25 AM    Chief Complaint   Patient presents with   • Peripheral Edema   • Weight Gain     10 lbs weight gain over the last 2 weeks   • Other     CHF exaserbation. Not compliant with medications.    • ALOC     Disoriented to event and time.    FU for bilateral LE cellulitis    Interval History:  3/17 AF, no CBC, lethargic but arousable to voice then quickly falls back to sleep  3/18 AF, WBC 15.5, remains on pressors, more awake and alert today, complaining of abd pain and leg pain, recent wound care photos reviewed  Labs Reviewed, Medications Reviewed, Radiology Reviewed and Wound Reviewed.    Review of Systems:  Review of Systems   Constitutional: Negative for fever and chills.   Respiratory: Positive for shortness of breath.    Cardiovascular: Negative for chest pain.   Gastrointestinal: Positive for abdominal pain.   Musculoskeletal: Positive for myalgias.        Both legs   All other systems reviewed and are negative.      Hemodynamics:  Temp (24hrs), Av.9 °C (98.4 °F), Min:36.6 °C (97.8 °F), Max:37.2 °C (99 °F)  Temperature: 36.7 °C (98 °F)  Pulse  Av  Min: 50  Max: 129Heart Rate (Monitored): (!) 114  NIBP: (!) 87/66 mmHg  CVP (mm Hg): (!) 21 MM HG    Physical Exam:  Physical Exam   Constitutional: He appears well-developed.   Chronically ill    HENT:   Head: Normocephalic and atraumatic.   Eyes: Pupils are equal, round, and reactive to light.   Neck: Neck supple.   Cardiovascular:   Murmur heard.  Tachy   Pulmonary/Chest: Effort normal. He has no rales.   Abdominal: Soft. There is no tenderness.   Musculoskeletal: He exhibits edema.   Bilateral LE erythema and edema.  Toes purplish  B/L wrapped    Neurological: He is alert.   More awake today   Skin: There is erythema.       Labs:  Recent Labs      17   0558  17   0415   WBC  14.2*  15.5*   RBC  5.14  5.21   HEMOGLOBIN  12.6*  13.0*    HEMATOCRIT  39.3*  38.8*   MCV  76.5*  74.5*   MCH  24.5*  25.0*   RDW  54.2*  50.8*   PLATELETCT  108*  142*   MPV  9.8  10.3   NEUTSPOLYS  74.60*  67.50   LYMPHOCYTES  9.90*  13.20*   MONOCYTES  12.10  8.80   EOSINOPHILS  1.50  0.90   BASOPHILS  0.80  0.90   RBCMORPHOLO   --   Present     Recent Labs      03/16/17   0558  03/17/17   0353  03/18/17   0415   SODIUM  120*  118*  117*   POTASSIUM  3.7  3.8  4.0   CHLORIDE  88*  87*  86*   CO2  21  21  18*   GLUCOSE  120*  122*  127*   BUN  37*  39*  45*     Recent Labs      03/16/17 0558  03/17/17 0353 03/18/17 0415   ALBUMIN  2.8*   --   2.8*   TBILIRUBIN  9.7*   --   10.1*   ALKPHOSPHAT  44   --   40   TOTPROTEIN  6.4   --   7.1   ALTSGPT  53*   --   41   ASTSGOT  60*   --   50*   CREATININE  1.42*  1.46*  2.17*       BLOOD CULTURE   Date Value Ref Range Status   03/13/2017   Preliminary    No Growth    Note: Blood cultures are incubated for 5 days and  are monitored continuously.Positive blood cultures  are called to the RN and reported as soon as  they are identified.       BLOOD CULTURE HOLD   Date Value Ref Range Status   02/09/2014 Collected  Final      :       Fluids:  Intake/Output       03/16/17 0700 - 03/17/17 0659 03/17/17 0700 - 03/18/17 0659 03/18/17 0700 - 03/19/17 0659      0700-1859 1900-0659 Total [id number] 1900-0659 Total [id number] 1900-0659 Total       Intake    P.O.  600  300 900  480  100 580  360  -- 360    P.O. 600 300 900 480 100 580 360 -- 360    I.V.  1093  1340.5 2433.5  1030.6  1190.8 2221.4  466.4  -- 466.4    Norepinephrine Volume 205 240.5 445.5 202.6 112.8 315.4 52.4 -- 52.4    Heparin Volume 298 410 708 420 420 840 210 -- 210    Dobutamine Volume 340 340 680 408 408 816 204 -- 204    IV Piggyback Volume (Antibiotics) 250 350 600 -- 250 250 -- -- --    Total Intake 1693 1640.5 3333.5 1510.6 1290.8 2801.4 826.4 -- 826.4       Output    Urine  565  700 1265  400  500 900  180  -- 180    Indwelling Cathether  400 500  900 180 -- 180    Stool  --  -- --  --  -- --  --  -- --    Number of Times Stooled 1 x 1 x 2 x -- 2 x 2 x 1 x -- 1 x    Total Output  400 500 900 180 -- 180       Net I/O     1128 940.5 2068.5 1110.6 790.8 1901.4 646.4 -- 646.4        Weight: 108 kg (238 lb 1.6 oz)    Assessment:  Active Hospital Problems    Diagnosis   • Acute renal insufficiency [N28.9]   • Acute on chronic systolic congestive heart failure, NYHA class 4, present on admission (EF 15-20%) [I50.23]   • Hepatitis C virus infection [B19.20]   • Essential hypertension, benign [I10]   • Hyponatremia [E87.1]   • Hyperkalemia [E87.5]   • Cellulitis [L03.90]   • Shock (CMS-HCC) [R57.9]   • LV (left ventricular) mural thrombus (CMS-HCC) [I21.3]       Plan:  Bilateral lower extremity cellulitis  Wound cx - mixed skin patricia  Continue clindamycin and zosyn. Endpoint clinical. 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

## 2017-03-18 NOTE — PROGRESS NOTES
"Interval History:  51 y.o. male who presents to the emergency department because of difficulty breathing and severe edema of the body x 2-3 week and med compliance issue is questioned; He has a history of  dilated cardiomyopathy, EF of 15%, drug abuse, cirrhosis with hepatitis C,    CHF IV; On pressor support  Physical Exam   Blood pressure 108/76, pulse 119, temperature 36.8 °C (98.2 °F), resp. rate 25, height 1.676 m (5' 5.98\"), weight 108 kg (238 lb 1.6 oz), SpO2 98 %.    Constitutional:  He appears well-developed.   HENT: Normocephalic and atraumatic. No scleral icterus.   Neck: No JVD present.   Cardiovascular: Fast rate. Exam reveals no gallop and no friction rub. No murmur heard.   Pulmonary/Chest: CTAB coarse   Abdominal: S/NT/ND BS+   Musculoskeletal: He exhibits mild to mod LE edema. Pulses present.  Skin: Skin is warm and dry. LE cellulitis      Intake/Output Summary (Last 24 hours) at 03/18/17 0536  Last data filed at 03/18/17 0400   Gross per 24 hour   Intake 2973.81 ml   Output    970 ml   Net 2003.81 ml       LABS:  Lab Results   Component Value Date/Time    CHOLESTEROL,TOT 57* 01/27/2017 01:04 PM    LDL 31 01/27/2017 01:04 PM    HDL 15* 01/27/2017 01:04 PM    TRIGLYCERIDES 56 01/27/2017 01:04 PM       Lab Results   Component Value Date/Time    WBC 15.5* 03/18/2017 04:15 AM    RBC 5.21 03/18/2017 04:15 AM    HEMOGLOBIN 13.0* 03/18/2017 04:15 AM    HEMATOCRIT 38.8* 03/18/2017 04:15 AM    MCV 74.5* 03/18/2017 04:15 AM    NEUTROPHILS-POLYS 74.60* 03/16/2017 05:58 AM    LYMPHOCYTES 9.90* 03/16/2017 05:58 AM    MONOCYTES 12.10 03/16/2017 05:58 AM    EOSINOPHILS 1.50 03/16/2017 05:58 AM    BASOPHILS 0.80 03/16/2017 05:58 AM    ANISOCYTOSIS 1+ 03/15/2017 05:00 AM     Lab Results   Component Value Date/Time    SODIUM 117* 03/18/2017 04:15 AM    POTASSIUM 4.0 03/18/2017 04:15 AM    CHLORIDE 86* 03/18/2017 04:15 AM    CO2 18* 03/18/2017 04:15 AM    GLUCOSE 127* 03/18/2017 04:15 AM    BUN 45* 03/18/2017 04:15 AM "    CREATININE 2.17* 03/18/2017 04:15 AM     Lab Results   Component Value Date    HBA1C 6.3* 01/27/2017      Lab Results   Component Value Date/Time    ALKALINE PHOSPHATASE 40 03/18/2017 04:15 AM    AST(SGOT) 50* 03/18/2017 04:15 AM    ALT(SGPT) 41 03/18/2017 04:15 AM    TOTAL BILIRUBIN 10.1* 03/18/2017 04:15 AM      Lab Results   Component Value Date/Time    B NATRIURETIC PEPTIDE 1389* 03/15/2017 05:00 AM      No results found for: TSH  Lab Results   Component Value Date/Time    PT 21.6* 03/13/2017 04:36 PM    INR 1.82* 03/13/2017 04:36 PM        Medications reviewed    Imaging reviewed    ECHO(3/13/2017):Compared to the images of the prior study done on 01/29/17-  there has   been no significant change.   Severely reduced left ventricular systolic function.  Left ventricular ejection fraction is visually estimated to be 20%.  Global hypokinesis.  Diastolic function is difficult to assess.  Moderately dilated right ventricle.  Pacer/ICD wire seen in right ventricle.  Moderate mitral regurgitation.  Mild tricuspid regurgitation.  Right ventricular systolic pressure is estimated to be 40 mmHg.      IMPRESSION AND RECOMMENDATIONS:      •    Acute renal insufficiency [N28.9]              Priority: High      •    Acute on chronic systolic congestive heart failure, NYHA class 4, present on admission (EF 15-20%) [I50.23]              Priority: High      •    Hepatitis C virus infection [B19.20]              Priority: Medium      •    Essential hypertension, benign [I10]              Priority: Medium      •    Hyponatremia [E87.1]      •    Hyperkalemia [E87.5]      •    Cellulitis [L03.90]      •    Shock (CMS-HCC) [R57.9]      •    LV (left ventricular) mural thrombus (CMS-HCC) [I21.3]  Obesity  Anemia  Iron deficiency  Non-compliance  H/o drug use        Agree with pressor to support perfusion and brent with fluidLE appears worse, decrease pressor a little  Continue diurese needed and responding well with Dobutamine  gtt and Bumex/Lasix  ACEI/ARB, BB, Diuretic when more stable and off pressor  Consider ischemia evaluation; Likely CM from his EtOH and Crystal Meth use  Social issue and compliance issue to be addressed  ICD in place  Code status DNR/DNI  Poor prognosis; CHF IV and stage D  Discussed  to initiate hospice care consult  Will follow  Thx

## 2017-03-18 NOTE — PROGRESS NOTES
Pulmonary Critical Care Progress Note        DOS:  3/18/2017    Chief Complaint: resp failure    History of Present Illness: 51 y.o. M, h/o severe CM, EF 15% secondary to cocaine use; intraventricular thrombus, PE, cirrhosis; transferred to ED from [facility] 3/13 with increasing body edema, wt gain, confusion; started on BiPAP and pressors in ED; DNR/I confirmed.      ROS:  Respiratory: positive shortness of breath, Cardiac: negative chest pain, GI: bloating.  All other systems negative.    Interval Events:  24 hour interval history reviewed    - NE, ;    - room air   - heparin gtt   - lasix 40 BID    PFSH:  No change.    Respiratory:     Pulse Oximetry: 96 %  Chest Tube Drains:          Exam: rhonchi bibasilar and diminished breath sounds mild  ImagingAvailable data reviewed         Invalid input(s): [id number]    HemoDynamics:  Pulse: (!) 120, Heart Rate (Monitored): (!) 119  NIBP: 101/78 mmHg  CVP (mm Hg): (!) 28 MM HG    Exam: SR/ST  Imaging: Available data reviewed        Neuro:  GCS Total Accord Coma Score: 14       Exam: confused; follows, moves all 4 ext equally  Imaging: Available data reviewed    Fluids:  Intake/Output       03/16/17 0700 - 03/17/17 0659 03/17/17 0700 - 03/18/17 0659 03/18/17 0700 - 03/19/17 0659      0584-9142 1125-2868 Total 2331-7793 1894-8231 Total 6581-4448 7762-9515 Total       Intake    P.O.  600  300 900  480  100 580  --  -- --    P.O. 600 300 900 480 100 580 -- -- --    I.V.  1093  1340.5 2433.5  1030.6  1190.8 2221.4  --  -- --    Norepinephrine Volume 205 240.5 445.5 202.6 112.8 315.4 -- -- --    Heparin Volume 298 410 708 420 420 840 -- -- --    Dobutamine Volume 340 340 680 408 408 816 -- -- --    IV Piggyback Volume (Antibiotics) 250 350 600 -- 250 250 -- -- --    Total Intake 1693 1640.5 3333.5 1510.6 1290.8 2801.4 -- -- --       Output    Urine  565  700 1265  400  500 900  --  -- --    Indwelling Cathether  400 500 900 -- -- --    Stool  --  -- --  --   -- --  --  -- --    Number of Times Stooled 1 x 1 x 2 x -- 2 x 2 x -- -- --    Total Output  400 500 900 -- -- --       Net I/O     1128 940.5 2068.5 1110.6 790.8 1901.4 -- -- --        Weight: 108 kg (238 lb 1.6 oz)  Recent Labs      17   SODIUM  120*  118*  117*   POTASSIUM  3.7  3.8  4.0   CHLORIDE  88*  87*  86*   CO2  21  21  18*   BUN  37*  39*  45*   CREATININE  1.42*  1.46*  2.17*   CALCIUM  8.1*  8.4*  8.7       GI/Nutrition:  Exam: distended; soft  Imaging: Available data reviewed  taking PO  Liver Function  Recent Labs      17   ALTSGPT  53*   --   41   ASTSGOT  60*   --   50*   ALKPHOSPHAT  44   --   40   TBILIRUBIN  9.7*   --   10.1*   GLUCOSE  120*  122*  127*       Heme:  Recent Labs      17   RBC  5.14   --    --    --   5.21   HEMOGLOBIN  12.6*   --    --    --   13.0*   HEMATOCRIT  39.3*   --    --    --   38.8*   PLATELETCT  108*   --    --    --   142*   APTT  53.3*   < >  88.1*  80.3*  62.3*    < > = values in this interval not displayed.       Infectious Disease:  Temp  Av °C (98.6 °F)  Min: 36.8 °C (98.2 °F)  Max: 37.2 °C (99 °F)  Micro: antibiotics reviewed  Recent Labs      17   WBC  14.2*  15.5*   NEUTSPOLYS  74.60*  67.50   LYMPHOCYTES  9.90*  13.20*   MONOCYTES  12.10  8.80   EOSINOPHILS  1.50  0.90   BASOPHILS  0.80  0.90   ASTSGOT  60*  50*   ALTSGPT  53*  41   ALKPHOSPHAT  44  40   TBILIRUBIN  9.7*  10.1*     Current Facility-Administered Medications   Medication Dose Frequency Provider Last Rate Last Dose   • famotidine (PEPCID) tablet 20 mg  20 mg DAILY Everton Paredes M.D.        Or   • famotidine (PEPCID) injection 20 mg  20 mg DAILY Everton Paredes M.D.   20 mg at 17 0819   • norepinephrine (LEVOPHED) 16 mg in  mL Infusion  0.5-30 mcg/min Continuous Everton Paredes  M.D. 9.4 mL/hr at 03/18/17 0152 10 mcg/min at 03/18/17 0152   • lactulose 20 GM/30ML solution 30 mL  30 mL BID Gavin Molina M.D.   30 mL at 03/18/17 0819   • furosemide (LASIX) injection 40 mg  40 mg BID DIURETIC Dc Hung D.OLoulou   40 mg at 03/18/17 0618   • rifaximin (XIFAXAN) tablet 550 mg  550 mg BID Everton Paredes M.D.   550 mg at 03/18/17 0819   • diphenhydrAMINE (BENADRYL) tablet/capsule 50 mg  50 mg HS PRN Tate Hogan M.D.   50 mg at 03/15/17 0306   • piperacillin-tazobactam (ZOSYN) 4.5 g in  mL IVPB  4.5 g Q6HRS Everton Paredes M.D.   Stopped at 03/18/17 0252   • clindamycin (CLEOCIN) IVPB premix 900 mg  900 mg Q8HRS Everton Paredes M.D.   Stopped at 03/18/17 0718   • oxycodone immediate-release (ROXICODONE) tablet 2.5-5 mg  2.5-5 mg Q4HRS PRN Alexia Munoz D.OLoulou   5 mg at 03/17/17 0002   • DOBUTamine (DOBUTREX) 1 mg/mL premix infusion  5 mcg/kg/min Continuous Jeremy M Gonda, M.D. 34 mL/hr at 03/18/17 0715 5 mcg/kg/min at 03/18/17 0715   • Respiratory Care per Protocol   Continuous RT Alexis Hamilton M.D.       • ondansetron (ZOFRAN) syringe/vial injection 4 mg  4 mg Q4HRS PRN Alexis Hamilton M.D.       • ondansetron (ZOFRAN ODT) dispertab 4 mg  4 mg Q4HRS PRN Alexis Hamilton M.D.       • promethazine (PHENERGAN) tablet 12.5-25 mg  12.5-25 mg Q4HRS PRN Alexis B Alfonso, M.D.       • promethazine (PHENERGAN) suppository 12.5-25 mg  12.5-25 mg Q4HRS PRN Alexis Hamilton M.D.       • prochlorperazine (COMPAZINE) injection 5-10 mg  5-10 mg Q4HRS PRN Alexis Hamilton M.D.       • acetaminophen (TYLENOL) tablet 650 mg  650 mg Q6HRS PRN Alexis Hamilton M.D.       • senna-docusate (PERICOLACE or SENOKOT S) 8.6-50 MG per tablet 2 Tab  2 Tab BID Alexis Hamilton M.D.   2 Tab at 03/18/17 0819    And   • polyethylene glycol/lytes (MIRALAX) PACKET 1 Packet  1 Packet QDAY PRN Alexis Hamilton M.D.        And   • magnesium hydroxide (MILK OF MAGNESIA) suspension 30 mL  30 mL QDAY PRN Alexis Hamilton M.D.         And   • bisacodyl (DULCOLAX) suppository 10 mg  10 mg QDAY PRN Alexis Hamilton M.D.       • heparin 1000 units/mL injection 3,200 Units  3,200 Units PRN CHEPE LimaD   3,200 Units at 03/16/17 1553    And   • heparin infusion 25,000 units in 500 ml 0.45% nacl   Continuous CHEPE LimaD 35 mL/hr at 03/18/17 0715 1,750 Units/hr at 03/18/17 0715   • POLYMEM ALGINATE DRESSING PADS 1 Each  1 Each QDAY PRN Jeremy M Gonda, M.D.         Last reviewed on 3/13/2017  5:30 PM by Yolanda Lewis Ph    Quality  Measures:  Core Measures & Quality Metrics    Problems:  Acute-on-chronic hypoxemic respiratory failure   - multifactorial    - BiPAP started for resp distress; off now - improved resp status   - DNR/I confirmed   - RT protocol; adding PEP  Decompensated severe systolic heart failure, end stage   - d/w'd cardiolgoy   - titrated pressors; diuresis   Acute cardiogenic pulmonary edema   - Bumex gtt; off; IV lasix   - I<O   - titrated pressors  Anasarca  Acute metabolic/toxic encephalopathy   - minimize benzos, narcotics   - f/u ammonia   - lactulose and rifaximin   Bilateral lower extremity cellulitis with open ulcerations   - bullae ongoing pain - improved   - abx per ID zosyn/cllinda  Thrombocytopenia  Anemia of chronic disease  Acute-on-chronic kidney disease  Hyperkalemia  Lactic acidosis with resultant anion gap  Elevated liver enzymes concerning for hepatic congestion  Hyperammonemia & hyperbili  Elevated troponin secondary to demand ischemia  Hypercoagulable state; LV thrombus; h/o PE   - noncompliant with his Coumadin with known pulmonary embolism and intraventricular clot in the past   - heparin gtt  DNR/I  Keep in ICU  Discussed patient condition and risk of morbidity and/or mortality with RN, RT and QA team.    The patient remains critically ill.  Critical care time = 32 minutes in directly providing and coordinating critical care and extensive data review.  No time overlap and excludes  procedures.

## 2017-03-19 NOTE — PROGRESS NOTES
Jonatan from Lab called with critical result of Sodium at 119. Critical lab result read back to Jonatan.   Dr. Mariely Molina notified of critical lab result at 0730.  Critical lab result read back by Dr. Mariely Molina.

## 2017-03-19 NOTE — PROCEDURES
"Date: 3/19/2017    Procedure:  Central Venous Catheter Insertion    Indication: Need for pressors and pulled out Right IJ    Physician:  David Pack M.D.    Consent:  Obtained from NOK and included in the medical record; time out performed    Procedure:  The patient was placed in the [place] position.  Appropriate \"time out\" was performed.  The left neck was prepped and draped in the usual sterile fashion.  Under full body sterile barrier precautions, a triple lumen central venous catheter was placed without difficulty in the left IJ position.  US was used for localization and placement.  All lumens were flushed with sterile saline and sterile caps were applied.  The line was sutured in place and a sterile dressing was applied.  There were no immediate complications.  A post-procedure CXR will be reviewed.  Estimated blood loss < 5cc.      "

## 2017-03-19 NOTE — PROGRESS NOTES
Pulmonary Critical Care Progress Note        DOS:  3/19/2017    Chief Complaint: resp failure    History of Present Illness: 51 y.o. M, h/o severe CM, EF 15% secondary to cocaine use; intraventricular thrombus, PE, cirrhosis; transferred to ED from [facility] 3/13 with increasing body edema, wt gain, confusion; started on BiPAP and pressors in ED; DNR/I confirmed.      ROS:  Respiratory: positive shortness of breath, Cardiac: negative chest pain, GI: bloating.  All other systems negative.    Interval Events:  24 hour interval history reviewed    - agitated; pulling henao; confused   - SR/ST   -  5 mcg   - decreased UOP   - heparin gtt   - room air   - zosyn/clinda   - Na 119 - up a bit    PFSH:  No change.    Respiratory:     Pulse Oximetry: 97 %  Chest Tube Drains:          Exam: rhonchi bibasilar and diminished breath sounds mild  ImagingAvailable data reviewed         Invalid input(s): [id number]    HemoDynamics:  Pulse: (!) 111, Heart Rate (Monitored): (!) 112  NIBP: (!) 85/57 mmHg  CVP (mm Hg): (!) 26 MM HG    Exam: SR/ST  Imaging: Available data reviewed        Neuro:  GCS Total [place] Coma Score: 14       Exam: confused; follows, moves all 4 ext equally  Imaging: Available data reviewed    Fluids:  Intake/Output       03/17/17 0700 - 03/18/17 0659 03/18/17 0700 - 03/19/17 0659 03/19/17 0700 - 03/20/17 0659      1543-9493 2262-6171 Total 0285-1753 3916-6583 Total 6949-3150 4507-7564 Total       Intake    P.O.  480  100 580  660  160 820  --  -- --    P.O. 480 100 580 660 160 820 -- -- --    I.V.  1030.6  1190.8 2221.4  959.2  759.4 1718.6  --  -- --    Norepinephrine Volume 202.6 112.8 315.4 96.2 56.7 152.8 -- -- --    Heparin Volume 420 420 840 455 396.2 851.2 -- -- --    Dobutamine Volume 408 408 816 408 306.6 714.6 -- -- --    IV Piggyback Volume (Antibiotics) -- 250 250 -- -- -- -- -- --    Total Intake 1510.6 1290.8 2801.4 1619.2 919.4 2538.6 -- -- --       Output    Urine  400  500 900  344 140  1010  --  -- --    Indwelling Cathether 400 500 900  -- -- --    Stool  --  -- --  --  -- --  --  -- --    Number of Times Stooled -- 2 x 2 x 4 x 2 x 6 x -- -- --    Total Output 400 500 900  -- -- --       Net I/O     1110.6 790.8 1901.4 1259.2 269.4 1528.6 -- -- --        Weight: 108.9 kg (240 lb 1.3 oz)  Recent Labs      17   SODIUM  118*  117*  119*   POTASSIUM  3.8  4.0  4.0   CHLORIDE  87*  86*  88*   CO2  21  18*  15*   BUN  39*  45*  53*   CREATININE  1.46*  2.17*  1.93*   CALCIUM  8.4*  8.7  9.0       GI/Nutrition:  Exam: distended; soft  Imaging: Available data reviewed  taking PO  Liver Function  Recent Labs      17   ALTSGPT   --   41  38   ASTSGOT   --   50*  52*   ALKPHOSPHAT   --   40  39   TBILIRUBIN   --   10.1*  10.3*   GLUCOSE  122*  127*  114*       Heme:  Recent Labs      17   RBC   --   5.21  5.55   HEMOGLOBIN   --   13.0*  13.6*   HEMATOCRIT   --   38.8*  40.5*   PLATELETCT   --   142*  126*   APTT  80.3*  62.3*  50.1*       Infectious Disease:  Temp  Av.6 °C (97.9 °F)  Min: 36 °C (96.8 °F)  Max: 37 °C (98.6 °F)  Micro: antibiotics reviewed  Recent Labs      17   WBC  15.5*  22.9*   NEUTSPOLYS  67.50  67.50   LYMPHOCYTES  13.20*  6.00*   MONOCYTES  8.80  13.70*   EOSINOPHILS  0.90  0.80   BASOPHILS  0.90  0.90   ASTSGOT  50*  52*   ALTSGPT  41  38   ALKPHOSPHAT  40  39   TBILIRUBIN  10.1*  10.3*     Current Facility-Administered Medications   Medication Dose Frequency Provider Last Rate Last Dose   • famotidine (PEPCID) tablet 20 mg  20 mg DAILY Everton Paredes M.D.        Or   • famotidine (PEPCID) injection 20 mg  20 mg DAILY Everton Paredes M.D.   20 mg at 17 0819   • furosemide (LASIX) injection 20 mg  20 mg Q12HRS Everton Paredes M.D.   20 mg at 17 2136   • norepinephrine  (LEVOPHED) 16 mg in  mL Infusion  0.5-30 mcg/min Continuous Everton Paredes M.D.   Stopped at 03/19/17 0235   • lactulose 20 GM/30ML solution 30 mL  30 mL BID Gavin Molina M.D.   Stopped at 03/18/17 2100   • rifaximin (XIFAXAN) tablet 550 mg  550 mg BID Everton Paredes M.D.   550 mg at 03/18/17 2137   • diphenhydrAMINE (BENADRYL) tablet/capsule 50 mg  50 mg HS PRN Tate Hogan M.D.   50 mg at 03/15/17 0306   • piperacillin-tazobactam (ZOSYN) 4.5 g in  mL IVPB  4.5 g Q6HRS Everton Paredes M.D. 100 mL/hr at 03/19/17 0634 4.5 g at 03/19/17 0634   • clindamycin (CLEOCIN) IVPB premix 900 mg  900 mg Q8HRS Everton Paredes M.D.   Stopped at 03/19/17 0631   • oxycodone immediate-release (ROXICODONE) tablet 2.5-5 mg  2.5-5 mg Q4HRS PRN Alexia Munoz D.O.   5 mg at 03/17/17 0002   • DOBUTamine (DOBUTREX) 1 mg/mL premix infusion  5 mcg/kg/min Continuous Jeremy M Gonda, M.D. 34 mL/hr at 03/19/17 0534 5 mcg/kg/min at 03/19/17 0534   • Respiratory Care per Protocol   Continuous RT Alexis Hamilton M.D.       • ondansetron (ZOFRAN) syringe/vial injection 4 mg  4 mg Q4HRS PRN Alexis Hamilton M.D.       • ondansetron (ZOFRAN ODT) dispertab 4 mg  4 mg Q4HRS PRN Alexis Hamilton M.D.       • promethazine (PHENERGAN) tablet 12.5-25 mg  12.5-25 mg Q4HRS PRN Alexis Hamilton M.D.       • promethazine (PHENERGAN) suppository 12.5-25 mg  12.5-25 mg Q4HRS PRN Alexis Hamilton M.D.       • prochlorperazine (COMPAZINE) injection 5-10 mg  5-10 mg Q4HRS PRN Alexis Hamilton M.D.       • acetaminophen (TYLENOL) tablet 650 mg  650 mg Q6HRS PRN Alexis Hamilton M.D.   650 mg at 03/18/17 2125   • senna-docusate (PERICOLACE or SENOKOT S) 8.6-50 MG per tablet 2 Tab  2 Tab BID Alexis Hamilton M.D.   Stopped at 03/18/17 2100    And   • polyethylene glycol/lytes (MIRALAX) PACKET 1 Packet  1 Packet QDAY PRN Alexis Hamilton M.D.        And   • magnesium hydroxide (MILK OF MAGNESIA) suspension 30 mL  30 mL QDAY PRN Alexis Hamilton M.D.        And   •  bisacodyl (DULCOLAX) suppository 10 mg  10 mg QDAY PRN Alexis Hamilton M.D.       • heparin 1000 units/mL injection 3,200 Units  3,200 Units PRN CHEPE LimaD   3,200 Units at 03/19/17 0518    And   • heparin infusion 25,000 units in 500 ml 0.45% nacl   Continuous Catherine Hillman PHARMD 38 mL/hr at 03/19/17 0517 1,900 Units/hr at 03/19/17 0517   • POLYMEM ALGINATE DRESSING PADS 1 Each  1 Each QDAY PRN Jeremy M Gonda, M.D.         Last reviewed on 3/13/2017  5:30 PM by Yolanda Lewis Ph    Quality  Measures:  Core Measures & Quality Metrics    Problems:  Acute-on-chronic hypoxemic respiratory failure   - multifactorial    - BiPAP started for resp distress; off now - improved resp status   - DNR/I confirmed   - RT protocol; adding PEP  Decompensated severe systolic heart failure, end stage   - d/w'd cardiolgoy   - titrated pressors; diuresis   Acute cardiogenic pulmonary edema   - Bumex gtt; off; IV lasix   - I<O   - titrated pressors  Anasarca  Acute metabolic/toxic encephalopathy   - minimize benzos, narcotics   - f/u ammonia   - lactulose and rifaximin   Bilateral lower extremity cellulitis with open ulcerations   - bullae ongoing pain - improved   - abx per ID zosyn/cllinda  Thrombocytopenia  Anemia of chronic disease  Acute-on-chronic kidney disease  Hyperkalemia  Lactic acidosis with resultant anion gap  Elevated liver enzymes concerning for hepatic congestion  Hyperammonemia & hyperbili  Elevated troponin secondary to demand ischemia  Hypercoagulable state; LV thrombus; h/o PE   - noncompliant with his Coumadin with known pulmonary embolism and intraventricular clot in the past   - heparin gtt  DNR/I  Keep in ICU  Discussed patient condition and risk of morbidity and/or mortality with RN, RT and QA team.    The patient remains critically ill.  Critical care time = 32 minutes in directly providing and coordinating critical care and extensive data review.  No time overlap and excludes  procedures.

## 2017-03-19 NOTE — PROGRESS NOTES
Infectious Disease Progress Note    Author: Bing Brumfield M.D. DOS & Time created: 3/19/2017  10:48 AM    Chief Complaint   Patient presents with   • Peripheral Edema   • Weight Gain     10 lbs weight gain over the last 2 weeks   • Other     CHF exaserbation. Not compliant with medications.    • ALOC     Disoriented to event and time.    FU for bilateral LE cellulitis    Interval History:  3/17 AF, no CBC, lethargic but arousable to voice then quickly falls back to sleep  3/18 AF, WBC 15.5, remains on pressors, more awake and alert today, complaining of abd pain and leg pain, recent wound care photos reviewed  3/19 AF, WBC 22.9, white count increasing, pulled out RIJ and left IJ inserted o/n. Off levophed  Labs Reviewed, Medications Reviewed, Radiology Reviewed and Wound Reviewed.    Review of Systems:  Review of Systems   Constitutional: Negative for fever and chills.   Respiratory: Positive for shortness of breath.    Cardiovascular: Negative for chest pain.   Gastrointestinal: Positive for abdominal pain.   Musculoskeletal: Positive for myalgias.        Both legs   All other systems reviewed and are negative.      Hemodynamics:  Temp (24hrs), Av.6 °C (97.9 °F), Min:36 °C (96.8 °F), Max:37 °C (98.6 °F)  Temperature: 36 °C (96.8 °F)  Pulse  Av.8  Min: 50  Max: 129Heart Rate (Monitored): (!) 112  NIBP: (!) 85/57 mmHg  CVP (mm Hg): (!) 26 MM HG    Physical Exam:  Physical Exam   Constitutional: He appears well-developed.   Chronically ill    HENT:   Head: Normocephalic and atraumatic.   Eyes: Pupils are equal, round, and reactive to light.   Neck: Neck supple.   Left IJ   Cardiovascular:   Murmur heard.  Tachy   Pulmonary/Chest: Effort normal. He has no rales.   Abdominal: Soft. There is no tenderness.   Musculoskeletal: He exhibits edema.   Bilateral LE erythema and edema.  Toes purplish  B/L wrapped    Neurological: He is alert.   Skin: There is erythema.       Labs:  Recent Labs      17   0303   03/19/17   0405   WBC  15.5*  22.9*   RBC  5.21  5.55   HEMOGLOBIN  13.0*  13.6*   HEMATOCRIT  38.8*  40.5*   MCV  74.5*  73.0*   MCH  25.0*  24.5*   RDW  50.8*  49.1   PLATELETCT  142*  126*   MPV  10.3  10.2   NEUTSPOLYS  67.50  67.50   LYMPHOCYTES  13.20*  6.00*   MONOCYTES  8.80  13.70*   EOSINOPHILS  0.90  0.80   BASOPHILS  0.90  0.90   RBCMORPHOLO  Present  Present     Recent Labs      03/17/17   0353  03/18/17   0415  03/19/17   0405   SODIUM  118*  117*  119*   POTASSIUM  3.8  4.0  4.0   CHLORIDE  87*  86*  88*   CO2  21  18*  15*   GLUCOSE  122*  127*  114*   BUN  39*  45*  53*     Recent Labs      03/17/17 0353 03/18/17 0415  03/19/17   0405   ALBUMIN   --   2.8*  2.8*   TBILIRUBIN   --   10.1*  10.3*   ALKPHOSPHAT   --   40  39   TOTPROTEIN   --   7.1  7.0   ALTSGPT   --   41  38   ASTSGOT   --   50*  52*   CREATININE  1.46*  2.17*  1.93*       BLOOD CULTURE   Date Value Ref Range Status   03/13/2017 No growth after 5 days of incubation.  Final     BLOOD CULTURE HOLD   Date Value Ref Range Status   02/09/2014 Collected  Final      :       Fluids:  Intake/Output       03/17/17 0700 - 03/18/17 0659 03/18/17 0700 - 03/19/17 0659 03/19/17 0700 - 03/20/17 0659      6184-1372 9480-5634 Total 2801-8160 1334-4765 Total 6494-1913 5543-1568 Total       Intake    P.O.  480  100 580  660  160 820  --  -- --    P.O. 480 100 580 660 160 820 -- -- --    I.V.  1030.6  1190.8 2221.4  959.2  759.4 1718.6  --  -- --    Norepinephrine Volume 202.6 112.8 315.4 96.2 56.7 152.8 -- -- --    Heparin Volume 420 420 840 455 396.2 851.2 -- -- --    Dobutamine Volume 408 408 816 408 306.6 714.6 -- -- --    IV Piggyback Volume (Antibiotics) -- 250 250 -- -- -- -- -- --    Total Intake 1510.6 1290.8 2801.4 1619.2 919.4 2538.6 -- -- --       Output    Urine  400  500 900  360  650 1010  --  -- --    Indwelling Cathether 400 500 900  -- -- --    Stool  --  -- --  --  -- --  --  -- --    Number of Times Stooled -- 2 x 2  x 4 x 2 x 6 x -- -- --    Total Output 400 500 900  -- -- --       Net I/O     1110.6 790.8 1901.4 1259.2 269.4 1528.6 -- -- --        Weight: 108.9 kg (240 lb 1.3 oz)    Assessment:  Active Hospital Problems    Diagnosis   • Acute renal insufficiency [N28.9]   • Acute on chronic systolic congestive heart failure, NYHA class 4, present on admission (EF 15-20%) [I50.23]   • Hepatitis C virus infection [B19.20]   • Essential hypertension, benign [I10]   • Hyponatremia [E87.1]   • Hyperkalemia [E87.5]   • Cellulitis [L03.90]   • Shock (CMS-HCC) [R57.9]   • LV (left ventricular) mural thrombus (CMS-HCC) [I21.3]       Plan:  Bilateral lower extremity cellulitis  Wound cx - mixed skin patricia  Continue clindamycin and zosyn. Endpoint clinical. 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

## 2017-03-19 NOTE — PROGRESS NOTES
"Interval History:  51 y.o. male who presents to the emergency department because of difficulty breathing and severe edema of the body x 2-3 week and med compliance issue is questioned; He has a history of  dilated cardiomyopathy, EF of 15%, drug abuse, cirrhosis with hepatitis C,    CHF IV; On pressor support  Not getting better with very poor prognosis; Explore Hospice care    Physical Exam   Blood pressure 108/76, pulse 111, temperature 36 °C (96.8 °F), resp. rate 26, height 1.676 m (5' 5.98\"), weight 108.9 kg (240 lb 1.3 oz), SpO2 97 %.    Constitutional:  He appears well-developed.   HENT: Normocephalic and atraumatic. No scleral icterus.   Neck: No JVD present.   Cardiovascular:Fast rate. Exam reveals no gallop and no friction rub. No murmur heard.   Pulmonary/Chest: CTAB coarse   Abdominal: S/NT/ND BS+   Musculoskeletal: He exhibits moderateanotic and edematous LE      Intake/Output Summary (Last 24 hours) at 03/19/17 0909  Last data filed at 03/19/17 0600   Gross per 24 hour   Intake 2261.76 ml   Output    950 ml   Net 1311.76 ml       LABS:  Lab Results   Component Value Date/Time    CHOLESTEROL,TOT 57* 01/27/2017 01:04 PM    LDL 31 01/27/2017 01:04 PM    HDL 15* 01/27/2017 01:04 PM    TRIGLYCERIDES 56 01/27/2017 01:04 PM       Lab Results   Component Value Date/Time    WBC 22.9* 03/19/2017 04:05 AM    RBC 5.55 03/19/2017 04:05 AM    HEMOGLOBIN 13.6* 03/19/2017 04:05 AM    HEMATOCRIT 40.5* 03/19/2017 04:05 AM    MCV 73.0* 03/19/2017 04:05 AM    NEUTROPHILS-POLYS 67.50 03/19/2017 04:05 AM    LYMPHOCYTES 6.00* 03/19/2017 04:05 AM    MONOCYTES 13.70* 03/19/2017 04:05 AM    EOSINOPHILS 0.80 03/19/2017 04:05 AM    BASOPHILS 0.90 03/19/2017 04:05 AM    ANISOCYTOSIS 1+ 03/19/2017 04:05 AM     Lab Results   Component Value Date/Time    SODIUM 119* 03/19/2017 04:05 AM    POTASSIUM 4.0 03/19/2017 04:05 AM    CHLORIDE 88* 03/19/2017 04:05 AM    CO2 15* 03/19/2017 04:05 AM    GLUCOSE 114* 03/19/2017 04:05 AM    BUN 53* " 03/19/2017 04:05 AM    CREATININE 1.93* 03/19/2017 04:05 AM     Lab Results   Component Value Date    HBA1C 6.3* 01/27/2017      Lab Results   Component Value Date/Time    ALKALINE PHOSPHATASE 39 03/19/2017 04:05 AM    AST(SGOT) 52* 03/19/2017 04:05 AM    ALT(SGPT) 38 03/19/2017 04:05 AM    TOTAL BILIRUBIN 10.3* 03/19/2017 04:05 AM      Lab Results   Component Value Date/Time    B NATRIURETIC PEPTIDE 1389* 03/15/2017 05:00 AM      No results found for: TSH  Lab Results   Component Value Date/Time    PT 21.6* 03/13/2017 04:36 PM    INR 1.82* 03/13/2017 04:36 PM        Medications reviewed    Imaging reviewed    ECHO(3/13/2017):Compared to the images of the prior study done on 01/29/17-  there has   been no significant change.   Severely reduced left ventricular systolic function.  Left ventricular ejection fraction is visually estimated to be 20%.  Global hypokinesis.  Diastolic function is difficult to assess.  Moderately dilated right ventricle.  Pacer/ICD wire seen in right ventricle.  Moderate mitral regurgitation.  Mild tricuspid regurgitation.  Right ventricular systolic pressure is estimated to be 40 mmHg.      IMPRESSION AND RECOMMENDATIONS:      •     Acute renal insufficiency [N28.9]                 Priority: High       •     Acute on chronic systolic congestive heart failure, NYHA class 4, present on admission (EF 15-20%) [I50.23]                 Priority: High       •     Hepatitis C virus infection [B19.20]                 Priority: Medium       •     Essential hypertension, benign [I10]                 Priority: Medium       •     Hyponatremia [E87.1]       •     Hyperkalemia [E87.5]       •     Cellulitis [L03.90]       •     Shock (CMS-HCC) [R57.9]       •     LV (left ventricular) mural thrombus (CMS-HCC) [I21.3]  Obesity  Anemia  Iron deficiency  Non-compliance  H/o drug use         Agree with pressor to support perfusion and brent with fluidLE appears worse, decrease pressor a little  Continue  diurese needed and responding well with Dobutamine gtt and Bumex/Lasix  ACEI/ARB, BB, Diuretic when more stable and off pressor  Consider ischemia evaluation; Likely CM from his EtOH and Crystal Meth use  Social issue and compliance issue to be addressed  ICD in place  Code status DNR/DNI  Poor prognosis; CHF IV and stage D  Discussed  to initiate hospice care consult  Case discussed with attending  Will follow  Jinx

## 2017-03-19 NOTE — PROGRESS NOTES
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      More alert weaned off  levo remains on  dobutamine drip  Complains of generalized pain WBC increased more confused today      Review of Systems:  Review of Systems   Unable to perform ROS: mental status change       Physical Exam:  Physical Exam   Constitutional: He appears well-developed and well-nourished.   HENT:   Head: Normocephalic and atraumatic.   Mouth/Throat: No oropharyngeal exudate.   Eyes: Conjunctivae are normal. Pupils are equal, round, and reactive to light. Right eye exhibits no discharge. Left eye exhibits no discharge.   Neck: Neck supple. No JVD present.   Cardiovascular: Normal rate.  Exam reveals no gallop.    Murmur heard.  Pulmonary/Chest: Effort normal. No stridor. No respiratory distress. He has no wheezes. He has rales. He exhibits no tenderness.   Abdominal: Soft. He exhibits distension. There is tenderness (generalized). There is no rebound and no guarding.   Musculoskeletal: He exhibits edema (2-3+). He exhibits no tenderness.   Neurological: He is alert.   Oriented x2, no focal deficits   Skin: Skin is warm and dry. He is not diaphoretic.   LE wounds dressed   Psychiatric: His speech is delayed. He is slowed.   Nursing note and vitals reviewed.      Labs:        Invalid input(s): [id number]      Recent Labs      03/17/17 0353 03/18/17 0415  03/19/17   0405   SODIUM  118*  117*  119*   POTASSIUM  3.8  4.0  4.0   CHLORIDE  87*  86*  88*   CO2  21  18*  15*   BUN  39*  45*  53*   CREATININE  1.46*  2.17*  1.93*   CALCIUM  8.4*  8.7  9.0     Recent Labs      03/17/17   0353  03/18/17 0415  03/19/17   0405   ALTSGPT   --   41  38   ASTSGOT   --    50*  52*   ALKPHOSPHAT   --   40  39   TBILIRUBIN   --   10.1*  10.3*   GLUCOSE  122*  127*  114*     Recent Labs      17   0353  17   0415  17   0405   RBC   --   5.21  5.55   HEMOGLOBIN   --   13.0*  13.6*   HEMATOCRIT   --   38.8*  40.5*   PLATELETCT   --   142*  126*   APTT  80.3*  62.3*  50.1*     Recent Labs      17   0415  17   0405   WBC  15.5*  22.9*   NEUTSPOLYS  67.50  67.50   LYMPHOCYTES  13.20*  6.00*   MONOCYTES  8.80  13.70*   EOSINOPHILS  0.90  0.80   BASOPHILS  0.90  0.90   ASTSGOT  50*  52*   ALTSGPT  41  38   ALKPHOSPHAT  40  39   TBILIRUBIN  10.1*  10.3*           Hemodynamics:  Temp (24hrs), Av.6 °C (97.9 °F), Min:36 °C (96.8 °F), Max:37 °C (98.6 °F)  Temperature: 36 °C (96.8 °F)  Pulse  Av.8  Min: 50  Max: 129Heart Rate (Monitored): (!) 112  NIBP: (!) 85/57 mmHg  CVP (mm Hg): (!) 26 MM HG  Respiratory:    Respiration: (!) 26, Pulse Oximetry: 97 %     Work Of Breathing / Effort: Moderate  RUL Breath Sounds: Diminished, RML Breath Sounds: Diminished, RLL Breath Sounds: Diminished, BHAVNA Breath Sounds: Diminished, LLL Breath Sounds: Diminished  Fluids:    Intake/Output Summary (Last 24 hours) at 17 0941  Last data filed at 17 0600   Gross per 24 hour   Intake 2261.76 ml   Output    950 ml   Net 1311.76 ml     Weight: 108.9 kg (240 lb 1.3 oz)  GI/Nutrition:  Orders Placed This Encounter   Procedures   • Diet Order     Standing Status: Standing      Number of Occurrences: 1      Standing Expiration Date:      Order Specific Question:  Diet:     Answer:  Cardiac [6]     Medical Decision Making, by Problem:  Active Hospital Problems    Diagnosis   • [place]  Cardiogenic shock   • Acute on chronic systolic congestive heart failure, NYHA class 4, present on admission (EF 15-20%) [I50.23]    Weaned off levophed continue  Dobutamine drip  Continue lasix as tolerated  Monitor I/O   F/u on US and consider Paracentesis with albumen     • Hepatitis C virus  infection [B19.20]       • Hyponatremia [E87.1]  Chronic  Sec to fluid overload, Diurese as tolerated  Monitor electrolytes   • Hyperkalemia [E87.5]  Resolved      • Cellulitis [L03.90]  Wound care    Continue clindamycin and zosyn  ID following       -Acute Hypoxic Respiratory failure:   -continue oxygen RT protocol, discussed with Dr kenny    • LV (left ventricular) mural thrombus (CMS-HCC) [I21.3]  Non compliant with home anticoagulation  Continue Heparin drip  Cardiology following discussed with Dr Hunter, overall prognosis extremely guarded     * Metabolic/hepatic encephalopathy  -minimize sedating agents  -continue rifaximin add lactulose        Overall prognosis extremely guarded discussed  treatment options with pt's son including consideration of comfort  Care/hospice, he wants to discuss it further and  at this time he wants to continue current level of care      >Patient is critically ill.   >The patient is having : cardiogenic shock  >The vital organ system that is effected is the:CV  >If untreated there is a high chance of deterioration into: death  >The critical care that I am providing today is: dobutamine drip  >The critical care that has been undertaken is medically complex.   >There has been no overlap in critical care time.   Critical care time not including procedures, no overlap: 32 minutes                           Medications reviewed, EKG reviewed, Labs reviewed and Radiology images reviewed  Holt catheter: Strict Intake and Output During Sepisis or Shock      DVT Prophylaxis: Heparin  DVT prophylaxis - mechanical: SCDs  Ulcer prophylaxis: Yes  Antibiotics: Treating active infection/contamination beyond 24 hours perioperative coverage

## 2017-03-20 NOTE — PROGRESS NOTES
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      Confused with intermittent agitation, leukocytosis worse  Remains on dobutamine      Review of Systems:  Review of Systems   Unable to perform ROS: mental status change       Physical Exam:  Physical Exam   Constitutional: He appears well-developed and well-nourished.   HENT:   Head: Normocephalic and atraumatic.   Right Ear: External ear normal.   Left Ear: External ear normal.   Mouth/Throat: No oropharyngeal exudate.   Eyes: Right eye exhibits no discharge. Left eye exhibits no discharge.   Neck: Neck supple. No JVD present.   Cardiovascular: Normal rate.  Exam reveals no gallop and no friction rub.    Murmur heard.  Pulmonary/Chest: Effort normal. No respiratory distress. He has rales. He exhibits no tenderness.   Abdominal: Soft. Bowel sounds are normal. He exhibits distension. He exhibits no mass. There is tenderness (mild generalized). There is no rebound and no guarding.   Musculoskeletal: He exhibits edema (2-3+). He exhibits no tenderness.   Neurological: He is alert.   Oriented to self only, generalized weakness  no focal deficits   Skin: Skin is warm and dry. He is not diaphoretic.   LE wounds dressed with clean dressing   Psychiatric: Cognition and memory are impaired. He expresses impulsivity.   Nursing note and vitals reviewed.      Labs:  Recent Labs      03/19/17   0318   ISTATAPH  7.434   ISTATAPCO2  22.5*   ISTATAPO2  79   ISTATATCO2  16*   [id number]  96   ISTATARTHCO3  15.0*   ISTATARTBE  -7*   ISTATTEMP  96.4 F   ISTATFIO2  21   ISTATSPEC  Arterial   ISTATAPHTC  7.452   [id number]  73         Recent Labs      03/19/17   0405  03/19/17   1125  03/20/17   0918    SODIUM  119*  117*  119*   POTASSIUM  4.0  3.6  3.6   CHLORIDE  88*  86*  88*   CO2  15*  20  22   BUN  53*  52*  52*   CREATININE  1.93*  1.78*  1.56*   CALCIUM  9.0  8.6  8.2*     Recent Labs      175  17   0405  17   1125  1718   ALTSGPT  41  38   --    --    ASTSGOT  50*  52*   --    --    ALKPHOSPHAT  40  39   --    --    TBILIRUBIN  10.1*  10.3*   --    --    GLUCOSE  127*  114*  129*  135*     Recent Labs      17   1825  17   0215  17   RBC  5.21  5.55   --    --    --   4.94   HEMOGLOBIN  13.0*  13.6*   --    --    --   12.2*   HEMATOCRIT  38.8*  40.5*   --    --    --   36.5*   PLATELETCT  142*  126*   --    --    --   114*   APTT  62.3*  50.1*   < >  98.9*  98.6*  50.5*    < > = values in this interval not displayed.     Recent Labs      17   WBC  15.5*  22.9*  23.7*   NEUTSPOLYS  67.50  67.50  74.80*   LYMPHOCYTES  13.20*  6.00*  5.20*   MONOCYTES  8.80  13.70*  2.60   EOSINOPHILS  0.90  0.80  0.00   BASOPHILS  0.90  0.90  0.00   ASTSGOT  50*  52*   --    ALTSGPT  41  38   --    ALKPHOSPHAT  40  39   --    TBILIRUBIN  10.1*  10.3*   --            Hemodynamics:  Temp (24hrs), Av.4 °C (97.5 °F), Min:35.7 °C (96.2 °F), Max:36.9 °C (98.5 °F)  Temperature: 36.9 °C (98.5 °F)  Pulse  Av.2  Min: 50  Max: 194Heart Rate (Monitored): (!) 107  NIBP: 100/48 mmHg    Respiratory:    Respiration: (!) 23, Pulse Oximetry: 96 %     Work Of Breathing / Effort: Moderate  RUL Breath Sounds: Diminished, RML Breath Sounds: Diminished, RLL Breath Sounds: Diminished, BHAVNA Breath Sounds: Diminished, LLL Breath Sounds: Diminished  Fluids:    Intake/Output Summary (Last 24 hours) at 17 1438  Last data filed at 17 1300   Gross per 24 hour   Intake 2526.8 ml   Output    400 ml   Net 2126.8 ml     Weight: 109.1 kg (240 lb 8.4 oz)  GI/Nutrition:  Orders Placed This Encounter    Procedures   • Diet Order     Standing Status: Standing      Number of Occurrences: 1      Standing Expiration Date:      Order Specific Question:  Diet:     Answer:  Cardiac [6]     Medical Decision Making, by Problem:  Active Hospital Problems    Diagnosis   • [place]  Cardiogenic shock   • Acute on chronic systolic congestive heart failure, NYHA class 4, present on admission (EF 15-20%) [I50.23]    Weaned off levophed   continue  Dobutamine drip at current rate  Increase  lasix and monitor renal function   Monitor I/O   Cardiology signing off       • Hepatitis C virus infection [B19.20]       • Hyponatremia [E87.1]  Chronic  Sec to fluid overload, Diurese as tolerated  Monitor electrolytes   • Hyperkalemia [E87.5]  Resolved      • Cellulitis [L03.90]  Wound care    Continue clindamycin and zosyn   leukocytosis worse, ID following with plans to repeat cx if trend persists      -Acute Hypoxic Respiratory failure:   -continue oxygen RT protocol, discussed with Dr Conley    • LV (left ventricular) mural thrombus (CMS-HCC) [I21.3]  Non compliant with home anticoagulation  Continue Heparin drip        * Metabolic/hepatic encephalopathy  -minimize sedating agents  -continue rifaximin add lactulose        Overall prognosis reamins  extremely guarded discussed  With family at bedside, will ask palliative care to also didcuu again with family         >Patient is critically ill.   >The patient is having : cardiogenic shock  >The vital organ system that is effected is the:CV  >If untreated there is a high chance of deterioration into: death  >The critical care that I am providing today is: dobutamine drip  >The critical care that has been undertaken is medically complex.   >There has been no overlap in critical care time.   Critical care time not including procedures, no overlap: 35 minutes                           Medications reviewed, EKG reviewed, Labs reviewed and Radiology images reviewed  Holt catheter: Strict  Intake and Output During Sepisis or Shock      DVT Prophylaxis: Heparin  DVT prophylaxis - mechanical: SCDs  Ulcer prophylaxis: Yes  Antibiotics: Treating active infection/contamination beyond 24 hours perioperative coverage

## 2017-03-20 NOTE — PROGRESS NOTES
Cardiology Progress Note               Author: Margaret Fajardo Date & Time created: 3/20/2017  8:17 AM     Interval History:    Moaning, not responsive    Review of Systems   Unable to perform ROS      Physical Exam   Constitutional:   moaning   Eyes: Pupils are equal, round, and reactive to light. No scleral icterus.   Neck: No thyromegaly present.   Cardiovascular: Intact distal pulses.    Murmur (2/6 blowing SM) heard.  Sinus tach   Pulmonary/Chest: No respiratory distress. He has wheezes. He has rales.   Neurological: No cranial nerve deficit. He exhibits normal muscle tone.   Skin: Skin is warm and dry. No rash noted.       Hemodynamics:  Temp (24hrs), Av.3 °C (97.3 °F), Min:35.7 °C (96.2 °F), Max:36.9 °C (98.5 °F)  Temperature: 36.9 °C (98.5 °F)  Pulse  Av.4  Min: 50  Max: 194Heart Rate (Monitored): (!) 113  NIBP: (!) 85/56 mmHg    Respiratory:    Respiration: (!) 25, Pulse Oximetry: 96 %     Work Of Breathing / Effort: Moderate  RUL Breath Sounds: Diminished, RML Breath Sounds: Diminished, RLL Breath Sounds: Diminished, BHAVNA Breath Sounds: Diminished, LLL Breath Sounds: Diminished  Fluids:     Weight: 109.1 kg (240 lb 8.4 oz)  GI/Nutrition:  Orders Placed This Encounter   Procedures   • Diet Order     Standing Status: Standing      Number of Occurrences: 1      Standing Expiration Date:      Order Specific Question:  Diet:     Answer:  Cardiac [6]     Lab Results:  Recent Labs      17   0415  17   0405   WBC  15.5*  22.9*   RBC  5.21  5.55   HEMOGLOBIN  13.0*  13.6*   HEMATOCRIT  38.8*  40.5*   MCV  74.5*  73.0*   MCH  25.0*  24.5*   MCHC  33.5*  33.6*   RDW  50.8*  49.1   PLATELETCT  142*  126*   MPV  10.3  10.2     Recent Labs      17   0415  17   0405  17   1125   SODIUM  117*  119*  117*   POTASSIUM  4.0  4.0  3.6   CHLORIDE  86*  88*  86*   CO2  18*  15*  20   GLUCOSE  127*  114*  129*   BUN  45*  53*  52*   CREATININE  2.17*  1.93*  1.78*   CALCIUM  8.7  9.0   8.6     Recent Labs      03/19/17   1125  03/19/17   1825  03/20/17   0215   APTT  135.6*  98.9*  98.6*                     Medical Decision Making, by Problem:  Active Hospital Problems    Diagnosis   • Acute renal insufficiency [N28.9]   • Acute on chronic systolic congestive heart failure, NYHA class 4, present on admission (EF 15-20%) [I50.23]   • Hepatitis C virus infection [B19.20]   • Essential hypertension, benign [I10]   • Hyponatremia [E87.1]   • Hyperkalemia [E87.5]   • Cellulitis [L03.90]   • Shock (CMS-HCC) [R57.9]   • LV (left ventricular) mural thrombus (CMS-HCC) [I21.3]       Plan:    52yo admit through ER3/13 for acute on chronic SOB & edema in the setting of persistant noncompliance, hx heavy meth, etoh & cocaine.  Know LVEF 20 with LV thrombus (I do not see the trombus on echo this admit, may be due to poor quality, not its absence)    CHF, on dobuta gtt.  Likley cause of sinus tach.  Would d/c if arrythmogenic (AF, VT).  No other CHF tolerated    Volume, dobuta as above.  Hepatorenal syndrome is ominous sign with exceedingly poor outcomes    LV thrombus, as above.  On hep gtt    Palliation & comfort.  Agree with emphasis on hospice  Will s/o - please dont hesitate to call with any concerns  D/w RN & primary MD    Core Measures

## 2017-03-20 NOTE — PROGRESS NOTES
Pulmonary Critical Care Progress Note    Interval Events:  24 hour interval history reviewed  Reason for visit:  Acute on chronic systolic heart failure     - dobut- 5   - heparin gtt   - SR   - RA   - antibx per ID   - NE is off    PFSH:  No change.    Respiratory:     Pulse Oximetry: 97 %  CXR with cardiomegaly  Room air  No wheezing    Recent Labs      03/19/17   0318   ISTATAPH  7.434   ISTATAPCO2  22.5*   ISTATAPO2  79   ISTATATCO2  16*   [id number]  96   ISTATARTHCO3  15.0*   ISTATARTBE  -7*   ISTATTEMP  96.4 F   ISTATFIO2  21   ISTATSPEC  Arterial   ISTATAPHTC  7.452   [id number]  73       HemoDynamics:  Pulse: (!) 112, Heart Rate (Monitored): (!) 112  NIBP: (!) 82/55 mmHg    SR  Dobut - 5    Neuro:  Confused  No focal weakness    Fluids:  Intake/Output       03/18/17 0700 - 03/19/17 0659 03/19/17 0700 - 03/20/17 0659 03/20/17 0700 - 03/21/17 0659      2657-3725 9641-8038 Total 0173-8312 1802-6013 Total 4086-2482 5288-6499 Total       Intake    P.O.  660  160 820  700  300 1000  --  -- --    P.O. 660 160 820  -- -- --    I.V.  959.2  759.4 1718.6  835.5  964.3 1799.8  --  -- --    Norepinephrine Volume 96.2 56.7 152.8 -- -- -- -- -- --    Heparin Volume 455 396.2 851.2 427.5 356.3 783.8 -- -- --    Dobutamine Volume 408 306.6 714.6 408 408 816 -- -- --    IV Piggyback Volume (Antibiotics) -- -- -- -- 200 200 -- -- --    Total Intake 1619.2 919.4 2538.6 1535.5 1264.3 2799.8 -- -- --       Output    Urine  360  650 1010  1000  -- 1000  --  -- --    Indwelling Cathether  1000 -- 1000 -- -- --    Stool  --  -- --  --  -- --  --  -- --    Number of Times Stooled 4 x 2 x 6 x -- 1 x 1 x -- -- --    Total Output  1000 -- 1000 -- -- --       Net I/O     1259.2 269.4 1528.6 535.5 1264.3 1799.8 -- -- --        Weight: 109.1 kg (240 lb 8.4 oz)  Recent Labs      03/18/17   0415  03/19/17   0405  03/19/17   1125   SODIUM  117*  119*  117*   POTASSIUM  4.0  4.0  3.6   CHLORIDE  86*  88*   86*   CO2  18*  15*  20   BUN  45*  53*  52*   CREATININE  2.17*  1.93*  1.78*   CALCIUM  8.7  9.0  8.6       GI/Nutrition:  Abd slightly distended.  Non-tender.  Vomited  NG placed to suction    Liver Function  Recent Labs      175  17   0405  17   1125   ALTSGPT  41  38   --    ASTSGOT  50*  52*   --    ALKPHOSPHAT  40  39   --    TBILIRUBIN  10.1*  10.3*   --    GLUCOSE  127*  114*  129*       Heme:  Recent Labs      17   0415  17   0405  17   1125  17   1825  17   0215   RBC  5.21  5.55   --    --    --    HEMOGLOBIN  13.0*  13.6*   --    --    --    HEMATOCRIT  38.8*  40.5*   --    --    --    PLATELETCT  142*  126*   --    --    --    APTT  62.3*  50.1*  135.6*  98.9*  98.6*       Infectious Disease:  Temp  Av.3 °C (97.3 °F)  Min: 35.7 °C (96.2 °F)  Max: 36.9 °C (98.5 °F)    Recent Labs      17   0405   WBC  15.5*  22.9*   NEUTSPOLYS  67.50  67.50   LYMPHOCYTES  13.20*  6.00*   MONOCYTES  8.80  13.70*   EOSINOPHILS  0.90  0.80   BASOPHILS  0.90  0.90   ASTSGOT  50*  52*   ALTSGPT  41  38   ALKPHOSPHAT  40  39   TBILIRUBIN  10.1*  10.3*     Current Facility-Administered Medications   Medication Dose Frequency Provider Last Rate Last Dose   • famotidine (PEPCID) tablet 20 mg  20 mg DAILY Everton Paredes M.D.   20 mg at 17 0839   • furosemide (LASIX) injection 20 mg  20 mg Q12HRS Everton Paredes M.D.   20 mg at 17 0838   • norepinephrine (LEVOPHED) 16 mg in  mL Infusion  0.5-30 mcg/min Continuous Everton Paredes M.D.   Stopped at 17 0235   • lactulose 20 GM/30ML solution 30 mL  30 mL BID Gavin Molina M.D.   30 mL at 17 3629   • rifaximin (XIFAXAN) tablet 550 mg  550 mg BID Everton Paredes M.D.   550 mg at 17 0839   • diphenhydrAMINE (BENADRYL) tablet/capsule 50 mg  50 mg HS PRN Tate Hogan M.D.   50 mg at 03/15/17 0306   • piperacillin-tazobactam (ZOSYN) 4.5 g in  mL IVPB  4.5  g Q6HRS Everton Paredes M.D.   Stopped at 03/20/17 0618   • clindamycin (CLEOCIN) IVPB premix 900 mg  900 mg Q8HRS Everton Paredes M.D.   Stopped at 03/20/17 0722   • oxycodone immediate-release (ROXICODONE) tablet 2.5-5 mg  2.5-5 mg Q4HRS PRN Alexia Munoz D.O.   5 mg at 03/19/17 1118   • DOBUTamine (DOBUTREX) 1 mg/mL premix infusion  5 mcg/kg/min Continuous Jeremy M Gonda, M.D. 34 mL/hr at 03/20/17 0218 5 mcg/kg/min at 03/20/17 0218   • Respiratory Care per Protocol   Continuous RT Alexis Hamilton M.D.       • ondansetron (ZOFRAN) syringe/vial injection 4 mg  4 mg Q4HRS PRN Alexis Hamilton M.D.       • ondansetron (ZOFRAN ODT) dispertab 4 mg  4 mg Q4HRS PRN Alexis Hamilton M.D.       • promethazine (PHENERGAN) tablet 12.5-25 mg  12.5-25 mg Q4HRS PRN Alexis Hamilton M.D.       • promethazine (PHENERGAN) suppository 12.5-25 mg  12.5-25 mg Q4HRS PRN Alexis Hamilton M.D.       • prochlorperazine (COMPAZINE) injection 5-10 mg  5-10 mg Q4HRS PRN Alexis Hamilton M.D.       • acetaminophen (TYLENOL) tablet 650 mg  650 mg Q6HRS PRN Alexis Hamilton M.D.   650 mg at 03/20/17 0838   • senna-docusate (PERICOLACE or SENOKOT S) 8.6-50 MG per tablet 2 Tab  2 Tab BID Alexis Hamilton M.D.   2 Tab at 03/20/17 0839    And   • polyethylene glycol/lytes (MIRALAX) PACKET 1 Packet  1 Packet QDAY PRN Alexis Hamilton M.D.        And   • magnesium hydroxide (MILK OF MAGNESIA) suspension 30 mL  30 mL QDAY PRN Alexis Hamilton M.D.        And   • bisacodyl (DULCOLAX) suppository 10 mg  10 mg QDAY PRN Alexis Hamilton M.D.       • heparin 1000 units/mL injection 3,200 Units  3,200 Units PRN Catherine Hillman, PHARMD   3,200 Units at 03/19/17 0518    And   • heparin infusion 25,000 units in 500 ml 0.45% nacl   Continuous Catherine Hillman PHARMD 27 mL/hr at 03/20/17 0313 1,350 Units/hr at 03/20/17 0313   • POLYMEM ALGINATE DRESSING PADS 1 Each  1 Each QDAY PRN Jeremy M Gonda, M.D.         Last reviewed on 3/13/2017  5:30 PM by Loi Ponce    Quality   Measures:  Labs reviewed, Medications reviewed and Radiology images reviewed  Holt catheter: Critically Ill - Requiring Accurate Measurement of Urinary Output  Central line in place: Need for access    DVT Prophylaxis: Heparin  DVT prophylaxis - mechanical: SCDs  Ulcer prophylaxis: Not indicated  Antibiotics: Treating active infection/contamination beyond 24 hours perioperative coverage          Assessment and Plan:    Acute on chronic hypoxemic respiratory failure  Acute on chronic systolic heart failure   - EF 15-20%   - dobutamine gtt   - force diuresis as tolerated  Acute cardiogenic pulmonary edema   - force diuresis as tolerated  Acute metabolic/toxic encephalopathy   - elevated ammonia   - lactulose and rifaximin   Bilateral lower extremity cellulitis with open ulcerations   - cont Zosyn and clindamycin per ID  Thrombocytopenia  Acute on chronic kidney disease  Hepatitis C  LV thrombus - cont heparin gtt  DNR/I    Keep in ICU.  Unstable cardiovascular status with inotropic support.    Critical Care Time:  32 minutes  Date of service:  3/20/17  65530  No time overlap  Time excludes procedures  Discussed with RN, RT, Team

## 2017-03-20 NOTE — DISCHARGE PLANNING
Received request from bedside RN to meet with the pt. Pt was sleeping. Left contact information for this SW with family member at bedside.

## 2017-03-20 NOTE — CARE PLAN
Problem: Nutritional:  Goal: Achieve adequate nutritional intake  Patient will consume 50% of meals and supplements.   Outcome: NOT MET  -pt is confused, not eating or drinking much, RN states today  -he is drinking some Boost with medications; will continue to send BID

## 2017-03-20 NOTE — PROGRESS NOTES
Infectious Disease Progress Note    Author: Ayala Zazueta M.D. DOS & Time created: 3/20/2017  1:17 PM    Chief Complaint   Patient presents with   • Peripheral Edema   • Weight Gain     10 lbs weight gain over the last 2 weeks   • Other     CHF exaserbation. Not compliant with medications.    • ALOC     Disoriented to event and time.    FU for bilateral LE cellulitis    Interval History:  3/17 AF, no CBC, lethargic but arousable to voice then quickly falls back to sleep  3/18 AF, WBC 15.5, remains on pressors, more awake and alert today, complaining of abd pain and leg pain, recent wound care photos reviewed  3/19 AF, WBC 22.9, white count increasing, pulled out RIJ and left IJ inserted o/n. Off levophed  3/20 AF WBC 23 on dobutamine-verbalizing but obtunded Pain in legs  Labs Reviewed, Medications Reviewed, Radiology Reviewed and Wound Reviewed.    Review of Systems:  Review of Systems   Constitutional: Negative for fever and chills.   Respiratory: Positive for shortness of breath.    Cardiovascular: Negative for chest pain.   Gastrointestinal: Positive for abdominal pain.   Musculoskeletal: Positive for myalgias.        Both legs   All other systems reviewed and are negative.      Hemodynamics:  Temp (24hrs), Av.4 °C (97.5 °F), Min:35.7 °C (96.2 °F), Max:36.9 °C (98.5 °F)  Temperature: 36.9 °C (98.5 °F)  Pulse  Av.2  Min: 50  Max: 194Heart Rate (Monitored): (!) 107  NIBP: 100/48 mmHg      Physical Exam:  Physical Exam   Constitutional: He appears well-developed.   Chronically ill Obese   HENT:   Head: Normocephalic and atraumatic.   Eyes: EOM are normal. Pupils are equal, round, and reactive to light. Scleral icterus is present.   Neck: Neck supple.   Left IJ   Cardiovascular:   Murmur heard.  Tachy   Pulmonary/Chest: Effort normal. No respiratory distress. He has no wheezes.   Abdominal: Soft. There is no tenderness.   Musculoskeletal: He exhibits edema.   Bilateral LE erythema and edema.  Toes  "purplish  Ruptured bullae-serous drainage  Pitting edema to thigh   Neurological: He is alert.   Skin: There is erythema.   Nursing note and vitals reviewed.      Labs:  Recent Labs      03/18/17 0415 03/19/17   0405  03/20/17   0918   WBC  15.5*  22.9*  23.7*   RBC  5.21  5.55  4.94   HEMOGLOBIN  13.0*  13.6*  12.2*   HEMATOCRIT  38.8*  40.5*  36.5*   MCV  74.5*  73.0*  73.9*   MCH  25.0*  24.5*  24.7*   RDW  50.8*  49.1  49.7   PLATELETCT  142*  126*  114*   MPV  10.3  10.2  10.0   NEUTSPOLYS  67.50  67.50  74.80*   LYMPHOCYTES  13.20*  6.00*  5.20*   MONOCYTES  8.80  13.70*  2.60   EOSINOPHILS  0.90  0.80  0.00   BASOPHILS  0.90  0.90  0.00   RBCMORPHOLO  Present  Present  Present     Recent Labs      03/19/17 0405  03/19/17   1125  03/20/17   0918   SODIUM  119*  117*  119*   POTASSIUM  4.0  3.6  3.6   CHLORIDE  88*  86*  88*   CO2  15*  20  22   GLUCOSE  114*  129*  135*   BUN  53*  52*  52*     Recent Labs      03/18/17 0415 03/19/17 0405  03/19/17 1125  03/20/17   0918   ALBUMIN  2.8*  2.8*   --    --    TBILIRUBIN  10.1*  10.3*   --    --    ALKPHOSPHAT  40  39   --    --    TOTPROTEIN  7.1  7.0   --    --    ALTSGPT  41  38   --    --    ASTSGOT  50*  52*   --    --    CREATININE  2.17*  1.93*  1.78*  1.56*       BLOOD CULTURE   Date Value Ref Range Status   03/13/2017 No growth after 5 days of incubation.  Final     BLOOD CULTURE HOLD   Date Value Ref Range Status   02/09/2014 Collected  Final      Results     Procedure Component Value Units Date/Time    BLOOD CULTURE [[id number]] Collected:  03/13/17 1550    Order Status:  Completed Specimen Information:  Blood from Peripheral Updated:  03/18/17 1900     Significant Indicator NEG      Source BLD      Site PERIPHERAL      Blood Culture No growth after 5 days of incubation.     Narrative:      Per Hospital Policy: Only change Specimen Src: to \"Line\" if  specified by physician order.    BLOOD CULTURE [[id number]] Collected:  03/13/17 1547    Order " "Status:  Completed Specimen Information:  Blood from Peripheral Updated:  03/18/17 1900     Significant Indicator NEG      Source BLD      Site PERIPHERAL      Blood Culture No growth after 5 days of incubation.     Narrative:      Per Hospital Policy: Only change Specimen Src: to \"Line\" if  specified by physician order.    FLUID CULTURE W/GRAM STAIN [[id number]]     Order Status:  No result Specimen Information:  Body Fluid from Ascities Fluid     CULTURE WOUND W/ GRAM STAIN [[id number]] Collected:  03/13/17 2230    Order Status:  Completed Specimen Information:  Wound from Right Leg Updated:  03/17/17 0841     Gram Stain Result No organisms seen.      Significant Indicator NEG      Source WND      Site RIGHT LEG      Culture Result Wound No growth at 72 hours.     URINE CULTURE(NEW) [[id number]] Collected:  03/13/17 1659    Order Status:  Completed Specimen Information:  Urine Updated:  03/15/17 0816     Significant Indicator NEG      Source UR      Site --      Urine Culture No growth at 48 hours     Narrative:      Indication for culture:->Emergency Room Patient    GRAM STAIN [[id number]] Collected:  03/13/17 2230    Order Status:  Completed Specimen Information:  Wound Updated:  03/14/17 1608     Significant Indicator .      Source WND      Site RIGHT LEG      Gram Stain Result No organisms seen.     URINALYSIS [[id number]]  (Abnormal) Collected:  03/13/17 1659    Order Status:  Completed Specimen Information:  Urine Updated:  03/13/17 1739     Micro Urine Req Microscopic      Color Yellow      Character Sl Cloudy (A)      Specific Gravity 1.013      Ph 5.0      Glucose Negative mg/dL      Ketones Negative mg/dL      Protein Negative mg/dL      Nitrite Negative      Leukocyte Esterase Negative      Occult Blood Small (A)     Narrative:      Indication for culture:->Emergency Room Patient          Fluids:  Intake/Output       03/18/17 0700 - 03/19/17 0659 03/19/17 0700 - 03/20/17 0659 03/20/17 0700 - 03/21/17 0659    "   [id number] 1900-0659 Total 0700-1859 1900-0659 Total 1468-9691 4223-2984 Total       Intake    P.O.  660  160 820  700  300 1000  120  -- 120    P.O. 660 160 820  120 -- 120    I.V.  959.2  759.4 1718.6  835.5  964.3 1799.8  439  -- 439    Norepinephrine Volume 96.2 56.7 152.8 -- -- -- -- -- --    Heparin Volume 455 396.2 851.2 427.5 356.3 783.8 201 -- 201    Dobutamine Volume 408 306.6 714.6 408 408 816 238 -- 238    IV Piggyback Volume (Antibiotics) -- -- -- -- 200 200 -- -- --    Total Intake 1619.2 919.4 2538.6 1535.5 1264.3 2799.8 559 -- 559       Output    Urine  360  650 1010  1000  -- 1000  --  -- --    Indwelling Cathether  1000 -- 1000 -- -- --    Stool  --  -- --  --  -- --  --  -- --    Number of Times Stooled 4 x 2 x 6 x -- 1 x 1 x 2 x -- 2 x    Total Output  1000 -- 1000 -- -- --       Net I/O     1259.2 269.4 1528.6 535.5 1264.3 1799.8 559 -- 559        Weight: 109.1 kg (240 lb 8.4 oz)    Assessment:  Active Hospital Problems    Diagnosis   • Acute renal insufficiency [N28.9]   • Acute on chronic systolic congestive heart failure, NYHA class 4, present on admission (EF 15-20%) [I50.23]   • Hepatitis C virus infection [B19.20]   • Hyponatremia [E87.1]   • Hyperkalemia [E87.5]   • Cellulitis [L03.90]   • Shock (CMS-HCC) [R57.9]   • LV (left ventricular) mural thrombus (CMS-HCC) [I21.3]       Plan:  Bilateral lower extremity cellulitis with bulla-not improving  Wound cx - mixed skin patricia  Continue clindamycin and zosyn. Endpoint clinical. 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

## 2017-03-20 NOTE — CARE PLAN
Problem: Bowel/Gastric:  Goal: Normal bowel function is maintained or improved  Outcome: PROGRESSING AS EXPECTED  1 BM on noc shift    Problem: Pain Management  Goal: Pain level will decrease to patient’s comfort goal  Outcome: PROGRESSING AS EXPECTED  Q@h turns, no complaints of pain throughout night

## 2017-03-20 NOTE — CARE PLAN
Problem: Mobility  Goal: Risk for activity intolerance will decrease  Intervention: Assess and monitor signs of activity intolerance  Pt refusing mobilization due to pain. Education provided.       Problem: Hemodynamic Status  Goal: Vital Signs and Fluid Balance Management  Intervention: Hemodynamic Monitoring  Pt on continuous hemodynamic monitoring and vasopressor support.

## 2017-03-21 PROBLEM — J96.00 ACUTE RESPIRATORY FAILURE (HCC): Status: ACTIVE | Noted: 2017-01-01

## 2017-03-21 NOTE — CARE PLAN
Problem: Bowel/Gastric:  Goal: Normal bowel function is maintained or improved  Outcome: PROGRESSING AS EXPECTED  BM q2h, ngt to liws    Problem: Skin Integrity  Goal: Risk for impaired skin integrity will decrease  Outcome: PROGRESSING AS EXPECTED  q2h turns, pillow support

## 2017-03-21 NOTE — DISCHARGE PLANNING
Received request from Palliative RN , Damaris to help facilitate a care conference with the pt's guardian/son Irvin. Confirmed meeting for 1100 on Thursday, 3/23/2017. Confirmed meeting time with Dr. Wu as well as Palliative Care.

## 2017-03-21 NOTE — PROGRESS NOTES
Pulmonary Critical Care Progress Note    Interval Events:  24 hour interval history reviewed  Reason for visit:  Acute on chronic systolic heart failure     - vomited yest   - NG to suction   - SR/ST   - dobut - 5   - heparin gtt   - CXR with worsening edema   - RA    PFSH:  No change.    Respiratory:     Pulse Oximetry: 94 %  CXR with increased pulmonary edema  Room air  Scattered fine and coarse crackles bilaterally    Recent Labs      03/19/17   0318   ISTATAPH  7.434   ISTATAPCO2  22.5*   ISTATAPO2  79   ISTATATCO2  16*   UGORRFO5CPP  96   ISTATARTHCO3  15.0*   ISTATARTBE  -7*   ISTATTEMP  96.4 F   ISTATFIO2  21   ISTATSPEC  Arterial   ISTATAPHTC  7.452   YQOVDSLI1TE  73       HemoDynamics:  Pulse: (!) 108, Heart Rate (Monitored): (!) 108  NIBP: (!) 82/51 mmHg    SR  Dobut - 5    Neuro:  Confused  No focal weakness    Fluids:  Intake/Output       03/19/17 0700 - 03/20/17 0659 03/20/17 0700 - 03/21/17 0659 03/21/17 0700 - 03/22/17 0659      6392-2132 7633-1071 Total 9577-5640 0850-6771 Total 7324-2366 6705-6813 Total       Intake    P.O.  700  300 1000  120  -- 120  --  -- --    P.O.  120 -- 120 -- -- --    I.V.  835.5  964.3 1799.8  1009  848.3 1857.3  49.9  -- 49.9    Heparin Volume 427.5 356.3 783.8 351 342.3 693.3 49.9 -- 49.9    Dobutamine Volume 408 408 816 408 306 714 -- -- --    IV Piggyback Volume (Antibiotics) -- 200 200 250 200 450 -- -- --    Total Intake 1535.5 1264.3 2799.8 1129 848.3 1977.3 49.9 -- 49.9       Output    Urine  1000  -- 1000  675  1115 1790  --  -- --    Indwelling Cathether 1000 -- 2443 947 4287 1790 -- -- --    Emesis  --  -- --  300  -- 300  --  -- --    Emesis -- -- -- 300 -- 300 -- -- --    Emesis - Number of Times -- -- -- 1 x -- 1 x -- -- --    Drains  --  -- --  --  200 200  --  -- --    Nasogastric Tube -- -- -- -- 200 200 -- -- --    Stool/Urine  --  -- --  850  -- 850  --  -- --    Measurable Stool (ml) -- -- -- 850 -- 850 -- -- --    Stool  --  -- --  --  --  --  --  -- --    Number of Times Stooled -- 1 x 1 x 6 x 6 x 12 x 0 x -- 0 x    Total Output 1000 -- 1000 1825 1315 3140 -- -- --       Net I/O     535.5 1264.3 1799.8 -696 -466.8 -1162.8 49.9 -- 49.9           Recent Labs      17   1125  17   0540   SODIUM  117*  119*  124*   POTASSIUM  3.6  3.6  3.2*   CHLORIDE  86*  88*  91*   CO2  20  22  22   BUN  52*  52*  47*   CREATININE  1.78*  1.56*  1.45*   CALCIUM  8.6  8.2*  8.2*       GI/Nutrition:  Abd slightly distended.  Non-tender.  NG to suction    Liver Function  Recent Labs      17   04017   0540   ALTSGPT  38   --    --   26   ASTSGOT  52*   --    --   42   ALKPHOSPHAT  39   --    --   35   TBILIRUBIN  10.3*   --    --   7.9*   GLUCOSE  114*  129*  135*  100*       Heme:  Recent Labs      17   1535  17   2250  17   0540   RBC  5.55   --   4.94   --    --   4.67*   HEMOGLOBIN  13.6*   --   12.2*   --    --   11.7*   HEMATOCRIT  40.5*   --   36.5*   --    --   34.7*   PLATELETCT  126*   --   114*   --    --   106*   APTT  50.1*   < >  50.5*  90.4*  117.6*  98.7*    < > = values in this interval not displayed.       Infectious Disease:  Temp  Av.4 °C (97.6 °F)  Min: 36.2 °C (97.2 °F)  Max: 36.7 °C (98 °F)    Recent Labs      17   0540   WBC  22.9*  23.7*  23.0*   NEUTSPOLYS  67.50  74.80*  84.50*   LYMPHOCYTES  6.00*  5.20*  4.30*   MONOCYTES  13.70*  2.60  1.70   EOSINOPHILS  0.80  0.00  1.70   BASOPHILS  0.90  0.00  0.90   ASTSGOT  52*   --   42   ALTSGPT  38   --   26   ALKPHOSPHAT  39   --   35   TBILIRUBIN  10.3*   --   7.9*     Current Facility-Administered Medications   Medication Dose Frequency Provider Last Rate Last Dose   • furosemide (LASIX) injection 40 mg  40 mg Q12HRS Gavin Molina M.D.   40 mg at 17 0843   • potassium chloride (KLOR-CON) 20 MEQ packet 20 mEq  20  mEq DAILY Gavin Molina M.D.   20 mEq at 03/21/17 0843   • famotidine (PEPCID) tablet 20 mg  20 mg DAILY Everton Paredes M.D.   20 mg at 03/21/17 0843   • lactulose 20 GM/30ML solution 30 mL  30 mL BID Gavin Molina M.D.   Stopped at 03/20/17 2100   • rifaximin (XIFAXAN) tablet 550 mg  550 mg BID Everton Paredes M.D.   550 mg at 03/21/17 0900   • diphenhydrAMINE (BENADRYL) tablet/capsule 50 mg  50 mg HS PRN Tate Hogan M.D.   50 mg at 03/15/17 0306   • piperacillin-tazobactam (ZOSYN) 4.5 g in  mL IVPB  4.5 g Q6HRS Everton Paredes M.D.   Stopped at 03/21/17 0635   • clindamycin (CLEOCIN) IVPB premix 900 mg  900 mg Q8HRS Everton Paredes M.D.   Stopped at 03/21/17 0736   • oxycodone immediate-release (ROXICODONE) tablet 2.5-5 mg  2.5-5 mg Q4HRS PRN Alexia Munoz D.O.   5 mg at 03/19/17 1118   • DOBUTamine (DOBUTREX) 1 mg/mL premix infusion  5 mcg/kg/min Continuous Jeremy M Gonda, M.D. 34 mL/hr at 03/21/17 0223 5 mcg/kg/min at 03/21/17 0223   • Respiratory Care per Protocol   Continuous RT Alexis Hamilton M.D.       • ondansetron (ZOFRAN) syringe/vial injection 4 mg  4 mg Q4HRS PRN Alexis Hamilton M.D.   4 mg at 03/20/17 1821   • ondansetron (ZOFRAN ODT) dispertab 4 mg  4 mg Q4HRS PRN Alexis Hamilton M.D.       • promethazine (PHENERGAN) tablet 12.5-25 mg  12.5-25 mg Q4HRS PRN Alexis Hamilton M.D.       • promethazine (PHENERGAN) suppository 12.5-25 mg  12.5-25 mg Q4HRS PRN Alexis Hamilton M.D.       • prochlorperazine (COMPAZINE) injection 5-10 mg  5-10 mg Q4HRS PRN Alexis Hamilton M.D.       • acetaminophen (TYLENOL) tablet 650 mg  650 mg Q6HRS PRN Alexis Hamilton M.D.   650 mg at 03/20/17 0838   • senna-docusate (PERICOLACE or SENOKOT S) 8.6-50 MG per tablet 2 Tab  2 Tab BID Alexis Hamilton M.D.   Stopped at 03/20/17 2100    And   • polyethylene glycol/lytes (MIRALAX) PACKET 1 Packet  1 Packet QDAY PRN Alexis Hamilton M.D.        And   • magnesium hydroxide (MILK OF MAGNESIA) suspension 30 mL  30 mL QDAY  PRN Alexis Hamilton M.D.        And   • bisacodyl (DULCOLAX) suppository 10 mg  10 mg QDAY PRN Alexis Hamilton M.D.       • heparin 1000 units/mL injection 3,200 Units  3,200 Units PRN Catherine Hillman, PHARMD   3,200 Units at 03/20/17 1007    And   • heparin infusion 25,000 units in 500 ml 0.45% nacl   Continuous Catherine Hillman PHARMD 24 mL/hr at 03/21/17 0637 1,200 Units/hr at 03/21/17 0637   • POLYMEM ALGINATE DRESSING PADS 1 Each  1 Each QDAY PRN Jeremy M Gonda, M.D.         Last reviewed on 3/13/2017  5:30 PM by Loi Ponce    Quality  Measures:  Labs reviewed, Medications reviewed and Radiology images reviewed  Holt catheter: Critically Ill - Requiring Accurate Measurement of Urinary Output  Central line in place: Need for access    DVT Prophylaxis: Heparin  DVT prophylaxis - mechanical: SCDs  Ulcer prophylaxis: Not indicated  Antibiotics: Treating active infection/contamination beyond 24 hours perioperative coverage          Assessment and Plan:    Acute on chronic hypoxemic respiratory failure  Acute on chronic systolic heart failure   - EF 15-20%   - dobutamine gtt   - force diuresis as tolerated  Acute cardiogenic pulmonary edema   - worse   - force diuresis as tolerated  Acute metabolic/toxic encephalopathy   - elevated ammonia   - lactulose and rifaximin  Sepsis - skin source  Bilateral lower extremity cellulitis with open ulcerations   - cont Zosyn and clindamycin per ID  Thrombocytopenia  Acute on chronic kidney disease  Hepatitis C  LV thrombus - cont heparin gtt  DNR/I    Unstable cardiovascular status with inotropic support.  Worsening pulmonary edema.    Critical Care Time:  35 minutes  Date of service:  3/21/17  89406  No time overlap  Time excludes procedures  Discussed with RN, RT, Team

## 2017-03-21 NOTE — PROGRESS NOTES
Infectious Disease Progress Note    Author: Ayala Zazueta M.D. DOS & Time created: 3/21/2017  10:50 AM    Chief Complaint   Patient presents with   • Peripheral Edema   • Weight Gain     10 lbs weight gain over the last 2 weeks   • Other     CHF exaserbation. Not compliant with medications.    • ALOC     Disoriented to event and time.    FU for bilateral LE cellulitis    Interval History:  3/17 AF, no CBC, lethargic but arousable to voice then quickly falls back to sleep  3/18 AF, WBC 15.5, remains on pressors, more awake and alert today, complaining of abd pain and leg pain, recent wound care photos reviewed  3/19 AF, WBC 22.9, white count increasing, pulled out RIJ and left IJ inserted o/n. Off levophed  3/20 AF WBC 23 on dobutamine-verbalizing but obtunded Pain in legs  3/21 AF WBC 23 yesterdays's projectile vomiting feculent material noted  Labs Reviewed, Medications Reviewed, Radiology Reviewed and Wound Reviewed.    Review of Systems:  Review of Systems   Constitutional: Negative for fever and chills.   Respiratory: Positive for shortness of breath.    Cardiovascular: Negative for chest pain.   Gastrointestinal: Positive for nausea, vomiting and abdominal pain.   Musculoskeletal: Positive for myalgias.        Both legs   All other systems reviewed and are negative.      Hemodynamics:  Temp (24hrs), Av.5 °C (97.7 °F), Min:36.2 °C (97.2 °F), Max:36.8 °C (98.2 °F)  Temperature: 36.8 °C (98.2 °F)  Pulse  Av  Min: 50  Max: 194Heart Rate (Monitored): (!) 108  NIBP: (!) 88/54 mmHg      Physical Exam:  Physical Exam   Constitutional: He appears well-developed.   Chronically ill Obese   HENT:   Head: Normocephalic and atraumatic.   NGT   Eyes: EOM are normal. Pupils are equal, round, and reactive to light. Scleral icterus is present.   Neck: Neck supple.   Left IJ   Cardiovascular:   Murmur heard.  Tachy   Pulmonary/Chest: Effort normal. No respiratory distress. He has no wheezes.   Abdominal: Soft. He  exhibits distension. There is no tenderness. There is no rebound and no guarding.   Musculoskeletal: He exhibits edema.   Bilateral LE erythema and edema.  Toes purplish  Ruptured bullae-serous drainage  Pitting edema to thigh   Neurological: He is alert.   Skin: There is erythema.   Nursing note and vitals reviewed.      Labs:  Recent Labs      03/19/17   0405  03/20/17   0918  03/21/17   0540   WBC  22.9*  23.7*  23.0*   RBC  5.55  4.94  4.67*   HEMOGLOBIN  13.6*  12.2*  11.7*   HEMATOCRIT  40.5*  36.5*  34.7*   MCV  73.0*  73.9*  74.3*   MCH  24.5*  24.7*  25.1*   RDW  49.1  49.7  51.8*   PLATELETCT  126*  114*  106*   MPV  10.2  10.0  10.3   NEUTSPOLYS  67.50  74.80*  84.50*   LYMPHOCYTES  6.00*  5.20*  4.30*   MONOCYTES  13.70*  2.60  1.70   EOSINOPHILS  0.80  0.00  1.70   BASOPHILS  0.90  0.00  0.90   RBCMORPHOLO  Present  Present  Present     Recent Labs      03/19/17   1125  03/20/17   0918  03/21/17   0540   SODIUM  117*  119*  124*   POTASSIUM  3.6  3.6  3.2*   CHLORIDE  86*  88*  91*   CO2  20  22  22   GLUCOSE  129*  135*  100*   BUN  52*  52*  47*     Recent Labs      03/19/17   0405  03/19/17   1125  03/20/17 0918  03/21/17   0540   ALBUMIN  2.8*   --    --   2.4*   TBILIRUBIN  10.3*   --    --   7.9*   ALKPHOSPHAT  39   --    --   35   TOTPROTEIN  7.0   --    --   6.2   ALTSGPT  38   --    --   26   ASTSGOT  52*   --    --   42   CREATININE  1.93*  1.78*  1.56*  1.45*       BLOOD CULTURE   Date Value Ref Range Status   03/13/2017 No growth after 5 days of incubation.  Final     BLOOD CULTURE HOLD   Date Value Ref Range Status   02/09/2014 Collected  Final      Results     Procedure Component Value Units Date/Time    BLOOD CULTURE [[id number]] Collected:  03/13/17 1550    Order Status:  Completed Specimen Information:  Blood from Peripheral Updated:  03/18/17 1900     Significant Indicator NEG      Source BLD      Site PERIPHERAL      Blood Culture No growth after 5 days of incubation.     Narrative:   "    Per Hospital Policy: Only change Specimen Src: to \"Line\" if  specified by physician order.    BLOOD CULTURE [[id number]] Collected:  03/13/17 1547    Order Status:  Completed Specimen Information:  Blood from Peripheral Updated:  03/18/17 1900     Significant Indicator NEG      Source BLD      Site PERIPHERAL      Blood Culture No growth after 5 days of incubation.     Narrative:      Per Hospital Policy: Only change Specimen Src: to \"Line\" if  specified by physician order.    FLUID CULTURE W/GRAM STAIN [[id number]]     Order Status:  Canceled Specimen Information:  Body Fluid from Ascities Fluid     CULTURE WOUND W/ GRAM STAIN [[id number]] Collected:  03/13/17 2230    Order Status:  Completed Specimen Information:  Wound from Right Leg Updated:  03/17/17 0841     Gram Stain Result No organisms seen.      Significant Indicator NEG      Source WND      Site RIGHT LEG      Culture Result Wound No growth at 72 hours.     URINE CULTURE(NEW) [[id number]] Collected:  03/13/17 1659    Order Status:  Completed Specimen Information:  Urine Updated:  03/15/17 0816     Significant Indicator NEG      Source UR      Site --      Urine Culture No growth at 48 hours     Narrative:      Indication for culture:->Emergency Room Patient    GRAM STAIN [[id number]] Collected:  03/13/17 2230    Order Status:  Completed Specimen Information:  Wound Updated:  03/14/17 1608     Significant Indicator .      Source WND      Site RIGHT LEG      Gram Stain Result No organisms seen.           Fluids:  Intake/Output       03/19/17 0700 - 03/20/17 0659 03/20/17 0700 - 03/21/17 0659 03/21/17 0700 - 03/22/17 0659      8716-0774 1706-8774 Total 1711-6650 0828-9046 Total 7415-6448 1453-8478 Total       Intake    P.O.  700  300 1000  120  -- 120  --  -- --    P.O.  120 -- 120 -- -- --    I.V.  835.5  964.3 1799.8  1009  848.3 1857.3  175.9  -- 175.9    Heparin Volume 427.5 356.3 783.8 351 342.3 693.3 73.9 -- 73.9    Dobutamine Volume 408 408 " 816 408 306 714 102 -- 102    IV Piggyback Volume (Antibiotics) -- 200 200 250 200 450 -- -- --    Total Intake 1535.5 1264.3 2799.8 1129 848.3 1977.3 175.9 -- 175.9       Output    Urine  1000  -- 1000  675  1115 1790  --  -- --    Indwelling Cathether 1000 -- 2990 373 3932 1790 -- -- --    Emesis  --  -- --  300  -- 300  --  -- --    Emesis -- -- -- 300 -- 300 -- -- --    Emesis - Number of Times -- -- -- 1 x -- 1 x -- -- --    Drains  --  -- --  --  200 200  --  -- --    Nasogastric Tube -- -- -- -- 200 200 -- -- --    Stool/Urine  --  -- --  850  -- 850  --  -- --    Measurable Stool (ml) -- -- -- 850 -- 850 -- -- --    Stool  --  -- --  --  -- --  --  -- --    Number of Times Stooled -- 1 x 1 x 6 x 6 x 12 x 0 x -- 0 x    Total Output 1000 -- 1000 1825 1315 3140 -- -- --       Net I/O     535.5 1264.3 1799.8 -696 -466.8 -1162.8 175.9 -- 175.9        DX-CHEST-PORTABLE (1 VIEW) (Final result) Result time: 03/21/17 03:06:06     Final result by Anika Saavedra M.D. (03/21/17 03:06:06)     Impression:     1.  Pulmonary edema and/or infiltrates are identified, which appear somewhat increased since the prior exam.  2.  Cardiomegaly  3.  Atherosclerosis        Assessment:  Active Hospital Problems    Diagnosis   • Acute renal insufficiency [N28.9]   • Acute on chronic systolic congestive heart failure, NYHA class 4, present on admission (EF 15-20%) [I50.23]   • Hepatitis C virus infection [B19.20]   • Hyponatremia [E87.1]   • Hyperkalemia [E87.5]   • Cellulitis [L03.90]   • Shock (CMS-HCC) [R57.9]   • LV (left ventricular) mural thrombus (CMS-HCC) [I21.3]       Plan:  Bilateral lower extremity cellulitis with bulla-not improving  Wound cx - mixed skin patricia  Continue zosyn. Endpoint clinical. 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 Possible aspiration pneumonitis  NGT placed  Check lipase  Keep meds IV  Resp status currently stable, likely aspiration  CXR bilateral infiltrates by my read    [place]  Improving    Prognosis - poor    Recommend Palliative care/hospice    DW IM

## 2017-03-21 NOTE — PROGRESS NOTES
Son at bedside and spoke to  about plan of care and future. Son stated he wouldn't want patient to go home but does not want his dad to have so many interventions provided for him that cause discomfort. Son mentioned hospice. A family meeting is scheduled for Thursday, 3/23/2017 at 1100.

## 2017-03-21 NOTE — PALLIATIVE CARE
Palliative Care Update:  Message left with pt' son Irvin LEYVA to call PC regarding setting up a time to talk about a plan for his father. Pietro STROUD will also assist with reaching out to son.  Need to clarify goals of care for pt and plan including option of hospice.  Please call Palliative Care ext. [phone] when son at bed side or when care conference arranged.

## 2017-03-21 NOTE — PROGRESS NOTES
Dr. Pena on unit. RN discussed NPO status and medication pass throughout noc shift due to projectile vomiting on day shift and imminent NGT placement. Orders received to keep patient NPO throughout night.

## 2017-03-21 NOTE — CARE PLAN
Problem: Bowel/Gastric:  Goal: Normal bowel function is maintained or improved  Outcome: PROGRESSING AS EXPECTED  Patient having adequate bowel movements, will continue to assess NGT output    Problem: Fluid Volume:  Goal: Will maintain balanced intake and output  Outcome: PROGRESSING AS EXPECTED  Patient receiving diuretics    Problem: Skin Integrity  Goal: Risk for impaired skin integrity will decrease  Outcome: PROGRESSING AS EXPECTED  Patient receiving turns every two hours

## 2017-03-21 NOTE — PROGRESS NOTES
Hospital Medicine Progress Note, Adult, Complex               Author: Pietro RONIT Wu Date & Time created: 3/21/2017  9:10 AM     Interval History:  50 y/o male presented with dyspnea.  Noted to have acute on chronic systolic heart failure, decompensated.  Also noted bilateral LE cellulitis causing sepsis.  Now on dobutamine.  Pt seen in ICU, ICU care given.  Discussed patient condition and plan with RN, RT and charge nurse / hot rounds.        Review of Systems:  Review of Systems   Unable to perform ROS: acuity of condition       Physical Exam:  Physical Exam   Constitutional:  Non-toxic appearance. No distress.   Ill appearing    HENT:   Head: Normocephalic and atraumatic.   Mouth/Throat: No oropharyngeal exudate.   Eyes: Right eye exhibits no discharge. Left eye exhibits no discharge.   Neck: Neck supple. No tracheal deviation, no edema and no erythema present.   Cardiovascular: Normal rate and regular rhythm.  Exam reveals no gallop and no friction rub.    No murmur heard.  Pulmonary/Chest: Effort normal and breath sounds normal. No stridor. No respiratory distress. He has no wheezes. He has no rales.   Abdominal: Soft. Bowel sounds are normal. He exhibits no distension.   Musculoskeletal: He exhibits edema.   Lymphadenopathy:     He has no cervical adenopathy.   Neurological:   Awake but confused    Skin: Skin is warm and dry. He is not diaphoretic. There is erythema (bilateral LE with blisters ).   Psychiatric: His speech is tangential. He is slowed. Cognition and memory are impaired.   Nursing note and vitals reviewed.      Labs:  Recent Labs      03/19/17   0318   ISTATAPH  7.434   ISTATAPCO2  22.5*   ISTATAPO2  79   ISTATATCO2  16*   [id number]  96   ISTATARTHCO3  15.0*   ISTATARTBE  -7*   ISTATTEMP  96.4 F   ISTATFIO2  21   ISTATSPEC  Arterial   ISTATAPHTC  7.452   [id number]  73         Recent Labs      03/19/17   1125  03/20/17   0918  03/21/17   0540   SODIUM  117*  119*  124*   POTASSIUM  3.6  3.6   3.2*   CHLORIDE  86*  88*  91*   CO2  20  22  22   BUN  52*  52*  47*   CREATININE  1.78*  1.56*  1.45*   CALCIUM  8.6  8.2*  8.2*     Recent Labs      17   0405  17   1125  17   0918  17   0540   ALTSGPT  38   --    --   26   ASTSGOT  52*   --    --   42   ALKPHOSPHAT  39   --    --   35   TBILIRUBIN  10.3*   --    --   7.9*   GLUCOSE  114*  129*  135*  100*     Recent Labs      17   0405   17   0918  17   1535  17   2250  17   0540   RBC  5.55   --   4.94   --    --   4.67*   HEMOGLOBIN  13.6*   --   12.2*   --    --   11.7*   HEMATOCRIT  40.5*   --   36.5*   --    --   34.7*   PLATELETCT  126*   --   114*   --    --   106*   APTT  50.1*   < >  50.5*  90.4*  117.6*  98.7*    < > = values in this interval not displayed.     Recent Labs      17   0540   WBC  22.9*  23.7*  23.0*   NEUTSPOLYS  67.50  74.80*  84.50*   LYMPHOCYTES  6.00*  5.20*  4.30*   MONOCYTES  13.70*  2.60  1.70   EOSINOPHILS  0.80  0.00  1.70   BASOPHILS  0.90  0.00  0.90   ASTSGOT  52*   --   42   ALTSGPT  38   --   26   ALKPHOSPHAT  39   --   35   TBILIRUBIN  10.3*   --   7.9*           Hemodynamics:  Temp (24hrs), Av.4 °C (97.6 °F), Min:36.2 °C (97.2 °F), Max:36.7 °C (98 °F)  Temperature: 36.7 °C (98 °F)  Pulse  Av.1  Min: 50  Max: 194Heart Rate (Monitored): (!) 108  NIBP: (!) 82/51 mmHg    Respiratory:    Respiration: (!) 21, Pulse Oximetry: 94 %     Work Of Breathing / Effort: Moderate  RUL Breath Sounds: Diminished, RML Breath Sounds: Diminished, RLL Breath Sounds: Diminished, BHAVNA Breath Sounds: Diminished, LLL Breath Sounds: Diminished  Fluids:    Intake/Output Summary (Last 24 hours) at 17 0910  Last data filed at 17 0800   Gross per 24 hour   Intake 1724.1 ml   Output   2990 ml   Net -1265.9 ml        GI/Nutrition:  Orders Placed This Encounter   Procedures   • DIET NPO     Standing Status: Standing      Number of  Occurrences: 1      Standing Expiration Date:      Order Specific Question:  Restrict to:     Answer:  Strict [1]      Comments:  no meds     Medical Decision Making, by Problem:  Active Hospital Problems    Diagnosis   • Acute on chronic systolic congestive heart failure, NYHA class 4, present on admission (EF 15-20%) [I50.23]  - severe, EF of 20%  - continue dobutamine  - medical management as able in pt with borderline BP     • Hepatitis C virus infection [B19.20]  - with liver failure  - continue rifaxamin, lactulose      • Acute renal insufficiency [N28.9]  - improving on dobutamine  - when further improved will need to stop dobutamine and monitor     • Essential hypertension, benign [I10]  - low on dobutamine     • Acute respiratory failure (CMS-HCC) [J96.00]  - improved     • Hyponatremia [E87.1]  - improved with lasix  - d/t fluid overload  - continue lasix and repeat bmp in am     • Hyperkalemia [E87.5]  - resolved, now hypokalemia  - increase oral replacement in pt on lasix     • Cellulitis [L03.90]  - improved, continue clindamycin/zosyn     • Shock (CMS-HCC) [R57.9]  - borderline on dobutamine, no need for pressors     • LV (left ventricular) mural thrombus (CMS-HCC) [I21.3]  - continue heparin gtt         Labs reviewed, Medications reviewed and Radiology images reviewed        DVT Prophylaxis: Heparin    Ulcer prophylaxis: Yes  Antibiotics: Treating active infection/contamination beyond 24 hours perioperative coverage    Patient is critically ill.   The patient continues to have : heart failure   The vital organ system that is effected is the: all are at risk from decreased profusion    If untreated there is a high chance of deterioration into: end organ failure   The critical care that I am providing today is: dobutamine gtt  The critical care that has been undertaken is medically complex.   There has been no overlap in critical care time.  Critical care time not including procedures, no overlap: 40  minutes

## 2017-03-21 NOTE — CARE PLAN
Problem: Venous Thromboembolism (VTW)/Deep Vein Thrombosis (DVT) Prevention:  Goal: Patient will participate in Venous Thrombosis (VTE)/Deep Vein Thrombosis (DVT)Prevention Measures  Outcome: PROGRESSING AS EXPECTED  Patient continues on heparin drip    Problem: Bowel/Gastric:  Goal: Normal bowel function is maintained or improved  Outcome: PROGRESSING AS EXPECTED  Patient receiving stool sfoteners and eliminating adequately    Problem: Skin Integrity  Goal: Risk for impaired skin integrity will decrease  Outcome: PROGRESSING AS EXPECTED  Patient receiving frequent turns and continuos skin care

## 2017-03-21 NOTE — PROGRESS NOTES
Patient vomited projectile fecal matter, Dr. Pena ordered and Nasogastric tube to low wall suction

## 2017-03-21 NOTE — PROGRESS NOTES
Luba from Lab called with critical result of Na 119 at 1000. Critical lab result read back to OSIEL Chopra Dr. notified of critical lab result at 1000.  Critical lab result read back by Dr. Plasencia.

## 2017-03-22 NOTE — PROGRESS NOTES
Pulmonary Critical Care Progress Note    Interval Events:  24 hour interval history reviewed  Reason for visit:  Acute on chronic systolic heart failure     - ST   - confused   - Dobut - 5   - heparin gtt   - room air   - abd less distended - NG to suction   - rectal trumpet   - CXR with less edema   - clinda stopped    PFSH:  No change.    Respiratory:     Pulse Oximetry: 98 %  CXR with mildly improved pulmonary edema  Room air  Scattered fine and coarse crackles bilaterally    HemoDynamics:  Pulse: (!) 108, Heart Rate (Monitored): (!) 108  NIBP: (!) 95/69 mmHg    SR  Dobut - 5    Neuro:  Confused  No focal weakness    Fluids:  Intake/Output       03/20/17 0700 - 03/21/17 0659 03/21/17 0700 - 03/22/17 0659 03/22/17 0700 - 03/23/17 0659      9982-4792 6074-3050 Total 5420-7447 2754-9946 Total 8143-8944 3228-6485 Total       Intake    P.O.  120  -- 120  --  -- --  --  -- --    P.O. 120 -- 120 -- -- -- -- -- --    I.V.  1009  848.3 1857.3  965.9  830.8 1796.7  174.8  -- 174.8    Heparin Volume 351 342.3 693.3 307.9 324 631.9 72.8 -- 72.8    Dobutamine Volume 408 306 714 408 406.8 814.8 102 -- 102    IV Piggyback Volume (Antibiotics) 250 200 450 250 100 350 -- -- --    Other  --  -- --  --  60 60  30  -- 30    Medications (P.O./ Enteral Liquids) -- -- -- -- 60 60 30 -- 30    Enteral  --  -- --  90  90 180  30  -- 30    Free Water / Tube Flush -- -- -- 90 90 180 30 -- 30    Total Intake 1129 848.3 1977.3 1055.9 980.8 2036.7 234.8 -- 234.8       Output    Urine  675  1115 1790  670  580 1250  155  -- 155    Indwelling Cathether 675 1115 1790  155 -- 155    Emesis  300  -- 300  --  -- --  --  -- --    Emesis 300 -- 300 -- -- -- -- -- --    Emesis - Number of Times 1 x -- 1 x -- -- -- -- -- --    Drains  --  200 200  100  60 160  --  -- --    Nasogastric Tube -- 200 200 100 60 160 -- -- --    Stool/Urine  850  -- 850  200  250 450  --  -- --    Measurable Stool (ml) 850 -- 850 200 250 450 -- -- --    Stool  --   -- --  --  -- --  --  -- --    Number of Times Stooled 6 x 6 x 12 x 3 x 2 x 5 x 0 x -- 0 x    Total Output 1825 1315 3140  155 -- 155       Net I/O     -696 -466.8 -1162.8 85.9 90.8 176.7 79.8 -- 79.8        Weight: 110.2 kg (242 lb 15.2 oz)  Recent Labs      17   SODIUM  119*  124*   --   124*   POTASSIUM  3.6  3.2*   --   3.7   CHLORIDE  88*  91*   --   92*   CO2  -      BUN  52*  47*   --   46*   CREATININE  1.56*  1.45*   --   1.47*   MAGNESIUM   --    --   2.1   --    CALCIUM  8.2*  8.2*   --   8.7       GI/Nutrition:  Abd slightly distended.  Non-tender.  NG to suction    Liver Function  Recent Labs      17   ALTSGPT   --   26   --   24   ASTSGOT   --   42   --   41   ALKPHOSPHAT   --   35   --   38   TBILIRUBIN   --   7.9*   --   7.2*   LIPASE   --    --   170*   --    GLUCOSE  135*  100*   --   110*       Heme:  Recent Labs      17   RBC  4.94   --   4.67*   --    --    --   4.76   --    HEMOGLOBIN  12.2*   --   11.7*   --    --    --   12.0*   --    HEMATOCRIT  36.5*   --   34.7*   --    --    --   35.9*   --    PLATELETCT  114*   --   106*   --    --    --   114*   --    APTT  50.5*   < >  98.7*   < >  85.3*  84.8*   --   115.7*    < > = values in this interval not displayed.       Infectious Disease:  Temp  Av.6 °C (97.9 °F)  Min: 35.6 °C (96.1 °F)  Max: 37 °C (98.6 °F)    Recent Labs      17   0918  17   0540  17   0346   WBC  23.7*  23.0*  22.4*   NEUTSPOLYS  74.80*  84.50*  92.00*   LYMPHOCYTES  5.20*  4.30*  3.60*   MONOCYTES  2.60  1.70  3.50   EOSINOPHILS  0.00  1.70  0.00   BASOPHILS  0.00  0.90  0.00   ASTSGOT   --   42  41   ALTSGPT   --   26  24   ALKPHOSPHAT   --   35  38   TBILIRUBIN   --   7.9*  7.2*     Current  Facility-Administered Medications   Medication Dose Frequency Provider Last Rate Last Dose   • potassium chloride (KLOR-CON) 20 MEQ packet 40 mEq  40 mEq BID Nikhil Nunez M.D.   40 mEq at 03/22/17 0841   • furosemide (LASIX) injection 40 mg  40 mg Q12HRS Gavin Molina M.D.   40 mg at 03/22/17 0841   • famotidine (PEPCID) tablet 20 mg  20 mg DAILY Everton Paredes M.D.   20 mg at 03/22/17 0841   • lactulose 20 GM/30ML solution 30 mL  30 mL BID Gavin Molina M.D.   Stopped at 03/20/17 2100   • rifaximin (XIFAXAN) tablet 550 mg  550 mg BID Everton Paredes M.D.   550 mg at 03/22/17 0841   • diphenhydrAMINE (BENADRYL) tablet/capsule 50 mg  50 mg HS PRN Tate Hogan M.D.   50 mg at 03/15/17 0306   • piperacillin-tazobactam (ZOSYN) 4.5 g in  mL IVPB  4.5 g Q6HRS Ayala Zazueta M.D.   Stopped at 03/22/17 0720   • oxycodone immediate-release (ROXICODONE) tablet 2.5-5 mg  2.5-5 mg Q4HRS PRN Alexia Munoz DCHARITY   5 mg at 03/19/17 1118   • DOBUTamine (DOBUTREX) 1 mg/mL premix infusion  5 mcg/kg/min Continuous Jeremy M Gonda, M.D. 34 mL/hr at 03/22/17 0841 5 mcg/kg/min at 03/22/17 0841   • Respiratory Care per Protocol   Continuous RT Alexis Hamilton M.D.       • ondansetron (ZOFRAN) syringe/vial injection 4 mg  4 mg Q4HRS PRN Alexis Hamilton M.D.   4 mg at 03/20/17 1821   • ondansetron (ZOFRAN ODT) dispertab 4 mg  4 mg Q4HRS PRN Alexis Hamilton M.D.       • promethazine (PHENERGAN) tablet 12.5-25 mg  12.5-25 mg Q4HRS PRN Alexis Hamilton M.D.       • promethazine (PHENERGAN) suppository 12.5-25 mg  12.5-25 mg Q4HRS PRN Alexis Hamilton M.D.       • prochlorperazine (COMPAZINE) injection 5-10 mg  5-10 mg Q4HRS PRN Alexis Hamilton M.D.       • acetaminophen (TYLENOL) tablet 650 mg  650 mg Q6HRS PRERIKA Hamilton M.D.   650 mg at 03/20/17 0838   • senna-docusate (PERICOLACE or SENOKOT S) 8.6-50 MG per tablet 2 Tab  2 Tab BID Alexis Hamilton M.D.   Stopped at 03/22/17 0900    And   • polyethylene  glycol/lytes (MIRALAX) PACKET 1 Packet  1 Packet QDAY PRN Alexis Hamilton M.D.        And   • magnesium hydroxide (MILK OF MAGNESIA) suspension 30 mL  30 mL QDAY PRN Alexis Hamilton M.D.        And   • bisacodyl (DULCOLAX) suppository 10 mg  10 mg QDAY PRN Alexis Hamilton M.D.       • heparin 1000 units/mL injection 3,200 Units  3,200 Units PRN CHEPE LimaD   3,200 Units at 03/21/17 1304    And   • heparin infusion 25,000 units in 500 ml 0.45% nacl   Continuous CHEPE LimaD 24 mL/hr at 03/22/17 0616 1,200 Units/hr at 03/22/17 0616   • POLYMEM ALGINATE DRESSING PADS 1 Each  1 Each QDAY PRN Jeremy M Gonda, M.D.         Last reviewed on 3/13/2017  5:30 PM by Loi Ponce    Quality  Measures:  Labs reviewed, Medications reviewed and Radiology images reviewed  Holt catheter: Critically Ill - Requiring Accurate Measurement of Urinary Output  Central line in place: Need for access    DVT Prophylaxis: Heparin  DVT prophylaxis - mechanical: SCDs  Ulcer prophylaxis: Not indicated  Antibiotics: Treating active infection/contamination beyond 24 hours perioperative coverage          Assessment and Plan:    Acute on chronic hypoxemic respiratory failure  Acute on chronic systolic heart failure   - EF 15-20%   - dobutamine gtt   - force diuresis as tolerated  Acute cardiogenic pulmonary edema   - force diuresis as tolerated  Acute metabolic/toxic encephalopathy   - elevated ammonia   - lactulose and rifaximin  Sepsis - skin source  Bilateral lower extremity cellulitis with open ulcerations   - cont Zosyn per ID  Thrombocytopenia  Acute on chronic kidney disease  Hepatitis C  LV thrombus - cont heparin gtt  DNR/I    Unstable cardiovascular status with inotropic support.  Prognosis is very poor.  Family conference tomorrow regarding goals of care.    Critical Care Time:  32 minutes  Date of service:  3/22/17  [id number]  No time overlap  Time excludes procedures  Discussed with RN, RT, Team

## 2017-03-22 NOTE — CARE PLAN
Problem: Safety  Goal: Will remain free from falls  Outcome: PROGRESSING AS EXPECTED  Patient educated about fall prevention and necessity to keep tubes and lines in place    Problem: Infection  Goal: Will remain free from infection  Outcome: PROGRESSING AS EXPECTED  Patient educated about antibiotics and infection prevention    Problem: Skin Integrity  Goal: Risk for impaired skin integrity will decrease  Outcome: PROGRESSING AS EXPECTED  Patient had photos taken of wounds, receiving turns every 2 hours, receiving barrrier cream, mepilex for prevention, will continue to turn and assess patient for skin breakdown prevention

## 2017-03-22 NOTE — PROGRESS NOTES
Hospital Medicine Progress Note, Adult, Complex               Author: Pietro Wu Date & Time created: 3/22/2017  9:17 AM     Interval History:  50 y/o male presented with dyspnea.  Noted to have acute on chronic systolic heart failure, decompensated.  Also noted bilateral LE cellulitis causing sepsis.  Now on dobutamine.  Pt seen in ICU, ICU care given.  Discussed patient condition and plan with RN, RT and charge nurse / hot rounds.        Review of Systems:  Review of Systems   Unable to perform ROS: acuity of condition       Physical Exam:  Physical Exam   Constitutional:  Non-toxic appearance.   Ill appearing    HENT:   Head: Normocephalic and atraumatic.   Eyes: Right eye exhibits no discharge. Left eye exhibits no discharge. No scleral icterus.   Neck: Neck supple. No edema and no erythema present.   Cardiovascular: Normal rate and regular rhythm.    No murmur heard.  Pulmonary/Chest: Effort normal and breath sounds normal. No stridor. No respiratory distress. He has no wheezes.   Abdominal: Soft. He exhibits no distension.   Musculoskeletal: He exhibits edema.   Lymphadenopathy:     He has no cervical adenopathy.   Neurological:   Awake but confused    Skin: Skin is warm. He is not diaphoretic. There is erythema (bilateral LE with blisters ).   Psychiatric: His speech is tangential. He is slowed. Cognition and memory are impaired.   Nursing note and vitals reviewed.      Labs:        Invalid input(s): [id number]      Recent Labs      03/20/17 0918 03/21/17 0540 03/21/17 0900 03/22/17   0346   SODIUM  119*  124*   --   124*   POTASSIUM  3.6  3.2*   --   3.7   CHLORIDE  88*  91*   --   92*   CO2  22 22   --   21   BUN  52*  47*   --   46*   CREATININE  1.56*  1.45*   --   1.47*   MAGNESIUM   --    --   2.1   --    CALCIUM  8.2*  8.2*   --   8.7     Recent Labs      03/20/17 0918 03/21/17 0540  03/21/17 0900 03/22/17   0346   ALTSGPT   --   26   --   24   ASTSGOT   --   42   --   41    ALKPHOSPHAT   --   35   --   38   TBILIRUBIN   --   7.9*   --   7.2*   LIPASE   --    --   170*   --    GLUCOSE  135*  100*   --   110*     Recent Labs      17   0540   17   0346  17   0530   RBC  4.94   --   4.67*   --    --    --   4.76   --    HEMOGLOBIN  12.2*   --   11.7*   --    --    --   12.0*   --    HEMATOCRIT  36.5*   --   34.7*   --    --    --   35.9*   --    PLATELETCT  114*   --   106*   --    --    --   114*   --    APTT  50.5*   < >  98.7*   < >  85.3*  84.8*   --   115.7*    < > = values in this interval not displayed.     Recent Labs      176   WBC  23.7*  23.0*  22.4*   NEUTSPOLYS  74.80*  84.50*  92.00*   LYMPHOCYTES  5.20*  4.30*  3.60*   MONOCYTES  2.60  1.70  3.50   EOSINOPHILS  0.00  1.70  0.00   BASOPHILS  0.00  0.90  0.00   ASTSGOT   --   42  41   ALTSGPT   --   26  24   ALKPHOSPHAT   --   35  38   TBILIRUBIN   --   7.9*  7.2*           Hemodynamics:  Temp (24hrs), Av.6 °C (97.9 °F), Min:35.6 °C (96.1 °F), Max:37 °C (98.6 °F)  Temperature: (!) 35.6 °C (96.1 °F)  Pulse  Av.3  Min: 50  Max: 194Heart Rate (Monitored): (!) 108  NIBP: (!) 95/69 mmHg    Respiratory:    Respiration: (!) 22, Pulse Oximetry: 98 %     Work Of Breathing / Effort: Moderate  RUL Breath Sounds: Diminished;Clear, RML Breath Sounds: Diminished, RLL Breath Sounds: Diminished, BHAVNA Breath Sounds: Diminished;Clear, LLL Breath Sounds: Diminished  Fluids:    Intake/Output Summary (Last 24 hours) at 17 0917  Last data filed at 17 0900   Gross per 24 hour   Intake 2065.6 ml   Output   1865 ml   Net  200.6 ml     Weight: 110.2 kg (242 lb 15.2 oz)  GI/Nutrition:  Orders Placed This Encounter   Procedures   • DIET NPO     Standing Status: Standing      Number of Occurrences: 1      Standing Expiration Date:      Order Specific Question:  Restrict to:     Answer:  Strict [1]      Comments:  no  meds     Medical Decision Making, by Problem:  Active Hospital Problems    Diagnosis   • Acute on chronic systolic congestive heart failure, NYHA class 4, present on admission (EF 15-20%) [I50.23]  - severe, EF of 20%  - continue dobutamine  - medical management as able in pt with borderline BP  - cxr read by me shows improved pulmonary edema     • Hepatitis C virus infection [B19.20]  - with liver failure  - continue rifaxamin, lactulose      • Acute renal insufficiency [N28.9]  - improved on dobutamine  - when further improved will need to stop dobutamine and monitor     • Essential hypertension, benign [I10]  - low on dobutamine     • Acute respiratory failure (CMS-HCC) [J96.00]  - improved     • Hyponatremia [E87.1]  - improved with lasix  - d/t fluid overload  - continue lasix and repeat bmp in am     • Hyperkalemia [E87.5]  - resolved, now getting oral k to match lasix     • Cellulitis [L03.90]  - improved, continue zosyn per ID     • Shock (CMS-[facility], located within [facility]) [R57.9]  - borderline on dobutamine, no need for pressors     • LV (left ventricular) mural thrombus (CMS-[facility], located within [facility]) [I21.3]  - continue heparin gtt         Labs reviewed, Medications reviewed and Radiology images reviewed        DVT Prophylaxis: Heparin    Ulcer prophylaxis: Yes  Antibiotics: Treating active infection/contamination beyond 24 hours perioperative coverage    Patient is critically ill.   The patient continues to have : heart failure   The vital organ system that is effected is the: all are at risk from decreased profusion    If untreated there is a high chance of deterioration into: end organ failure   The critical care that I am providing today is: dobutamine gtt  The critical care that has been undertaken is medically complex.   There has been no overlap in critical care time.  Critical care time not including procedures, no overlap: 39 minutes

## 2017-03-22 NOTE — CARE PLAN
Problem: Bowel/Gastric:  Goal: Normal bowel function is maintained or improved  Intervention: Educate patient and significant other/support system about diet, fluid intake, medications and activity to promote bowel function  Order received by PMA for insertion for rectal tube/done this time.      Problem: Pain Management  Goal: Pain level will decrease to patient’s comfort goal  Intervention: Follow pain managment plan developed in collaboration with patient and Interdisciplinary Team  Assess for pain q2h and prn

## 2017-03-22 NOTE — PROGRESS NOTES
Infectious Disease Progress Note    Author: Ayala Zazueta M.D. DOS & Time created: 3/22/2017  12:21 PM    Chief Complaint   Patient presents with   • Peripheral Edema   • Weight Gain     10 lbs weight gain over the last 2 weeks   • Other     CHF exaserbation. Not compliant with medications.    • ALOC     Disoriented to event and time.    FU for bilateral LE cellulitis    Interval History:  3/17 AF, no CBC, lethargic but arousable to voice then quickly falls back to sleep  3/18 AF, WBC 15.5, remains on pressors, more awake and alert today, complaining of abd pain and leg pain, recent wound care photos reviewed  3/19 AF, WBC 22.9, white count increasing, pulled out RIJ and left IJ inserted o/n. Off levophed  3/20 AF WBC 23 on dobutamine-verbalizing but obtunded Pain in legs  3/21 AF WBC 23 yesterdays's projectile vomiting feculent material noted  3/22 AF WBC 22 copious secretions/sputum  Labs Reviewed, Medications Reviewed, Radiology Reviewed and Wound Reviewed.    Review of Systems:  Review of Systems   Constitutional: Negative for fever and chills.   Respiratory: Positive for shortness of breath.    Cardiovascular: Negative for chest pain.   Gastrointestinal: Positive for nausea, vomiting and abdominal pain.   Musculoskeletal: Positive for myalgias.        Both legs   All other systems reviewed and are negative.      Hemodynamics:  Temp (24hrs), Av.6 °C (97.8 °F), Min:35.6 °C (96.1 °F), Max:37 °C (98.6 °F)  Temperature: (!) 35.6 °C (96.1 °F)  Pulse  Av.2  Min: 50  Max: 194Heart Rate (Monitored): (!) 108  NIBP: 100/63 mmHg      Physical Exam:  Physical Exam   Constitutional: He appears well-developed.   Chronically ill Obese   HENT:   Head: Normocephalic and atraumatic.   NGT   Eyes: EOM are normal. Pupils are equal, round, and reactive to light. Scleral icterus is present.   Neck: Neck supple.   Left IJ   Cardiovascular:   Murmur heard.  Tachy   Pulmonary/Chest: Effort normal. No respiratory  distress. He has no wheezes.   Abdominal: Soft. He exhibits distension. There is no tenderness. There is no rebound and no guarding.   Musculoskeletal: He exhibits edema.   Bilateral LE erythema and edema.  Toes purplish  Ruptured bullae-serous drainage  Pitting edema to thigh   Neurological: He is alert.   Skin: There is erythema.   Nursing note and vitals reviewed.      Labs:  Recent Labs      03/20/17 0918 03/21/17   0540  03/22/17   0346   WBC  23.7*  23.0*  22.4*   RBC  4.94  4.67*  4.76   HEMOGLOBIN  12.2*  11.7*  12.0*   HEMATOCRIT  36.5*  34.7*  35.9*   MCV  73.9*  74.3*  75.4*   MCH  24.7*  25.1*  25.2*   RDW  49.7  51.8*  54.1*   PLATELETCT  114*  106*  114*   MPV  10.0  10.3  10.6   NEUTSPOLYS  74.80*  84.50*  92.00*   LYMPHOCYTES  5.20*  4.30*  3.60*   MONOCYTES  2.60  1.70  3.50   EOSINOPHILS  0.00  1.70  0.00   BASOPHILS  0.00  0.90  0.00   RBCMORPHOLO  Present  Present  Present     Recent Labs      03/20/17 0918 03/21/17   0540  03/22/17   0346   SODIUM  119*  124*  124*   POTASSIUM  3.6  3.2*  3.7   CHLORIDE  88*  91*  92*   CO2  22  22  21   GLUCOSE  135*  100*  110*   BUN  52*  47*  46*     Recent Labs      03/20/17 0918 03/21/17   0540  03/22/17   0346   ALBUMIN   --   2.4*  2.5*   TBILIRUBIN   --   7.9*  7.2*   ALKPHOSPHAT   --   35  38   TOTPROTEIN   --   6.2  6.9   ALTSGPT   --   26  24   ASTSGOT   --   42  41   CREATININE  1.56*  1.45*  1.47*       BLOOD CULTURE   Date Value Ref Range Status   03/13/2017 No growth after 5 days of incubation.  Final     BLOOD CULTURE HOLD   Date Value Ref Range Status   02/09/2014 Collected  Final      Results     Procedure Component Value Units Date/Time    BLOOD CULTURE [[id number]] Collected:  03/13/17 1550    Order Status:  Completed Specimen Information:  Blood from Peripheral Updated:  03/18/17 1900     Significant Indicator NEG      Source BLD      Site PERIPHERAL      Blood Culture No growth after 5 days of incubation.     Narrative:      Per  "Hospital Policy: Only change Specimen Src: to \"Line\" if  specified by physician order.    BLOOD CULTURE [[id number]] Collected:  03/13/17 1547    Order Status:  Completed Specimen Information:  Blood from Peripheral Updated:  03/18/17 1900     Significant Indicator NEG      Source BLD      Site PERIPHERAL      Blood Culture No growth after 5 days of incubation.     Narrative:      Per Hospital Policy: Only change Specimen Src: to \"Line\" if  specified by physician order.    FLUID CULTURE W/GRAM STAIN [[id number]]     Order Status:  Canceled Specimen Information:  Body Fluid from Ascities Fluid     CULTURE WOUND W/ GRAM STAIN [[id number]] Collected:  03/13/17 2230    Order Status:  Completed Specimen Information:  Wound from Right Leg Updated:  03/17/17 0841     Gram Stain Result No organisms seen.      Significant Indicator NEG      Source WND      Site RIGHT LEG      Culture Result Wound No growth at 72 hours.           Fluids:  Intake/Output       03/20/17 0700 - 03/21/17 0659 03/21/17 0700 - 03/22/17 0659 03/22/17 0700 - 03/23/17 0659      6712-6047 6624-2086 Total 9212-2310 6592-7803 Total 7053-3508 1991-4066 Total       Intake    P.O.  120  -- 120  --  -- --  0  -- 0    P.O. 120 -- 120 -- -- -- 0 -- 0    I.V.  1009  848.3 1857.3  965.9  830.8 1796.7  458.8  -- 458.8    Heparin Volume 351 342.3 693.3 307.9 324 631.9 144.8 -- 144.8    Dobutamine Volume 408 306 714 408 406.8 814.8 204 -- 204    IV Piggyback Volume (Antibiotics) 250 200 450 250 100 350 110 -- 110    Other  --  -- --  --  60 60  30  -- 30    Medications (P.O./ Enteral Liquids) -- -- -- -- 60 60 30 -- 30    Enteral  --  -- --  90  90 180  30  -- 30    Free Water / Tube Flush -- -- -- 90 90 180 30 -- 30    Total Intake 1129 848.3 1977.3 1055.9 980.8 2036.7 518.8 -- 518.8       Output    Urine  675  1115 5370   155  -- 155    Indwelling Cathether 675 1115 4628 996 081  155 -- 155    Emesis  300  -- 300  --  -- --  --  -- --    Emesis 300 " -- 300 -- -- -- -- -- --    Emesis - Number of Times 1 x -- 1 x -- -- -- -- -- --    Drains  --  200 200  100  60 160  --  -- --    Nasogastric Tube -- 200 200 100 60 160 -- -- --    Other  --  -- --  --  -- --  7  -- 7    Other -- -- -- -- -- -- 7 -- 7    Stool/Urine  850  -- 850  200  250 450  --  -- --    Measurable Stool (ml) 850 -- 850 200 250 450 -- -- --    Stool  --  -- --  --  -- --  --  -- --    Number of Times Stooled 6 x 6 x 12 x 3 x 2 x 5 x 1 x -- 1 x    Total Output 1825 1315 3140  162 -- 162       Net I/O     -696 -466.8 -1162.8 85.9 90.8 176.7 356.8 -- 356.8        DX-CHEST-PORTABLE (1 VIEW) (Final result) Result time: 03/21/17 03:06:06     Final result by Anika Saavedra M.D. (03/21/17 03:06:06)     Impression:     1.  Pulmonary edema and/or infiltrates are identified, which appear somewhat increased since the prior exam.  2.  Cardiomegaly  3.  Atherosclerosis   Weight: 110.2 kg (242 lb 15.2 oz)    Assessment:  Active Hospital Problems    Diagnosis   • Acute renal insufficiency [N28.9]   • Acute on chronic systolic congestive heart failure, NYHA class 4, present on admission (EF 15-20%) [I50.23]   • Hepatitis C virus infection [B19.20]   • Cellulitis [L03.90]   • Shock (CMS-HCC) [R57.9]   • LV (left ventricular) mural thrombus (CMS-HCC) [I21.3]       Plan:  Bilateral lower extremity cellulitis with bulla-not improving  Wound cx - mixed skin patricia  Continue zosyn. Endpoint clinical. 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

## 2017-03-22 NOTE — WOUND TEAM
Wound Team consulted for IAD to patient's buttocks. Nursing inserted a nasal trumpet as a rectal tube to manage stool, has been applying barrier cream, and been turning patient. There are some small patches of partially denuded tissue to lower medial buttocks., but much of the IAD has resolved. Please continue with the above mentioned interventions.

## 2017-03-22 NOTE — CARE PLAN
Problem: Bowel/Gastric:  Goal: Normal bowel function is maintained or improved  Intervention: Educate patient and significant other/support system about diet, fluid intake, medications and activity to promote bowel function  Rectal tube inserted as ordered this time

## 2017-03-23 NOTE — CARE PLAN
Problem: Bowel/Gastric:  Goal: Normal bowel function is maintained or improved  Outcome: PROGRESSING AS EXPECTED  Pt stool in continent, rectal rube in place. OG tube removed. Cortrak placed to resume tube feeding.     Problem: Skin Integrity  Goal: Risk for impaired skin integrity will decrease  Outcome: PROGRESSING AS EXPECTED  Jakob skin risk assessment completed. Pt turned q 2 hours. Medical devices repositioned frequently. Skin breakdown assessed and documented. Dressings on bilateral lower extremities changed.

## 2017-03-23 NOTE — PROGRESS NOTES
PALLIATIVE CARE FOLLOW UP:  Rounded with patient. Wife at bedside. She reports that Irvin will not be at the hospital until 1500. Palliative Care SW Angelita left Irvin a message stating we thought that there was a care conference scheduled for 1100 but patient's wife informed us he would arrive at 1500. Requested call back to confirm. Discussed with bedside RN. Note placed in summary to call if Irvin arrives and for other needs.     Thank you for allowing Palliative Care to follow this patient. Please contact us at  with any questions.

## 2017-03-23 NOTE — CARE PLAN
Problem: Safety  Goal: Will remain free from injury  Outcome: PROGRESSING AS EXPECTED  Bed in low position, bed alarm in use, call light within reach and patient room near nursing station.     Patient communicates and demonstrates understanding that a nurse must be present during ambulation while patient in the ICU and how to use the call light when said assistance is required.     Problem: Pain Management  Goal: Pain level will decrease to patient’s comfort goal  Outcome: PROGRESSING AS EXPECTED  Frequent repositioning being provided. Per physician notes, plan of care is to minimize pain medication usage do due poor organ function.     Problem: Skin Integrity  Goal: Risk for impaired skin integrity will decrease  Outcome: PROGRESSING SLOWER THAN EXPECTED  Assess patient's skin at the beginning of the shift. Turn patient every two hours and monitor under all medical devices throughout entire shift. Maintain a clean, dry linen environment. Float heals and elbows. Provide appropriate would prevention interventions as they apply. Implement pertinent wound protocols and document them in EPIC. Please see wound flow sheet for further details.

## 2017-03-23 NOTE — DISCHARGE PLANNING
"Palliative Social Work    This SW along with PC RN, Lizz, participated in a family care conference with pts son and DPOA, Irvin.  Irvin requested to not meet with pt present so the care conference took place in the conference room on T6.  Dr. Wu provided Irvin with a medical update regarding pt and answered Irvin's questions.  After update, PC team further explored with Irvin pts wishes related to his care and Irvin's understanding regarding pts current medical status.  Irvin reported that he would like to continue treatment at this time so that he does not have a \"guilty conscious\" based on his prior conversations with pt.  Irvin did comment that he realizes pt is getting worse and even though he has the feeding tube, pt was requesting \"solid food\" yesterday.  Irvin is open to exploring other options such as hospice but commented that he has some misconceptions regarding hospice based on research he has done and information he has received from friends.  Lizz provided Irvin with a thorough overview of hospice and the services that hospice provides both at home and in SNF.  Irvin was appreciative of the information.  A choice form list was provided to Irvin for him to review.  Irvin appeared open to a hospice referral for the purpose of gathering further information but wanted to do some research before making a choice.  Irvin also commented that he was not quite ready to be \"bombarded\" with phone calls.  Irvin had FMLA paperwork that he requested assistance with.  Contact information for the PC team provided to Irvin and he was encouraged to call with any questions/concerns.  Support, encouragement and active listening provided to Irvin.  This SW will follow up with Irvin tomorrow.  "

## 2017-03-23 NOTE — PROGRESS NOTES
Pulmonary Critical Care Progress Note    Interval Events:  24 hour interval history reviewed  Reason for visit:  Acute on chronic systolic heart failure     - ST   - dobut 5   - confused, agitated at times   - NG to suction with minimal output   - CXR with improved edema   - heparin gtt   - place cortrak and start TF    PFSH:  No change.    Respiratory:     Pulse Oximetry: 100 %  CXR with mildly improved pulmonary edema  Room air  Scattered fine and coarse crackles bilaterally  Periodic breathing with associated paroxysmal hypoxemia    HemoDynamics:  Pulse: (!) 114, Heart Rate (Monitored): (!) 113  NIBP: 103/81 mmHg    SR  Dobut - 5    Neuro:  Confused, agitated at times  No focal weakness    Fluids:  Intake/Output       03/21/17 0700 - 03/22/17 0659 03/22/17 0700 - 03/23/17 0659 03/23/17 0700 - 03/24/17 0659      6595-1389 7379-9724 Total 9242-9367 2949-7887 Total 0814-9931 6191-6551 Total       Intake    P.O.  --  -- --  0  0 0  --  -- --    P.O. -- -- -- 0 0 0 -- -- --    I.V.  965.9  830.8 1796.7  906.8  896 1802.8  116  -- 116    Heparin Volume 307.9 324 631.9 288.8 288 576.8 48 -- 48    Dobutamine Volume 408 406.8 814.8 408 408 816 68 -- 68    IV Piggyback Volume (Antibiotics) 250 100 350 210 200 410 -- -- --    Other  --  60 60  30  -- 30  --  -- --    Medications (P.O./ Enteral Liquids) -- 60 60 30 -- 30 -- -- --    Enteral  90  90 180  30  -- 30  --  -- --    Free Water / Tube Flush 90 90 180 30 -- 30 -- -- --    Total Intake 1055.9 980.8 2036.7 966.8 896 1862.8 116 -- 116       Output    Urine  670  580 1250  500  675 1175  150  -- 150    Indwelling Cathether   150 -- 150    Drains  100  60 160  0  -- 0  --  -- --    Nasogastric Tube 100 60 160 0 -- 0 -- -- --    Other  --  -- --  7  -- 7  --  -- --    Other -- -- -- 7 -- 7 -- -- --    Stool/Urine  200  250 450  300  -- 300  --  -- --    Measurable Stool (ml) 200 250 450 300 -- 300 -- -- --    Stool  --  -- --  --  -- --  --  -- --     Number of Times Stooled 3 x 2 x 5 x 2 x -- 2 x -- -- --    Total Output   150 -- 150       Net I/O     85.9 90.8 176.7 159.8 221 380.8 -34 -- -34        Weight: 109.5 kg (241 lb 6.5 oz)  Recent Labs      17   0540  17   0900  17   0346  17   0420   SODIUM  124*   --   124*  122*   POTASSIUM  3.2*   --   3.7  4.0   CHLORIDE  91*   --   92*  92*   CO2     BUN  47*   --   46*  46*   CREATININE  1.45*   --   1.47*  1.52*   MAGNESIUM   --   2.1   --    --    CALCIUM  8.2*   --   8.7  8.6       GI/Nutrition:  Abd slightly distended.  Non-tender.  NG to suction    Liver Function  Recent Labs      17   0540  17   0900  17   0346  17   0420   ALTSGPT  26   --   24  25   ASTSGOT  42   --   41  44   ALKPHOSPHAT  35   --   38  41   TBILIRUBIN  7.9*   --   7.2*  6.8*   LIPASE   --   170*   --    --    GLUCOSE  100*   --   110*  112*       Heme:  Recent Labs      17   0540   17   0346  17   0530  17   1212  17   1831  17   0420   RBC  4.67*   --   4.76   --    --    --   4.78   HEMOGLOBIN  11.7*   --   12.0*   --    --    --   12.0*   HEMATOCRIT  34.7*   --   35.9*   --    --    --   36.1*   PLATELETCT  106*   --   114*   --    --    --   124*   APTT  98.7*   < >   --   115.7*  94.4*  89.9*   --     < > = values in this interval not displayed.       Infectious Disease:  Temp  Av.2 °C (97.1 °F)  Min: 35.7 °C (96.3 °F)  Max: 36.6 °C (97.9 °F)    Recent Labs      17   0540  17   0346  17   0420   WBC  23.0*  22.4*  17.4*   NEUTSPOLYS  84.50*  92.00*  78.80*   LYMPHOCYTES  4.30*  3.60*  9.70*   MONOCYTES  1.70  3.50  3.50   EOSINOPHILS  1.70  0.00  2.60   BASOPHILS  0.90  0.00  0.00   ASTSGOT  42  41  44   ALTSGPT  26  24  25   ALKPHOSPHAT  35  38  41   TBILIRUBIN  7.9*  7.2*  6.8*     Current Facility-Administered Medications   Medication Dose Frequency Provider Last Rate Last Dose   •  piperacillin-tazobactam (ZOSYN) 3.375 g in  mL IVPB  3.375 g Q6HRS Ayala Zazueta M.D.       • potassium chloride (KLOR-CON) 20 MEQ packet 40 mEq  40 mEq BID Nikhil Nunez M.D.   40 mEq at 03/23/17 0818   • furosemide (LASIX) injection 40 mg  40 mg Q12HRS Gavin Molina M.D.   40 mg at 03/23/17 0818   • famotidine (PEPCID) tablet 20 mg  20 mg DAILY Everton Paredes M.D.   20 mg at 03/23/17 0818   • lactulose 20 GM/30ML solution 30 mL  30 mL BID Gavin Molina M.D.   30 mL at 03/23/17 0818   • rifaximin (XIFAXAN) tablet 550 mg  550 mg BID Everton Paredes M.D.   550 mg at 03/23/17 0818   • diphenhydrAMINE (BENADRYL) tablet/capsule 50 mg  50 mg HS PRN Tate Hogan M.D.   50 mg at 03/15/17 0306   • oxycodone immediate-release (ROXICODONE) tablet 2.5-5 mg  2.5-5 mg Q4HRS PRN Alexia Munoz D.O.   5 mg at 03/19/17 1118   • DOBUTamine (DOBUTREX) 1 mg/mL premix infusion  5 mcg/kg/min Continuous Jeremy M Gonda, M.D. 34 mL/hr at 03/23/17 0752 5 mcg/kg/min at 03/23/17 0752   • Respiratory Care per Protocol   Continuous RT Alexis Hamilton M.D.       • ondansetron (ZOFRAN) syringe/vial injection 4 mg  4 mg Q4HRS PRN Alexis Hamilton M.D.   4 mg at 03/20/17 1821   • ondansetron (ZOFRAN ODT) dispertab 4 mg  4 mg Q4HRS PRN Alexis Hamilton M.D.       • promethazine (PHENERGAN) tablet 12.5-25 mg  12.5-25 mg Q4HRS PRN Alexis Hamilton M.D.       • promethazine (PHENERGAN) suppository 12.5-25 mg  12.5-25 mg Q4HRS PRN Alexis Hamilton M.D.       • prochlorperazine (COMPAZINE) injection 5-10 mg  5-10 mg Q4HRS PRN Alexis Hamilton M.D.       • acetaminophen (TYLENOL) tablet 650 mg  650 mg Q6HRS PRERIKA Hamilton M.D.   650 mg at 03/20/17 0838   • senna-docusate (PERICOLACE or SENOKOT S) 8.6-50 MG per tablet 2 Tab  2 Tab BID Alexis Hamilton M.D.   Stopped at 03/22/17 0900    And   • polyethylene glycol/lytes (MIRALAX) PACKET 1 Packet  1 Packet QDAY PRN Alexis Hamilton M.D.        And   • magnesium hydroxide (MILK OF  MAGNESIA) suspension 30 mL  30 mL QDAY PRN Alexis Hamilton M.D.        And   • bisacodyl (DULCOLAX) suppository 10 mg  10 mg QDAY PRN Alexis Hamilton M.D.       • heparin 1000 units/mL injection 3,200 Units  3,200 Units PRN CHEPE LimaD   3,200 Units at 03/21/17 1304    And   • heparin infusion 25,000 units in 500 ml 0.45% nacl   Continuous CHEPE LimaD 24 mL/hr at 03/22/17 2049 1,200 Units/hr at 03/22/17 2049   • POLYMEM ALGINATE DRESSING PADS 1 Each  1 Each QDAY PRN Jeremy M Gonda, M.D.         Last reviewed on 3/13/2017  5:30 PM by Loi Ponce    Quality  Measures:  Labs reviewed, Medications reviewed and Radiology images reviewed  Holt catheter: Critically Ill - Requiring Accurate Measurement of Urinary Output  Central line in place: Need for access    DVT Prophylaxis: Heparin  DVT prophylaxis - mechanical: SCDs  Ulcer prophylaxis: Not indicated  Antibiotics: Treating active infection/contamination beyond 24 hours perioperative coverage          Assessment and Plan:    Acute on chronic hypoxemic respiratory failure  Acute on chronic systolic heart failure   - EF 15-20%   - dobutamine gtt   - force diuresis as tolerated  Acute cardiogenic pulmonary edema   - force diuresis as tolerated  Acute metabolic/toxic encephalopathy   - lactulose and rifaximin  Sepsis - skin source  Bilateral lower extremity cellulitis with open ulcerations   - cont Zosyn per ID  Thrombocytopenia  Acute on chronic kidney disease  Hepatitis C  LV thrombus - cont heparin gtt  DNR/I    Unstable cardiovascular and pulmonary status with inotropic support and periodic breathing.  Prognosis is very poor.  Significant periodic breathing due to severe systolic heart failure.    Critical Care Time:  35 minutes  Date of service:  3/23/17  72413  No time overlap  Time excludes procedures  Discussed with RN, RT, Team

## 2017-03-23 NOTE — PROGRESS NOTES
Infectious Disease Progress Note    Author: Ayala Zazueta M.D. DOS & Time created: 3/23/2017  3:19 PM    Chief Complaint   Patient presents with   • Peripheral Edema   • Weight Gain     10 lbs weight gain over the last 2 weeks   • Other     CHF exaserbation. Not compliant with medications.    • ALOC     Disoriented to event and time.    FU for bilateral LE cellulitis    Interval History:  3/17 AF, no CBC, lethargic but arousable to voice then quickly falls back to sleep  3/18 AF, WBC 15.5, remains on pressors, more awake and alert today, complaining of abd pain and leg pain, recent wound care photos reviewed  3/19 AF, WBC 22.9, white count increasing, pulled out RIJ and left IJ inserted o/n. Off levophed  3/20 AF WBC 23 on dobutamine-verbalizing but obtunded Pain in legs  3/21 AF WBC 23 yesterdays's projectile vomiting feculent material noted  3/22 AF WBC 22 copious secretions/sputum  3/23 AF WBC 17 no new +cxs remains obtunded  Labs Reviewed, Medications Reviewed, Radiology Reviewed and Wound Reviewed.    Review of Systems:  Review of Systems   Constitutional: Negative for fever and chills.   Respiratory: Positive for cough and sputum production.    Cardiovascular: Negative for chest pain.   Musculoskeletal: Positive for myalgias.        Both legs   All other systems reviewed and are negative.      Hemodynamics:  Temp (24hrs), Av.2 °C (97.2 °F), Min:35.7 °C (96.3 °F), Max:36.7 °C (98.1 °F)  Temperature: 36.7 °C (98.1 °F)  Pulse  Av.9  Min: 50  Max: 194Heart Rate (Monitored): (!) 121  NIBP: 104/82 mmHg      Physical Exam:  Physical Exam   Constitutional: He appears well-developed.   Chronically ill Obese   HENT:   Head: Normocephalic and atraumatic.   NGT   Eyes: EOM are normal. Pupils are equal, round, and reactive to light. Scleral icterus is present.   Neck: Neck supple.   Left IJ   Cardiovascular:   Murmur heard.  Tachy   Pulmonary/Chest: Effort normal. No respiratory distress. He has no wheezes.    Abdominal: Soft. He exhibits distension. There is no tenderness. There is no rebound and no guarding.   Musculoskeletal: He exhibits edema.   Bilateral LE erythema and edema.  Toes purplish  Ruptured bullae-serous drainage  Pitting edema to thigh   Neurological: He is alert.   Skin: There is erythema.   Nursing note and vitals reviewed.      Labs:  Recent Labs      03/21/17   0540  03/22/17   0346  03/23/17   0420   WBC  23.0*  22.4*  17.4*   RBC  4.67*  4.76  4.78   HEMOGLOBIN  11.7*  12.0*  12.0*   HEMATOCRIT  34.7*  35.9*  36.1*   MCV  74.3*  75.4*  75.5*   MCH  25.1*  25.2*  25.1*   RDW  51.8*  54.1*  55.7*   PLATELETCT  106*  114*  124*   MPV  10.3  10.6  10.5   NEUTSPOLYS  84.50*  92.00*  78.80*   LYMPHOCYTES  4.30*  3.60*  9.70*   MONOCYTES  1.70  3.50  3.50   EOSINOPHILS  1.70  0.00  2.60   BASOPHILS  0.90  0.00  0.00   RBCMORPHOLO  Present  Present  Present     Recent Labs      03/21/17   0540  03/22/17   0346  03/23/17   0420   SODIUM  124*  124*  122*   POTASSIUM  3.2*  3.7  4.0   CHLORIDE  91*  92*  92*   CO2  22  21  21   GLUCOSE  100*  110*  112*   BUN  47*  46*  46*     Recent Labs      03/21/17   0540  03/22/17   0346  03/23/17   0420   ALBUMIN  2.4*  2.5*  2.8*   TBILIRUBIN  7.9*  7.2*  6.8*   ALKPHOSPHAT  35  38  41   TOTPROTEIN  6.2  6.9  7.2   ALTSGPT  26  24  25   ASTSGOT  42  41  44   CREATININE  1.45*  1.47*  1.52*       BLOOD CULTURE   Date Value Ref Range Status   03/13/2017 No growth after 5 days of incubation.  Final     BLOOD CULTURE HOLD   Date Value Ref Range Status   02/09/2014 Collected  Final      Results     Procedure Component Value Units Date/Time    BLOOD CULTURE [[id number]] Collected:  03/13/17 1550    Order Status:  Completed Specimen Information:  Blood from Peripheral Updated:  03/18/17 1900     Significant Indicator NEG      Source BLD      Site PERIPHERAL      Blood Culture No growth after 5 days of incubation.     Narrative:      Per Hospital Policy: Only change Specimen  "Src: to \"Line\" if  specified by physician order.    BLOOD CULTURE [[id number]] Collected:  03/13/17 1547    Order Status:  Completed Specimen Information:  Blood from Peripheral Updated:  03/18/17 1900     Significant Indicator NEG      Source BLD      Site PERIPHERAL      Blood Culture No growth after 5 days of incubation.     Narrative:      Per Hospital Policy: Only change Specimen Src: to \"Line\" if  specified by physician order.    FLUID CULTURE W/GRAM STAIN [[id number]]     Order Status:  Canceled Specimen Information:  Body Fluid from Ascities Fluid     CULTURE WOUND W/ GRAM STAIN [[id number]] Collected:  03/13/17 2230    Order Status:  Completed Specimen Information:  Wound from Right Leg Updated:  03/17/17 0841     Gram Stain Result No organisms seen.      Significant Indicator NEG      Source WND      Site RIGHT LEG      Culture Result Wound No growth at 72 hours.           Fluids:  Intake/Output       03/21/17 0700 - 03/22/17 0659 03/22/17 0700 - 03/23/17 0659 03/23/17 0700 - 03/24/17 0659      3531-5296 2719-3803 Total 7609-1146 1932-4424 Total 3857-0025 5430-7493 Total       Intake    P.O.  --  -- --  0  0 0  --  -- --    P.O. -- -- -- 0 0 0 -- -- --    I.V.  965.9  830.8 1796.7  906.8  896 1802.8  408.3  -- 408.3    Heparin Volume 307.9 324 631.9 288.8 288 576.8 144 -- 144    Dobutamine Volume 408 406.8 814.8 408 408 816 164.3 -- 164.3    IV Piggyback Volume (Antibiotics) 250 100 350 210 200 410 100 -- 100    Other  --  60 60  30  -- 30  --  -- --    Medications (P.O./ Enteral Liquids) -- 60 60 30 -- 30 -- -- --    Enteral  90  90 180  30  -- 30  0  -- 0    Enteral Volume -- -- -- -- -- -- 0 -- 0    Free Water / Tube Flush 90 90 180 30 -- 30 -- -- --    Total Intake 1055.9 980.8 2036.7 966.8 896 1862.8 408.3 -- 408.3       Output    Urine  670  580 1250  500  675 1175  395  -- 395    Indwelling Cathether   395 -- 395    Drains  100  60 160  0  -- 0  --  -- --    Nasogastric Tube 100 " 60 160 0 -- 0 -- -- --    Other  --  -- --  7  -- 7  --  -- --    Other -- -- -- 7 -- 7 -- -- --    Stool/Urine  200  250 450  300  -- 300  --  -- --    Measurable Stool (ml) 200 250 450 300 -- 300 -- -- --    Stool  --  -- --  --  -- --  --  -- --    Number of Times Stooled 3 x 2 x 5 x 2 x -- 2 x -- -- --    Total Output   395 -- 395       Net I/O     85.9 90.8 176.7 159.8 221 380.8 13.3 -- 13.3        DX-CHEST-PORTABLE (1 VIEW) (Final result) Result time: 03/21/17 03:06:06     Final result by Anika Saavedra M.D. (03/21/17 03:06:06)     Impression:     1.  Pulmonary edema and/or infiltrates are identified, which appear somewhat increased since the prior exam.  2.  Cardiomegaly  3.  Atherosclerosis   Weight: 109.5 kg (241 lb 6.5 oz)    Assessment:  Active Hospital Problems    Diagnosis   • Acute renal insufficiency [N28.9]   • Acute on chronic systolic congestive heart failure, NYHA class 4, present on admission (EF 15-20%) [I50.23]   • Hepatitis C virus infection [B19.20]   • Cellulitis [L03.90]   • Shock (CMS-HCC) [R57.9]   • LV (left ventricular) mural thrombus (CMS-HCC) [I21.3]       Plan:  Bilateral lower extremity cellulitis with bulla  Wound cx - mixed skin patricia  Continue zosyn. Anticipate 2 weeks  Stop date 3/29  Wound care    Cardiogenic shock/PM/AICD  Remains dobutamine  2/2 med noncompliance with hx cocaine-induced dilated CM EF 15%    Leukocytosis, persistent but decreased  Multifactorial  Abx per above  Repeat cxs if ncreases    Hepatitis C/cirrhosis  Contributing to low platelets, electrolyte derangements, and immunocompromised state  Elevated ammonia  +ascites    Possible aspiration pneumonitis after emesis  NGT placed  Checked lipase- elevated  Keep meds IV  Resp status currently stable  CXR + pulm edema vs pneumonitis by my read  Abx as above    [place]  Improved overall  Creat 1.57    Prognosis - poor    Appreciate Palliative care/hospice nick [facility]

## 2017-03-23 NOTE — DIETARY
"Nutrition Support (cortrak) Assessment - Male    Obinna Grande is a 51 y.o. male with admitting DX of Shock (CMS-HCC)  Past Medical History   Diagnosis Date   • Ulcer (CMS-HCC)    • CHF (congestive heart failure) (CMS-[facility])    • Hypertension    • Hypertension    • Essential hypertension, benign 2012   • Other primary cardiomyopathies (CMS-HCC) 2012   • Cardiomegaly 2012   • Nonspecific elevation of levels of transaminase or lactic acid dehydrogenase (LDH) 2012   • Obesity, unspecified 2012   • Cocaine abuse      Past Surgical History   Procedure Laterality Date   • Gastroscopy-endo  08     Performed by CASPER VILCHIS at ENDOSCOPY [facility]     Allergies:  Review of patient's allergies indicates no known allergies.  Height: 167.6 cm (5' 5.98\")  Weight: 109.5 kg (241 lb 6.5 oz)  Weight to Use in Calculations: 103 kg (227 lb 1.2 oz) (estimated dry weight, as per I/Os tab pt is +6.9L fluid overloaded since admit)   Ideal Body Weight: 64.411 kg (142 lb)  Percent Ideal Body Weight: 170  Body mass index is 38.98 kg/(m^2).     Pertinent Labs: Gluc 112, BUN 46, creat 1.52, Na 122, Ammonia 37  Pertinent Medications: Pepcid, Lasix, Heparin, Lactulose, Zofran (prn), Zosyn, KCl, Xifaxan  Pertinent Fluids: No IVF at this time per MAR  Skin: per wound team note 3/22, pt noted with IAD to buttocks  Last BM: 17; rectal trumpet in place     Estimated Needs: MSJ x 1 = 1820 kcals (17 kcals/kg)   Total Calories / day: 1130 - 1445  (Calories / k - 14)  Total Grams Protein / day: 130  (Grams Protein / kg of ideal body weight: >2.0)  Total Fluids ml / day: 2579.6 ml         Assessment / Evaluation: Pt is on day 10 of admit. He was receiving a cardiac diet. He is currently NPO and per discussion w/RN cortrak placed and nutrition support to be initiated. Pt is obese with a BMI of 38.9, will permissively underfeed pt per [facility] guidelines of BMI >30. Will provide specialized TF " formula, Peptamen Intense VHP, to meet pt's estimated needs.  Palliative to be meeting with pt's son today as well.     Plan / Recommendation:   · Once MD is ready to start nutrition support, start Peptamen Intense VHP at 25 mL/hr and advance per protocol to goal rate of 60 mL/hr. TF at goal will provide 1440 kcals, 134 gm protein and 1210 mL free water per day.   · Fluids per MD   · Monitor wt and lab trends   · Advance to po diet as pt is able     RD will con't to monitor

## 2017-03-23 NOTE — PROGRESS NOTES
Patient has been cursing and says we are trying to kill him. I am continuing to educate patient about skin care and the importance of turning to prevent pressure ulcers. Patient is calling me names using curse words.

## 2017-03-23 NOTE — PROGRESS NOTES
Hospital Medicine Progress Note, Adult, Complex               Author: Pietro Wu Date & Time created: 3/23/2017  9:38 AM     Interval History:  50 y/o male presented with dyspnea.  Noted to have acute on chronic systolic heart failure, decompensated.  Also noted bilateral LE cellulitis causing sepsis.  Now on dobutamine.  Pt seen in ICU, ICU care given.  Discussed patient condition and plan with RN, RT and charge nurse / hot rounds.        Review of Systems:  Review of Systems   Unable to perform ROS: acuity of condition       Physical Exam:  Physical Exam   Constitutional:  Non-toxic appearance. No distress.   Ill appearing    HENT:   Head: Normocephalic and atraumatic.   Mouth/Throat: No oropharyngeal exudate.   Eyes: Right eye exhibits no discharge. Left eye exhibits no discharge.   Neck: Neck supple. No edema and no erythema present.   Cardiovascular: Normal rate and regular rhythm.  Exam reveals no gallop and no friction rub.    No murmur heard.  Pulmonary/Chest: Effort normal and breath sounds normal. No stridor. No respiratory distress. He has no wheezes. He has no rales.   Abdominal: Soft. He exhibits no distension.   Decreased bowel sounds but present    Musculoskeletal: He exhibits edema.   Neurological:   Awake but confused    Skin: Skin is warm and dry. He is not diaphoretic. There is erythema (bilateral LE with blisters ).   Psychiatric: His speech is tangential. He is slowed. Cognition and memory are impaired.   Nursing note and vitals reviewed.      Labs:        Invalid input(s): [id number]      Recent Labs      03/21/17   0540  03/21/17   0900  03/22/17   0346  03/23/17   0420   SODIUM  124*   --   124*  122*   POTASSIUM  3.2*   --   3.7  4.0   CHLORIDE  91*   --   92*  92*   CO2  22 -- 21 21   BUN  47*   --   46*  46*   CREATININE  1.45*   --   1.47*  1.52*   MAGNESIUM   --   2.1   --    --    CALCIUM  8.2*   --   8.7  8.6     Recent Labs      03/21/17   0540  03/21/17   0900   17   0346  17   0420   ALTSGPT  26   --   24  25   ASTSGOT  42   --   41  44   ALKPHOSPHAT  35   --   38  41   TBILIRUBIN  7.9*   --   7.2*  6.8*   LIPASE   --   170*   --    --    GLUCOSE  100*   --   110*  112*     Recent Labs      17   0540   17   0346  17   0530  17   1212  17   1831  17   0420   RBC  4.67*   --   4.76   --    --    --   4.78   HEMOGLOBIN  11.7*   --   12.0*   --    --    --   12.0*   HEMATOCRIT  34.7*   --   35.9*   --    --    --   36.1*   PLATELETCT  106*   --   114*   --    --    --   124*   APTT  98.7*   < >   --   115.7*  94.4*  89.9*   --     < > = values in this interval not displayed.     Recent Labs      17   0540  17   03417   0420   WBC  23.0*  22.4*  17.4*   NEUTSPOLYS  84.50*  92.00*  78.80*   LYMPHOCYTES  4.30*  3.60*  9.70*   MONOCYTES  1.70  3.50  3.50   EOSINOPHILS  1.70  0.00  2.60   BASOPHILS  0.90  0.00  0.00   ASTSGOT  42  41  44   ALTSGPT  26  24  25   ALKPHOSPHAT  35  38  41   TBILIRUBIN  7.9*  7.2*  6.8*           Hemodynamics:  Temp (24hrs), Av.2 °C (97.1 °F), Min:35.7 °C (96.3 °F), Max:36.6 °C (97.9 °F)  Temperature: 35.9 °C (96.7 °F)  Pulse  Av.9  Min: 50  Max: 194Heart Rate (Monitored): (!) 113  NIBP: 103/81 mmHg    Respiratory:    Respiration: 14, Pulse Oximetry: 100 %     Work Of Breathing / Effort: Mild  RUL Breath Sounds: Clear, RML Breath Sounds: Diminished, RLL Breath Sounds: Diminished, BHAVNA Breath Sounds: Clear, LLL Breath Sounds: Diminished  Fluids:    Intake/Output Summary (Last 24 hours) at 17 0938  Last data filed at 17 0800   Gross per 24 hour   Intake   1744 ml   Output   1477 ml   Net    267 ml     Weight: 109.5 kg (241 lb 6.5 oz)  GI/Nutrition:  Orders Placed This Encounter   Procedures   • DIET NPO     Standing Status: Standing      Number of Occurrences: 1      Standing Expiration Date:      Order Specific Question:  Restrict to:     Answer:  Strict [1]       Comments:  no meds     Medical Decision Making, by Problem:  Active Hospital Problems    Diagnosis   • Acute on chronic systolic congestive heart failure, NYHA class 4, present on admission (EF 15-20%) [I50.23]  - severe, EF of 20%  - continue dobutamine  - medical management as able in pt with borderline BP  - cxr read by me again shows improved pulmonary edema     • Hepatitis C virus infection [B19.20]  - with liver failure  - continue rifaxamin, lactulose      • Acute renal insufficiency [N28.9]  - improved on dobutamine  - when further improved will need to stop dobutamine and monitor     • Essential hypertension, benign [I10]  - low on dobutamine     • Acute respiratory failure (CMS-HCC) [J96.00]  - improved     • Hyponatremia [E87.1]  - d/t fluid overload  - continue lasix and repeat bmp in am  - restarting TF which will likely improve Na     • Hyperkalemia [E87.5]  - resolved, now getting oral k to match lasix     • Cellulitis [L03.90]  - improved, continue zosyn per ID     • Shock (CMS-HCC) [R57.9]  - borderline on dobutamine, no need for pressors     • LV (left ventricular) mural thrombus (CMS-[facility]) [I21.3]  - continue heparin gtt       Labs reviewed, Medications reviewed and Radiology images reviewed        DVT Prophylaxis: Heparin    Ulcer prophylaxis: Yes  Antibiotics: Treating active infection/contamination beyond 24 hours perioperative coverage    Patient is critically ill.   The patient continues to have : heart failure   The vital organ system that is effected is the: all are at risk from decreased profusion    If untreated there is a high chance of deterioration into: end organ failure   The critical care that I am providing today is: dobutamine gtt  The critical care that has been undertaken is medically complex.   There has been no overlap in critical care time.  Critical care time not including procedures, no overlap: 36 minutes

## 2017-03-23 NOTE — CARE PLAN
Problem: Nutritional:  Goal: Achieve adequate nutritional intake  Patient will consume 50% of meals and supplements.   Outcome: NOT MET  Pt NPO, nutrition support to be initiated   Goal: Nutrition support tolerated and meeting greater than 85% of estimated needs  Outcome: NOT MET

## 2017-03-24 NOTE — PROGRESS NOTES
Spoke with Dr. Pena regarding dobutamine drip. Orders received to begin the drip again as ordered in MAR and to maintain the drip in order to improve kidney perfusion unless patient develops worsening tachycardia, tachyarrythmias or runs of ectopy.

## 2017-03-24 NOTE — CARE PLAN
Problem: Nutritional:  Goal: Achieve adequate nutritional intake  Patient will consume 50% of meals and supplements.   Outcome: MET Date Met:  03/24/17  TF at goal

## 2017-03-24 NOTE — DISCHARGE PLANNING
Palliative Social Work    Met with son, Irvin, to follow up with him after yesterday's meeting with the PC team.  Irvin reported that he is interested in a referral to [facility] but not ready to sign the choice form until he does further research on the local SNF's.  Irvin requested follow up on Monday.  Requested that Jose STROUD, f/u with Irvin on Monday because this SW will be out.  Introduced Jose to Irvin and Jose will f/u.  Informed hospitalist coordinator, Magaly, about Irvin's  request for [place] paperwork to be completed.  Magaly will f/u on request.  PC RN, Lizz, updated.

## 2017-03-24 NOTE — CARE PLAN
Problem: Safety  Goal: Will remain free from injury  Outcome: PROGRESSING AS EXPECTED  Bed in low position, bed alarm in use, call light within reach and patient room near nursing station.     Patient communicates and demonstrates understanding that a nurse must be present during ambulation while patient in the ICU and how to use the call light when said assistance is required.     Problem: Respiratory:  Goal: Respiratory status will improve  Outcome: PROGRESSING SLOWER THAN EXPECTED  Patient agonal breathing, discussed with PMA and respiratory, ABG not indicated at this time. Patient now requiring 1-2 liters of oxygen.     Problem: Skin Integrity  Goal: Risk for impaired skin integrity will decrease  Outcome: PROGRESSING SLOWER THAN EXPECTED  Assess patient's skin at the beginning of the shift. Turn patient every two hours and monitor under all medical devices throughout entire shift. Maintain a clean, dry linen environment. Float heals and elbows. Provide appropriate would prevention interventions as they apply. Implement pertinent wound protocols and document them. Please see wound flow sheet for further details.

## 2017-03-24 NOTE — PROGRESS NOTES
Infectious Disease Progress Note    Author: Ayala Zazueta M.D. DOS & Time created: 3/24/2017  12:02 PM    Chief Complaint   Patient presents with   • Peripheral Edema   • Weight Gain     10 lbs weight gain over the last 2 weeks   • Other     CHF exaserbation. Not compliant with medications.    • ALOC     Disoriented to event and time.    FU for bilateral LE cellulitis    Interval History:  3/17 AF, no CBC, lethargic but arousable to voice then quickly falls back to sleep  3/18 AF, WBC 15.5, remains on pressors, more awake and alert today, complaining of abd pain and leg pain, recent wound care photos reviewed  3/19 AF, WBC 22.9, white count increasing, pulled out RIJ and left IJ inserted o/n. Off levophed  3/20 AF WBC 23 on dobutamine-verbalizing but obtunded Pain in legs  3/21 AF WBC 23 yesterdays's projectile vomiting feculent material noted  3/22 AF WBC 22 copious secretions/sputum  3/23 AF WBC 17 no new +cxs remains obtunded  3/24 AF WBC 17.4 more alert today-asking for soda  Labs Reviewed, Medications Reviewed, Radiology Reviewed and Wound Reviewed.    Review of Systems:  Review of Systems   Constitutional: Negative for fever and chills.   Respiratory: Positive for cough and sputum production.    Cardiovascular: Negative for chest pain.   Musculoskeletal: Positive for myalgias.        Both legs   All other systems reviewed and are negative.      Hemodynamics:  Temp (24hrs), Av.3 °C (97.4 °F), Min:36 °C (96.8 °F), Max:36.6 °C (97.8 °F)  Temperature: 36.6 °C (97.8 °F)  Pulse  Av.9  Min: 50  Max: 194Heart Rate (Monitored): (!) 111  NIBP: (!) 87/71 mmHg      Physical Exam:  Physical Exam   Constitutional: He appears well-developed.   Chronically ill Obese   HENT:   Head: Normocephalic and atraumatic.   NGT   Eyes: EOM are normal. Pupils are equal, round, and reactive to light. Scleral icterus is present.   Neck: Neck supple.   Left IJ   Cardiovascular:   Murmur heard.  Tachy   Pulmonary/Chest:  Effort normal. No respiratory distress. He has no wheezes.   Abdominal: Soft. He exhibits distension. There is no tenderness. There is no rebound and no guarding.   Musculoskeletal: He exhibits edema.   Bilateral LE erythema and edema.  Ruptured bullae-serous drainage, superficial  Pitting edema to thigh   Neurological: He is alert.   Skin: There is erythema.   Nursing note and vitals reviewed.      Labs:  Recent Labs      03/22/17 0346 03/23/17   0420 03/24/17   0154   WBC  22.4*  17.4*  17.1*   RBC  4.76  4.78  4.74   HEMOGLOBIN  12.0*  12.0*  12.0*   HEMATOCRIT  35.9*  36.1*  36.8*   MCV  75.4*  75.5*  77.6*   MCH  25.2*  25.1*  25.3*   RDW  54.1*  55.7*  57.0*   PLATELETCT  114*  124*  120*   MPV  10.6  10.5  10.8   NEUTSPOLYS  92.00*  78.80*  75.90*   LYMPHOCYTES  3.60*  9.70*  7.80*   MONOCYTES  3.50  3.50  6.00   EOSINOPHILS  0.00  2.60  0.00   BASOPHILS  0.00  0.00  1.70   RBCMORPHOLO  Present  Present  Present     Recent Labs      03/22/17 0346 03/23/17   0420 03/24/17   0154   SODIUM  124*  122*  125*   POTASSIUM  3.7  4.0  4.7   CHLORIDE  92*  92*  96   CO2  21  21  19*   GLUCOSE  110*  112*  148*   BUN  46*  46*  49*     Recent Labs      03/22/17 0346 03/23/17   0420 03/24/17   0154   ALBUMIN  2.5*  2.8*  2.6*   TBILIRUBIN  7.2*  6.8*  7.2*   ALKPHOSPHAT  38  41  42   TOTPROTEIN  6.9  7.2  6.7   ALTSGPT  24  25  26   ASTSGOT  41  44  60*   CREATININE  1.47*  1.52*  1.62*       BLOOD CULTURE   Date Value Ref Range Status   03/13/2017 No growth after 5 days of incubation.  Final     BLOOD CULTURE HOLD   Date Value Ref Range Status   02/09/2014 Collected  Final      Results     Procedure Component Value Units Date/Time    BLOOD CULTURE [[id number]] Collected:  03/13/17 1550    Order Status:  Completed Specimen Information:  Blood from Peripheral Updated:  03/18/17 1900     Significant Indicator NEG      Source BLD      Site PERIPHERAL      Blood Culture No growth after 5 days of incubation.      "Narrative:      Per Hospital Policy: Only change Specimen Src: to \"Line\" if  specified by physician order.    BLOOD CULTURE [[id number]] Collected:  03/13/17 1547    Order Status:  Completed Specimen Information:  Blood from Peripheral Updated:  03/18/17 1900     Significant Indicator NEG      Source BLD      Site PERIPHERAL      Blood Culture No growth after 5 days of incubation.     Narrative:      Per Hospital Policy: Only change Specimen Src: to \"Line\" if  specified by physician order.    FLUID CULTURE W/GRAM STAIN [[id number]]     Order Status:  Canceled Specimen Information:  Body Fluid from [facility] Fluid           Fluids:  Intake/Output       03/22/17 0700 - 03/23/17 0659 03/23/17 0700 - 03/24/17 0659 03/24/17 0700 - 03/25/17 0659      8186-8473 7274-3074 Total 3851-2893 3589-6339 Total 5585-0599 5135-2866 Total       Intake    P.O.  0  0 0  --  -- --  --  -- --    P.O. 0 0 0 -- -- -- -- -- --    I.V.  906.8  896 1802.8  552.3  630.8 1183.1  275  -- 275    Heparin Volume 288.8 288 576.8 288 256.5 544.5 105 -- 105    Dobutamine Volume 408 408 816 164.3 274.3 438.6 170 -- 170    IV Piggyback Volume (Antibiotics) 210 200 410 100 100 200 -- -- --    Other  30  -- 30  30  40 70  60  -- 60    Medications (P.O./ Enteral Liquids) 30 -- 30 30 40 70 60 -- 60    Enteral  30  -- 30  210  670 880  410  -- 410    Enteral Volume -- -- -- 150 550 700 350 -- 350    Free Water / Tube Flush 30 -- 30 60 120 180 60 -- 60    Total Intake 966.8 896 1862.8 792.3 1340.8 2133.1 745 -- 745       Output    Urine  500  675 1175  470  310 780  120  -- 120    Indwelling Cathether  470 310 780 120 -- 120    Drains  0  -- 0  --  -- --  0  -- 0    Residual Amount (ml) (Discarded) -- -- -- -- -- -- 0 -- 0    Nasogastric Tube 0 -- 0 -- -- -- -- -- --    Other  7  -- 7  --  -- --  --  -- --    Other 7 -- 7 -- -- -- -- -- --    Stool/Urine  300  -- 300  --  -- --  --  -- --    Measurable Stool (ml) 300 -- 300 -- -- -- -- -- --    Stool "  --  -- --  --  -- --  --  -- --    Number of Times Stooled 2 x -- 2 x -- -- -- -- -- --    Total Output  470 310 780 120 -- 120       Net I/O     159.8 221 380.8 322.3 1030.8 1353.1 625 -- 625        DX-CHEST-PORTABLE (1 VIEW) (Final result) Result time: 03/21/17 03:06:06     Final result by Anika Saavedra M.D. (03/21/17 03:06:06)     Impression:     1.  Pulmonary edema and/or infiltrates are identified, which appear somewhat increased since the prior exam.  2.  Cardiomegaly  3.  Atherosclerosis   Weight: 111.6 kg (246 lb 0.5 oz)    Assessment:  Active Hospital Problems    Diagnosis   • Acute renal insufficiency [N28.9]   • Acute on chronic systolic congestive heart failure, NYHA class 4, present on admission (EF 15-20%) [I50.23]   • Hepatitis C virus infection [B19.20]   • Cellulitis [L03.90]   • Shock (CMS-HCC) [R57.9]   • LV (left ventricular) mural thrombus (CMS-HCC) [I21.3]       Plan:  Bilateral lower extremity cellulitis with bulla  Wound cx - mixed skin patricia  Healing shallow bullae  Continue zosyn. 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

## 2017-03-24 NOTE — PROGRESS NOTES
Pulmonary Critical Care Progress Note    Interval Events:  24 hour interval history reviewed  Reason for visit:  Acute on chronic systolic heart failure     - dobut -5   - ST   - CXR with slightly increased edema   - UOP decreased with no dobut    PFSH:  No change.    Respiratory:     Pulse Oximetry: 99 %  CXR with increased pulmonary edema  Scattered fine and coarse crackles bilaterally  Periodic breathing    HemoDynamics:  Pulse: (!) 107, Heart Rate (Monitored): (!) 107  NIBP: (!) 92/62 mmHg    SR  Dobut - 5    Neuro:  Confused, agitated at times  No focal weakness    Fluids:  Intake/Output       03/22/17 0700 - 03/23/17 0659 03/23/17 0700 - 03/24/17 0659 03/24/17 0700 - 03/25/17 0659      0852-2946 5460-1928 Total 5642-1786 9900-8836 Total 7309-9731 6103-0345 Total       Intake    P.O.  0  0 0  --  -- --  --  -- --    P.O. 0 0 0 -- -- -- -- -- --    I.V.  906.8  896 1802.8  552.3  630.8 1183.1  110  -- 110    Heparin Volume 288.8 288 576.8 288 256.5 544.5 42 -- 42    Dobutamine Volume 408 408 816 164.3 274.3 438.6 68 -- 68    IV Piggyback Volume (Antibiotics) 210 200 410 100 100 200 -- -- --    Other  30  -- 30  30  40 70  30  -- 30    Medications (P.O./ Enteral Liquids) 30 -- 30 30 40 70 30 -- 30    Enteral  30  -- 30  210  670 880  140  -- 140    Enteral Volume -- -- -- 150 550 700 110 -- 110    Free Water / Tube Flush 30 -- 30 60 120 180 30 -- 30    Total Intake 966.8 896 1862.8 792.3 1340.8 2133.1 280 -- 280       Output    Urine  500  675 1175  470  310 780  30  -- 30    Indwelling Cathether  470 310 780 30 -- 30    Drains  0  -- 0  --  -- --  --  -- --    Nasogastric Tube 0 -- 0 -- -- -- -- -- --    Other  7  -- 7  --  -- --  --  -- --    Other 7 -- 7 -- -- -- -- -- --    Stool/Urine  300  -- 300  --  -- --  --  -- --    Measurable Stool (ml) 300 -- 300 -- -- -- -- -- --    Stool  --  -- --  --  -- --  --  -- --    Number of Times Stooled 2 x -- 2 x -- -- -- -- -- --    Total Output   470 310 780 30 -- 30       Net I/O     159.8 221 380.8 322.3 1030.8 1353.1 250 -- 250        Weight: 111.6 kg (246 lb 0.5 oz)  Recent Labs      17   SODIUM   --   124*  122*  125*   POTASSIUM   --   3.7  4.0  4.7   CHLORIDE   --   92*  92*  96   CO2   --   *   BUN   --   46*  46*  49*   CREATININE   --   1.47*  1.52*  1.62*   MAGNESIUM  2.1   --    --    --    CALCIUM   --   8.7  8.6  8.7       GI/Nutrition:  Abd slightly distended.  Non-tender.  NG to suction    Liver Function  Recent Labs      17   ALTSGPT   --   24  25  26   ASTSGOT   --   41  44  60*   ALKPHOSPHAT   --   38  41  42   TBILIRUBIN   --   7.2*  6.8*  7.2*   LIPASE  170*   --    --    --    GLUCOSE   --   110*  112*  148*       Heme:  Recent Labs      17   18317   18217   RBC  4.76   --    --   4.78   --   4.74   HEMOGLOBIN  12.0*   --    --   12.0*   --   12.0*   HEMATOCRIT  35.9*   --    --   36.1*   --   36.8*   PLATELETCT  114*   --    --   124*   --   120*   APTT   --    < >  89.9*   --   98.7*  60.2*    < > = values in this interval not displayed.       Infectious Disease:  Temp  Av.3 °C (97.4 °F)  Min: 36 °C (96.8 °F)  Max: 36.7 °C (98.1 °F)    Recent Labs      17   WBC  22.4*  17.4*  17.1*   NEUTSPOLYS  92.00*  78.80*  75.90*   LYMPHOCYTES  3.60*  9.70*  7.80*   MONOCYTES  3.50  3.50  6.00   EOSINOPHILS  0.00  2.60  0.00   BASOPHILS  0.00  0.00  1.70   ASTSGOT  41  44  60*   ALTSGPT  24  25  26   ALKPHOSPHAT  38  41  42   TBILIRUBIN  7.2*  6.8*  7.2*     Current Facility-Administered Medications   Medication Dose Frequency Provider Last Rate Last Dose   • piperacillin-tazobactam (ZOSYN) 3.375 g in  mL IVPB  3.375 g Q6HRS Ayala Zazueta M.D.   Stopped at 17 0608   • potassium  chloride (KLOR-CON) 20 MEQ packet 40 mEq  40 mEq BID Nikhil Nunez M.D.   40 mEq at 03/24/17 0756   • furosemide (LASIX) injection 40 mg  40 mg Q12HRS Gavin Molina M.D.   40 mg at 03/24/17 0755   • famotidine (PEPCID) tablet 20 mg  20 mg DAILY Everton Paredes M.D.   20 mg at 03/24/17 0756   • lactulose 20 GM/30ML solution 30 mL  30 mL BID Gavin Molina M.D.   30 mL at 03/24/17 0757   • rifaximin (XIFAXAN) tablet 550 mg  550 mg BID Everton Paredes M.D.   550 mg at 03/24/17 0756   • diphenhydrAMINE (BENADRYL) tablet/capsule 50 mg  50 mg HS PRN Tate Hgoan M.D.   50 mg at 03/15/17 0306   • oxycodone immediate-release (ROXICODONE) tablet 2.5-5 mg  2.5-5 mg Q4HRS PRN Alexia Munoz D.O.   5 mg at 03/23/17 1457   • DOBUTamine (DOBUTREX) 1 mg/mL premix infusion  5 mcg/kg/min Continuous Jeremy M Gonda, M.D. 34 mL/hr at 03/24/17 0536 5 mcg/kg/min at 03/24/17 0536   • Respiratory Care per Protocol   Continuous RT Alexis Hamilton M.D.       • ondansetron (ZOFRAN) syringe/vial injection 4 mg  4 mg Q4HRS PRN Alexis Hamilton M.D.   4 mg at 03/20/17 1821   • ondansetron (ZOFRAN ODT) dispertab 4 mg  4 mg Q4HRS PRN Alexis Hamilton M.D.       • promethazine (PHENERGAN) tablet 12.5-25 mg  12.5-25 mg Q4HRS PRN Alexis Hamilton M.D.       • promethazine (PHENERGAN) suppository 12.5-25 mg  12.5-25 mg Q4HRS PRN Alexis Hamilton M.D.       • prochlorperazine (COMPAZINE) injection 5-10 mg  5-10 mg Q4HRS PRN Alexis Hamilton M.D.       • acetaminophen (TYLENOL) tablet 650 mg  650 mg Q6HRS PRN Alexis Hamilton M.D.   650 mg at 03/20/17 0838   • senna-docusate (PERICOLACE or SENOKOT S) 8.6-50 MG per tablet 2 Tab  2 Tab BID Alexis Hamilton M.D.   Stopped at 03/22/17 0900    And   • polyethylene glycol/lytes (MIRALAX) PACKET 1 Packet  1 Packet QDAY PRN Alexis Hamilton M.D.        And   • magnesium hydroxide (MILK OF MAGNESIA) suspension 30 mL  30 mL QDAY PRN Alexis Hamilton M.D.        And   • bisacodyl (DULCOLAX) suppository 10 mg  10  mg QDAY PRN Alexis Hamilton M.D.       • heparin 1000 units/mL injection 3,200 Units  3,200 Units PRN Catherine Hillman PHARMD   3,200 Units at 03/21/17 1304    And   • heparin infusion 25,000 units in 500 ml 0.45% nacl   Continuous Catherine Hillman PHARMD 21 mL/hr at 03/24/17 0200 1,050 Units/hr at 03/24/17 0200   • POLYMEM ALGINATE DRESSING PADS 1 Each  1 Each QDAY PRN Jeremy M Gonda, M.D.         Last reviewed on 3/13/2017  5:30 PM by Loi Ponce    Quality  Measures:  Labs reviewed, Medications reviewed and Radiology images reviewed  Holt catheter: Critically Ill - Requiring Accurate Measurement of Urinary Output  Central line in place: Need for access    DVT Prophylaxis: Heparin  DVT prophylaxis - mechanical: SCDs  Ulcer prophylaxis: Not indicated  Antibiotics: Treating active infection/contamination beyond 24 hours perioperative coverage          Assessment and Plan:    Acute on chronic hypoxemic respiratory failure  Acute on chronic systolic heart failure   - EF 15-20%   - dobutamine gtt   - force diuresis as tolerated  Acute cardiogenic pulmonary edema   - force diuresis as tolerated   - worse  Acute metabolic/toxic encephalopathy   - lactulose and rifaximin  Sepsis - skin source  Bilateral lower extremity cellulitis with open ulcerations   - cont Zosyn per ID  Thrombocytopenia  Acute on chronic kidney disease  Hepatitis C  LV thrombus - cont heparin gtt  DNR/I    Unstable cardiovascular and pulmonary status with inotropic support and periodic breathing.  Prognosis is very poor.  Did not tolerate weaning of dobutamine with marked reduction in urine output.    Critical Care Time:  38 minutes  Date of service:  3/24/17  76804  No time overlap  Time excludes procedures  Discussed with RN, RT, Team

## 2017-03-24 NOTE — PROGRESS NOTES
Worked in collaboration with respiratory therapy due to patient having increased agonal breathing and frequent oxygen desaturations post-titration of oxygen via nasal cannula. Respiratory does not believe the patient needs an ABG at this time, and we will continue to monitor the patient's work of breathing throughout rest of shift.

## 2017-03-24 NOTE — DISCHARGE PLANNING
Still confused.  Urine output decreased.  Dobutamine drip.  [place].  Reviewed Palliative care notes.  Son not ready to commit to Hospice/SNF.     Will follow.

## 2017-03-24 NOTE — PROGRESS NOTES
Hospital Medicine Progress Note, Adult, Complex               Author: Pietro Wu Date & Time created: 3/24/2017  9:08 AM     Interval History:  52 y/o male presented with dyspnea.  Noted to have acute on chronic systolic heart failure, decompensated.  Also noted bilateral LE cellulitis causing sepsis.  Now on dobutamine.  Pt seen in ICU, ICU care given.  Discussed patient condition and plan with RN, RT and charge nurse / hot rounds.        Review of Systems:  Review of Systems   Unable to perform ROS: acuity of condition       Physical Exam:  Physical Exam   Constitutional:  Non-toxic appearance.   Ill appearing    HENT:   Head: Normocephalic and atraumatic.   Eyes: Right eye exhibits no discharge. Left eye exhibits no discharge. No scleral icterus.   Neck: Neck supple. No tracheal deviation, no edema and no erythema present.   Cardiovascular: Normal rate and regular rhythm.    No murmur heard.  Pulmonary/Chest: Effort normal and breath sounds normal. No stridor. No respiratory distress. He has no wheezes.   Abdominal: Soft.   Decreased bowel sounds but present    Musculoskeletal: He exhibits edema.   Neurological:   Awake but confused    Skin: Skin is warm. He is not diaphoretic. There is erythema (bilateral LE with blisters ).   Psychiatric: His speech is tangential. He is slowed. Cognition and memory are impaired.   Nursing note and vitals reviewed.      Labs:        Invalid input(s): [id number]      Recent Labs      03/22/17 0346 03/23/17   0420  03/24/17   0154   SODIUM  124*  122*  125*   POTASSIUM  3.7  4.0  4.7   CHLORIDE  92*  92*  96   CO2  21  21  19*   BUN  46*  46*  49*   CREATININE  1.47*  1.52*  1.62*   CALCIUM  8.7  8.6  8.7     Recent Labs      03/22/17 0346 03/23/17   0420  03/24/17   0154   ALTSGPT  24  25  26   ASTSGOT  41  44  60*   ALKPHOSPHAT  38  41  42   TBILIRUBIN  7.2*  6.8*  7.2*   GLUCOSE  110*  112*  148*     Recent Labs      03/22/17   0346   03/22/17   1831  03/23/17    0420  17   1825  17   0154   RBC  4.76   --    --   4.78   --   4.74   HEMOGLOBIN  12.0*   --    --   12.0*   --   12.0*   HEMATOCRIT  35.9*   --    --   36.1*   --   36.8*   PLATELETCT  114*   --    --   124*   --   120*   APTT   --    < >  89.9*   --   98.7*  60.2*    < > = values in this interval not displayed.     Recent Labs      17   0346  17   04217   0154   WBC  22.4*  17.4*  17.1*   NEUTSPOLYS  92.00*  78.80*  75.90*   LYMPHOCYTES  3.60*  9.70*  7.80*   MONOCYTES  3.50  3.50  6.00   EOSINOPHILS  0.00  2.60  0.00   BASOPHILS  0.00  0.00  1.70   ASTSGOT  41  44  60*   ALTSGPT  24  25  26   ALKPHOSPHAT  38  41  42   TBILIRUBIN  7.2*  6.8*  7.2*           Hemodynamics:  Temp (24hrs), Av.3 °C (97.4 °F), Min:36 °C (96.8 °F), Max:36.7 °C (98.1 °F)  Temperature: 36.4 °C (97.6 °F)  Pulse  Av.9  Min: 50  Max: 194Heart Rate (Monitored): (!) 107  NIBP: (!) 92/62 mmHg    Respiratory:    Respiration: 20, Pulse Oximetry: 99 %     Work Of Breathing / Effort: Moderate  RUL Breath Sounds: Clear, RML Breath Sounds: Diminished, RLL Breath Sounds: Diminished, BHAVNA Breath Sounds: Clear, LLL Breath Sounds: Diminished  Fluids:    Intake/Output Summary (Last 24 hours) at 17 0908  Last data filed at 17 0800   Gross per 24 hour   Intake 2297.1 ml   Output    660 ml   Net 1637.1 ml     Weight: 111.6 kg (246 lb 0.5 oz)  GI/Nutrition:  Orders Placed This Encounter   Procedures   • DIET NPO     Standing Status: Standing      Number of Occurrences: 1      Standing Expiration Date:      Order Specific Question:  Restrict to:     Answer:  Strict [1]      Comments:  no meds     Medical Decision Making, by Problem:  Active Hospital Problems    Diagnosis   • Acute on chronic systolic congestive heart failure, NYHA class 4, present on admission (EF 15-20%) [I50.23]  - severe, EF of 20%  - continue dobutamine, was stopped for a short time yesterday d/t significant tachycardia but urine outpt  stopped  - medical management as able in pt with borderline BP  - cxr read by me shows slightly worse pulmonary edema     • Hepatitis C virus infection [B19.20]  - with liver failure  - continue rifaxamin, lactulose      • Acute renal insufficiency [N28.9]  - worse today, continue dobutamine     • Essential hypertension, benign [I10]  - low on dobutamine     • Acute respiratory failure (CMS-HCC) [J96.00]  - improved     • Hyponatremia [E87.1]  - d/t fluid overload  - continue lasix and repeat bmp in am  - improved with restarting TF     • Hyperkalemia [E87.5]  - resolved, was getting oral k, will stop d/t increase in k today     • Cellulitis [L03.90]  - improved, continue zosyn per ID     • Shock (CMS-[facility]) [R57.9]  - borderline on dobutamine, no need for pressors     • LV (left ventricular) mural thrombus (CMS-[facility]) [I21.3]  - continue heparin gtt       Labs reviewed, Medications reviewed and Radiology images reviewed        DVT Prophylaxis: Heparin    Ulcer prophylaxis: Yes  Antibiotics: Treating active infection/contamination beyond 24 hours perioperative coverage    Patient is critically ill.   The patient continues to have : heart failure   The vital organ system that is effected is the: all are at risk from decreased profusion    If untreated there is a high chance of deterioration into: end organ failure   The critical care that I am providing today is: dobutamine gtt  The critical care that has been undertaken is medically complex.   There has been no overlap in critical care time.  Critical care time not including procedures, no overlap: 37 minutes

## 2017-03-25 NOTE — PROGRESS NOTES
Infectious Disease Progress Note    Author: Ayala Zazueta M.D. DOS & Time created: 3/25/2017  4:29 PM    Chief Complaint   Patient presents with   • Peripheral Edema   • Weight Gain     10 lbs weight gain over the last 2 weeks   • Other     CHF exaserbation. Not compliant with medications.    • ALOC     Disoriented to event and time.    FU for bilateral LE cellulitis    Interval History:  3/17 AF, no CBC, lethargic but arousable to voice then quickly falls back to sleep  3/18 AF, WBC 15.5, remains on pressors, more awake and alert today, complaining of abd pain and leg pain, recent wound care photos reviewed  3/19 AF, WBC 22.9, white count increasing, pulled out RIJ and left IJ inserted o/n. Off levophed  3/20 AF WBC 23 on dobutamine-verbalizing but obtunded Pain in legs  3/21 AF WBC 23 yesterdays's projectile vomiting feculent material noted  3/22 AF WBC 22 copious secretions/sputum  3/23 AF WBC 17 no new +cxs remains obtunded  3/24 AF WBC 17.4 more alert today-asking for soda  3/25 AF WBC 13.4 no acute events-choked on water  Labs Reviewed, Medications Reviewed, Radiology Reviewed and Wound Reviewed.    Review of Systems:  Review of Systems   Constitutional: Negative for fever and chills.   Respiratory: Positive for cough and sputum production.    Cardiovascular: Negative for chest pain.   Musculoskeletal: Positive for myalgias.        Both legs   All other systems reviewed and are negative.      Hemodynamics:  Temp (24hrs), Av.4 °C (97.6 °F), Min:36 °C (96.8 °F), Max:36.9 °C (98.5 °F)  Temperature: 36.9 °C (98.5 °F)  Pulse  Av.9  Min: 50  Max: 194Heart Rate (Monitored): (!) 116  NIBP: (!) 92/73 mmHg      Physical Exam:  Physical Exam   Constitutional: He appears well-developed.   Chronically ill Obese   HENT:   Head: Normocephalic and atraumatic.   NGT   Eyes: EOM are normal. Pupils are equal, round, and reactive to light. Scleral icterus is present.   Neck: Neck supple.   Left IJ    Cardiovascular:   Murmur heard.  Tachy   Pulmonary/Chest: Effort normal. No respiratory distress. He has no wheezes.   Abdominal: Soft. He exhibits distension. There is no tenderness. There is no rebound and no guarding.   Musculoskeletal: He exhibits edema.   Bilateral LE erythema and edema.  Ruptured bullae-serous drainage, superficial  Pitting edema to thigh   Neurological: He is alert.   Skin: There is erythema.   Nursing note and vitals reviewed.      Labs:  Recent Labs      03/23/17 0420 03/24/17 0154 03/25/17 0440   WBC  17.4*  17.1*  13.4*   RBC  4.78  4.74  4.66*   HEMOGLOBIN  12.0*  12.0*  11.7*   HEMATOCRIT  36.1*  36.8*  36.7*   MCV  75.5*  77.6*  78.8*   MCH  25.1*  25.3*  25.1*   RDW  55.7*  57.0*  61.0*   PLATELETCT  124*  120*  112*   MPV  10.5  10.8  9.7   NEUTSPOLYS  78.80*  75.90*  62.60   LYMPHOCYTES  9.70*  7.80*  6.10*   MONOCYTES  3.50  6.00  7.80   EOSINOPHILS  2.60  0.00  7.00*   BASOPHILS  0.00  1.70  3.50*   RBCMORPHOLO  Present  Present  Present     Recent Labs      03/23/17 0420 03/24/17 0154 03/25/17   0440   SODIUM  122*  125*  129*   POTASSIUM  4.0  4.7  4.1   CHLORIDE  92*  96  99   CO2  21  19*  22   GLUCOSE  112*  148*  126*   BUN  46*  49*  48*     Recent Labs      03/23/17 0420 03/24/17 0154 03/25/17   0440   ALBUMIN  2.8*  2.6*  2.7*   TBILIRUBIN  6.8*  7.2*  5.7*   ALKPHOSPHAT  41  42  40   TOTPROTEIN  7.2  6.7  6.9   ALTSGPT  25  26  23   ASTSGOT  44  60*  47*   CREATININE  1.52*  1.62*  1.35       BLOOD CULTURE   Date Value Ref Range Status   03/13/2017 No growth after 5 days of incubation.  Final     BLOOD CULTURE HOLD   Date Value Ref Range Status   02/09/2014 Collected  Final      Results     Procedure Component Value Units Date/Time    BLOOD CULTURE [[id number]] Collected:  03/13/17 1550    Order Status:  Completed Specimen Information:  Blood from Peripheral Updated:  03/18/17 1900     Significant Indicator NEG      Source BLD      Site PERIPHERAL   "    Blood Culture No growth after 5 days of incubation.     Narrative:      Per Hospital Policy: Only change Specimen Src: to \"Line\" if  specified by physician order.    BLOOD CULTURE [[id number]] Collected:  03/13/17 1547    Order Status:  Completed Specimen Information:  Blood from Peripheral Updated:  03/18/17 1900     Significant Indicator NEG      Source BLD      Site PERIPHERAL      Blood Culture No growth after 5 days of incubation.     Narrative:      Per Hospital Policy: Only change Specimen Src: to \"Line\" if  specified by physician order.          Fluids:  Intake/Output       03/23/17 0700 - 03/24/17 0659 03/24/17 0700 - 03/25/17 0659 03/25/17 0700 - 03/26/17 0659      8208-1115 2278-5609 Total 4573-8338 1237-7711 Total 0442-6974 5132-8617 Total       Intake    I.V.  552.3  630.8 1183.1  702  660 1362  550  -- 550    Heparin Volume 288 256.5 544.5 294 252 546 210 -- 210    Dobutamine Volume 164.3 274.3 438.6 408 408 816 340 -- 340    IV Piggyback Volume (Antibiotics) 100 100 200 -- -- -- -- -- --    Other  30  40 70  120  260 380  270  -- 270    Medications (P.O./ Enteral Liquids) 30 40 70 120 260 380 270 -- 270    Enteral  210  670 880  830  780 1610  660  -- 660    Enteral Volume 150 550 700  600 -- 600    Free Water / Tube Flush 60 120 180 120 60 180 60 -- 60    Total Intake 792.3 1340.8 2133.1 1652 1700 3352 1480 -- 1480       Output    Urine  470  310 780  820  1100 1920  850  -- 850    Indwelling Cathether 470 310  1920 850 -- 850    Drains  --  -- --  0  -- 0  --  -- --    Residual Amount (ml) (Discarded) -- -- -- 0 -- 0 -- -- --    Stool/Urine  --  -- --  600  600 1200  --  -- --    Measurable Stool (ml) -- -- --  -- -- --    Total Output 470  1700 3120 850 -- 850       Net I/O     322.3 1030.8 1353.1 232 0 232 630 -- 630        DX-CHEST-PORTABLE (1 VIEW) (Final result) Result time: 03/21/17 03:06:06     Final result by Anika Saavedra M.D. (03/21/17 " 03:06:06)     Impression:     1.  Pulmonary edema and/or infiltrates are identified, which appear somewhat increased since the prior exam.  2.  Cardiomegaly  3.  Atherosclerosis        Assessment:  Active Hospital Problems    Diagnosis   • Acute renal insufficiency [N28.9]   • Acute on chronic systolic congestive heart failure, NYHA class 4, present on admission (EF 15-20%) [I50.23]   • Hepatitis C virus infection [B19.20]   • Cellulitis [L03.90]   • Shock (CMS-HCC) [R57.9]   • LV (left ventricular) mural thrombus (CMS-HCC) [I21.3]       Plan:  Bilateral lower extremity cellulitis with bulla  Wound cx - mixed skin patricia  Healing shallow bullae  Continue zosyn. Anticipate 2 weeks.Stop date 3/29  Wound care    Possible aspiration pneumonitis after emesis  NGT placed  Checked lipase- elevated  Keep meds IV  Resp status currently stable  CXR +edema-no sig change by my read  Abx as above    Cardiogenic shock/PM/AICD  Remains dobutamine  2/2 med noncompliance with hx cocaine-induced dilated CM EF 15%  Noncompliant with water restriction    Leukocytosis, persistent but decreased  Multifactorial  Abx per above  Repeat cxs if increases    Hepatitis C/cirrhosis  Contributing to low platelets, electrolyte derangements, and immunocompromised state  Elevated ammonia  +ascites  [place]  Improved  Creat 1.35  Avoid nephrotoxins    Prognosis - poor    Appreciate Palliative care/hospice nick SUTTON

## 2017-03-25 NOTE — PROGRESS NOTES
Pulmonary Critical Care Progress Note    Interval Events:  24 hour interval history reviewed  Reason for visit:  Acute on chronic systolic heart failure     - agitated, confused, hallucinating at times   - dobut 5   - hep gtt   - SR/ST   - Zosyn per ID    PFSH:  No change.    Respiratory:     Pulse Oximetry: 98 %  CXR with improved pulmonary edema  Scattered fine and coarse crackles bilaterally  Periodic breathing - improved  No wheezing.    HemoDynamics:  Pulse: (!) 121, Heart Rate (Monitored): (!) 121  NIBP: 100/75 mmHg    SR  Dobut - 5    Neuro:  Confused, agitated at times  No focal weakness    Fluids:  Intake/Output       03/23/17 0700 - 03/24/17 0659 03/24/17 0700 - 03/25/17 0659 03/25/17 0700 - 03/26/17 0659      6009-1810 7445-3392 Total 6045-0102 1337-6022 Total 6595-4506 7343-5116 Total       Intake    I.V.  552.3  630.8 1183.1  702  660 1362  --  -- --    Heparin Volume 288 256.5 544.5 294 252 546 -- -- --    Dobutamine Volume 164.3 274.3 438.6 408 408 816 -- -- --    IV Piggyback Volume (Antibiotics) 100 100 200 -- -- -- -- -- --    Other  30  40 70  120  260 380  --  -- --    Medications (P.O./ Enteral Liquids) 30 40 70 120 260 380 -- -- --    Enteral  210  670 880  830  780 1610  --  -- --    Enteral Volume 150 550 700  -- -- --    Free Water / Tube Flush 60 120 180 120 60 180 -- -- --    Total Intake 792.3 1340.8 2133.1 1652 1700 3352 -- -- --       Output    Urine  470  310 780  820  1100 1920  --  -- --    Indwelling Cathether 470 310  1920 -- -- --    Drains  --  -- --  0  -- 0  --  -- --    Residual Amount (ml) (Discarded) -- -- -- 0 -- 0 -- -- --    Stool/Urine  --  -- --  600  600 1200  --  -- --    Measurable Stool (ml) -- -- --  -- -- --    Total Output 470  1700 3120 -- -- --       Net I/O     322.3 1030.8 1353.1 232 0 232 -- -- --           Recent Labs      03/23/17   0420  03/24/17   0154  03/25/17   0440   SODIUM  122*  125*  129*   POTASSIUM   4.0  4.7  4.1   CHLORIDE  92*  96  99   CO2  21  19*  22   BUN  46*  49*  48*   CREATININE  1.52*  1.62*  1.35   CALCIUM  8.6  8.7  8.6       GI/Nutrition:  Abd slightly distended.  Non-tender.  NG to suction    Liver Function  Recent Labs      17   0440   ALTSGPT  25  26  23   ASTSGOT  44  60*  47*   ALKPHOSPHAT  41  42  40   TBILIRUBIN  6.8*  7.2*  5.7*   GLUCOSE  112*  148*  126*       Heme:  Recent Labs      17   0930  170   RBC  4.78   --   4.74   --   4.66*   HEMOGLOBIN  12.0*   --   12.0*   --   11.7*   HEMATOCRIT  36.1*   --   36.8*   --   36.7*   PLATELETCT  124*   --   120*   --   112*   APTT   --    < >  60.2*  62.7*  55.8*    < > = values in this interval not displayed.       Infectious Disease:  Temp  Av.4 °C (97.5 °F)  Min: 36.1 °C (97 °F)  Max: 36.6 °C (97.8 °F)    Recent Labs      17   0440   WBC  17.4*  17.1*  13.4*   NEUTSPOLYS  78.80*  75.90*  62.60   LYMPHOCYTES  9.70*  7.80*  6.10*   MONOCYTES  3.50  6.00  7.80   EOSINOPHILS  2.60  0.00  7.00*   BASOPHILS  0.00  1.70  3.50*   ASTSGOT  44  60*  47*   ALTSGPT  25  26  23   ALKPHOSPHAT  41  42  40   TBILIRUBIN  6.8*  7.2*  5.7*     Current Facility-Administered Medications   Medication Dose Frequency Provider Last Rate Last Dose   • piperacillin-tazobactam (ZOSYN) 3.375 g in  mL IVPB  3.375 g Q6HRS Ayala Zazueta M.D.   Stopped at 17 0546   • furosemide (LASIX) injection 40 mg  40 mg Q12HRS Gavin Molina M.D.   40 mg at 17   • famotidine (PEPCID) tablet 20 mg  20 mg DAILY Everton Paredes M.D.   20 mg at 17   • lactulose 20 GM/30ML solution 30 mL  30 mL BID Gavin Molina M.D.   30 mL at 17   • rifaximin (XIFAXAN) tablet 550 mg  550 mg BID Everton Paredes M.D.   550 mg at 17   • diphenhydrAMINE (BENADRYL) tablet/capsule 50 mg  50 mg HS PRN  Tate Hogan M.D.   50 mg at 03/15/17 0306   • oxycodone immediate-release (ROXICODONE) tablet 2.5-5 mg  2.5-5 mg Q4HRS PRN Alexia Munoz D.O.   5 mg at 03/25/17 0531   • DOBUTamine (DOBUTREX) 1 mg/mL premix infusion  5 mcg/kg/min Continuous Jeremy M Gonda, M.D. 34 mL/hr at 03/25/17 0340 5 mcg/kg/min at 03/25/17 0340   • Respiratory Care per Protocol   Continuous RT Alexis Hamilton M.D.       • ondansetron (ZOFRAN) syringe/vial injection 4 mg  4 mg Q4HRS PRN Alexis Hamilton M.D.   4 mg at 03/20/17 1821   • ondansetron (ZOFRAN ODT) dispertab 4 mg  4 mg Q4HRS PRN Alexis Hamilton M.D.       • promethazine (PHENERGAN) tablet 12.5-25 mg  12.5-25 mg Q4HRS PRN Alexis Hamilton M.D.       • promethazine (PHENERGAN) suppository 12.5-25 mg  12.5-25 mg Q4HRS PRN Alexis Hamilton M.D.       • prochlorperazine (COMPAZINE) injection 5-10 mg  5-10 mg Q4HRS PRN Alexis Hamilton M.D.       • acetaminophen (TYLENOL) tablet 650 mg  650 mg Q6HRS PRN Alexis Hamilton M.D.   650 mg at 03/20/17 0838   • senna-docusate (PERICOLACE or SENOKOT S) 8.6-50 MG per tablet 2 Tab  2 Tab BID Alexis Hamilton M.D.   2 Tab at 03/25/17 0801    And   • polyethylene glycol/lytes (MIRALAX) PACKET 1 Packet  1 Packet QDAY PRN Alexis Hamilton M.D.        And   • magnesium hydroxide (MILK OF MAGNESIA) suspension 30 mL  30 mL QDAY PRN Alexis Hamilton M.D.        And   • bisacodyl (DULCOLAX) suppository 10 mg  10 mg QDAY PRN Alexis Hamilton M.D.       • heparin 1000 units/mL injection 3,200 Units  3,200 Units PRN Catherine Hillman PHARMD   3,200 Units at 03/21/17 1304    And   • heparin infusion 25,000 units in 500 ml 0.45% nacl   Continuous Catherine Hillman PHARMD 21 mL/hr at 03/25/17 0528 1,050 Units/hr at 03/25/17 0528   • POLYMEM ALGINATE DRESSING PADS 1 Each  1 Each QDAY PRN Jeremy M Gonda, M.D.         Last reviewed on 3/13/2017  5:30 PM by Loi Ponce    Quality  Measures:  Labs reviewed, Medications reviewed and Radiology images reviewed  Holt catheter:  Critically Ill - Requiring Accurate Measurement of Urinary Output  Central line in place: Need for access    DVT Prophylaxis: Heparin  DVT prophylaxis - mechanical: SCDs  Ulcer prophylaxis: Not indicated  Antibiotics: Treating active infection/contamination beyond 24 hours perioperative coverage          Assessment and Plan:    Acute on chronic hypoxemic respiratory failure  Acute on chronic systolic heart failure   - EF 15-20%   - dobutamine gtt   - force diuresis as tolerated  Acute cardiogenic pulmonary edema   - force diuresis as tolerated  Acute metabolic/toxic encephalopathy   - lactulose and rifaximin  Sepsis - skin source  Bilateral lower extremity cellulitis with open ulcerations   - cont Zosyn per ID  Thrombocytopenia  Acute on chronic kidney disease  Hepatitis C  LV thrombus - cont heparin gtt  DNR/I    Unstable cardiovascular and pulmonary status.  Prognosis poor.    Critical Care Time:  33minutes  Date of service:  3/25/17  [id number]  No time overlap  Time excludes procedures  Discussed with RN, RT, Team

## 2017-03-25 NOTE — CARE PLAN
Problem: Pain Management  Goal: Pain level will decrease to patient’s comfort goal  Patient pain assessed using Nurse pain scale 0-10 and interventions in place as appropriate see Flow sheets and MAR.     Problem: Psychosocial Needs:  Goal: Level of anxiety will decrease  Working with patient to try to decrease anxiety. Frequently re-oriented patient to room/unit, nurse, and situation. Decreased stimulation and relaxation techniques utilized to promote a calming environment.

## 2017-03-25 NOTE — PROGRESS NOTES
Patient ex-wife attempting to give patient a whole cup of water despite being NPO. Pt coughing with water. RN educated family that patient is to only have swabs. Son Irvin agreeable and translated to ex-wife.

## 2017-03-25 NOTE — PROGRESS NOTES
"Patient more agitated states \"Call the man, man, the terrorists are looking for me.\" Attempted to reorient patient, patient increasingly agitated, attempting to spit at RN. HR increased slightly, EKG obtained, ST, BP stable.  "

## 2017-03-25 NOTE — PROGRESS NOTES
Hospital Medicine Progress Note, Adult, Complex               Author: Pietro Wu Date & Time created: 3/25/2017  9:12 AM     Interval History:  50 y/o male presented with dyspnea.  Noted to have acute on chronic systolic heart failure, decompensated.  Also noted bilateral LE cellulitis causing sepsis.  Now on dobutamine.  Pt seen in ICU, ICU care given.  Discussed patient condition and plan with RN, RT and charge nurse / hot rounds.        Review of Systems:  Review of Systems   Unable to perform ROS: acuity of condition       Physical Exam:  Physical Exam   Constitutional:  Non-toxic appearance. No distress.   Ill appearing    HENT:   Head: Normocephalic and atraumatic.   Mouth/Throat: No oropharyngeal exudate.   Eyes: Right eye exhibits no discharge. Left eye exhibits no discharge.   Neck: Neck supple. No edema and no erythema present.   Cardiovascular: Regular rhythm.  Tachycardia present.  Exam reveals no gallop and no friction rub.    No murmur heard.  Pulmonary/Chest: Effort normal and breath sounds normal. No stridor. No respiratory distress.   Abdominal: Soft. Bowel sounds are normal.   Musculoskeletal: He exhibits edema.   Neurological:   Awake but confused    Skin: Skin is warm and dry. He is not diaphoretic. There is erythema (bilateral LE with blisters ).   Psychiatric: His speech is tangential. He is slowed. Cognition and memory are impaired.   Nursing note and vitals reviewed.      Labs:        Invalid input(s): [id number]      Recent Labs      03/23/17 0420 03/24/17 0154 03/25/17   0440   SODIUM  122*  125*  129*   POTASSIUM  4.0  4.7  4.1   CHLORIDE  92*  96  99   CO2  21  19*  22   BUN  46*  49*  48*   CREATININE  1.52*  1.62*  1.35   CALCIUM  8.6  8.7  8.6     Recent Labs      03/23/17 0420 03/24/17   0154  03/25/17   0440   ALTSGPT  25  26  23   ASTSGOT  44  60*  47*   ALKPHOSPHAT  41  42  40   TBILIRUBIN  6.8*  7.2*  5.7*   GLUCOSE  112*  148*  126*     Recent Labs       17   0420   17   0154  17   0930  17   0440   RBC  4.78   --   4.74   --   4.66*   HEMOGLOBIN  12.0*   --   12.0*   --   11.7*   HEMATOCRIT  36.1*   --   36.8*   --   36.7*   PLATELETCT  124*   --   120*   --   112*   APTT   --    < >  60.2*  62.7*  55.8*    < > = values in this interval not displayed.     Recent Labs      17   042174  17   0440   WBC  17.4*  17.1*  13.4*   NEUTSPOLYS  78.80*  75.90*  62.60   LYMPHOCYTES  9.70*  7.80*  6.10*   MONOCYTES  3.50  6.00  7.80   EOSINOPHILS  2.60  0.00  7.00*   BASOPHILS  0.00  1.70  3.50*   ASTSGOT  44  60*  47*   ALTSGPT  25  26  23   ALKPHOSPHAT  41  42  40   TBILIRUBIN  6.8*  7.2*  5.7*           Hemodynamics:  Temp (24hrs), Av.4 °C (97.5 °F), Min:36.1 °C (97 °F), Max:36.6 °C (97.8 °F)  Temperature: 36.6 °C (97.8 °F)  Pulse  Av.8  Min: 50  Max: 194Heart Rate (Monitored): (!) 121  NIBP: 100/75 mmHg    Respiratory:    Respiration: (!) 11, Pulse Oximetry: 98 %     Work Of Breathing / Effort: Moderate  RUL Breath Sounds: Clear, RML Breath Sounds: Diminished, RLL Breath Sounds: Diminished, BHAVNA Breath Sounds: Clear, LLL Breath Sounds: Diminished  Fluids:    Intake/Output Summary (Last 24 hours) at 17 0912  Last data filed at 17 0600   Gross per 24 hour   Intake   3072 ml   Output   3090 ml   Net    -18 ml        GI/Nutrition:  Orders Placed This Encounter   Procedures   • DIET NPO     Standing Status: Standing      Number of Occurrences: 1      Standing Expiration Date:      Order Specific Question:  Restrict to:     Answer:  Strict [1]      Comments:  no meds     Medical Decision Making, by Problem:  Active Hospital Problems    Diagnosis   • Acute on chronic systolic congestive heart failure, NYHA class 4, present on admission (EF 15-20%) [I50.23]  - severe, EF of 20%  - continue dobutamine, was stopped for a short time 3/23 d/t significant tachycardia but urine outpt stopped  - medical management as  able in pt with borderline BP  - cxr read by me shows improved edema     • Hepatitis C virus infection [B19.20]  - with liver failure  - continue rifaxamin, lactulose      • Acute renal insufficiency [N28.9]  - improved, continue dobutamine     • Essential hypertension, benign [I10]  - low on dobutamine      • Acute respiratory failure (CMS-HCC) [J96.00]  - improved     • Hyponatremia [E87.1]  - d/t fluid overload  - continue lasix and repeat bmp in am  - improved with restarting TF     • Hyperkalemia [E87.5]  - resolved     • Cellulitis [L03.90]  - improved, continue zosyn per ID     • Shock (CMS-HCC) [R57.9]  - borderline on dobutamine, no need for pressors     • LV (left ventricular) mural thrombus (CMS-HCC) [I21.3]  - continue heparin gtt       Labs reviewed, Medications reviewed and Radiology images reviewed        DVT Prophylaxis: Heparin    Ulcer prophylaxis: Yes  Antibiotics: Treating active infection/contamination beyond 24 hours perioperative coverage    Patient is critically ill.   The patient continues to have : heart failure   The vital organ system that is effected is the: all are at risk from decreased profusion    If untreated there is a high chance of deterioration into: end organ failure   The critical care that I am providing today is: dobutamine gtt  The critical care that has been undertaken is medically complex.   There has been no overlap in critical care time.  Critical care time not including procedures, no overlap: 39 minutes

## 2017-03-25 NOTE — PROGRESS NOTES
Assumed care from Dulce TORRES RN, all LDAs, gtts verified. Patient's mouth swabbed with soda per patient request.

## 2017-03-26 NOTE — PROGRESS NOTES
Infectious Disease Progress Note    Author: Ayala Zazueta M.D. DOS & Time created: 3/26/2017  1:58 PM    Chief Complaint   Patient presents with   • Peripheral Edema   • Weight Gain     10 lbs weight gain over the last 2 weeks   • Other     CHF exaserbation. Not compliant with medications.    • ALOC     Disoriented to event and time.    FU for bilateral LE cellulitis    Interval History:  3/17 AF, no CBC, lethargic but arousable to voice then quickly falls back to sleep  3/18 AF, WBC 15.5, remains on pressors, more awake and alert today, complaining of abd pain and leg pain, recent wound care photos reviewed  3/19 AF, WBC 22.9, white count increasing, pulled out RIJ and left IJ inserted o/n. Off levophed  3/20 AF WBC 23 on dobutamine-verbalizing but obtunded Pain in legs  3/21 AF WBC 23 yesterdays's projectile vomiting feculent material noted  3/22 AF WBC 22 copious secretions/sputum  3/23 AF WBC 17 no new +cxs remains obtunded  3/24 AF WBC 17.4 more alert today-asking for soda  3/25 AF WBC 13.4 no acute events-choked on water  3/26 AF WBC 10.9 asking for pain meds-confused  Labs Reviewed, Medications Reviewed, Radiology Reviewed and Wound Reviewed.    Review of Systems:  Review of Systems   Unable to perform ROS: medical condition   Constitutional: Negative for fever.   Musculoskeletal: Positive for myalgias.        Both legs   All other systems reviewed and are negative.      Hemodynamics:  Temp (24hrs), Av.6 °C (97.8 °F), Min:36 °C (96.8 °F), Max:37 °C (98.6 °F)  Temperature: 36.3 °C (97.3 °F)  Pulse  Av.9  Min: 50  Max: 194Heart Rate (Monitored): (!) 117  NIBP: 109/75 mmHg      Physical Exam:  Physical Exam   Constitutional: He appears well-developed.   Chronically ill Obese   HENT:   Head: Normocephalic and atraumatic.   NGT   Eyes: EOM are normal. Pupils are equal, round, and reactive to light. Scleral icterus is present.   Neck: Neck supple.   Left IJ   Cardiovascular:   Murmur heard.  Tachy    Pulmonary/Chest: Effort normal. No respiratory distress. He has no wheezes.   Abdominal: Soft. He exhibits distension. There is no tenderness. There is no rebound and no guarding.   Musculoskeletal: He exhibits edema.   Bilateral LE erythema and edema.  Ruptured bullae-serous drainage, superficial  Pitting edema to thigh   Neurological: He is alert.   Skin: There is erythema.   decreased   Nursing note and vitals reviewed.      Labs:  Recent Labs      03/24/17   0154 03/25/17 0440  03/26/17   0420   WBC  17.1*  13.4*  10.9*   RBC  4.74  4.66*  4.56*   HEMOGLOBIN  12.0*  11.7*  11.5*   HEMATOCRIT  36.8*  36.7*  36.4*   MCV  77.6*  78.8*  79.8*   MCH  25.3*  25.1*  25.2*   RDW  57.0*  61.0*  64.4*   PLATELETCT  120*  112*  110*   MPV  10.8  9.7  10.6   NEUTSPOLYS  75.90*  62.60  70.40   LYMPHOCYTES  7.80*  6.10*  11.30*   MONOCYTES  6.00  7.80  6.10   EOSINOPHILS  0.00  7.00*  1.80   BASOPHILS  1.70  3.50*  1.70   RBCMORPHOLO  Present  Present  Present     Recent Labs      03/24/17 0154 03/25/17 0440  03/26/17   0420   SODIUM  125*  129*  130*   POTASSIUM  4.7  4.1  4.0   CHLORIDE  96  99  101   CO2  19*  22  21   GLUCOSE  148*  126*  127*   BUN  49*  48*  45*     Recent Labs      03/24/17 0154 03/25/17 0440  03/26/17   0420   ALBUMIN  2.6*  2.7*  2.7*   TBILIRUBIN  7.2*  5.7*  5.3*   ALKPHOSPHAT  42  40  37   TOTPROTEIN  6.7  6.9  6.9   ALTSGPT  26  23  23   ASTSGOT  60*  47*  42   CREATININE  1.62*  1.35  1.19       BLOOD CULTURE   Date Value Ref Range Status   03/13/2017 No growth after 5 days of incubation.  Final     BLOOD CULTURE HOLD   Date Value Ref Range Status   02/09/2014 Collected  Final      Results     ** No results found for the last 168 hours. **          Fluids:  Intake/Output       03/24/17 0700 - 03/25/17 0659 03/25/17 0700 - 03/26/17 0659 03/26/17 0700 - 03/27/17 0659      0183-4715 4021-5051 Total 1114-5213 3566-6575 Total 5416-5475 0507-7379 Total       Intake    I.V.  967 221  1362  660  540 1200  330  -- 330    Heparin Volume 294 252 546 252 168 420 126 -- 126    Dobutamine Volume 408 408 816 408 272 680 204 -- 204    IV Piggyback Volume (Antibiotics) -- -- -- -- 100 100 -- -- --    Other  120  260 380  270  -- 270  180  -- 180    Medications (P.O./ Enteral Liquids) 120 260 380 270 -- 270 180 -- 180    Enteral  830  780 1610  810  480 1290  420  -- 420    Enteral Volume   360 -- 360    Free Water / Tube Flush 120 60 180 90 -- 90 60 -- 60    Total Intake 1652 1700 3352 1740 1020 2760 930 -- 930       Output    Urine  820  1100 1920  1100  -- 1100  575  -- 575    Indwelling Cathether 820 1100 1920 1100 -- 1100 575 -- 575    Drains  0  -- 0  --  -- --  --  -- --    Residual Amount (ml) (Discarded) 0 -- 0 -- -- -- -- -- --    Stool/Urine  600  600 1200  --  -- --  --  -- --    Measurable Stool (ml)  -- -- -- -- -- --    Total Output 1420 1700 3120 1100 -- 1100 575 -- 575       Net I/O     232 0  1660 355 -- 355        DX-CHEST-PORTABLE (1 VIEW) (Final result) Result time: 03/21/17 03:06:06     Final result by Anika Saavedra M.D. (03/21/17 03:06:06)     Impression:     1.  Pulmonary edema and/or infiltrates are identified, which appear somewhat increased since the prior exam.  2.  Cardiomegaly  3.  Atherosclerosis   Weight: 104.4 kg (230 lb 2.6 oz)    Assessment:  Active Hospital Problems    Diagnosis   • Acute renal insufficiency [N28.9]   • Acute on chronic systolic congestive heart failure, NYHA class 4, present on admission (EF 15-20%) [I50.23]   • Hepatitis C virus infection [B19.20]   • Cellulitis [L03.90]   • Shock (CMS-HCC) [R57.9]   • LV (left ventricular) mural thrombus (CMS-HCC) [I21.3]       Plan:  Bilateral lower extremity cellulitis with bulla, improved  Wound cx - mixed skin patricia  Healing shallow bullae  Continue zosyn. Anticipate 2 weeks.Stop date 3/29  Wound care    Possible aspiration pneumonitis after emesis  NGT  placed  Checked lipase- elevated  Keep meds IV  Resp status currently stable  CXR +edema-no sig change by my read  Abx as above    Cardiogenic shock/PM/AICD  Remains dobutamine  2/2 med noncompliance with hx cocaine-induced dilated CM EF 15%  Noncompliant with water restriction    Leukocytosis, nearly resolved  Multifactorial  Abx per above    Hepatitis C/cirrhosis  Contributing to low platelets, electrolyte derangements, and immunocompromised state  Elevated ammonia  +ascites  [place]  Improved  Creat 1.35  Avoid nephrotoxins    Prognosis - poor    Appreciate Palliative care/hospice eval    SHAJI IM

## 2017-03-26 NOTE — PROGRESS NOTES
Hospital Medicine Progress Note, Adult, Complex               Author: Pietro Wu Date & Time created: 3/26/2017  9:22 AM     Interval History:  50 y/o male presented with dyspnea.  Noted to have acute on chronic systolic heart failure, decompensated.  Also noted bilateral LE cellulitis causing sepsis.  Now on dobutamine.  Pt seen in ICU, ICU care given.  Discussed patient condition and plan with RN, RT and charge nurse / hot rounds.        Review of Systems:  Review of Systems   Unable to perform ROS: acuity of condition       Physical Exam:  Physical Exam   Constitutional:  Non-toxic appearance.   Ill appearing    HENT:   Head: Normocephalic and atraumatic.   Eyes: Right eye exhibits no discharge. Left eye exhibits no discharge. No scleral icterus.   Neck: Neck supple. No tracheal deviation, no edema and no erythema present.   Cardiovascular: Regular rhythm.  Tachycardia present.    No murmur heard.  Pulmonary/Chest: Effort normal. No stridor. No respiratory distress. He has rhonchi.   Abdominal: Soft. Bowel sounds are normal. He exhibits no distension.   Musculoskeletal: He exhibits edema.   Neurological:   Awake but confused    Skin: Skin is warm. He is not diaphoretic. There is erythema (bilateral LE with blisters ).   Psychiatric: His speech is tangential. He is slowed. Cognition and memory are impaired.   Nursing note and vitals reviewed.      Labs:        Invalid input(s): [id number]      Recent Labs      03/24/17   0154 03/25/17   0440  03/26/17   0420   SODIUM  125*  129*  130*   POTASSIUM  4.7  4.1  4.0   CHLORIDE  96  99  101   CO2  19*  22  21   BUN  49*  48*  45*   CREATININE  1.62*  1.35  1.19   CALCIUM  8.7  8.6  8.8     Recent Labs      03/24/17   0154  03/25/17   0440  03/26/17   0420   ALTSGPT  26  23  23   ASTSGOT  60*  47*  42   ALKPHOSPHAT  42  40  37   TBILIRUBIN  7.2*  5.7*  5.3*   GLUCOSE  148*  126*  127*     Recent Labs      03/24/17   0154  03/24/17   0930  03/25/17   0440   17   0420   RBC  4.74   --   4.66*  4.56*   HEMOGLOBIN  12.0*   --   11.7*  11.5*   HEMATOCRIT  36.8*   --   36.7*  36.4*   PLATELETCT  120*   --   112*  110*   APTT  60.2*  62.7*  55.8*  59.6*     Recent Labs      17   0154  17   0440  17   0420   WBC  17.1*  13.4*  10.9*   NEUTSPOLYS  75.90*  62.60  70.40   LYMPHOCYTES  7.80*  6.10*  11.30*   MONOCYTES  6.00  7.80  6.10   EOSINOPHILS  0.00  7.00*  1.80   BASOPHILS  1.70  3.50*  1.70   ASTSGOT  60*  47*  42   ALTSGPT     ALKPHOSPHAT  42  40  37   TBILIRUBIN  7.2*  5.7*  5.3*           Hemodynamics:  Temp (24hrs), Av.7 °C (98 °F), Min:36 °C (96.8 °F), Max:37 °C (98.6 °F)  Temperature: 36.6 °C (97.9 °F)  Pulse  Av.9  Min: 50  Max: 194Heart Rate (Monitored): (!) 114  NIBP: (!) 97/74 mmHg    Respiratory:    Respiration: 19, Pulse Oximetry: 98 %     Work Of Breathing / Effort: Moderate  RUL Breath Sounds: Clear, RML Breath Sounds: Diminished, RLL Breath Sounds: Diminished, BHAVNA Breath Sounds: Clear, LLL Breath Sounds: Diminished  Fluids:    Intake/Output Summary (Last 24 hours) at 17 09  Last data filed at 17 0200   Gross per 24 hour   Intake   2400 ml   Output    900 ml   Net   1500 ml     Weight: 104.4 kg (230 lb 2.6 oz)  GI/Nutrition:  Orders Placed This Encounter   Procedures   • DIET NPO     Standing Status: Standing      Number of Occurrences: 1      Standing Expiration Date:      Order Specific Question:  Restrict to:     Answer:  Strict [1]      Comments:  no meds     Medical Decision Making, by Problem:  Active Hospital Problems    Diagnosis   • Acute on chronic systolic congestive heart failure, NYHA class 4, present on admission (EF 15-20%) [I50.23]  - severe, EF of 20%  - continue dobutamine, was stopped for a short time 3/23 d/t significant tachycardia but urine outpt stopped  - medical management as able in pt with borderline BP  - cxr read by me shows stable edema     • Hepatitis C virus infection  [B19.20]  - with liver failure  - continue rifaximin, lactulose      • Acute renal insufficiency [N28.9]  - improved, continue dobutamine     • Essential hypertension, benign [I10]  - low on dobutamine      • Acute respiratory failure (CMS-HCC) [J96.00]  - improved     • Hyponatremia [E87.1]  - d/t fluid overload  - continue lasix and repeat bmp in am  - improved with restarting TF     • Hyperkalemia [E87.5]  - resolved     • Cellulitis [L03.90]  - improved, continue zosyn per ID     • Shock (CMS-HCC) [R57.9]  - borderline on dobutamine, no need for pressors     • LV (left ventricular) mural thrombus (CMS-[facility]) [I21.3]  - continue heparin gtt     Agitation  - and confusion, worse at night  - will start riperdal at night     Labs reviewed, Medications reviewed and Radiology images reviewed        DVT Prophylaxis: Heparin    Ulcer prophylaxis: Yes  Antibiotics: Treating active infection/contamination beyond 24 hours perioperative coverage    Patient is critically ill.   The patient continues to have : heart failure   The vital organ system that is effected is the: all are at risk from decreased profusion    If untreated there is a high chance of deterioration into: end organ failure   The critical care that I am providing today is: dobutamine gtt  The critical care that has been undertaken is medically complex.   There has been no overlap in critical care time.  Critical care time not including procedures, no overlap: 36 minutes

## 2017-03-26 NOTE — PROGRESS NOTES
Pulmonary Critical Care Progress Note    Interval Events:  24 hour interval history reviewed  Reason for visit:  Acute on chronic systolic heart failure     - agitated last night, confused   - ST   - dobut - 5   - hep gtt   - CXR no change   - start Risperdal at night    PFSH:  No change.    Respiratory:     Pulse Oximetry: 98 %  CXR with improved pulmonary edema  Few crackles at the bases bilaterally  No wheezing.    HemoDynamics:  Pulse: (!) 114, Heart Rate (Monitored): (!) 114  NIBP: (!) 97/74 mmHg    SR  Dobut - 5    Neuro:  Confused, agitated last night  No focal weakness    Fluids:  Intake/Output       03/24/17 0700 - 03/25/17 0659 03/25/17 0700 - 03/26/17 0659 03/26/17 0700 - 03/27/17 0659      6931-7945 7770-0565 Total 3951-2523 2513-2712 Total 7366-4632 1968-8830 Total       Intake    I.V.  702  660 1362  660  540 1200  --  -- --    Heparin Volume 294 252 546 252 168 420 -- -- --    Dobutamine Volume 408 408 816 408 272 680 -- -- --    IV Piggyback Volume (Antibiotics) -- -- -- -- 100 100 -- -- --    Other  120  260 380  270  -- 270  --  -- --    Medications (P.O./ Enteral Liquids) 120 260 380 270 -- 270 -- -- --    Enteral  830  780 1610  810  480 1290  --  -- --    Enteral Volume   -- -- --    Free Water / Tube Flush 120 60 180 90 -- 90 -- -- --    Total Intake 1652 1700 3352 1740 1020 2760 -- -- --       Output    Urine  820  1100 1920  1100  -- 1100  --  -- --    Indwelling Cathether 820 1100 1920 1100 -- 1100 -- -- --    Drains  0  -- 0  --  -- --  --  -- --    Residual Amount (ml) (Discarded) 0 -- 0 -- -- -- -- -- --    Stool/Urine  600  600 1200  --  -- --  --  -- --    Measurable Stool (ml)  -- -- -- -- -- --    Total Output 1420 1700 3120 1100 -- 1100 -- -- --       Net I/O     232 0  1660 -- -- --        Weight: 104.4 kg (230 lb 2.6 oz)  Recent Labs      03/24/17   0154  03/25/17   0440  03/26/17   0420   SODIUM  125*  129*  130*   POTASSIUM  4.7   4.1  4.0   CHLORIDE  96  99  101   CO2  19*  22  21   BUN  49*  48*  45*   CREATININE  1.62*  1.35  1.19   CALCIUM  8.7  8.6  8.8       GI/Nutrition:  Abd slightly distended, non-tender  Tolerated enteral TF    Liver Function  Recent Labs      17   01517   0440  17   0420   ALTSGPT  26  23  23   ASTSGOT  60*  47*  42   ALKPHOSPHAT  42  40  37   TBILIRUBIN  7.2*  5.7*  5.3*   GLUCOSE  148*  126*  127*       Heme:  Recent Labs      17   01517   0930  17   RBC  4.74   --   4.66*  4.56*   HEMOGLOBIN  12.0*   --   11.7*  11.5*   HEMATOCRIT  36.8*   --   36.7*  36.4*   PLATELETCT  120*   --   112*  110*   APTT  60.2*  62.7*  55.8*  59.6*       Infectious Disease:  Temp  Av.7 °C (98 °F)  Min: 36 °C (96.8 °F)  Max: 37 °C (98.6 °F)    Recent Labs      17   042   WBC  17.1*  13.4*  10.9*   NEUTSPOLYS  75.90*  62.60  70.40   LYMPHOCYTES  7.80*  6.10*  11.30*   MONOCYTES  6.00  7.80  6.10   EOSINOPHILS  0.00  7.00*  1.80   BASOPHILS  1.70  3.50*  1.70   ASTSGOT  60*  47*  42   ALTSGPT  26  23  23   ALKPHOSPHAT  42  40  37   TBILIRUBIN  7.2*  5.7*  5.3*     Current Facility-Administered Medications   Medication Dose Frequency Provider Last Rate Last Dose   • potassium chloride SA (Kdur) tablet 20 mEq  20 mEq BID Pietro Wu D.O.        Or   • potassium chloride (KLOR-CON) 20 MEQ packet 20 mEq  20 mEq BID Pietro Wu D.O.   20 mEq at 17 0821   • piperacillin-tazobactam (ZOSYN) 3.375 g in  mL IVPB  3.375 g Q6HRS Ayala Zazueta M.D.   Stopped at 17 0544   • furosemide (LASIX) injection 40 mg  40 mg Q12HRS Gavin Molina M.D.   40 mg at 17   • lactulose 20 GM/30ML solution 30 mL  30 mL BID Gavin Molina M.D.   30 mL at 1720   • rifaximin (XIFAXAN) tablet 550 mg  550 mg BID Everton Paredes M.D.   550 mg at 17   • diphenhydrAMINE (BENADRYL)  tablet/capsule 50 mg  50 mg HS PRN Tate Hogan M.D.   50 mg at 03/15/17 0306   • oxycodone immediate-release (ROXICODONE) tablet 2.5-5 mg  2.5-5 mg Q4HRS PRN Alexia Munoz D.O.   5 mg at 03/26/17 0414   • DOBUTamine (DOBUTREX) 1 mg/mL premix infusion  5 mcg/kg/min Continuous Jeremy M Gonda, M.D. 34 mL/hr at 03/26/17 0842 5 mcg/kg/min at 03/26/17 0842   • Respiratory Care per Protocol   Continuous RT Alexis Hamilton M.D.       • ondansetron (ZOFRAN) syringe/vial injection 4 mg  4 mg Q4HRS PRN Alexis Hamilton M.D.   4 mg at 03/20/17 1821   • ondansetron (ZOFRAN ODT) dispertab 4 mg  4 mg Q4HRS PRN Alexis Hamilton M.D.       • promethazine (PHENERGAN) tablet 12.5-25 mg  12.5-25 mg Q4HRS PRN Alexis Hamilton M.D.       • promethazine (PHENERGAN) suppository 12.5-25 mg  12.5-25 mg Q4HRS PRN Alexis Hamilton M.D.       • prochlorperazine (COMPAZINE) injection 5-10 mg  5-10 mg Q4HRS PRN Alexis Hamilton M.D.       • acetaminophen (TYLENOL) tablet 650 mg  650 mg Q6HRS PRN Alexis Hamilton M.D.   650 mg at 03/20/17 0838   • senna-docusate (PERICOLACE or SENOKOT S) 8.6-50 MG per tablet 2 Tab  2 Tab BID Alexis Hamilton M.D.   2 Tab at 03/26/17 0821    And   • polyethylene glycol/lytes (MIRALAX) PACKET 1 Packet  1 Packet QDAY PRN Alexis Hamilton M.D.        And   • magnesium hydroxide (MILK OF MAGNESIA) suspension 30 mL  30 mL QDAY PRN Alexis Hamilton M.D.        And   • bisacodyl (DULCOLAX) suppository 10 mg  10 mg QDAY PRN Alexis Hamilton M.D.       • heparin 1000 units/mL injection 3,200 Units  3,200 Units PRN Catherine Hillman PHARMD   3,200 Units at 03/21/17 1304    And   • heparin infusion 25,000 units in 500 ml 0.45% nacl   Continuous Catherine Hillman PHARMD 21 mL/hr at 03/26/17 0559 1,050 Units/hr at 03/26/17 0559   • POLYMEM ALGINATE DRESSING PADS 1 Each  1 Each QDAY PRN Jeremy M Gonda, M.D.         Last reviewed on 3/13/2017  5:30 PM by Loi Ponce    Quality  Measures:  Labs reviewed, Medications reviewed and Radiology  images reviewed  Holt catheter: Critically Ill - Requiring Accurate Measurement of Urinary Output  Central line in place: Need for access    DVT Prophylaxis: Heparin  DVT prophylaxis - mechanical: SCDs  Ulcer prophylaxis: Not indicated  Antibiotics: Treating active infection/contamination beyond 24 hours perioperative coverage          Assessment and Plan:    Acute on chronic hypoxemic respiratory failure  Acute on chronic systolic heart failure   - EF 15-20%   - dobutamine gtt   - force diuresis as tolerated  Acute cardiogenic pulmonary edema   - force diuresis as tolerated  Acute metabolic/toxic encephalopathy   - lactulose and rifaximin  Sepsis - skin source  Bilateral lower extremity cellulitis with open ulcerations   - cont Zosyn per ID  Thrombocytopenia  Acute on chronic kidney disease  Hepatitis C  LV thrombus - cont heparin gtt  DNR/I    Unstable cardiovascular and pulmonary status.  Prognosis poor.  Does not tolerate titration down of dobutamine gtt.    Critical Care Time:  35 minutes  Date of service:  3/26/17  28225  No time overlap  Time excludes procedures  Discussed with RN, RT, Team

## 2017-03-26 NOTE — CARE PLAN
Problem: Safety  Goal: Will remain free from injury  Outcome: PROGRESSING SLOWER THAN EXPECTED  Patient needing frequent reorientation, very impulsive needed reminders to not pull at lines.     Problem: Respiratory:  Goal: Respiratory status will improve  Today the patient had some anxiety that was causing him to feel like he couldn't breathe, RN taught the patient deep breathing exercises to help calm the patient and help them feel more comfortable breathing. Brought up in hot rounds MD order for a one time dose of ativan, patients breathing and anxiety imporved.

## 2017-03-26 NOTE — PROGRESS NOTES
"Patient increasingly agitated yelling, \"Go outside and check if they are trying to get in.\" RN attempted reorientation, patient yells \"Grab the fucking rifle and go check.\" RN attempted reorientation and to calm patient, patient only calmed when RN stated I would go check outside.  "

## 2017-03-27 NOTE — PROGRESS NOTES
"Patient became very agitated screaming out, yelling to the nurse \"Give me Peace!\". Tried calming environment, breathing techniques, agitation did not resolve. MD Wu notified new orders received see MAR.   "

## 2017-03-27 NOTE — PROGRESS NOTES
Infectious Disease Progress Note    Author: Bing Brumfield M.D. DOS & Time created: 3/27/2017  9:21 AM    Chief Complaint   Patient presents with   • Peripheral Edema   • Weight Gain     10 lbs weight gain over the last 2 weeks   • Other     CHF exaserbation. Not compliant with medications.    • ALOC     Disoriented to event and time.    FU for bilateral LE cellulitis    Interval History:  3/17 AF, no CBC, lethargic but arousable to voice then quickly falls back to sleep  3/18 AF, WBC 15.5, remains on pressors, more awake and alert today, complaining of abd pain and leg pain, recent wound care photos reviewed  3/19 AF, WBC 22.9, white count increasing, pulled out RIJ and left IJ inserted o/n. Off levophed  3/20 AF WBC 23 on dobutamine-verbalizing but obtunded Pain in legs  3/21 AF WBC 23 yesterdays's projectile vomiting feculent material noted  3/22 AF WBC 22 copious secretions/sputum  3/23 AF WBC 17 no new +cxs remains obtunded  3/24 AF WBC 17.4 more alert today-asking for soda  3/25 AF WBC 13.4 no acute events-choked on water  3/26 AF WBC 10.9 asking for pain meds-confused  3/27 AF, WBC 12.4, feels short of breath but stating 96% on NC, remains on dobutamine  Labs Reviewed, Medications Reviewed, Radiology Reviewed and Wound Reviewed.    Review of Systems:  Review of Systems   Unable to perform ROS: medical condition   Constitutional: Negative for fever.   Musculoskeletal: Positive for myalgias.        Both legs   All other systems reviewed and are negative.      Hemodynamics:  Temp (24hrs), Av.5 °C (97.7 °F), Min:36.3 °C (97.3 °F), Max:36.7 °C (98.1 °F)  Temperature: 36.6 °C (97.9 °F)  Pulse  Av.1  Min: 50  Max: 194Heart Rate (Monitored): (!) 116  NIBP: (!) 91/68 mmHg      Physical Exam:  Physical Exam   Constitutional: He appears well-developed.   Chronically ill Obese   HENT:   Head: Normocephalic and atraumatic.   NGT   Eyes: EOM are normal. Pupils are equal, round, and reactive to light. Scleral  icterus is present.   Neck: Neck supple.   Left IJ   Cardiovascular:   Murmur heard.  Tachy   Pulmonary/Chest: Effort normal. No respiratory distress. He has no wheezes.   Abdominal: Soft. He exhibits distension. There is no tenderness. There is no rebound and no guarding.   Musculoskeletal: He exhibits edema.   Bilateral LE erythema and edema.  Ruptured bullae-serous drainage, superficial  Pitting edema to thigh   Neurological: He is alert.   Skin: There is erythema.   decreased   Nursing note and vitals reviewed.      Labs:  Recent Labs      03/25/17 0440 03/26/17 0420 03/27/17 0415   WBC  13.4*  10.9*  12.4*   RBC  4.66*  4.56*  4.86   HEMOGLOBIN  11.7*  11.5*  12.3*   HEMATOCRIT  36.7*  36.4*  39.6*   MCV  78.8*  79.8*  81.5   MCH  25.1*  25.2*  25.3*   RDW  61.0*  64.4*  68.7*   PLATELETCT  112*  110*  130*   MPV  9.7  10.6  10.5   NEUTSPOLYS  62.60  70.40  73.90*   LYMPHOCYTES  6.10*  11.30*  5.30*   MONOCYTES  7.80  6.10  5.20   EOSINOPHILS  7.00*  1.80  2.60   BASOPHILS  3.50*  1.70  2.60*   RBCMORPHOLO  Present  Present  Present     Recent Labs      03/25/17 0440 03/26/17 0420 03/27/17 0415   SODIUM  129*  130*  131*   POTASSIUM  4.1  4.0  4.6   CHLORIDE  99  101  101   CO2  22  21  18*   GLUCOSE  126*  127*  132*   BUN  48*  45*  52*     Recent Labs      03/25/17 0440 03/26/17 0420 03/27/17   0415   ALBUMIN  2.7*  2.7*  3.0*   TBILIRUBIN  5.7*  5.3*  5.6*   ALKPHOSPHAT  40  37  46   TOTPROTEIN  6.9  6.9  7.7   ALTSGPT  23  23  21   ASTSGOT  47*  42  54*   CREATININE  1.35  1.19  1.31       BLOOD CULTURE   Date Value Ref Range Status   03/13/2017 No growth after 5 days of incubation.  Final     BLOOD CULTURE HOLD   Date Value Ref Range Status   02/09/2014 Collected  Final      Results     ** No results found for the last 168 hours. **          Fluids:  Intake/Output       03/25/17 0700 - 03/26/17 0659 03/26/17 0700 - 03/27/17 0659 03/27/17 0700 - 03/28/17 0659      7121-1574 8430-9188  Total 5354-9346 0295-0718 Total 1539-4701 0007-7069 Total       Intake    I.V.  660  540 1200  660  860 1520  --  -- --    Heparin Volume 252 168 420 252 252 504 -- -- --    Dobutamine Volume 408 272 680 408 408 816 -- -- --    IV Piggyback Volume (Antibiotics) -- 100 100 -- 200 200 -- -- --    Other  270  -- 270  240  90 330  --  -- --    Medications (P.O./ Enteral Liquids) 270 -- 270 240 90 330 -- -- --    Enteral  810  480 1290  810  780 1590  --  -- --    Enteral Volume   -- -- --    Free Water / Tube Flush 90 -- 90 90 60 150 -- -- --    Total Intake 1740 1020 2760 1710 1730 3440 -- -- --       Output    Urine  1100  -- 1100  950  740 1690  --  -- --    Indwelling Cathether 1100 -- 1100  -- -- --    Total Output 1100 -- 1100  -- -- --       Net I/O     640 1020 1660  -- -- --        DX-CHEST-PORTABLE (1 VIEW) (Final result) Result time: 03/21/17 03:06:06     Final result by Anika Saavedra M.D. (03/21/17 03:06:06)     Impression:     1.  Pulmonary edema and/or infiltrates are identified, which appear somewhat increased since the prior exam.  2.  Cardiomegaly  3.  Atherosclerosis   Weight: 104.2 kg (229 lb 11.5 oz)    Assessment:  Active Hospital Problems    Diagnosis   • Acute renal insufficiency [N28.9]   • Acute on chronic systolic congestive heart failure, NYHA class 4, present on admission (EF 15-20%) [I50.23]   • Hepatitis C virus infection [B19.20]   • Cellulitis [L03.90]   • Shock (CMS-HCC) [R57.9]   • LV (left ventricular) mural thrombus (CMS-HCC) [I21.3]       Plan:  Bilateral lower extremity cellulitis with bulla, improved  Wound cx - mixed skin patricia  Healing shallow bullae  Continue zosyn. Anticipate 2 weeks. Stop date 03/29/17  Wound care    Possible aspiration pneumonitis after emesis  NGT placed  Checked lipase- elevated  Keep meds IV  Resp status currently stable  CXR +edema-no sig change by my read  Abx as above    Cardiogenic  shock/PM/AICD  Remains dobutamine  2/2 med noncompliance with hx cocaine-induced dilated CM EF 15%  Noncompliant with water restriction    Leukocytosis,increased today  Multifactorial  Abx per above  Monitor    Hepatitis C/cirrhosis  Contributing to low platelets, electrolyte derangements, and immunocompromised state  Elevated ammonia  +ascites  [place]  Improved  Creat 1.35  Avoid nephrotoxins    Prognosis - poor    Appreciate Palliative care/hospice eval    DW IM

## 2017-03-27 NOTE — CARE PLAN
Problem: Skin Integrity  Goal: Risk for impaired skin integrity will decrease  Outcome: PROGRESSING AS EXPECTED  Rectal tube in place for IAD. Jakob skin risk assessment completed. Pt turned Q 2 hours. Medical devices repositioned frequently.     Problem: Psychosocial Needs:  Goal: Level of anxiety will decrease  Outcome: PROGRESSING AS EXPECTED  Pt anxious, agitated, and confused. CAM ICU score positive for delirium. Discussed with MD during interdisciplinary rounds. Medicated, see MAR.

## 2017-03-27 NOTE — PROGRESS NOTES
Hospital Medicine Progress Note, Adult, Complex               Author: Pietro Wu Date & Time created: 3/27/2017  9:15 AM     Interval History:  52 y/o male presented with dyspnea.  Noted to have acute on chronic systolic heart failure, decompensated.  Also noted bilateral LE cellulitis causing sepsis.  Now on dobutamine.  Pt seen in ICU, ICU care given.  Discussed patient condition and plan with RN, RT and charge nurse / hot rounds.        Review of Systems:  Review of Systems   Unable to perform ROS: acuity of condition       Physical Exam:  Physical Exam   Constitutional:  Non-toxic appearance. He appears distressed (agitated ).   Ill appearing    HENT:   Head: Normocephalic and atraumatic.   Mouth/Throat: No oropharyngeal exudate.   Eyes: Right eye exhibits no discharge. Left eye exhibits no discharge.   Neck: Neck supple. No edema and no erythema present.   Cardiovascular: Regular rhythm.  Tachycardia present.  Exam reveals no friction rub.    No murmur heard.  Pulmonary/Chest: Effort normal. No stridor. No respiratory distress. He has rhonchi.   Abdominal: Soft. He exhibits no distension.   Musculoskeletal: He exhibits edema.   Neurological:   Awake but confused and agitated     Skin: Skin is warm. He is not diaphoretic. There is erythema (bilateral LE with blisters ). No pallor.   Psychiatric: His speech is tangential. He is slowed. Cognition and memory are impaired.   Nursing note and vitals reviewed.      Labs:        Invalid input(s): [id number]      Recent Labs      03/25/17 0440  03/26/17   0420  03/27/17   0415   SODIUM  129*  130*  131*   POTASSIUM  4.1  4.0  4.6   CHLORIDE  99  101  101   CO2  22  21  18*   BUN  48*  45*  52*   CREATININE  1.35  1.19  1.31   CALCIUM  8.6  8.8  9.1     Recent Labs      03/25/17   0440  03/26/17   0420  03/27/17   0415   ALTSGPT  23  23  21   ASTSGOT  47*  42  54*   ALKPHOSPHAT  40  37  46   TBILIRUBIN  5.7*  5.3*  5.6*   PREALBUMIN   --    --   7.0*   GLUCOSE   126*  127*  132*     Recent Labs      17   0440  17   0420  17   0415   RBC  4.66*  4.56*  4.86   HEMOGLOBIN  11.7*  11.5*  12.3*   HEMATOCRIT  36.7*  36.4*  39.6*   PLATELETCT  112*  110*  130*   APTT  55.8*  59.6*  39.4*     Recent Labs      170  17   0415   WBC  13.4*  10.9*  12.4*   NEUTSPOLYS  62.60  70.40  73.90*   LYMPHOCYTES  6.10*  11.30*  5.30*   MONOCYTES  7.80  6.10  5.20   EOSINOPHILS  7.00*  1.80  2.60   BASOPHILS  3.50*  1.70  2.60*   ASTSGOT  47*  42  54*   ALTSGPT  23  23  21   ALKPHOSPHAT  40  37  46   TBILIRUBIN  5.7*  5.3*  5.6*           Hemodynamics:  Temp (24hrs), Av.5 °C (97.7 °F), Min:36.3 °C (97.3 °F), Max:36.7 °C (98.1 °F)  Temperature: 36.6 °C (97.9 °F)  Pulse  Av.1  Min: 50  Max: 194Heart Rate (Monitored): (!) 116  NIBP: (!) 91/68 mmHg    Respiratory:    Respiration: 14, Pulse Oximetry: 99 %     Work Of Breathing / Effort: Mild  RUL Breath Sounds: Clear, RML Breath Sounds: Clear, RLL Breath Sounds: Diminished, BHAVNA Breath Sounds: Clear, LLL Breath Sounds: Diminished  Fluids:    Intake/Output Summary (Last 24 hours) at 17 0915  Last data filed at 17 0600   Gross per 24 hour   Intake   3060 ml   Output   1540 ml   Net   1520 ml     Weight: 104.2 kg (229 lb 11.5 oz)  GI/Nutrition:  Orders Placed This Encounter   Procedures   • DIET NPO     Standing Status: Standing      Number of Occurrences: 1      Standing Expiration Date:      Order Specific Question:  Restrict to:     Answer:  Strict [1]      Comments:  no meds     Medical Decision Making, by Problem:  Active Hospital Problems    Diagnosis   • Acute on chronic systolic congestive heart failure, NYHA class 4, present on admission (EF 15-20%) [I50.23]  - severe, EF of 20%  - continue dobutamine, was stopped for a short time 3/23 d/t significant tachycardia but urine outpt stopped  - medical management as able in pt with borderline BP  - cxr read by me shows stable  edema  - decreasing lasix     • Hepatitis C virus infection [B19.20]  - with liver failure  - continue rifaximin, lactulose      • Acute renal insufficiency [N28.9]  - improved, continue dobutamine     • Essential hypertension, benign [I10]  - low on dobutamine      • Acute respiratory failure (CMS-HCC) [J96.00]  - improved     • Hyponatremia [E87.1]  - improved, now mild  - d/t fluid overload  - bmp in am  - improved with restarting TF     • Hyperkalemia [E87.5]  - resolved  - decreasing oral k to match decrease in lasix      • Cellulitis [L03.90]  - improved, continue zosyn per ID     • Shock (CMS-[facility]) [R57.9]  - borderline on dobutamine, no need for pressors     • LV (left ventricular) mural thrombus (CMS-[facility]) [I21.3]  - continue heparin gtt     Agitation  - and confusion, worse at night  - haldol prn added yesterday with good results  - will add scheduled haldol and d/c risperdal       Labs reviewed, Medications reviewed and Radiology images reviewed        DVT Prophylaxis: Heparin    Ulcer prophylaxis: Yes  Antibiotics: Treating active infection/contamination beyond 24 hours perioperative coverage    Patient is critically ill.   The patient continues to have : heart failure   The vital organ system that is effected is the: all are at risk from decreased profusion    If untreated there is a high chance of deterioration into: end organ failure   The critical care that I am providing today is: dobutamine gtt  The critical care that has been undertaken is medically complex.   There has been no overlap in critical care time.  Critical care time not including procedures, no overlap: 37 minutes

## 2017-03-27 NOTE — CARE PLAN
Problem: Pain Management  Goal: Pain level will decrease to patient’s comfort goal  Intervention: Follow pain managment plan developed in collaboration with patient and Interdisciplinary Team  Medicated per orders, see MAR.      Problem: Skin Integrity  Goal: Risk for impaired skin integrity will decrease  Intervention: Assess risk factors for impaired skin integrity and/or pressure ulcers  Turned every 2 hours.

## 2017-03-27 NOTE — PROGRESS NOTES
Pulmonary Critical Care Progress Note    Interval Events:  24 hour interval history reviewed  Reason for visit:  Acute on chronic systolic heart failure     - ST   - hep gtt   - TF at goal   - dobut 5   - decrease Lasix   - stop Risperdal   - routine Haldol    PFSH:  No change.    Respiratory:     Pulse Oximetry: 99 %  CXR with improved pulmonary edema  Few crackles at the bases bilaterally    HemoDynamics:  Pulse: (!) 117, Heart Rate (Monitored): (!) 116  NIBP: (!) 91/68 mmHg    SR  Dobut - 5    Neuro:  Confused, agitated at times - Haldol seems to work well.  No focal weakness    Fluids:  Intake/Output       03/25/17 0700 - 03/26/17 0659 03/26/17 0700 - 03/27/17 0659 03/27/17 0700 - 03/28/17 0659      0457-4651 9700-0921 Total 2558-6310 5794-4200 Total 0354-7053 5950-0051 Total       Intake    I.V.  660  540 1200  660  860 1520  --  -- --    Heparin Volume 252 168 420 252 252 504 -- -- --    Dobutamine Volume 408 272 680 408 408 816 -- -- --    IV Piggyback Volume (Antibiotics) -- 100 100 -- 200 200 -- -- --    Other  270  -- 270  240  90 330  --  -- --    Medications (P.O./ Enteral Liquids) 270 -- 270 240 90 330 -- -- --    Enteral  810  480 1290  810  780 1590  --  -- --    Enteral Volume   -- -- --    Free Water / Tube Flush 90 -- 90 90 60 150 -- -- --    Total Intake 1740 1020 2760 1710 1730 3440 -- -- --       Output    Urine  1100  -- 1100  950  740 1690  --  -- --    Indwelling Cathether 1100 -- 1100  -- -- --    Total Output 1100 -- 1100  -- -- --       Net I/O     640 1020 1660  -- -- --        Weight: 104.2 kg (229 lb 11.5 oz)  Recent Labs      03/25/17   0440  03/26/17   0420  03/27/17   0415   SODIUM  129*  130*  131*   POTASSIUM  4.1  4.0  4.6   CHLORIDE  99  101  101   CO2  22  21  18*   BUN  48*  45*  52*   CREATININE  1.35  1.19  1.31   CALCIUM  8.6  8.8  9.1       GI/Nutrition:  Abd slightly distended, non-tender  Tolerated enteral  TF    Liver Function  Recent Labs      170  17   0420  17   0415   ALTSGPT  23  23  21   ASTSGOT  47*  42  54*   ALKPHOSPHAT  40  37  46   TBILIRUBIN  5.7*  5.3*  5.6*   PREALBUMIN   --    --   7.0*   GLUCOSE  126*  127*  132*       Heme:  Recent Labs      17   04217   RBC  4.66*  4.56*  4.86   HEMOGLOBIN  11.7*  11.5*  12.3*   HEMATOCRIT  36.7*  36.4*  39.6*   PLATELETCT  112*  110*  130*   APTT  55.8*  59.6*  39.4*       Infectious Disease:  Temp  Av.4 °C (97.6 °F)  Min: 36 °C (96.8 °F)  Max: 36.7 °C (98.1 °F)    Recent Labs      17   WBC  13.4*  10.9*  12.4*   NEUTSPOLYS  62.60  70.40  73.90*   LYMPHOCYTES  6.10*  11.30*  5.30*   MONOCYTES  7.80  6.10  5.20   EOSINOPHILS  7.00*  1.80  2.60   BASOPHILS  3.50*  1.70  2.60*   ASTSGOT  47*  42  54*   ALTSGPT  23  23  21   ALKPHOSPHAT  40  37  46   TBILIRUBIN  5.7*  5.3*  5.6*     Current Facility-Administered Medications   Medication Dose Frequency Provider Last Rate Last Dose   • risperidone (RISPERDAL) tablet 1 mg  1 mg Q EVENING Nikhil Nunez M.D.   1 mg at 17   • gabapentin (NEURONTIN) capsule 300 mg  300 mg BID Nikhil Nunez M.D.   300 mg at 17   • haloperidol lactate (HALDOL) injection 5 mg  5 mg Q4HRS PRN ROLAND JusticeO.   5 mg at 17 0502   • potassium chloride SA (Kdur) tablet 20 mEq  20 mEq BID ROLAND JusticeOLoulou        Or   • potassium chloride (KLOR-CON) 20 MEQ packet 20 mEq  20 mEq BID ROLAND JusticeO.   20 mEq at 17   • piperacillin-tazobactam (ZOSYN) 3.375 g in  mL IVPB  3.375 g Q6HRS Ayala Zazueta M.D.   Stopped at 1752   • furosemide (LASIX) injection 40 mg  40 mg Q12HRS Gavin Molina M.D.   40 mg at 17   • lactulose 20 GM/30ML solution 30 mL  30 mL BID Gavin Molina M.D.   30 mL at 17   • rifaximin (XIFAXAN)  tablet 550 mg  550 mg BID Everton Paredes M.D.   550 mg at 03/26/17 2012   • diphenhydrAMINE (BENADRYL) tablet/capsule 50 mg  50 mg HS PRN Tate Hogan M.D.   50 mg at 03/26/17 2022   • oxycodone immediate-release (ROXICODONE) tablet 2.5-5 mg  2.5-5 mg Q4HRS PRN Alexia Munoz D.O.   5 mg at 03/27/17 0454   • DOBUTamine (DOBUTREX) 1 mg/mL premix infusion  5 mcg/kg/min Continuous Jeremy M Gonda, M.D. 34 mL/hr at 03/27/17 0701 5 mcg/kg/min at 03/27/17 0701   • Respiratory Care per Protocol   Continuous RT Alexis Hamilton M.D.       • ondansetron (ZOFRAN) syringe/vial injection 4 mg  4 mg Q4HRS PRN Alexis Hamilton M.D.   4 mg at 03/20/17 1821   • ondansetron (ZOFRAN ODT) dispertab 4 mg  4 mg Q4HRS PRN Alexis Hamilton M.D.       • promethazine (PHENERGAN) tablet 12.5-25 mg  12.5-25 mg Q4HRS PRN Alexis Hamilton M.D.       • promethazine (PHENERGAN) suppository 12.5-25 mg  12.5-25 mg Q4HRS PRN Alexis Hamilton M.D.       • prochlorperazine (COMPAZINE) injection 5-10 mg  5-10 mg Q4HRS PRN Alexis Hamilton M.D.       • acetaminophen (TYLENOL) tablet 650 mg  650 mg Q6HRS PRN Alexis Hamilton M.D.   650 mg at 03/20/17 0838   • senna-docusate (PERICOLACE or SENOKOT S) 8.6-50 MG per tablet 2 Tab  2 Tab BID Alexis Hamilton M.D.   2 Tab at 03/26/17 2012    And   • polyethylene glycol/lytes (MIRALAX) PACKET 1 Packet  1 Packet QDAY PRN Alexis Hamilton M.D.        And   • magnesium hydroxide (MILK OF MAGNESIA) suspension 30 mL  30 mL QDAY PRN Alexis Hamilton M.D.        And   • bisacodyl (DULCOLAX) suppository 10 mg  10 mg QDAY PRN Alexis Hamilton M.D.       • heparin 1000 units/mL injection 3,200 Units  3,200 Units PRN Catherine Hillman, PHARMD   3,200 Units at 03/27/17 0627    And   • heparin infusion 25,000 units in 500 ml 0.45% nacl   Continuous Catherine Hillman PHARMD 26 mL/hr at 03/27/17 0624 1,300 Units/hr at 03/27/17 0624   • POLYMEM ALGINATE DRESSING PADS 1 Each  1 Each QDAY PRN Jeremy M Gonda, M.D.         Last reviewed on 3/13/2017  5:30  PM by Loi Ponce    Quality  Measures:  Labs reviewed, Medications reviewed and Radiology images reviewed  Holt catheter: Critically Ill - Requiring Accurate Measurement of Urinary Output  Central line in place: Need for access    DVT Prophylaxis: Heparin and Enoxaparin (Lovenox)  DVT prophylaxis - mechanical: SCDs  Ulcer prophylaxis: Not indicated  Antibiotics: Treating active infection/contamination beyond 24 hours perioperative coverage          Assessment and Plan:    Acute on chronic hypoxemic respiratory failure  Acute on chronic systolic heart failure   - EF 15-20%   - dobutamine gtt   - force diuresis as tolerated - decrease Lasix with increased azotemia  Acute cardiogenic pulmonary edema   - force diuresis as tolerated  Acute metabolic/toxic encephalopathy   - lactulose and rifaximin  Sepsis - skin source  Bilateral lower extremity cellulitis with open ulcerations   - cont Zosyn per ID  Thrombocytopenia  Acute on chronic kidney disease  Hepatitis C  LV thrombus - cont heparin gtt  DNR/I    Unstable cardiovascular and pulmonary status.  Periodic breathing associated with severe systolic failure.    Critical Care Time:  32 minutes  Date of service:  3/27/17  30459  No time overlap  Time excludes procedures  Discussed with RN, RT, Team

## 2017-03-28 NOTE — PROGRESS NOTES
PALLIATIVE CARE FOLLOW UP:  Discussed with LUANNE Garcia. He reports he left Irvin a message regarding DC plan. Palliative Care will continue to follow and assist as needed.    Thank you for allowing Palliative Care to follow this patient. Please contact us at  with any questions.

## 2017-03-28 NOTE — PROGRESS NOTES
Pulmonary Critical Care Progress Note    Interval Events:  24 hour interval history reviewed  Reason for visit:  Acute on chronic systolic heart failure     - ST   - hep gtt   - dobut 5   - decrease dobut to 2.5   - stop Lasix    PFSH:  No change.    Respiratory:     Pulse Oximetry: 97 %  CXR with improved pulmonary edema  Few crackles at the bases bilaterally  Periodic breathing    HemoDynamics:  Pulse: (!) 111, Heart Rate (Monitored): (!) 114  NIBP: 106/78 mmHg    SR  Dobut - 5    Neuro:  Confused with intermittent agitation  No focal weakness    Fluids:  Intake/Output       03/26/17 0700 - 03/27/17 0659 03/27/17 0700 - 03/28/17 0659 03/28/17 0700 - 03/29/17 0659      4531-1322 4069-9763 Total 5820-7365 8691-4907 Total 9710-8524 7032-3702 Total       Intake    I.V.  660  860 1520  952.9  880 1832.9  --  -- --    Heparin Volume 252 252 504 344.9 372 716.9 -- -- --    Dobutamine Volume 408 408 816 408 408 816 -- -- --    IV Piggyback Volume (Antibiotics) -- 200 200 200 100 300 -- -- --    Other  240  90 330  150  90 240  --  -- --    Medications (P.O./ Enteral Liquids) 240 90 330 150 90 240 -- -- --    Enteral  810  780 1590  1170  780 1950  --  -- --    Enteral Volume   -- -- --    Free Water / Tube Flush 90 60 150 210 60 270 -- -- --    Total Intake 1710 1730 3440 2272.9 1750 4022.9 -- -- --       Output    Urine  950  740 1690  575  315 890  --  -- --    Indwelling Cathether  575 315 890 -- -- --    Drains  --  -- --  --  0 0  --  -- --    Residual Amount (ml) (Discarded) -- -- -- -- 0 0 -- -- --    Stool  --  -- --  --  -- --  --  -- --    Number of Times Stooled -- -- -- -- 2 x 2 x -- -- --    Total Output  575 315 890 -- -- --       Net I/O      1697.9 1435 3132.9 -- -- --        Weight: 113.4 kg (250 lb)  Recent Labs      03/26/17   0420  03/27/17   0415  03/28/17   0528   SODIUM  130*  131*  127*   POTASSIUM  4.0  4.6  4.7   CHLORIDE  101  101  99    CO2  21  18*  17*   BUN  45*  52*  60*   CREATININE  1.19  1.31  1.48*   CALCIUM  8.8  9.1  8.9       GI/Nutrition:  Abd slightly distended, non-tender  Tolerated enteral TF    Liver Function  Recent Labs      17   05   ALTSGPT  23  21  21   ASTSGOT  42  54*  53*   ALKPHOSPHAT  37  46  42   TBILIRUBIN  5.3*  5.6*  5.5*   PREALBUMIN   --   7.0*   --    GLUCOSE  127*  132*  122*       Heme:  Recent Labs      17   1115  17   1825  17   RBC  4.56*  4.86   --    --   4.48*   HEMOGLOBIN  11.5*  12.3*   --    --   11.5*   HEMATOCRIT  36.4*  39.6*   --    --   36.8*   PLATELETCT  110*  130*   --    --   152*   APTT  59.6*  39.4*  44.2*  86.0*  86.9*       Infectious Disease:  Temp  Av.2 °C (97.2 °F)  Min: 35.7 °C (96.3 °F)  Max: 36.7 °C (98.1 °F)    Recent Labs      17   WBC  10.9*  12.4*  16.0*   NEUTSPOLYS  70.40  73.90*  75.60*   LYMPHOCYTES  11.30*  5.30*  10.40*   MONOCYTES  6.10  5.20  7.60   EOSINOPHILS  1.80  2.60  0.90   BASOPHILS  1.70  2.60*  1.00   ASTSGOT  42  54*  53*   ALTSGPT  23  21  21   ALKPHOSPHAT  37  46  42   TBILIRUBIN  5.3*  5.6*  5.5*     Current Facility-Administered Medications   Medication Dose Frequency Provider Last Rate Last Dose   • haloperidol (HALDOL) tablet 5 mg  5 mg Q6HRS Nikhil Nunez M.D.   5 mg at 17 0557   • furosemide (LASIX) injection 40 mg  40 mg Q DAY Pietro Wu D.O.       • potassium chloride SA (Kdur) tablet 20 mEq  20 mEq DAILY Nikhil Nunez M.D.        Or   • potassium chloride (KLOR-CON) 20 MEQ packet 20 mEq  20 mEq DAILY Nikhil Nunez M.D.       • glucose 4 g chewable tablet 16 g  16 g Q15 MIN PRN Gavin Molina M.D.        And   • dextrose 50% (D50W) injection 25 mL  25 mL Q15 MIN PRN Gavin Molina M.D.       • insulin lispro (HUMALOG) injection 3-14 Units  3-14 Units  Q6HRS Gavin Molina M.D.   Stopped at 03/28/17 0000   • gabapentin (NEURONTIN) capsule 300 mg  300 mg BID Nikhil Nunez M.D.   300 mg at 03/27/17 1946   • haloperidol lactate (HALDOL) injection 5 mg  5 mg Q4HRS PRN ROLAND JusticeOLoulou   5 mg at 03/28/17 0549   • piperacillin-tazobactam (ZOSYN) 3.375 g in  mL IVPB  3.375 g Q6HRS Ayala Zazueta M.D.   Stopped at 03/28/17 0619   • lactulose 20 GM/30ML solution 30 mL  30 mL BID Gavin Molina M.D.   30 mL at 03/27/17 1946   • rifaximin (XIFAXAN) tablet 550 mg  550 mg BID Everton Paredes M.D.   550 mg at 03/27/17 1946   • diphenhydrAMINE (BENADRYL) tablet/capsule 50 mg  50 mg HS PRN Tate Hogan M.D.   50 mg at 03/26/17 2022   • oxycodone immediate-release (ROXICODONE) tablet 2.5-5 mg  2.5-5 mg Q4HRS PRN Alexia Munoz D.O.   5 mg at 03/27/17 1711   • DOBUTamine (DOBUTREX) 1 mg/mL premix infusion  5 mcg/kg/min Continuous Jeremy M Gonda, M.D. 34 mL/hr at 03/28/17 0634 5 mcg/kg/min at 03/28/17 0634   • Respiratory Care per Protocol   Continuous RT Alexis Hamilton M.D.       • ondansetron (ZOFRAN) syringe/vial injection 4 mg  4 mg Q4HRS PRN Alexis Hamilton M.D.   4 mg at 03/20/17 1821   • ondansetron (ZOFRAN ODT) dispertab 4 mg  4 mg Q4HRS PRN Alexis Hamilton M.D.       • promethazine (PHENERGAN) tablet 12.5-25 mg  12.5-25 mg Q4HRS PRN Alexis Hamilton M.D.       • promethazine (PHENERGAN) suppository 12.5-25 mg  12.5-25 mg Q4HRS PRN Alexis Hamilton M.D.       • prochlorperazine (COMPAZINE) injection 5-10 mg  5-10 mg Q4HRS PRN Alexis Hamilton M.D.       • acetaminophen (TYLENOL) tablet 650 mg  650 mg Q6HRS PRN Alexis Hamilton M.D.   650 mg at 03/20/17 0838   • senna-docusate (PERICOLACE or SENOKOT S) 8.6-50 MG per tablet 2 Tab  2 Tab BID Alexis Hamilton M.D.   2 Tab at 03/27/17 1947    And   • polyethylene glycol/lytes (MIRALAX) PACKET 1 Packet  1 Packet QDAY PRN Alexis Hamilton M.D.        And   • magnesium hydroxide (MILK OF MAGNESIA) suspension 30  mL  30 mL QDAY PRN Alexis Hamilton M.D.        And   • bisacodyl (DULCOLAX) suppository 10 mg  10 mg QDAY PRN Alexis Hamilton M.D.       • heparin 1000 units/mL injection 3,200 Units  3,200 Units PRN Catherine Hillman, PHARMD   3,200 Units at 03/27/17 1219    And   • heparin infusion 25,000 units in 500 ml 0.45% nacl   Continuous Catherine Hillman PHARMD 31 mL/hr at 03/28/17 0544 1,550 Units/hr at 03/28/17 0544   • POLYMEM ALGINATE DRESSING PADS 1 Each  1 Each QDAY PRN Jeremy M Gonda, M.D.         Last reviewed on 3/13/2017  5:30 PM by Loi Ponce    Quality  Measures:  Labs reviewed, Medications reviewed and Radiology images reviewed  Holt catheter: Critically Ill - Requiring Accurate Measurement of Urinary Output  Central line in place: Need for access    DVT Prophylaxis: Heparin and Enoxaparin (Lovenox)  DVT prophylaxis - mechanical: SCDs  Ulcer prophylaxis: Not indicated  Antibiotics: Treating active infection/contamination beyond 24 hours perioperative coverage          Assessment and Plan:    Acute on chronic hypoxemic respiratory failure  Acute on chronic systolic heart failure   - EF 15-20%   - dobutamine gtt - try to decrease from 5 to 2.5   - force diuresis as tolerated - stop Lasix with increased azotemia  Acute cardiogenic pulmonary edema   - force diuresis as tolerated  Acute metabolic/toxic encephalopathy   - lactulose and rifaximin  Sepsis - skin source  Bilateral lower extremity cellulitis with open ulcerations   - cont Zosyn per ID  Thrombocytopenia  Acute on chronic kidney disease  Hepatitis C  LV thrombus - cont heparin gtt  DNR/I    Unstable cardiovascular and pulmonary status.  Keep in ICU.    Critical Care Time:  35 minutes  Date of service:  3/28/17  [id number]  No time overlap  Time excludes procedures  Discussed with RN, RT, Team

## 2017-03-28 NOTE — PROGRESS NOTES
Hospital Medicine Progress Note, Adult, Complex               Author: Nikhil Enriquez Date & Time created: 3/28/2017  9:15 AM     Interval History:  52 y/o male presented with dyspnea. Known hx of cirrhosis.  Noted to have acute on chronic systolic heart failure, decompensated.  Also noted bilateral LE cellulitis causing sepsis.  Now on dobutamine.    On room air now. Agitated and confused but orientated with encouragement  Tachy  96.4 tmax  Cortrack/tf at goal  On heparin gtt and on dobutamine gtt  Rectal trumpet  ?aspiration?- slp ordered  Pt/ot  No longer hallucinating    Had long meeting with son today on plan of care. Hospice recommended. He was not willing to make any changes in the plan of care today and says he will be willing to meet with us again later in the week. SW to arrange.           Review of Systems:  Review of Systems   Unable to perform ROS: medical condition       Physical Exam:  Physical Exam   Constitutional: He appears well-developed and well-nourished.  Non-toxic appearance. No distress.   Ill appearing    HENT:   Right Ear: External ear normal.   Left Ear: External ear normal.   Nose: Nose normal.   Mouth/Throat: No oropharyngeal exudate.   Eyes: Right eye exhibits no discharge. Left eye exhibits no discharge. No scleral icterus.   Neck: Neck supple. No JVD present. No tracheal deviation, no edema and no erythema present.   Cardiovascular: Regular rhythm, normal heart sounds and intact distal pulses.  Tachycardia present.  Exam reveals no friction rub.    No murmur heard.  Pulmonary/Chest: Effort normal. No stridor. No respiratory distress. He has rhonchi. He has no rales.   Abdominal: Soft. Bowel sounds are normal. He exhibits distension. There is no tenderness.   Musculoskeletal: He exhibits edema. He exhibits no tenderness.   Neurological: He is alert.   Awake but confused and agitated     Skin: Skin is warm and dry. He is not diaphoretic. There is erythema (bilateral LE with blisters ).  No pallor.   Psychiatric: His affect is labile. He is agitated and slowed. He expresses impulsivity. He is noncommunicative.   Nursing note and vitals reviewed.      Labs:        Invalid input(s): [id number]      Recent Labs      17   0528   SODIUM  130*  131*  127*   POTASSIUM  4.0  4.6  4.7   CHLORIDE  101  101  99   CO2  21  18*  17*   BUN  45*  52*  60*   CREATININE  1.19  1.31  1.48*   CALCIUM  8.8  9.1  8.9     Recent Labs      17   0528   ALTSGPT  23  21  21   ASTSGOT  42  54*  53*   ALKPHOSPHAT  37  46  42   TBILIRUBIN  5.3*  5.6*  5.5*   PREALBUMIN   --   7.0*   --    GLUCOSE  127*  132*  122*     Recent Labs      17   1115  17   1825  17   0528   RBC  4.56*  4.86   --    --   4.48*   HEMOGLOBIN  11.5*  12.3*   --    --   11.5*   HEMATOCRIT  36.4*  39.6*   --    --   36.8*   PLATELETCT  110*  130*   --    --   152*   APTT  59.6*  39.4*  44.2*  86.0*  86.9*     Recent Labs      17   0528   WBC  10.9*  12.4*  16.0*   NEUTSPOLYS  70.40  73.90*  75.60*   LYMPHOCYTES  11.30*  5.30*  10.40*   MONOCYTES  6.10  5.20  7.60   EOSINOPHILS  1.80  2.60  0.90   BASOPHILS  1.70  2.60*  1.00   ASTSGOT  42  54*  53*   ALTSGPT  23  21  21   ALKPHOSPHAT  37  46  42   TBILIRUBIN  5.3*  5.6*  5.5*           Hemodynamics:  Temp (24hrs), Av.2 °C (97.2 °F), Min:35.7 °C (96.3 °F), Max:36.7 °C (98.1 °F)  Temperature: (!) 35.7 °C (96.3 °F)  Pulse  Av.4  Min: 50  Max: 194Heart Rate (Monitored): (!) 114  NIBP: 106/78 mmHg    Respiratory:    Respiration: (!) 36, Pulse Oximetry: 97 %     Work Of Breathing / Effort: Moderate  RUL Breath Sounds: Clear, RML Breath Sounds: Clear, RLL Breath Sounds: Diminished, BHAVNA Breath Sounds: Clear, LLL Breath Sounds: Diminished  Fluids:    Intake/Output Summary (Last 24 hours) at 17 0915  Last data filed at 17  0600   Gross per 24 hour   Intake 3591.7 ml   Output    765 ml   Net 2826.7 ml     Weight: 113.4 kg (250 lb)  GI/Nutrition:  No orders of the defined types were placed in this encounter.     Medical Decision Making, by Problem:  Active Hospital Problems    Diagnosis   • Acute on chronic systolic congestive heart failure, NYHA class 4, present on admission (EF 15-20%) [I50.23]  - continue dobutamine, was stopped for a short time 3/23 d/t significant tachycardia but urine outpt dropped off. Back on this now. Will try to titrate down to 2.5mg/hr but he appears dobutamine dependent at this point.   - try to stop lasix.    • Hepatitis C virus infection [B19.20]  - with cirrhosis.   - continue rifaximin, lactulose    • Acute renal insufficiency [N28.9]  - worse. Stop lasix.    • Essential hypertension, benign [I10]  - low BP on dobutamine   - off meds.    • Acute respiratory failure (CMS-HCC) [J96.00]  - improved   • Hyponatremia [E87.1]  persists. Hypervolemic.    • Hyperkalemia [E87.5]  - resolved  - trend and replace   • Cellulitis [L03.90]  - improved, continue zosyn per ID   • Shock (CMS-HCC) [R57.9]  - borderline on dobutamine, no need for pressors   • LV (left ventricular) mural thrombus (CMS-[facility]) [I21.3]  - continue heparin gtt, consider coumadin soon.    Agitation- consistent with metabolic encephalopathy  - haldol prn added yesterday with good results  - will decrease scheduled haldol and contine prn  - continue lactulose will titrate to 3-4 BM per day- no need to follow ammonia    Discussed plan with RN  Critical care time 76min with active management of gtts  For CHF and extensive time spent directly with family discussing plan of care.     Labs reviewed, Medications reviewed, Radiology images reviewed and EKG reviewed  Holt catheter: No Holt      DVT Prophylaxis: Heparin    Ulcer prophylaxis: Yes  Antibiotics: Treating active infection/contamination beyond 24 hours perioperative coverage  Assessed for  rehab: Patient was assess for and/or received rehabilitation services during this hospitalization

## 2017-03-28 NOTE — PROGRESS NOTES
Discussed with Dr. FALL regarding pt decreased urine output since the dobutamine was titrated down.  No further orders at this time.

## 2017-03-28 NOTE — THERAPY
"  Speech Language Therapy Clinical Swallow Evaluation completed.  Functional Status: Pt sedated earlier today with RN stating more awake  this PM. Pt required min stimulation to remain awake for the swallow eval. Pt with clear sounding voice with good intensity. Pt with moderate WOB during the eval with nurs explaining pt with ejection fracture of less than 20%. Pt given single ice chips and 1/2 tsp sips of thickened water from a spoon with moderate cues for the 2nd swallow. Possible pharyngeal residue is cervically auscultated with the 2nd swallow. No coughing with one episode of throat clearing. Pt able to repeat swallow with no further throat clearing or coughing. Nursing has been giving limited ice chips. SLP collaborated with nurs, pt, and family re NPO for nutrition. Pt at the level for 5-10 sips of thickened water using a spoon three times daily=AM/PM/HS. Hold thickened liquids with difficulty, sedation, or difficulty. Cont NG for nutrition. SLP will see for prefeeding. Pt demonstrating s/s of moderate dysphagia.  Recommendations - Diet: Diet / Liquid Recommendation: NPO (limited thickened water only)-NG tube.                          Strategies: HOB 90 degrees, use spoon for nectar thick water with 1/2 tsp amounts- 5 to 10 sips three times daily AM/PM/HS. Double swallow                          Medication Administration: Medication Administration : Via Gastric Tube  Plan of Care: Will benefit from Speech Therapy 3 times per week  Post-Acute Therapy: Discharge to a transitional care facility for continued skilled therapy services.Thanks, Parviz    See \"Rehab Therapy-Acute\" Patient Summary Report for complete documentation.   "

## 2017-03-28 NOTE — DISCHARGE PLANNING
Call to son requesting call back to discuss DC plan.   Palliative following.     Left message.      -120.  Cortrak.  Hep drip.  Dobutamine.  Rectal trumpet.  Holt.      Medicaid insurance.  Needs PT/OT.  2 L. 02.  Zosyn IV ABX.

## 2017-03-28 NOTE — WOUND TEAM
In to see pt for BMS placement. Pt has orders for lactulose to titrate for 1-2 BM/day. Stool is thicker loose and pt has had 2 already today. Ammonia level is okay. Discussion with RN that pt doesn't rally need BMS unless more frequent stooling and lactulose being administered more frequently. BMS outside room and re-consult if needed.

## 2017-03-29 NOTE — PROGRESS NOTES
Hospital Medicine Progress Note, Adult, Complex               Author: Nikhil Enriquez Date & Time created: 3/29/2017  8:15 AM     Interval History:  50 y/o male presented with dyspnea. Known hx of cirrhosis.  Noted to have acute on chronic systolic heart failure, decompensated.  Also noted bilateral LE cellulitis causing sepsis.  Now dobutamine dependent.  LV thrombus on heparin gtt  Persistent encephalopathy      Patient alert and appears orientated today. He expressed his wishes for continued treatment  Holt 400 out over night  Bm this am  Still on dobutamine at 2.5      Review of Systems:  Review of Systems   Constitutional: Negative for fever and chills.   HENT: Negative for sore throat.    Eyes: Negative for blurred vision and pain.   Respiratory: Positive for shortness of breath. Negative for cough.    Cardiovascular: Negative for chest pain and palpitations.   Gastrointestinal: Negative for nausea, vomiting and abdominal pain.   Genitourinary: Negative for dysuria and urgency.   Musculoskeletal: Negative for back pain and neck pain.   Skin: Negative for itching and rash.   Neurological: Negative for dizziness, tingling and headaches.   Psychiatric/Behavioral: Negative for depression. The patient does not have insomnia.    All other systems reviewed and are negative.      Physical Exam:  Physical Exam   Constitutional: He appears well-developed and well-nourished.  Non-toxic appearance. No distress.   Ill appearing    HENT:   Right Ear: External ear normal.   Left Ear: External ear normal.   Nose: Nose normal.   Mouth/Throat: No oropharyngeal exudate.   Scleral edema   Eyes: Right eye exhibits no discharge. Left eye exhibits no discharge. No scleral icterus.   Neck: JVD present. No tracheal deviation, no edema and no erythema present.   Cardiovascular: Regular rhythm, normal heart sounds and intact distal pulses.  Tachycardia present.  Exam reveals no friction rub.    No murmur heard.  Pulmonary/Chest: Effort  normal. No respiratory distress. He has rhonchi. He has no rales.   Abdominal: Soft. Bowel sounds are normal. He exhibits distension. There is no tenderness.   Musculoskeletal: He exhibits edema. He exhibits no tenderness.   anasarca   Neurological: He is alert.   Skin: Skin is warm and dry. He is not diaphoretic. There is erythema (bilateral LE with blisters ). No pallor.   Psychiatric: His affect is blunt and labile. He is slowed.   Nursing note and vitals reviewed.      Labs:        Invalid input(s): [id number]      Recent Labs      17   SODIUM  131*  127*  126*   POTASSIUM  4.6  4.7  4.6   CHLORIDE  101  99  99   CO2  18*  17*  17*   BUN  52*  60*  74*   CREATININE  1.31  1.48*  1.78*   CALCIUM  9.1  8.9  8.8     Recent Labs      17   ALTSGPT  21     ASTSGOT  54*  53*  56*   ALKPHOSPHAT  46  42  39   TBILIRUBIN  5.6*  5.5*  5.0*   PREALBUMIN  7.0*   --    --    GLUCOSE  132*  122*  130*     Recent Labs      17   RBC  4.86   --    --   4.48*  4.17*   HEMOGLOBIN  12.3*   --    --   11.5*  11.0*   HEMATOCRIT  39.6*   --    --   36.8*  34.9*   PLATELETCT  130*   --    --   152*  134*   APTT  39.4*   < >  86.0*  86.9*  88.2*    < > = values in this interval not displayed.     Recent Labs      17   WBC  12.4*  16.0*  14.3*   NEUTSPOLYS  73.90*  75.60*  76.10*   LYMPHOCYTES  5.30*  10.40*  10.60*   MONOCYTES  5.20  7.60  7.60   EOSINOPHILS  2.60  0.90  1.80   BASOPHILS  2.60*  1.00  1.10   ASTSGOT  54*  53*  56*   ALTSGPT  21  21  22   ALKPHOSPHAT  46  42  39   TBILIRUBIN  5.6*  5.5*  5.0*           Hemodynamics:  Temp (24hrs), Av.2 °C (97.1 °F), Min:35.7 °C (96.2 °F), Max:36.5 °C (97.7 °F)  Temperature: 36.3 °C (97.4 °F)  Pulse  Av  Min: 50  Max: 194Heart Rate (Monitored): (!) 106  NIBP: 103/66  mmHg    Respiratory:    Respiration: (!) 22, Pulse Oximetry: 99 %     Work Of Breathing / Effort: Moderate;Increased Work of Breathing;Accessory Muscle Use  RUL Breath Sounds: Clear, RML Breath Sounds: Clear, RLL Breath Sounds: Diminished, BHAVNA Breath Sounds: Clear, LLL Breath Sounds: Diminished  Fluids:    Intake/Output Summary (Last 24 hours) at 03/29/17 0815  Last data filed at 03/29/17 0600   Gross per 24 hour   Intake   2288 ml   Output    655 ml   Net   1633 ml        GI/Nutrition:  No orders of the defined types were placed in this encounter.     Medical Decision Making, by Problem:  Active Hospital Problems    Diagnosis   • Acute on chronic systolic congestive heart failure, NYHA class 4, present on admission (EF 15-20%) [I50.23]  - continue dobutamine, was stopped for a short time 3/23 d/t significant tachycardia but urine outpt dropped off. Back on this now and appears dependent on it. conintue at 2.5 for now. Endpoint not clear.    • Hepatitis C virus infection [B19.20]  - with cirrhosis.   - continue rifaximin, lactulose    • Acute renal insufficiency [N28.9]  - worse. Off lasix. Small bolus today. Unlikely to help. Poor intravascular volume and extensive 3rd spacing.    • Essential hypertension, benign [I10]  - low BP on dobutamine   - off meds.    • Acute respiratory failure (CMS-[facility]) [J96.00]  - improved   • Hyponatremia [E87.1]  persists. Hypervolemic.    • Hyperkalemia [E87.5]  - resolved  - trend and replace for low k as needed.    • Cellulitis [L03.90]  - improved, continue zosyn per ID   • Shock (CMS-[facility]) [R57.9]  - borderline on dobutamine, no need for pressors   • LV (left ventricular) mural thrombus (CMS-[facility]) [I21.3]  - continue heparin gtt, start coumadin.    Agitation- consistent with metabolic encephalopathy  - haldol prn added yesterday with good results  - will decrease scheduled haldol and contine prn  - continue lactulose will titrate to 3-4 BM per day    Discussed plan with RIYA Carpio  care time 36min with active titration of pressors.   Grave prognosis. However patient clear he wants to continue care.      Labs reviewed, Medications reviewed, Radiology images reviewed and EKG reviewed  Holt catheter: No Holt      DVT Prophylaxis: Heparin    Ulcer prophylaxis: Yes  Antibiotics: Treating active infection/contamination beyond 24 hours perioperative coverage  Assessed for rehab: Patient was assess for and/or received rehabilitation services during this hospitalization

## 2017-03-29 NOTE — PROGRESS NOTES
Pt requested bed pan for bowel movement.  Patient placed on bedpan.  Pt had a medium sized soft to loose bowel movement.

## 2017-03-29 NOTE — PROGRESS NOTES
Administered 10 1/2 tsp nectar thick sips of water to patient by mouth per instructions from speech therapist.

## 2017-03-29 NOTE — THERAPY
"Speech Language Therapy dysphagia treatment completed.   Functional Status:  Per nurs, pt with agitation this AM requiring Haldol with no agitation currently. Pt's HOB raised to approx 45 degrees with difficulty tolerating higher r/t WOB related to habitus. Pt required min cues for a double swallow with sips of thickened water and applesauce. One half tsp amounts given using a spoon. Pt with clear voice, no coughing but with expiratory wheeze, RR high 20's and low 30's with variable WOB from min to moderate. Sats greater than 90%. Triggering of the swallow in approx 3-8 seconds. Possible pharyngeal residue is cervically auscultated with the 2nd swallow. 3/29 chest xray indicating mild pulm edema. Therapy session ended related to increase in time for pt to return to his baseline in regards to WOB and RR. Collaborated with nurs regarding pt with poor prognosis for improvement related to his heart status. Plan-attempt snack of NTL food items to assess if pt at the level to begin one snack every day with staff and continued NG for nutrition. Pt eager to begin eating. Pt and his family have declined Hospice services and require further educ regarding possible risks of beginning PO. Pt is a full code. Pt is also at risk for bleeding per nurs with Heparin and coumadin and probably not appropriate for FEES to further assess airway protection. Collaborate with MD when recommending start of PO.  Recommendations: NPO and NG with prefeeding  Plan of Care: Will benefit from Speech Therapy 3 times per week  Post-Acute Therapy: Discharge to a transitional care facility for continued skilled therapy services.Thanks, Parviz    See \"Rehab Therapy-Acute\" Patient Summary Report for complete documentation.     "

## 2017-03-29 NOTE — CARE PLAN
Problem: Infection  Goal: Will remain free from infection  Outcome: PROGRESSING AS EXPECTED    Problem: Mobility  Goal: Risk for activity intolerance will decrease  Outcome: PROGRESSING SLOWER THAN EXPECTED  Pt has refused to ambulate.  PT/OT ordered today per physician.  Pt and family educated on need to move out of bed.

## 2017-03-29 NOTE — PROGRESS NOTES
Family at bedside.  Pt resting comfortably in bed.  No complaints at this time.  No needs at this time.

## 2017-03-29 NOTE — PROGRESS NOTES
Pulmonary Critical Care Progress Note    Interval Events:  24 hour interval history reviewed  Reason for visit:  Acute on chronic systolic heart failure     - hep gtt   - dobut 2.5   - TF at 60   - start Coumadin   - finishing Zosyn today   - worsening renal function   - cautious IV fluids    PFSH:  No change.    Respiratory:     Pulse Oximetry: 99 %  CXR with improved pulmonary edema  Few crackles at the bases bilaterally - no wheezing  Periodic breathing    HemoDynamics:  Pulse: (!) 105, Heart Rate (Monitored): (!) 106  NIBP: 103/66 mmHg    SR  Dobut - 2.5    Neuro:  Confused with intermittent agitation  No focal weakness    Fluids:  Intake/Output       03/27/17 0700 - 03/28/17 0659 03/28/17 0700 - 03/29/17 0659 03/29/17 0700 - 03/30/17 0659      6838-1935 3231-2584 Total 8930-8922 2015-3985 Total 1098-4949 8557-3209 Total       Intake    I.V.  952.9  880 1832.9  778  200 978  --  -- --    Heparin Volume 344.9 372 716.9 372 -- 372 -- -- --    Dobutamine Volume 408 408 816 306 -- 306 -- -- --    IV Piggyback Volume (Antibiotics) 200 100 300 100 200 300 -- -- --    Other  150  90 240  90  -- 90  --  -- --    Medications (P.O./ Enteral Liquids) 150 90 240 90 -- 90 -- -- --    Enteral  1170  780 1950  750  720 1470  --  -- --    Enteral Volume   -- -- --    Free Water / Tube Flush 210 60 270 30 -- 30 -- -- --    Total Intake 2272.9 1750 4022.9 6518 070 1775 -- -- --       Output    Urine  575  315 890  480  375 855  --  -- --    Indwelling Cathether 575 315 890 480 375 855 -- -- --    Drains  --  0 0  --  -- --  --  -- --    Residual Amount (ml) (Discarded) -- 0 0 -- -- -- -- -- --    Stool  --  -- --  --  -- --  --  -- --    Number of Times Stooled -- 2 x 2 x 2 x -- 2 x -- -- --    Total Output 575 315 890 786 290 855 -- -- --       Net I/O     1697.9 1435 3132.9 5512 255 6268 -- -- --           Recent Labs      03/27/17   0415  03/28/17   0528  03/29/17   0425   SODIUM  131*  127*  126*    POTASSIUM  4.6  4.7  4.6   CHLORIDE  101  99  99   CO2  18*  17*  17*   BUN  52*  60*  74*   CREATININE  1.31  1.48*  1.78*   CALCIUM  9.1  8.9  8.8       GI/Nutrition:  Abd slightly distended, non-tender  Tolerated enteral TF    Liver Function  Recent Labs      17   ALTSGPT  21  21  22   ASTSGOT  54*  53*  56*   ALKPHOSPHAT  46  42  39   TBILIRUBIN  5.6*  5.5*  5.0*   PREALBUMIN  7.0*   --    --    GLUCOSE  132*  122*  130*       Heme:  Recent Labs      17   RBC  4.86   --    --   4.48*  4.17*   HEMOGLOBIN  12.3*   --    --   11.5*  11.0*   HEMATOCRIT  39.6*   --    --   36.8*  34.9*   PLATELETCT  130*   --    --   152*  134*   APTT  39.4*   < >  86.0*  86.9*  88.2*    < > = values in this interval not displayed.       Infectious Disease:  Temp  Av.2 °C (97.1 °F)  Min: 35.7 °C (96.2 °F)  Max: 36.5 °C (97.7 °F)    Recent Labs      17   WBC  12.4*  16.0*  14.3*   NEUTSPOLYS  73.90*  75.60*  76.10*   LYMPHOCYTES  5.30*  10.40*  10.60*   MONOCYTES  5.20  7.60  7.60   EOSINOPHILS  2.60  0.90  1.80   BASOPHILS  2.60*  1.00  1.10   ASTSGOT  54*  53*  56*   ALTSGPT  21  21  22   ALKPHOSPHAT  46  42  39   TBILIRUBIN  5.6*  5.5*  5.0*     Current Facility-Administered Medications   Medication Dose Frequency Provider Last Rate Last Dose   • haloperidol lactate (HALDOL) injection 2-5 mg  2-5 mg Q4HRS PRN Nikhil Enriquez M.D.   5 mg at 17 0730   • haloperidol (HALDOL) tablet 2 mg  2 mg Q6HRS Nikhil Enriquez M.D.   2 mg at 17 0553   • glucose 4 g chewable tablet 16 g  16 g Q15 MIN PRN Gavin Molina M.D.        And   • dextrose 50% (D50W) injection 25 mL  25 mL Q15 MIN PRN Gavin Molina M.D.       • insulin lispro (HUMALOG) injection 3-14 Units  3-14 Units Q6HRS Gavin Molina M.D.   Stopped at 17 0000   • gabapentin  (NEURONTIN) capsule 300 mg  300 mg BID Nikhil Nunez M.D.   300 mg at 03/28/17 2003   • piperacillin-tazobactam (ZOSYN) 3.375 g in  mL IVPB  3.375 g Q6HRS Ayala Zazueta M.D.   Stopped at 03/29/17 0623   • lactulose 20 GM/30ML solution 30 mL  30 mL BID Gavin Molina M.D.   Stopped at 03/28/17 0900   • rifaximin (XIFAXAN) tablet 550 mg  550 mg BID Everton Paredes M.D.   550 mg at 03/28/17 2003   • diphenhydrAMINE (BENADRYL) tablet/capsule 50 mg  50 mg HS PRN Tate Hogan M.D.   50 mg at 03/28/17 2003   • oxycodone immediate-release (ROXICODONE) tablet 2.5-5 mg  2.5-5 mg Q4HRS PRN Alexia Munoz D.O.   5 mg at 03/27/17 1711   • DOBUTamine (DOBUTREX) 1 mg/mL premix infusion  2.5 mcg/kg/min Continuous Nikhil Nunez M.D. 17 mL/hr at 03/29/17 0722 2.5 mcg/kg/min at 03/29/17 0722   • Respiratory Care per Protocol   Continuous RT Alexis Hamilton M.D.       • ondansetron (ZOFRAN) syringe/vial injection 4 mg  4 mg Q4HRS PRN Alexis Hamilton M.D.   4 mg at 03/20/17 1821   • ondansetron (ZOFRAN ODT) dispertab 4 mg  4 mg Q4HRS PRN Alexis Hamilton M.D.       • promethazine (PHENERGAN) tablet 12.5-25 mg  12.5-25 mg Q4HRS PRN Alexis Hamilton M.D.       • promethazine (PHENERGAN) suppository 12.5-25 mg  12.5-25 mg Q4HRS PRN Alexis Hamilton M.D.       • prochlorperazine (COMPAZINE) injection 5-10 mg  5-10 mg Q4HRS PRN Alexis Hamilton M.D.       • acetaminophen (TYLENOL) tablet 650 mg  650 mg Q6HRS PRN Alexis Hamilton M.D.   650 mg at 03/20/17 0838   • senna-docusate (PERICOLACE or SENOKOT S) 8.6-50 MG per tablet 2 Tab  2 Tab BID Alexis Hamilton M.D.   Stopped at 03/28/17 0900    And   • polyethylene glycol/lytes (MIRALAX) PACKET 1 Packet  1 Packet QDAY PRN Alexis Hamilton M.D.        And   • magnesium hydroxide (MILK OF MAGNESIA) suspension 30 mL  30 mL QDAY PRN Alexis Hamilton M.D.        And   • bisacodyl (DULCOLAX) suppository 10 mg  10 mg QDAY PRN Alexis Hamilton M.D.       • heparin 1000 units/mL injection  3,200 Units  3,200 Units PRN Catherine Hillman PHARMD   3,200 Units at 03/27/17 1219    And   • heparin infusion 25,000 units in 500 ml 0.45% nacl   Continuous CHEPE LimaD 31 mL/hr at 03/29/17 0704 1,550 Units/hr at 03/29/17 0704   • POLYMEM ALGINATE DRESSING PADS 1 Each  1 Each QDAY PRN Jeremy M Gonda, M.D.         Last reviewed on 3/13/2017  5:30 PM by Loi Ponce    Quality  Measures:  Labs reviewed, Medications reviewed and Radiology images reviewed  Holt catheter: Critically Ill - Requiring Accurate Measurement of Urinary Output  Central line in place: Need for access    DVT Prophylaxis: Heparin and Enoxaparin (Lovenox)  DVT prophylaxis - mechanical: SCDs  Ulcer prophylaxis: Not indicated  Antibiotics: Treating active infection/contamination beyond 24 hours perioperative coverage          Assessment and Plan:    Acute on chronic hypoxemic respiratory failure  Acute on chronic systolic heart failure   - EF 15-20%   - dobutamine gtt   - Lasix on hold for increased azotemia  Acute cardiogenic pulmonary edema  Acute metabolic/toxic encephalopathy   - lactulose and rifaximin  Sepsis - skin source  Bilateral lower extremity cellulitis with open ulcerations   - cont Zosyn per ID  Thrombocytopenia  Acute on chronic kidney disease  Hepatitis C  LV thrombus - cont heparin gtt  DNR/I    Unstable cardiovascular and pulmonary status.  Keep in ICU.  Prognosis is abysmal.    Critical Care Time:  33 minutes  Date of service:  3/29/17  10238  No time overlap  Time excludes procedures  Discussed with RN, RT, Team

## 2017-03-29 NOTE — THERAPY
"Physical Therapy Evaluation completed.   Bed Mobility:  Supine to Sit: Total Assist X 2  Transfers: Sit to Stand: Total Assist X 3  Gait: Level Of Assist: Unable to Participate    Plan of Care: Will benefit from Physical Therapy 2 times per week  Discharge Recommendations: Equipment: Will Continue to Assess for Equipment Needs.   Pt presents with impaired activity tolerance, endurance, LE strength and ROM associated with current shock. Pt is most limited by ventilatory mechanics with minimal activity RR to 40s with LE exercise. BP 60s/40s EOB initially. Did attempt standing at pt's request, requiring 3 assist for body weight; pt unable to bear weight through legs with complete knee buckling. Anticipate poor physical progress given degree of organ dysfunction. acute PT will continue to follow, anticipate need for placement for family training and functional progress.   See \"Rehab Therapy-Acute\" Patient Summary Report for complete documentation.     "

## 2017-03-29 NOTE — THERAPY
"Occupational Therapy Evaluation completed.   Functional Status:  Total A x2 supine>sit EOB, max A sitting balance EOB max A x3 sit>stand total  A sit>supine BTB RN present VSS   Plan of Care: Will benefit from Occupational Therapy 2 times per week  Discharge Recommendations:  Equipment: Will Continue to Assess for Equipment Needs. Post-acute therapy Discharge to a transitional care facility for continued skilled therapy services.    51 yr old male dx w/increasing SOB, pt has CHF w/EF 15%, hx of drug abuse, cirrhosis w/hep C CKD, and obesity, pt presents w/ongoing SOB, edema and limited activity tolerance, pt is requiring max A for ADL's and near total assist from mobility at this time will monitor medical course and provide OT as appropriate     See \"Rehab Therapy-Acute\" Patient Summary Report for complete documentation.    "

## 2017-03-29 NOTE — PROGRESS NOTES
Physical therapy attempted to ambulate patient.  Were able to get patient to the edge of bed with 4 person assistance.  Patient able to briefly stand with 4 person assistance, but unable to take any steps and tolerated for less than 10 seconds.  Patient placed back in bed.  Fall precautions in place.  Patient resting comfortably in bed.  Family at bedside.

## 2017-03-29 NOTE — CARE PLAN
Problem: Safety  Goal: Will remain free from injury  Outcome: PROGRESSING AS EXPECTED  Pt has had no falls throughout hospital stay.

## 2017-03-29 NOTE — PROGRESS NOTES
Infectious Disease Progress Note    Author: Bing Brumfield M.D. DOS & Time created: 3/29/2017  9:27 AM    Chief Complaint   Patient presents with   • Peripheral Edema   • Weight Gain     10 lbs weight gain over the last 2 weeks   • Other     CHF exaserbation. Not compliant with medications.    • ALOC     Disoriented to event and time.    FU for bilateral LE cellulitis    Interval History:  3/17 AF, no CBC, lethargic but arousable to voice then quickly falls back to sleep  3/18 AF, WBC 15.5, remains on pressors, more awake and alert today, complaining of abd pain and leg pain, recent wound care photos reviewed  3/19 AF, WBC 22.9, white count increasing, pulled out RIJ and left IJ inserted o/n. Off levophed  3/20 AF WBC 23 on dobutamine-verbalizing but obtunded Pain in legs  3/21 AF WBC 23 yesterdays's projectile vomiting feculent material noted  3/22 AF WBC 22 copious secretions/sputum  3/23 AF WBC 17 no new +cxs remains obtunded  3/24 AF WBC 17.4 more alert today-asking for soda  3/25 AF WBC 13.4 no acute events-choked on water  3/26 AF WBC 10.9 asking for pain meds-confused  3/27 AF, WBC 12.4, feels short of breath but stating 96% on NC, remains on dobutamine  3/28 AF, WBC 16, no clinical change from yesterday, asking when he can go home  3/29 AF, WBC 14.3, no clinical change, watching TV  Labs Reviewed, Medications Reviewed, Radiology Reviewed and Wound Reviewed.    Review of Systems:  Review of Systems   Unable to perform ROS: medical condition   Constitutional: Negative for fever.   Musculoskeletal: Positive for myalgias.        Both legs   All other systems reviewed and are negative.      Hemodynamics:  Temp (24hrs), Av.2 °C (97.2 °F), Min:35.7 °C (96.2 °F), Max:36.6 °C (97.8 °F)  Temperature: 36.6 °C (97.8 °F)  Pulse  Av  Min: 50  Max: 194Heart Rate (Monitored): (!) 118  NIBP: 103/70 mmHg      Physical Exam:  Physical Exam   Constitutional: He appears well-developed.   Chronically ill Obese    HENT:   Head: Normocephalic and atraumatic.   NGT   Eyes: EOM are normal. Pupils are equal, round, and reactive to light. Scleral icterus is present.   Neck: Neck supple.   Left IJ   Cardiovascular:   Murmur heard.  Tachy   Pulmonary/Chest: Effort normal. No respiratory distress. He has no wheezes.   Abdominal: Soft. He exhibits distension. There is no tenderness. There is no rebound and no guarding.   Musculoskeletal: He exhibits edema.   Bilateral LE erythema and edema. Improving  Ruptured bullae-serous drainage, superficial  Pitting edema to thigh   Neurological: He is alert.   Skin: There is erythema.   decreased   Nursing note and vitals reviewed.      Labs:  Recent Labs      03/27/17 0415 03/28/17 0528 03/29/17 0425   WBC  12.4*  16.0*  14.3*   RBC  4.86  4.48*  4.17*   HEMOGLOBIN  12.3*  11.5*  11.0*   HEMATOCRIT  39.6*  36.8*  34.9*   MCV  81.5  82.1  83.7   MCH  25.3*  25.7*  26.4*   RDW  68.7*  71.6*  73.5*   PLATELETCT  130*  152*  134*   MPV  10.5  11.1  11.2   NEUTSPOLYS  73.90*  75.60*  76.10*   LYMPHOCYTES  5.30*  10.40*  10.60*   MONOCYTES  5.20  7.60  7.60   EOSINOPHILS  2.60  0.90  1.80   BASOPHILS  2.60*  1.00  1.10   RBCMORPHOLO  Present   --    --      Recent Labs      03/27/17 0415 03/28/17 0528 03/29/17 0425   SODIUM  131*  127*  126*   POTASSIUM  4.6  4.7  4.6   CHLORIDE  101  99  99   CO2  18*  17*  17*   GLUCOSE  132*  122*  130*   BUN  52*  60*  74*     Recent Labs      03/27/17 0415 03/28/17 0528 03/29/17 0425   ALBUMIN  3.0*  2.9*  2.8*   TBILIRUBIN  5.6*  5.5*  5.0*   ALKPHOSPHAT  46  42  39   TOTPROTEIN  7.7  7.5  7.2   ALTSGPT  21  21  22   ASTSGOT  54*  53*  56*   CREATININE  1.31  1.48*  1.78*       BLOOD CULTURE   Date Value Ref Range Status   03/13/2017 No growth after 5 days of incubation.  Final     BLOOD CULTURE HOLD   Date Value Ref Range Status   02/09/2014 Collected  Final      Results     ** No results found for the last 168 hours. **           Fluids:  Intake/Output       03/27/17 0700 - 03/28/17 0659 03/28/17 0700 - 03/29/17 0659 03/29/17 0700 - 03/30/17 0659      9165-3531 5048-8946 Total 0700-1859 2449-6300 Total 0700-1859 1900-0659 Total       Intake    I.V.  952.9  880 1832.9  778  200 978  --  -- --    Heparin Volume 344.9 372 716.9 372 -- 372 -- -- --    Dobutamine Volume 408 408 816 306 -- 306 -- -- --    IV Piggyback Volume (Antibiotics) 200 100 300 100 200 300 -- -- --    Other  150  90 240  90  -- 90  --  -- --    Medications (P.O./ Enteral Liquids) 150 90 240 90 -- 90 -- -- --    Enteral  1170  780 1950  750  720 1470  --  -- --    Enteral Volume   -- -- --    Free Water / Tube Flush 210 60 270 30 -- 30 -- -- --    Total Intake 2272.9 1750 4022.9 3895 772 4683 -- -- --       Output    Urine  575  315 890  480  375 855  --  -- --    Indwelling Cathether 575 315 890 480 375 855 -- -- --    Drains  --  0 0  --  -- --  --  -- --    Residual Amount (ml) (Discarded) -- 0 0 -- -- -- -- -- --    Stool  --  -- --  --  -- --  --  -- --    Number of Times Stooled -- 2 x 2 x 2 x -- 2 x -- -- --    Total Output 575 315 890 480 375 855 -- -- --       Net I/O     1697.9 1435 3132.9 1658 555 0034 -- -- --        DX-CHEST-PORTABLE (1 VIEW) (Final result) Result time: 03/21/17 03:06:06     Final result by Anika Saavedra M.D. (03/21/17 03:06:06)     Impression:     1.  Pulmonary edema and/or infiltrates are identified, which appear somewhat increased since the prior exam.  2.  Cardiomegaly  3.  Atherosclerosis        Assessment:  Active Hospital Problems    Diagnosis   • Acute renal insufficiency [N28.9]   • Acute on chronic systolic congestive heart failure, NYHA class 4, present on admission (EF 15-20%) [I50.23]   • Hepatitis C virus infection [B19.20]   • Cellulitis [L03.90]   • Shock (CMS-HCC) [R57.9]   • LV (left ventricular) mural thrombus (CMS-HCC) [I21.3]       Plan:  Bilateral lower extremity cellulitis with bulla,  improved  Wound cx - mixed skin patricia  Healing shallow bullae  Continue zosyn. Anticipate 2 weeks. Stop date 03/29/17 today  Wound care    Possible aspiration pneumonitis after emesis  NGT placed  Checked lipase- elevated  Keep meds IV  Resp status currently stable  Abx as above    Cardiogenic shock/PM/AICD  Remains on dobutamine  2/2 med noncompliance with hx cocaine-induced dilated CM EF 15%  Noncompliant with water restriction    Leukocytosis, decreased today  Multifactorial  Abx per above  Monitor    Hepatitis C/cirrhosis  Contributing to low platelets, electrolyte derangements, and immunocompromised state  Elevated ammonia  +ascites    [place]  Worsening  Creat 1.79 today  Avoid nephrotoxins    Prognosis - poor    Appreciate Palliative care/hospice eval    DW IM. Signing off. Please re-consult if needed.

## 2017-03-29 NOTE — PROGRESS NOTES
PALLIATIVE CARE FOLLOW UP:  Discussed case with bedside RN Virginia and LUANNE Garcia. Irvin remains cautious about discharge planning though hospitalized hospice was recommended due to current prognosis.     Thank you for allowing Palliative Care to follow this patient. Please contact us at  with any questions.

## 2017-03-29 NOTE — DISCHARGE PLANNING
"Lengthy discussion with son Irvin and Dr Navarro.  Discussed Recommendation for IP hospice referral.  Son remains Cautious about making any commitment to any DC planning for his dad.  \"He told me he wants to Live.\"  Irvin received current prognosis and ceiling of care.  Son will continue to consider our recommendations.     palliative  care following.  Son Has poor insight to severity of Illness.   "

## 2017-03-29 NOTE — PROGRESS NOTES
Ok to give patient ice chips per speech therapy.  They will re-evaluate tomorrow with pureed soups to see if patient can tolerate.  May need tube feedings to continue for nutrition.  Patient tires easily.

## 2017-03-29 NOTE — PROGRESS NOTES
Inpatient Anticoagulation Service Note    Date: 3/29/2017  Reason for Anticoagulation: Other (Comments) (LV thrombus)        Hemoglobin Value: 11  Hematocrit Value: 34.9  Lab Platelet Value: 134  Target INR: 2.0 to 3.0    INR from last 7 days     Date/Time INR Value    03/29/17 1145 (!)1.62        Dose from last 7 days     Date/Time Dose (mg)    03/29/17 1300 5        Average Dose (mg):  (New start)  Bridge Therapy: Yes  Date of Last VTE Event: 03/13/17  Bridge Therapy Start Date: 03/29/17  Days of Overlap Therapy: 0   INR Value Greater than 2 Prior to Discontinuation of Parenteral Anticoagulation: Not Applicable    Reversal Agent Administered: Not Applicable  Comments: Patient is a new start warfarin for a LV mural thrombus formation.  He is being bridged with a heparin drip.  H/H remains stable and no signs of bleeding noted per chart review.  INR at baseline is elevated.  Will initiate 5 mg to assess response.  INR in am.  Pharmacy will continue to monitor.    Plan:  Warfarin 5 mg.  INR in am  Education Material Provided?: No  Pharmacist suggested discharge dosing: Possibly warfarin 5 mg daily.  INR within 48 hours of discharge.     Medina Cid, PharmD.  PGY-1 Resident  [phone]

## 2017-03-29 NOTE — CARE PLAN
Problem: Safety  Goal: Will remain free from falls  Outcome: PROGRESSING AS EXPECTED  Pt educated on use of call bell, calling for assistance, pt is complying. Call bell in reach.    Problem: Skin Integrity  Goal: Risk for impaired skin integrity will decrease  Outcome: PROGRESSING AS EXPECTED  Dressing changes qshift, barrier cream applied to buttocks and perineum PRN.

## 2017-03-30 PROBLEM — R94.31 PROLONGED Q-T INTERVAL ON ECG: Status: ACTIVE | Noted: 2017-01-01

## 2017-03-30 PROBLEM — E87.20 METABOLIC ACIDOSIS: Status: ACTIVE | Noted: 2017-01-01

## 2017-03-30 PROBLEM — K92.2 GIB (GASTROINTESTINAL BLEEDING): Status: ACTIVE | Noted: 2017-01-01

## 2017-03-30 PROBLEM — R41.0 ACUTE DELIRIUM: Status: ACTIVE | Noted: 2017-01-01

## 2017-03-30 NOTE — PROGRESS NOTES
Pulmonary Critical Care Progress Note    Interval Events:  24 hour interval history reviewed  Reason for visit:  Acute on chronic systolic heart failure     - bonnie blood in stool   - heparin stopped   - stop Coumadin   - dobut 2.5   - ST   - low UOP   - albumin and Lasix   - stop Haldol   - start Abilify    PFSH:  No change.    Respiratory:     Pulse Oximetry: 98 %  CXR with improved pulmonary edema  Crackles at the bases bilaterally  Periodic breathing    HemoDynamics:  Pulse: (!) 104, Heart Rate (Monitored): (!) 103  NIBP: (!) 94/61 mmHg    SR/ST  Dobut - 2.5    Neuro:  Confused with intermittent agitation  No focal weakness    Fluids:  Intake/Output       03/28/17 0700 - 03/29/17 0659 03/29/17 0700 - 03/30/17 0659 03/30/17 0700 - 03/31/17 0659      3798-5768 5061-4914 Total 4914-9017 2408-8404 Total 3972-0265 9069-2767 Total       Intake    P.O.  --  -- --  20  -- 20  --  -- --    P.O. -- -- -- 20 -- 20 -- -- --    I.V.  778  200 978  1273.6  352.3 1625.9  --  -- --    Heparin Volume 372 -- 372 372 148.3 520.3 -- -- --    Dobutamine Volume 306 -- 306 201.6 204 405.6 -- -- --    IV Volume (NS Bolus) -- -- -- 500 -- 500 -- -- --    IV Piggyback Volume (Antibiotics) 100 200 300 200 -- 200 -- -- --    Other  90  -- 90  --  -- --  --  -- --    Medications (P.O./ Enteral Liquids) 90 -- 90 -- -- -- -- -- --    Enteral  750  720 1470  870  720 1590  --  -- --    Enteral Volume   -- -- --    Free Water / Tube Flush 30 -- 30 150 -- 150 -- -- --    Total Intake 5958 422 2043 2163.6 1072.3 3235.9 -- -- --       Output    Urine  480  713 855  320  305 625  --  -- --    Indwelling Cathether 480 873 244 968 212 465 -- -- --    Drains  --  -- --  0  -- 0  --  -- --    Residual Amount (ml) (Discarded) -- -- -- 0 -- 0 -- -- --    Stool  --  -- --  --  -- --  --  -- --    Number of Times Stooled 2 x -- 2 x 4 x -- 4 x -- -- --    Total Output 476 526 160 171 639 568 -- -- --       Net I/O     [phone]  1843.6 767.3 2610.9 -- -- --           Recent Labs      17   0500   SODIUM  127*  126*  125*   POTASSIUM  4.7  4.6  5.1   CHLORIDE  99  99  98   CO2  17*  17*  16*   BUN  60*  74*  91*   CREATININE  1.48*  1.78*  1.97*   CALCIUM  8.9  8.8  9.0       GI/Nutrition:  Abd slightly distended, non-tender  Tolerated enteral TF  Blood in stool    Liver Function  Recent Labs      17   0500   ALTSGPT  21  22  26   ASTSGOT  53*  56*  53*   ALKPHOSPHAT  42  39  43   TBILIRUBIN  5.5*  5.0*  4.8*   GLUCOSE  122*  130*  123*       Heme:  Recent Labs      17   1145  17   2300  17   0500   RBC  4.48*  4.17*   --    --    --    HEMOGLOBIN  11.5*  11.0*   --   10.6*   --    HEMATOCRIT  36.8*  34.9*   --   34.3*   --    PLATELETCT  152*  134*   --    --    --    PROTHROMBTM   --    --   19.7*   --   19.5*   APTT  86.9*  88.2*   --    --   29.9   INR   --    --   1.62*   --   1.60*       Infectious Disease:  Temp  Av.6 °C (97.9 °F)  Min: 36.2 °C (97.2 °F)  Max: 36.9 °C (98.4 °F)    Recent Labs      17   0500   WBC  16.0*  14.3*   --    NEUTSPOLYS  75.60*  76.10*   --    LYMPHOCYTES  10.40*  10.60*   --    MONOCYTES  7.60  7.60   --    EOSINOPHILS  0.90  1.80   --    BASOPHILS  1.00  1.10   --    ASTSGOT  53*  56*  53*   ALTSGPT  21  22  26   ALKPHOSPHAT  42  39  43   TBILIRUBIN  5.5*  5.0*  4.8*     Current Facility-Administered Medications   Medication Dose Frequency Provider Last Rate Last Dose   • MD ALERT... warfarin (COUMADIN) per pharmacy protocol   pharmacy to dose Nikhil Enriquez M.D.       • warfarin (COUMADIN) tablet 5 mg  5 mg COUMADIN-DAILY Medina Gates PHARMJUAN DAVID   5 mg at 17 1800   • pantoprazole (PROTONIX) injection 40 mg  40 mg BID David Pack M.D.   40 mg at 17 7898   • haloperidol lactate (HALDOL) injection 2-5 mg  2-5 mg Q4HRS  PRN Nikhil Enriquez M.D.   5 mg at 03/30/17 0231   • haloperidol (HALDOL) tablet 2 mg  2 mg Q6HRS Nikhil Enriquez M.D.   2 mg at 03/30/17 0524   • glucose 4 g chewable tablet 16 g  16 g Q15 MIN PRN Gavin Molina M.D.        And   • dextrose 50% (D50W) injection 25 mL  25 mL Q15 MIN PRN Gavin Molina M.D.       • insulin lispro (HUMALOG) injection 3-14 Units  3-14 Units Q6HRS Gavin Molina M.D.   Stopped at 03/28/17 0000   • gabapentin (NEURONTIN) capsule 300 mg  300 mg BID Nikhil Nunez M.D.   300 mg at 03/29/17 2046   • lactulose 20 GM/30ML solution 30 mL  30 mL BID Gavin Molina M.D.   Stopped at 03/28/17 0900   • rifaximin (XIFAXAN) tablet 550 mg  550 mg BID Everton Paredes M.D.   550 mg at 03/29/17 2046   • diphenhydrAMINE (BENADRYL) tablet/capsule 50 mg  50 mg HS PRN Tate Hogan M.D.   50 mg at 03/28/17 2003   • oxycodone immediate-release (ROXICODONE) tablet 2.5-5 mg  2.5-5 mg Q4HRS PRN Alexia Munoz D.O.   5 mg at 03/27/17 1711   • DOBUTamine (DOBUTREX) 1 mg/mL premix infusion  2.5 mcg/kg/min Continuous Nikhil Nunez M.D. 17 mL/hr at 03/29/17 1811 2.5 mcg/kg/min at 03/29/17 1811   • Respiratory Care per Protocol   Continuous RT Alexis Hamilton M.D.       • ondansetron (ZOFRAN) syringe/vial injection 4 mg  4 mg Q4HRS PRN Alexis Hamilton M.D.   4 mg at 03/20/17 1821   • ondansetron (ZOFRAN ODT) dispertab 4 mg  4 mg Q4HRS PRN Alexis Hamilton M.D.       • promethazine (PHENERGAN) tablet 12.5-25 mg  12.5-25 mg Q4HRS PRN Alexis Hamilton M.D.       • promethazine (PHENERGAN) suppository 12.5-25 mg  12.5-25 mg Q4HRS PRN Alexis Hamilton M.D.       • prochlorperazine (COMPAZINE) injection 5-10 mg  5-10 mg Q4HRS PRN Alexis Hamilton M.D.       • acetaminophen (TYLENOL) tablet 650 mg  650 mg Q6HRS PRN Alexis Hamilton M.D.   650 mg at 03/20/17 0838   • senna-docusate (PERICOLACE or SENOKOT S) 8.6-50 MG per tablet 2 Tab  2 Tab BID Alexis Hamilton M.D.   Stopped at 03/28/17 0900     And   • polyethylene glycol/lytes (MIRALAX) PACKET 1 Packet  1 Packet QDAY PRN Alexis Hamilton M.D.        And   • magnesium hydroxide (MILK OF MAGNESIA) suspension 30 mL  30 mL QDAY PRN Alexis Hamilton M.D.        And   • bisacodyl (DULCOLAX) suppository 10 mg  10 mg QDAY PRN Alexis Hamilton M.D.       • heparin 1000 units/mL injection 3,200 Units  3,200 Units PRN Catherine Hillman PHARMD   3,200 Units at 03/27/17 1219    And   • heparin infusion 25,000 units in 500 ml 0.45% nacl   Continuous CHEPE LimaD   Stopped at 03/29/17 4957   • POLYMEM ALGINATE DRESSING PADS 1 Each  1 Each QDAY PRN Jeremy M Gonda, M.D.         Last reviewed on 3/13/2017  5:30 PM by Loi Ponce    Quality  Measures:  Labs reviewed, Medications reviewed and Radiology images reviewed  Holt catheter: Critically Ill - Requiring Accurate Measurement of Urinary Output  Central line in place: Need for access    DVT Prophylaxis: Heparin and Contraindicated - High bleeding risk  DVT prophylaxis - mechanical: SCDs  Ulcer prophylaxis: Not indicated            Assessment and Plan:    Acute on chronic hypoxemic respiratory failure  Acute on chronic systolic heart failure   - EF 15-20%   - dobutamine gtt   - will try Lasix with albumin  Acute cardiogenic pulmonary edema  Acute metabolic/toxic encephalopathy   - lactulose and rifaximin  Sepsis - skin source  Bilateral lower extremity cellulitis with open ulcerations   - completed Zosyn per ID  Thrombocytopenia  Acute on chronic kidney disease  Hepatitis C  Blood in stool   - stop all anticoagulation   - follow Hgb  LV thrombus - anticoagulation now contraindicated due to blood in stool  DNR/I    Unstable cardiovascular and pulmonary status.  Keep in ICU.  Now with evidence of GIB.  Prognosis is abysmal.    Critical Care Time:  35 minutes  Date of service:  3/30/17  02331  No time overlap  Time excludes procedures  Discussed with RN, RT, Team

## 2017-03-30 NOTE — CARE PLAN
Problem: Bowel/Gastric:  Goal: Will not experience complications related to bowel motility  Outcome: PROGRESSING AS EXPECTED  Titrating to 2-3 BM's daily, exceeding that limit currently. Stool softeners held as of now.    Problem: Psychosocial Needs:  Goal: Level of anxiety will decrease  Outcome: PROGRESSING AS EXPECTED  Pt is responding to coping mechanism education. Pt is much more redirectable.

## 2017-03-30 NOTE — CARE PLAN
Problem: Skin Integrity  Goal: Risk for impaired skin integrity will decrease  Intervention: Assess risk factors for impaired skin integrity and/or pressure ulcers  Completed. Wound orders are in  Intervention: Implement precautions to protect skin integrity in collaboration with the interdisciplinary team  Pt has dressing changes and has barrier cream for buttocks      Problem: Hemodynamic Status  Goal: Vital Signs and Fluid Balance Management  Intervention: Cardiac Monitoring, Pulse Oximetry  completed  Intervention: Daily Weights  completed

## 2017-03-30 NOTE — DIETARY
Nutrition Services: WEEKLY TF UPDATE     Pt is on day 17 of admit. He con't to receive nutrition support via cortrak: Peptamen Intense VHP at 60 mL/hr, at goal.  TF provides 1440 kcals, 134 gm protein and 1210 mL free water per day. Tolerating TF well, no GRV noted at this time.     Pertinent Labs: Gluc 123, BUN 91, creat 1.97, NH3 57, (3/27) PreAlb 7.0, CRP 3.07  Pertinent Meds: Albumin, Lasix, Neurontin, Insulin, Lactulose, Protonix, Zofran (prn), Xifaxan; bowel meds prn   Fluids: No IVF at this time per MAR   GI: per ADLs, last BM 3/30   WT: (3/30) 112 kg, bed scale - pt noted with wt fluctuation since admit, likely r/t fluids as per I/Os pt is +19L since admit, receiving lasix   SKIN: per wound team, pt noted with partial thickness wound to bilateral lower extremities     Assessment:   Con't to receive nutrition via cortrak  SLP to work with pt to advance to po diet.  Per SLP note, pt probably not appropriate for FEES d/t risk of bleeding.   Per chart review, family declined hospice services      RECOMMEND:  · Con't with current TF regimen   · Advance to po diet as pt is able per SLP   · Fluids per MD   · Monitor wt and lab trends     RD will con't to follow

## 2017-03-30 NOTE — PROGRESS NOTES
Hospital Medicine Progress Note, Adult, Complex               Author: Nikhil Enriquez Date & Time created: 3/30/2017  8:07 AM     Interval History:  52 y/o male presented with dyspnea. Known hx of cirrhosis.  Noted to have acute on chronic systolic heart failure, decompensated.  Also noted bilateral LE cellulitis causing sepsis.  Now dobutamine dependent.  LV thrombus on heparin gtt now stopped 2/2 gi bleeding.   Persistent encephalopathy      Nemesio blood in stool-  Alert occasional confusion  Frequent stooling  Sr/st with stable bp  Has oxy mask on with no o2 needs- patient more ocmfortable with this. \  Still on dobutamine at 2.5            Review of Systems:  Review of Systems   Constitutional: Negative for fever and chills.   HENT: Negative for sore throat.    Eyes: Negative for blurred vision and pain.   Respiratory: Positive for shortness of breath. Negative for cough.    Cardiovascular: Negative for chest pain and palpitations.   Gastrointestinal: Negative for nausea, vomiting and abdominal pain.   Genitourinary: Negative for dysuria and urgency.   Musculoskeletal: Negative for back pain and neck pain.   Skin: Negative for itching and rash.   Neurological: Negative for dizziness, tingling and headaches.   Psychiatric/Behavioral: Negative for depression. The patient does not have insomnia.    All other systems reviewed and are negative.      Physical Exam:  Physical Exam   Constitutional: He appears well-developed and well-nourished.  Non-toxic appearance. No distress.   Ill appearing    HENT:   Right Ear: External ear normal.   Left Ear: External ear normal.   Nose: Nose normal.   Mouth/Throat: No oropharyngeal exudate.   Scleral edema   Eyes: Right eye exhibits no discharge. Left eye exhibits no discharge. No scleral icterus.   Neck: JVD present. No tracheal deviation, no edema and no erythema present.   Cardiovascular: Regular rhythm, normal heart sounds and intact distal pulses.  Tachycardia present.  Exam  reveals no friction rub.    No murmur heard.  Pulmonary/Chest: Effort normal. No respiratory distress. He has rhonchi. He has no rales.   Abdominal: Soft. Bowel sounds are normal. He exhibits distension. There is no tenderness.   Musculoskeletal: He exhibits edema. He exhibits no tenderness.   anasarca   Neurological: He is alert.   Skin: Skin is warm and dry. He is not diaphoretic. There is erythema (bilateral LE with blisters ). No pallor.   Psychiatric: His affect is blunt and labile. He is slowed.   Nursing note and vitals reviewed.      Labs:        Invalid input(s): [id number]      Recent Labs      17   0500   SODIUM  127*  126*  125*   POTASSIUM  4.7  4.6  5.1   CHLORIDE  99  99  98   CO2  17*  17*  16*   BUN  60*  74*  91*   CREATININE  1.48*  1.78*  1.97*   CALCIUM  8.9  8.8  9.0     Recent Labs      17   0500   ALTSGPT  21  22  26   ASTSGOT  53*  56*  53*   ALKPHOSPHAT  42  39  43   TBILIRUBIN  5.5*  5.0*  4.8*   GLUCOSE  122*  130*  123*     Recent Labs      17   1145  17   2300  17   0500   RBC  4.48*  4.17*   --    --   4.22*   HEMOGLOBIN  11.5*  11.0*   --   10.6*  10.8*   HEMATOCRIT  36.8*  34.9*   --   34.3*  35.2*   PLATELETCT  152*  134*   --    --   138*   PROTHROMBTM   --    --   19.7*   --   19.5*   APTT  86.9*  88.2*   --    --   29.9   INR   --    --   1.62*   --   1.60*     Recent Labs      17   0500   WBC  16.0*  14.3*  14.7*   NEUTSPOLYS  75.60*  76.10*  78.60*   LYMPHOCYTES  10.40*  10.60*  12.50*   MONOCYTES  7.60  7.60  4.40   EOSINOPHILS  0.90  1.80  0.90   BASOPHILS  1.00  1.10  2.70*   ASTSGOT  53*  56*  53*   ALTSGPT  21  22  26   ALKPHOSPHAT  42  39  43   TBILIRUBIN  5.5*  5.0*  4.8*           Hemodynamics:  Temp (24hrs), Av.6 °C (97.9 °F), Min:36.2 °C (97.2 °F), Max:36.9 °C (98.4 °F)  Temperature: 36.5 °C  (97.7 °F)  Pulse  Av.8  Min: 50  Max: 194Heart Rate (Monitored): (!) 103  NIBP: (!) 94/61 mmHg    Respiratory:    Respiration: 18, Pulse Oximetry: 98 %     Work Of Breathing / Effort: Moderate;Tachypnea  RUL Breath Sounds: Clear, RML Breath Sounds: Diminished, RLL Breath Sounds: Diminished, BHAVNA Breath Sounds: Clear, LLL Breath Sounds: Diminished;Expiratory Wheezes  Fluids:    Intake/Output Summary (Last 24 hours) at 17 0807  Last data filed at 17 0600   Gross per 24 hour   Intake 3020.28 ml   Output    607 ml   Net 2413.28 ml        GI/Nutrition:  No orders of the defined types were placed in this encounter.     Medical Decision Making, by Problem:  Active Hospital Problems    Diagnosis   • Acute on chronic systolic congestive heart failure, NYHA class 4, present on admission (EF 15-20%) [I50.23]  - continue dobutamine, was stopped for a short time 3/23 d/t significant tachycardia but urine outpt dropped off. Back on this now and appears dependent on it. conintue at 2.5 for now. Endpoint not clear.    • Hepatitis C virus infection [B19.20]  - with cirrhosis.   - continue rifaximin, lactulose   - titrate to 3-4 bm per day   • Acute renal failure- worse. Off lasix. Small bolus yesterday and still worse. May be intravascularly depleted but is certainly total body volume overloaded. Will try albumin/lasix combo today. Difficult to treat. Unlikely to help. Poor intravascular volume and extensive 3rd spacing.   Hepatorenal??- trial of albumin/lasix   • Essential hypertension, benign [I10]  - low BP on dobutamine   - off meds.    • Acute respiratory failure (CMS-HCC) [J96.00]  - improved. On room air but insists that he wear on o2 device. He has mask on presently for comfort. Not on o2.    • Hyponatremia [E87.1]  persists. Hypervolemic likely .    • Hyperkalemia [E87.5]  - resolved  - trend and replace for low k as needed.    • Cellulitis [L03.90]  - improved, complete zosyn per ID   • Shock (CMS-HCC)  [R57.9]  - borderline on dobutamine. Titrate as tolerated.    • LV (left ventricular) mural thrombus (CMS-HCC) [I21.3]  - stop heparin gtt for new GIB now.    Agitation- consistent with metabolic encephalopathy  - haldol prn added yesterday with good results  - will decrease scheduled haldol and contine prn  - continue lactulose will titrate to 3-4 BM per day    Metabolic Acidosis- worsening     GIB- new- trend hgb. Start protonix gtt. Stop anticoagulation. Not a good candidate for intervention at present as likely too high risk. Has been seen by dr veronica in remote past. Consider consult with worsening.     Long qtc- use Abilify for delirium       Discussed plan with RN  Critical care time 32min with active titration of pressors for shock.   Grave prognosis. However patient has been clear and despite this he wants to continue care.      Labs reviewed, Medications reviewed, Radiology images reviewed and EKG reviewed  Holt catheter: No Holt      DVT Prophylaxis: Heparin    Ulcer prophylaxis: Yes  Antibiotics: Treating active infection/contamination beyond 24 hours perioperative coverage  Assessed for rehab: Patient was assess for and/or received rehabilitation services during this hospitalization

## 2017-03-31 NOTE — PROGRESS NOTES
Pulmonary Critical Care Progress Note    Interval Events:  24 hour interval history reviewed  Reason for visit:  Acute on chronic systolic heart failure      Sinus tachycardia  Dobutamine 2.5 mcg  Decreased urinary output   Completed Zosyn yesterday   Kidney function worse    PFSH:  No change.    Respiratory:     Pulse Oximetry: 94 %  CXR with improved pulmonary edema  Crackles at the bases bilaterally - no wheezing  Periodic breathing    HemoDynamics:  Pulse: (!) 105, Heart Rate (Monitored): (!) 104  NIBP: 111/71 mmHg    SR/ST  Dobut - 2.5    Neuro:  Confused with intermittent agitation  No focal weakness    Fluids:  Intake/Output       03/29/17 0700 - 03/30/17 0659 03/30/17 0700 - 03/31/17 0659 03/31/17 0700 - 04/01/17 0659      [id number] 2794-4986 Total 6474-3426 4297-8953 Total 5533-9128 2068-1510 Total       Intake    P.O.  20  -- 20  --  -- --  --  -- --    P.O. 20 -- 20 -- -- -- -- -- --    I.V.  1273.6  352.3 1625.9  1020  238 1258  --  -- --    Heparin Volume 372 148.3 520.3 -- -- -- -- -- --    Dobutamine Volume 201.6 204 405.6 170 238 408 -- -- --    IV Volume (NS Bolus) 500 -- 500 550 -- 550 -- -- --    IV Volume (albumin) -- -- -- 300 -- 300 -- -- --    IV Piggyback Volume (Antibiotics) 200 -- 200 -- -- -- -- -- --    Enteral  870  720 1590  720  720 1440  --  -- --    Enteral Volume   -- -- --    Free Water / Tube Flush 150 -- 150 -- -- -- -- -- --    Total Intake 2163.6 1072.3 3235.9 6085 186 0666 -- -- --       Output    Urine  320  337 657  190  96 286  --  -- --    Indwelling Cathether 320 337 657 190 96 286 -- -- --    Drains  0  -- 0  --  -- --  --  -- --    Residual Amount (ml) (Discarded) 0 -- 0 -- -- -- -- -- --    Stool  --  -- --  --  -- --  --  -- --    Number of Times Stooled 4 x -- 4 x 3 x 1 x 4 x -- -- --    Total Output 320 337 657 190 96 286 -- -- --       Net I/O     1843.6 735.3 2578.9 8917 900 0990 -- -- --        Weight: 112 kg (246 lb 14.6 oz)  Recent Labs       17   0500  17   SODIUM  126*  125*  122*   POTASSIUM  4.6  5.1  5.5   CHLORIDE  99  98  95*   CO2  17*  16*  15*   BUN  74*  91*  111*   CREATININE  1.78*  1.97*  2.76*   MAGNESIUM   --    --   3.0*   PHOSPHORUS   --    --   5.4*   CALCIUM  8.8  9.0  9.6       GI/Nutrition:  Abd slightly distended, non-tender  Tolerated enteral TF    Liver Function  Recent Labs      17   05017   ALTSGPT  22    22   ASTSGOT  56*  53*  52*   ALKPHOSPHAT  39  43  43   TBILIRUBIN  5.0*  4.8*  4.9*   LIPASE   --    --   465*   GLUCOSE  130*  123*  119*       Heme:  Recent Labs      17   1145  17   2300  17   05017   RBC  4.17*   --    --   4.22*  3.95*   HEMOGLOBIN  11.0*   --   10.6*  10.8*  10.3*   HEMATOCRIT  34.9*   --   34.3*  35.2*  33.4*   PLATELETCT  134*   --    --   138*  138*   PROTHROMBTM   --   19.7*   --   19.5*   --    APTT  88.2*   --    --   29.9   --    INR   --   1.62*   --   1.60*   --        Infectious Disease:  Temp  Av.2 °C (97.2 °F)  Min: 35.6 °C (96 °F)  Max: 36.7 °C (98 °F)    Recent Labs      17   05017   WBC  14.3*  14.7*  14.1*   NEUTSPOLYS  76.10*  78.60*  79.60*   LYMPHOCYTES  10.60*  12.50*  9.30*   MONOCYTES  7.60  4.40  7.30   EOSINOPHILS  1.80  0.90  1.10   BASOPHILS  1.10  2.70*  1.10   ASTSGOT  56*  53*  52*   ALTSGPT  22  26  22   ALKPHOSPHAT  39  43  43   TBILIRUBIN  5.0*  4.8*  4.9*     Current Facility-Administered Medications   Medication Dose Frequency Provider Last Rate Last Dose   • haloperidol lactate (HALDOL) injection 5 mg  5 mg Q4HRS PRN David Pack M.D.       • albumin human 25% solution 25 g  25 g Q6HRS Nikhil Enriquez M.D. 150 mL/hr at 17 0602 25 g at 17 0602   • aripiprazole (ABILIFY) tablet 10 mg  10 mg DAILY Nikhil Enriquez M.D.   10 mg at 17 0935   • pantoprazole (PROTONIX) injection  40 mg  40 mg BID David Pack M.D.   40 mg at 03/30/17 2024   • glucose 4 g chewable tablet 16 g  16 g Q15 MIN PRN Gavin Molina M.D.        And   • dextrose 50% (D50W) injection 25 mL  25 mL Q15 MIN PRN Gavin Molina M.D.       • gabapentin (NEURONTIN) capsule 300 mg  300 mg BID Nikhil Nunez M.D.   300 mg at 03/30/17 2024   • lactulose 20 GM/30ML solution 30 mL  30 mL BID Gavin Molina M.D.   Stopped at 03/28/17 0900   • rifaximin (XIFAXAN) tablet 550 mg  550 mg BID Everton Paredes M.D.   550 mg at 03/30/17 2024   • diphenhydrAMINE (BENADRYL) tablet/capsule 50 mg  50 mg HS PRN Tate Hogan M.D.   50 mg at 03/30/17 2025   • oxycodone immediate-release (ROXICODONE) tablet 2.5-5 mg  2.5-5 mg Q4HRS PRN Alexia Munoz D.O.   5 mg at 03/31/17 0207   • DOBUTamine (DOBUTREX) 1 mg/mL premix infusion  2.5 mcg/kg/min Continuous Nikhil Nunez M.D. 17 mL/hr at 03/31/17 0126 2.5 mcg/kg/min at 03/31/17 0126   • Respiratory Care per Protocol   Continuous RT Alexis Hamilton M.D.       • ondansetron (ZOFRAN) syringe/vial injection 4 mg  4 mg Q4HRS PRN Alexis Hamilton M.D.   4 mg at 03/20/17 1821   • ondansetron (ZOFRAN ODT) dispertab 4 mg  4 mg Q4HRS PRN Alxeis Hamilton M.D.       • promethazine (PHENERGAN) tablet 12.5-25 mg  12.5-25 mg Q4HRS PRN Alexis Hamilton M.D.       • promethazine (PHENERGAN) suppository 12.5-25 mg  12.5-25 mg Q4HRS PRN Alexis Hamilton M.D.       • prochlorperazine (COMPAZINE) injection 5-10 mg  5-10 mg Q4HRS PRN Alexis Hamilton M.D.       • acetaminophen (TYLENOL) tablet 650 mg  650 mg Q6HRS PRN Alexis Hamilton M.D.   650 mg at 03/20/17 0838   • senna-docusate (PERICOLACE or SENOKOT S) 8.6-50 MG per tablet 2 Tab  2 Tab BID Alexis Hamilton M.D.   Stopped at 03/28/17 0900    And   • polyethylene glycol/lytes (MIRALAX) PACKET 1 Packet  1 Packet QDAY PRERIKA Hamilton M.D.        And   • magnesium hydroxide (MILK OF MAGNESIA) suspension 30 mL  30 mL QDAY PRERIKA Hamilton M.D.         And   • bisacodyl (DULCOLAX) suppository 10 mg  10 mg QDAY PRN Alexis Hamilton M.D.       • POLYMEM ALGINATE DRESSING PADS 1 Each  1 Each QDAY PRN Jeremy M Gonda, M.D.         Last reviewed on 3/13/2017  5:30 PM by Yolanda Lewis [facility]    Quality  Measures:  Labs reviewed, Medications reviewed and Radiology images reviewed  Holt catheter: Critically Ill - Requiring Accurate Measurement of Urinary Output  Central line in place: Need for access    DVT Prophylaxis: Heparin and Contraindicated - High bleeding risk  DVT prophylaxis - mechanical: SCDs  Ulcer prophylaxis: Not indicated            Assessment and Plan:    Acute on chronic hypoxemic respiratory failure  Acute on chronic systolic heart failure   - EF 15-20%   - dobutamine gtt - increase to 5 mcg   - stop diuretics with worsening renal function  Acute cardiogenic pulmonary edema  Acute metabolic/toxic encephalopathy   - lactulose and rifaximin  Sepsis - skin source  Bilateral lower extremity cellulitis with open ulcerations   - completed Zosyn per ID  Thrombocytopenia  Acute on chronic kidney disease   - worse   - will give fluids cautiously  Hepatitis C  Blood in stool   - stop all anticoagulation   - follow Hgb  Acute pancreatitis   - stop enteral TF   - keep NG to suction  LV thrombus - anticoagulation now contraindicated due to blood in stool  DNR/I    Unstable cardiac and pulmonary status.  Now with worsening renal function.  Prognosis is very poor.    Critical Care Time:  36 minutes  Date of service:  3/31/17  [id number]  No time overlap  Time excludes procedures  Discussed with RN, RT, Team      I, Magaly Brooks), am scribing for, and in the presence of, Dr. Jean M.D..  Electronically signed by: Magaly Brooks), 3/31/2017  I, Dr. Jean M.D. personally performed the services described in this documentation, as scribed by Magaly Cortez in my presence, and it is both accurate and complete.

## 2017-03-31 NOTE — CARE PLAN
Problem: Safety  Goal: Will remain free from injury  Outcome: PROGRESSING AS EXPECTED  Pt calls for assistance, call bell in reach, bed alarm on.    Problem: Mobility  Goal: Risk for activity intolerance will decrease  Outcome: PROGRESSING SLOWER THAN EXPECTED  Pt with sever increased work of breathing with movement, encouraging active ROM in bed and calming breathing exercises.

## 2017-03-31 NOTE — THERAPY
Hold tx today, Pt with large emesis this AM. Pt asking to also hold tx. Nurs advised. Cont with NG for nutrition. Thanks, Parviz

## 2017-03-31 NOTE — CARE PLAN
Problem: Mobility  Goal: Risk for activity intolerance will decrease  Outcome: PROGRESSING SLOWER THAN EXPECTED

## 2017-03-31 NOTE — DISCHARGE PLANNING
EF 15%.  No overnight events.  Did not mobilize.   A/O x4.  ST  Room air to NC 2L.   Dobutamine.   Cortrak SB.   Kidneys worsening.  Decreased Urine output.     No DC plan as son/POA not realistic with poor prognosis.

## 2017-04-01 NOTE — CARE PLAN
Problem: Safety  Goal: Will remain free from injury  Outcome: PROGRESSING AS EXPECTED  Pt calls for assistance. Call bell within reach. Bed alarm on.    Problem: Infection  Goal: Will remain free from infection  Outcome: PROGRESSING SLOWER THAN EXPECTED  Will continue to monitor labs. Assess for s/sx of infection. Implement standard precautions and perform hand washing before and after contact w/ pt.

## 2017-04-01 NOTE — PROGRESS NOTES
Hospital Medicine Progress Note, Adult, Complex               Author: Nikhil Overton Date & Time created: 4/1/2017  8:47 AM     Interval History:  ID: 51-year-old male with history of dilated cardiomyopathy, EF of 15%, drug abuse, cirrhosis with hepatitis C, chronic kidney disease, left ventricle mural thrombus, obesity, evaluated here   at [facility] with increased shortness of breath, bilateral lower extremity swelling with redness, abdominal distention and pain.  Admitted for c/o decompensated heart failure, Sepsis due to cellulitis of leg, and acute respiratory distress.      Today:  Awake and alert.  Young son at bedside.  Wife in bathroom and would come out to briefly say hello but then would go back into the bathroom for unknown reason and was not part of the conversation.  I did discuss with the patient my concern of his severity of his issues and he states he agrees and didn't want to continue like this for prolonged period of time.  He appears more SOB and agrees to have paracentesis for therapeutic relief.  Care of patient discussed in am ICU MDR.    Review of Systems:  Review of Systems   Constitutional: Positive for malaise/fatigue. Negative for fever.   HENT: Negative for sore throat.    Eyes: Negative for redness.   Respiratory: Positive for shortness of breath.    Cardiovascular: Positive for leg swelling. Negative for chest pain.   Gastrointestinal: Negative for nausea, vomiting and abdominal pain.   Musculoskeletal: Negative for back pain and neck pain.   Neurological: Positive for weakness. Negative for dizziness and headaches.   Psychiatric/Behavioral: The patient is not nervous/anxious.        Physical Exam:  Physical Exam   Constitutional: He is oriented to person, place, and time. No distress.   anasarca   HENT:   Head: Normocephalic and atraumatic.   Nose: Nose normal.   Mouth/Throat: No oropharyngeal exudate (dry).   Eyes: Conjunctivae and EOM are normal. Right eye exhibits no discharge. Left eye  exhibits no discharge. Scleral icterus is present.   Neck: No tracheal deviation present.   Left IJ triple lumen catheter   Cardiovascular: Normal rate, regular rhythm, normal heart sounds and intact distal pulses.    No murmur heard.  Pulmonary/Chest: Effort normal and breath sounds normal. No stridor. No respiratory distress. He has no wheezes.   Abdominal: Soft. Bowel sounds are normal. He exhibits distension. There is no tenderness.   Musculoskeletal: He exhibits edema (Anasarca).   Neurological: He is alert and oriented to person, place, and time. No cranial nerve deficit.   Skin: Skin is warm. He is not diaphoretic.   Psychiatric: He has a normal mood and affect. His behavior is normal. Thought content normal. His speech is delayed. Cognition and memory are normal.   Vitals reviewed.      Labs:        Invalid input(s): [id number]      Recent Labs      03/30/17   0500  03/31/17 0425 04/01/17   0400   SODIUM  125*  122*  125*   POTASSIUM  5.1  5.5  5.7*   CHLORIDE  98  95*  97   CO2  16*  15*  13*   BUN  91*  111*  119*   CREATININE  1.97*  2.76*  3.16*   MAGNESIUM   --   3.0*   --    PHOSPHORUS   --   5.4*   --    CALCIUM  9.0  9.6  9.6     Recent Labs      03/30/17   0500  03/31/17 0425 04/01/17   0400   ALTSGPT  26  22  22   ASTSGOT  53*  52*  56*   ALKPHOSPHAT  43  43  36   TBILIRUBIN  4.8*  4.9*  5.0*   LIPASE   --   465*  403*   GLUCOSE  123*  119*  107*     Recent Labs      03/29/17   1145   03/30/17   0500  03/31/17 0425  04/01/17   0400   RBC   --    --   4.22*  3.95*  3.81*   HEMOGLOBIN   --    < >  10.8*  10.3*  10.1*   HEMATOCRIT   --    < >  35.2*  33.4*  32.7*   PLATELETCT   --    --   138*  138*  127*   PROTHROMBTM  19.7*   --   19.5*   --    --    APTT   --    --   29.9   --    --    INR  1.62*   --   1.60*   --    --     < > = values in this interval not displayed.     Recent Labs      03/30/17   0500  03/31/17   0425  04/01/17   0400   WBC  14.7*  14.1*  15.4*   NEUTSPOLYS  78.60*   79.60*  83.90*   LYMPHOCYTES  12.50*  9.30*  6.10*   MONOCYTES  4.40  7.30  7.40   EOSINOPHILS  0.90  1.10  0.30   BASOPHILS  2.70*  1.10  0.50   ASTSGOT  53*  52*  56*   ALTSGPT  26  22  22   ALKPHOSPHAT  43  43  36   TBILIRUBIN  4.8*  4.9*  5.0*           Hemodynamics:  Temp (24hrs), Av °C (96.8 °F), Min:35.9 °C (96.7 °F), Max:36.1 °C (97 °F)  Temperature: 36 °C (96.8 °F)  Pulse  Av.8  Min: 50  Max: 194Heart Rate (Monitored): 98  NIBP: (!) 92/64 mmHg  CVP (mm Hg): (!) 35 MM HG  Respiratory:    Respiration: 15, Pulse Oximetry: 93 %     Work Of Breathing / Effort: Mild  RUL Breath Sounds: Clear, RML Breath Sounds: Expiratory Wheezes, RLL Breath Sounds: Crackles;Expiratory Wheezes, BHAVNA Breath Sounds: Diminished, LLL Breath Sounds: Diminished  Fluids:    Intake/Output Summary (Last 24 hours) at 17 0847  Last data filed at 17 0600   Gross per 24 hour   Intake 2002.63 ml   Output    465 ml   Net 1537.63 ml     Weight: 117 kg (257 lb 15 oz)  GI/Nutrition:  No orders of the defined types were placed in this encounter.     Medical Decision Making, by Problem:  Active Hospital Problems    Diagnosis   • Acute on chronic systolic congestive heart failure, NYHA class 4, present on admission (EF 15-20%) [I50.23]  - albumin transfusion,   - dobutamine drip     • Hepatitis C virus infection [B19.20]     • Acute renal insufficiency [N28.9]  - worsening over last three day labs     • Essential hypertension, benign [I10]  - monitor vitals     • Prolonged Q-T interval on ECG [R94.31]  - abilify for agiation  - monitor EKG, avoid meds prolonging QT     • GIB (gastrointestinal bleeding) [K92.2]  - monitor cbc  - on pantoprazole BID     • Acute delirium [R41.0]  - monitor neurologic status.  - C/O liver failure and encephalopathy. Continue with rifaximin and lactulose     • Metabolic acidosis [E87.2]     • Acute respiratory failure (CMS-HCC) [J96.00]  - Continue with RT and supplemental oxygen as needed to keep  SpO2 >90  - Therapeutic paracentesis for decrease diaphragm restriction.  Will need transfusion of albumin      • Hyponatremia [E87.1]  - continue to monitor bmp     • Hyperkalemia [E87.5]  - monitor.  Increase in K today on BMP.  Kayexalate if needed.       • Cellulitis [L03.90]  - s/p abx  -   Difficult as has ongoing anasarca and c/o continued weeping of legs  - wound care     • Shock (CMS-HCC) [R57.9]  - dobutamine drip for low EF:15% titrate up.  If remains on fixed rate and improving in am will consider transfer to telemetry.  - monitor in ICU for now.       • LV (left ventricular) mural thrombus (CMS-HCC) [I21.3]  - holding anticoagulation due to acute GI bleed.  - reassess for stability of intervention        *  Sepsis:        - due to cellulitis of legs.  Difficult as has ongoing anasarca and c/o continued weeping of legs        - Dr Brumfield, ID consulting. S/P 14 days of Zosyn on 3/29        Medications reviewed, Labs reviewed and Radiology images reviewed  Holt catheter: Critically Ill - Requiring Accurate Measurement of Urinary Output and Strict Intake and Output During Sepisis or Shock  Central line in place: Vasopressors and Sepsis

## 2017-04-01 NOTE — PALLIATIVE CARE
"Palliative Care Follow-up  Pt awake and alert. Denies any pain.  \"I'm OK\". Unable to describe his current health status \"I guess I'm fine\". Will only answer some questions.  Tells a lengthy story about a person he worked with.  Doesn't know if Irvin his son will visit today.  \"I never know  when he's going to show up\"      Plan: Please call ext. [phone] if Irvin pt's son and DPOA visits.  Will continue to work with pt and son Irvin on goals of care and plan of care based on pt's poor prognosis.    Thank you for allowing Palliative Care to participate in this patient's care. Please feel free to call [phone] with any questions or additional needs.  "

## 2017-04-01 NOTE — PROGRESS NOTES
Pulmonary Critical Care Progress Note    Interval Events:  24 hour interval history reviewed  Reason for visit:  Acute on chronic systolic heart failure    SR/ST  Held tube feedings yesterday after vomiting events.  300 cc removed yesterday - NG  Not mobilizing.    Heparin gtts   Dobut 5  Worsening renal function  Lipase slightly decreased    PFSH:  No change.    Respiratory:     Pulse Oximetry: 95 %  CXR with improved pulmonary edema  Crackles at the bases bilaterally  Periodic breathing persists    HemoDynamics:  Pulse: (!) 102, Heart Rate (Monitored): 99  NIBP: 103/73 mmHg  CVP (mm Hg): (!) 49 MM HG  SR/ST  Dobut - 5    Neuro:  Confused with intermittent agitation  No focal weakness    Fluids:  Intake/Output       03/30/17 0700 - 03/31/17 0659 03/31/17 0700 - 04/01/17 0659 04/01/17 0700 - 04/02/17 0659      7727-3455 1518-7436 Total 4812-6375 3514-8340 Total 2145-8055 7961-0209 Total       Intake    I.V.  1020  238 1258  990.6  926 1916.6  --  -- --    Dobutamine Volume 170 238 408 315.6 476 791.6 -- -- --    IV Volume (NS Bolus) 550 -- 550  -- -- --    IV Volume (albumin) 300 -- 300 -- -- -- -- -- --    Enteral  720  720 1440  240  -- 240  --  -- --    Enteral Volume  240 -- 240 -- -- --    Total Intake 0189 044 5074 1230.6 926 2156.6 -- -- --       Output    Urine  190  96 286  25  140 165  --  -- --    Indwelling Cathether 190 96 286 25 140 165 -- -- --    Emesis  --  -- --  --  -- --  --  -- --    Emesis - Number of Times -- -- -- 1 x -- 1 x -- -- --    Drains  --  -- --  0  300 300  --  -- --    Residual Amount (ml) (Discarded) -- -- -- 0 -- 0 -- -- --    Nasogastric Tube -- -- -- -- 300 300 -- -- --    Stool  --  -- --  --  -- --  --  -- --    Number of Times Stooled 3 x 1 x 4 x 1 x 3 x 4 x -- -- --    Total Output 190 96 286 25 440 465 -- -- --       Net I/O     4656 037 0154 1205.6 486 1691.6 -- -- --        Weight: 118.6 kg (261 lb 7.5 oz)  Recent Labs      03/30/17   2212   17   0400   SODIUM  125*  122*  125*   POTASSIUM  5.1  5.5  5.7*   CHLORIDE  98  95*  97   CO2  16*  15*  13*   BUN  91*  111*  119*   CREATININE  1.97*  2.76*  3.16*   MAGNESIUM   --   3.0*   --    PHOSPHORUS   --   5.4*   --    CALCIUM  9.0  9.6  9.6       GI/Nutrition:  Abd distended.  NG to suction.  Bowel sounds present.    Liver Function  Recent Labs      17   0500  17   040   ALTSGPT    22  22   ASTSGOT  53*  52*  56*   ALKPHOSPHAT  43  43  36   TBILIRUBIN  4.8*  4.9*  5.0*   LIPASE   --   465*  403*   GLUCOSE  123*  119*  107*       Heme:  Recent Labs      17   1145   17   0500  17   040   RBC   --    --   4.22*  3.95*  3.81*   HEMOGLOBIN   --    < >  10.8*  10.3*  10.1*   HEMATOCRIT   --    < >  35.2*  33.4*  32.7*   PLATELETCT   --    --   138*  138*  127*   PROTHROMBTM  19.7*   --   19.5*   --    --    APTT   --    --   29.9   --    --    INR  1.62*   --   1.60*   --    --     < > = values in this interval not displayed.       Infectious Disease:  Temp  Av °C (96.8 °F)  Min: 35.9 °C (96.7 °F)  Max: 36.1 °C (97 °F)    Recent Labs      17   0500  17   0400   WBC  14.7*  14.1*  15.4*   NEUTSPOLYS  78.60*  79.60*  83.90*   LYMPHOCYTES  12.50*  9.30*  6.10*   MONOCYTES  4.40  7.30  7.40   EOSINOPHILS  0.90  1.10  0.30   BASOPHILS  2.70*  1.10  0.50   ASTSGOT  53*  52*  56*   ALTSGPT  26  22  22   ALKPHOSPHAT  43  43  36   TBILIRUBIN  4.8*  4.9*  5.0*     Current Facility-Administered Medications   Medication Dose Frequency Provider Last Rate Last Dose   • haloperidol lactate (HALDOL) injection 5 mg  5 mg Q4HRS PRN David Pack M.D.       • DOBUTamine (DOBUTREX) 1 mg/mL premix infusion  5 mcg/kg/min Continuous Nikhil Nunez M.D. 34 mL/hr at 17 5 mcg/kg/min at 17   • NS infusion   Continuous Nikhil Nunez M.D. 75 mL/hr at 173     •  aripiprazole (ABILIFY) tablet 10 mg  10 mg DAILY Nikhil Enriquez M.D.   10 mg at 03/31/17 0943   • pantoprazole (PROTONIX) injection 40 mg  40 mg BID David Pack M.D.   40 mg at 03/31/17 2029   • glucose 4 g chewable tablet 16 g  16 g Q15 MIN PRN Gavin Molina M.D.        And   • dextrose 50% (D50W) injection 25 mL  25 mL Q15 MIN PRN Gavin Molina M.D.       • gabapentin (NEURONTIN) capsule 300 mg  300 mg BID Nikhil Nunez M.D.   300 mg at 03/31/17 2029   • lactulose 20 GM/30ML solution 30 mL  30 mL BID Gavin Molina M.D.   30 mL at 03/31/17 2029   • rifaximin (XIFAXAN) tablet 550 mg  550 mg BID Everton Paredes M.D.   550 mg at 03/31/17 2029   • diphenhydrAMINE (BENADRYL) tablet/capsule 50 mg  50 mg HS PRN Tate Hogan M.D.   50 mg at 03/30/17 2025   • oxycodone immediate-release (ROXICODONE) tablet 2.5-5 mg  2.5-5 mg Q4HRS PRN Alexia Munoz D.O.   2.5 mg at 04/01/17 0212   • Respiratory Care per Protocol   Continuous RT Alexis Hamilton M.D.       • ondansetron (ZOFRAN) syringe/vial injection 4 mg  4 mg Q4HRS PRN Alexis Hamilton M.D.   4 mg at 03/20/17 1821   • ondansetron (ZOFRAN ODT) dispertab 4 mg  4 mg Q4HRS PRN Alexis Hamilton M.D.       • promethazine (PHENERGAN) tablet 12.5-25 mg  12.5-25 mg Q4HRS PRN Alexis Hamilton M.D.       • promethazine (PHENERGAN) suppository 12.5-25 mg  12.5-25 mg Q4HRS PRN Alexis Hamilton M.D.       • prochlorperazine (COMPAZINE) injection 5-10 mg  5-10 mg Q4HRS PRN Alexis Hamilton M.D.       • acetaminophen (TYLENOL) tablet 650 mg  650 mg Q6HRS PRN Alexis Hamilton M.D.   650 mg at 03/20/17 0838   • senna-docusate (PERICOLACE or SENOKOT S) 8.6-50 MG per tablet 2 Tab  2 Tab BID Alexis Hamilton M.D.   Stopped at 03/28/17 0900    And   • polyethylene glycol/lytes (MIRALAX) PACKET 1 Packet  1 Packet QDAY PRN Alexis Hamilton M.D.        And   • magnesium hydroxide (MILK OF MAGNESIA) suspension 30 mL  30 mL QDAY PRN Alexis Hamilton M.D.        And   • bisacodyl  (DULCOLAX) suppository 10 mg  10 mg QDAY PRN Alexis Hamilton M.D.       • POLYMEM ALGINATE DRESSING PADS 1 Each  1 Each QDAY PRN Jeremy M Gonda, M.D.         Last reviewed on 3/13/2017  5:30 PM by Yolanda Lewis DONNA    Quality  Measures:  Labs reviewed, Medications reviewed and Radiology images reviewed  Holt catheter: Critically Ill - Requiring Accurate Measurement of Urinary Output  Central line in place: Need for access    DVT Prophylaxis: Heparin and Contraindicated - High bleeding risk  DVT prophylaxis - mechanical: SCDs  Ulcer prophylaxis: Not indicated            Assessment and Plan:    Acute on chronic hypoxemic respiratory failure  Acute on chronic systolic heart failure   - EF 15-20%   - dobutamine gtt  Acute cardiogenic pulmonary edema  Acute metabolic/toxic encephalopathy   - lactulose and rifaximin  Sepsis - skin source  Bilateral lower extremity cellulitis with open ulcerations   - completed Zosyn per ID  Thrombocytopenia  Acute on chronic kidney disease   - worse   - cont IV fluids cautiously  Hepatitis C  Blood in stool   - all anticoagulation stopped   - follow Hgb  Acute pancreatitis   - stop enteral TF   - keep NG to suction   - lipase down slightly  LV thrombus - anticoagulation now contraindicated due to blood in stool  DNR/I    Unstable cardiac and pulmonary status.  Worsening renal function.  Prognosis is very poor.  Paracentesis ordered.    Critical Care Time:  32 minutes  Date of service:  4/1/17  [id number]  No time overlap  Time excludes procedures  Discussed with RN, RT, Team      Magaly KEEN (Lisseth), am scribing for, and in the presence of, Dr. Jean M.D..  Electronically signed by: Magaly Cortez (Lisseth), 4/1/2017  IDr. Jean M.D. personally performed the services described in this documentation, as scribed by Magaly Cortez in my presence, and it is both accurate and complete.

## 2017-04-02 NOTE — PROGRESS NOTES
Hospital Medicine Progress Note, Adult, Complex               Author: Nikhil Overton Date & Time created: 4/2/2017  7:35 AM     Interval History:  ID: 51-year-old male with history of dilated cardiomyopathy, EF of 15%, drug abuse, cirrhosis with hepatitis C, chronic kidney disease, left ventricle mural thrombus, obesity, evaluated here   at [facility] with increased shortness of breath, bilateral lower extremity swelling with redness, abdominal distention and pain.  Admitted for c/o decompensated heart failure, Sepsis due to cellulitis of leg, and acute respiratory distress.      Today:  More lethargic and confused as of last night per nursing.  This am he has slow slurred speech and inappropriate in response to questions.  Worsening renal function with acute worsening of Potassium.  He had 4L ascites removed on paracentesis today.  Family made aware of severity of patient's failing multiorgans.  I expressed that hemodialysis would likely only be a temporary measure and feel that palliative care should be considered.  Carolyn, ICU RN, able to translate for me.    Review of Systems:  Review of Systems   Constitutional: Positive for malaise/fatigue. Negative for fever.   HENT: Negative for sore throat.    Eyes: Negative for redness.   Respiratory: Positive for shortness of breath.    Cardiovascular: Positive for leg swelling. Negative for chest pain.   Gastrointestinal: Negative for nausea, vomiting and abdominal pain.   Musculoskeletal: Negative for back pain and neck pain.   Neurological: Positive for weakness. Negative for dizziness and headaches.   Psychiatric/Behavioral: The patient is not nervous/anxious.        Physical Exam:  Physical Exam   Constitutional: He is oriented to person, place, and time. No distress.   anasarca   HENT:   Head: Normocephalic and atraumatic.   Nose: Nose normal.   Mouth/Throat: No oropharyngeal exudate (dry).   Eyes: Conjunctivae and EOM are normal. Right eye exhibits no discharge. Left eye  exhibits no discharge.   Neck: No tracheal deviation present.   Left IJ triple lumen catheter   Cardiovascular: Normal rate, regular rhythm, normal heart sounds and intact distal pulses.    No murmur heard.  Pulmonary/Chest: Effort normal and breath sounds normal. No stridor. No respiratory distress. He has no wheezes.   Abdominal: Soft. Bowel sounds are normal. He exhibits distension. There is no tenderness.   Musculoskeletal: He exhibits edema (Anasarca).   Neurological: He is alert and oriented to person, place, and time. No cranial nerve deficit.   Skin: Skin is warm. He is not diaphoretic.   Psychiatric: He has a normal mood and affect. His behavior is normal. Thought content normal. His speech is delayed. Cognition and memory are normal.   Vitals reviewed.      Labs:        Invalid input(s): [id number]      Recent Labs      03/31/17 0425 04/01/17 0400 04/02/17   0545   SODIUM  122*  125*  123*   POTASSIUM  5.5  5.7*  6.5*   CHLORIDE  95*  97  96   CO2  15*  13*  12*   BUN  111*  119*  129*   CREATININE  2.76*  3.16*  3.93*   MAGNESIUM  3.0*   --    --    PHOSPHORUS  5.4*   --    --    CALCIUM  9.6  9.6  9.1     Recent Labs      03/31/17 0425 04/01/17 0400 04/02/17   0545   ALTSGPT  22  22  29   ASTSGOT  52*  56*  66*   ALKPHOSPHAT  43  36  42   TBILIRUBIN  4.9*  5.0*  5.3*   LIPASE  465*  403*   --    GLUCOSE  119*  107*  107*     Recent Labs      03/31/17 0425 04/01/17 0400 04/02/17   0545   RBC  3.95*  3.81*  4.00*   HEMOGLOBIN  10.3*  10.1*  10.4*   HEMATOCRIT  33.4*  32.7*  35.0*   PLATELETCT  138*  127*  169     Recent Labs      03/31/17 0425 04/01/17 0400 04/02/17   0545   WBC  14.1*  15.4*  15.9*   NEUTSPOLYS  79.60*  83.90*  83.20*   LYMPHOCYTES  9.30*  6.10*  5.80*   MONOCYTES  7.30  7.40  8.50   EOSINOPHILS  1.10  0.30  0.20   BASOPHILS  1.10  0.50  0.40   ASTSGOT  52*  56*  66*   ALTSGPT  22  22  29   ALKPHOSPHAT  43  36  42   TBILIRUBIN  4.9*  5.0*  5.3*            Hemodynamics:  Temp (24hrs), Av.3 °C (97.3 °F), Min:36 °C (96.8 °F), Max:36.7 °C (98 °F)  Temperature: 36.7 °C (98 °F)  Pulse  Av.3  Min: 50  Max: 194Heart Rate (Monitored): 98  NIBP: (!) 92/58 mmHg  CVP (mm Hg): (!) 34 MM HG  Respiratory:    Respiration: 13, Pulse Oximetry: 97 %     Work Of Breathing / Effort: Mild  RUL Breath Sounds: Expiratory Wheezes, RML Breath Sounds: Expiratory Wheezes, RLL Breath Sounds: Expiratory Wheezes, BHAVNA Breath Sounds: Diminished, LLL Breath Sounds: Diminished  Fluids:    Intake/Output Summary (Last 24 hours) at 17 0735  Last data filed at 17 0600   Gross per 24 hour   Intake   2876 ml   Output     65 ml   Net   2811 ml     Weight: 117 kg (257 lb 15 oz)  GI/Nutrition:  No orders of the defined types were placed in this encounter.     Medical Decision Making, by Problem:  Active Hospital Problems    Diagnosis   • Acute on chronic systolic congestive heart failure, NYHA class 4, present on admission (EF 15-20%) [I50.23]  - s/p albumin transfusion with marked improved albumin  - dobutamine drip     • Hepatitis C virus infection [B19.20]     • Acute renal insufficiency [N28.9]  - worsening  - Hemodialysis likely only futile.  I will give 80mg IV lasix and repeat an am BMP  - consult nephrology tomorrow if no improvement with lasix.     • Essential hypertension, benign [I10]  - monitor vitals     • Prolonged Q-T interval on ECG [R94.31]  - abilify for agiation  - monitor EKG, avoid meds prolonging QT     • GIB (gastrointestinal bleeding) [K92.2]  - monitor cbc  - on pantoprazole BID     • Acute delirium [R41.0]  - monitor neurologic status.  - C/O liver failure and encephalopathy. Continue with rifaximin and lactulose  - worse on 4/1pm with elevated ammonia level 4/2     • Metabolic acidosis [E87.2]     • Acute respiratory failure (CMS-HCC) [J96.00]  - Continue with RT and supplemental oxygen as needed to keep SpO2 >90  - Therapeutic paracentesis for  decrease diaphragm restriction.  Will need transfusion of albumin      • Hyponatremia [E87.1]  - continue to monitor bmp     • Hyperkalemia [E87.5]  - no ectopy.  Treating w/ Calcium, D50, Insulin  - will discuss with family that renal function worsening and ultimately is only temporary due over comorbities     • Cellulitis [L03.90]  - s/p abx  -   Difficult as has ongoing anasarca and c/o continued weeping of legs  - wound care     • Shock (CMS-HCC) [R57.9]  - dobutamine drip for low EF:15% titrate up.  If remains on fixed rate and improving in am will consider transfer to telemetry.  - monitor in ICU for now.       • LV (left ventricular) mural thrombus (CMS-[facility]) [I21.3]  - holding anticoagulation due to acute GI bleed.  - reassess for stability of intervention        *  Sepsis:        - due to cellulitis of legs.  Difficult as has ongoing anasarca and c/o continued weeping of legs        - Dr Brumfield, ID signing off. S/P 14 days of Zosyn on 3/29        Medications reviewed, Labs reviewed and Radiology images reviewed  Holt catheter: Critically Ill - Requiring Accurate Measurement of Urinary Output and Strict Intake and Output During Sepisis or Shock  Central line in place: Vasopressors and Sepsis

## 2017-04-02 NOTE — PROGRESS NOTES
Patient having decreased urine output 10 ml in last 4 hours. MD Washburn notified, no new orders received will continue to monitor.

## 2017-04-02 NOTE — CARE PLAN
Problem: Respiratory:  Goal: Respiratory status will improve  Outcome: PROGRESSING AS EXPECTED  Pulmonary hygeine, frequent repositioning, mobilization and elevation of head of bed at 30 degrees or greater as tolerated.    Problem: Skin Integrity  Goal: Risk for impaired skin integrity will decrease  Outcome: PROGRESSING AS EXPECTED  Frequent turning/repositioning of patient.

## 2017-04-02 NOTE — PROGRESS NOTES
Pulmonary Critical Care Progress Note    Interval Events:  24 hour interval history reviewed  Reason for visit:  Acute on chronic systolic heart failure    Not responding to commands and appears more lethargic.   Sinus rhythm.  5 liters oxy-mask.    NG in place  On lactulose.   On dobut at 5.   Decreased urine output.    PFSH:  No change.    Respiratory:     Pulse Oximetry: 97 %  CXR with improved pulmonary edema  Crackles bilaterally  Periodic breathing persists    HemoDynamics:  Pulse: 97, Heart Rate (Monitored): 98  NIBP: (!) 92/58 mmHg  CVP (mm Hg): (!) 34 MM HG  SR/ST  Dobut - 5    Neuro:  More lethargic  No focal weakness    Fluids:  Intake/Output       03/31/17 0700 - 04/01/17 0659 04/01/17 0700 - 04/02/17 0659 04/02/17 0700 - 04/03/17 0659      9502-4602 5898-5420 Total 1206-6106 6979-3570 Total 5686-2650 1468-6609 Total       Intake    I.V.  990.6  926 1916.6  1308  1308 2616  --  -- --    Dobutamine Volume 315.6 476 791.6 408 408 816 -- -- --    IV Volume (NS Bolus)   -- -- --    Other  --  -- --  --  170 170  --  -- --    Medications (P.O./ Enteral Liquids) -- -- -- -- 170 170 -- -- --    Enteral  240  -- 240  --  90 90  --  -- --    Enteral Volume 240 -- 240 -- -- -- -- -- --    Free Water / Tube Flush -- -- -- -- 90 90 -- -- --    Total Intake 1230.6 926 2156.6 1308 1568 2876 -- -- --       Output    Urine  25  140 165  40  25 65  --  -- --    Indwelling Cathether 25 140 165 40 25 65 -- -- --    Emesis  --  -- --  --  -- --  --  -- --    Emesis - Number of Times 1 x -- 1 x -- -- -- -- -- --    Drains  0  300 300  --  -- --  --  -- --    Residual Amount (ml) (Discarded) 0 -- 0 -- -- -- -- -- --    Nasogastric Tube -- 300 300 -- -- -- -- -- --    Stool  --  -- --  --  -- --  --  -- --    Number of Times Stooled 1 x 3 x 4 x -- -- -- -- -- --    Total Output 25 440 465 40 25 65 -- -- --       Net I/O     1205.6 486 1691.6 1268 9643 6591 -- -- --        Weight: 117 kg (257 lb 15  oz)  Recent Labs      17   0545   SODIUM  122*  125*  123*   POTASSIUM  5.5  5.7*  6.5*   CHLORIDE  95*  97  96   CO2  15*  13*  12*   BUN  111*  119*  129*   CREATININE  2.76*  3.16*  3.93*   MAGNESIUM  3.0*   --    --    PHOSPHORUS  5.4*   --    --    CALCIUM  9.6  9.6  9.1       GI/Nutrition:  Abd distended.  NG to suction.  Bowel sounds present.    Liver Function  Recent Labs      17   0545   ALTSGPT  22  22  29   ASTSGOT  52*  56*  66*   ALKPHOSPHAT  43  36  42   TBILIRUBIN  4.9*  5.0*  5.3*   LIPASE  465*  403*   --    GLUCOSE  119*  107*  107*       Heme:  Recent Labs      17   0400   RBC  3.95*  3.81*   HEMOGLOBIN  10.3*  10.1*   HEMATOCRIT  33.4*  32.7*   PLATELETCT  138*  127*       Infectious Disease:  Temp  Av.3 °C (97.3 °F)  Min: 36 °C (96.8 °F)  Max: 36.7 °C (98 °F)    Recent Labs      17   0545   WBC  14.1*  15.4*   --    NEUTSPOLYS  79.60*  83.90*   --    LYMPHOCYTES  9.30*  6.10*   --    MONOCYTES  7.30  7.40   --    EOSINOPHILS  1.10  0.30   --    BASOPHILS  1.10  0.50   --    ASTSGOT  52*  56*  66*   ALTSGPT  22  22  29   ALKPHOSPHAT  43  36  42   TBILIRUBIN  4.9*  5.0*  5.3*     Current Facility-Administered Medications   Medication Dose Frequency Provider Last Rate Last Dose   • haloperidol lactate (HALDOL) injection 5 mg  5 mg Q4HRS PRN David Pack M.D.       • DOBUTamine (DOBUTREX) 1 mg/mL premix infusion  5 mcg/kg/min Katherine Nunez M.D. 34 mL/hr at 17 0408 5 mcg/kg/min at 178   • NS infusion   Continuous Nikhil Nunez M.D. 75 mL/hr at 17     • aripiprazole (ABILIFY) tablet 10 mg  10 mg DAILY Nikhil Enriquez M.D.   10 mg at 17 0803   • pantoprazole (PROTONIX) injection 40 mg  40 mg BID David Pack M.D.   40 mg at 17   • glucose 4 g chewable tablet 16 g  16 g Q15 MIN  PRN Gavin Molina M.D.        And   • dextrose 50% (D50W) injection 25 mL  25 mL Q15 MIN PRN Gavin Molina M.D.       • gabapentin (NEURONTIN) capsule 300 mg  300 mg BID Nikhil Nunez M.D.   300 mg at 04/01/17 2015   • lactulose 20 GM/30ML solution 30 mL  30 mL BID Gavin Molina M.D.   30 mL at 04/01/17 2015   • rifaximin (XIFAXAN) tablet 550 mg  550 mg BID Everton Paredes M.D.   550 mg at 04/01/17 2015   • diphenhydrAMINE (BENADRYL) tablet/capsule 50 mg  50 mg HS PRN Tate Hogan M.D.   50 mg at 04/01/17 2015   • oxycodone immediate-release (ROXICODONE) tablet 2.5-5 mg  2.5-5 mg Q4HRS PRN Alexia Munoz D.O.   2.5 mg at 04/01/17 0212   • Respiratory Care per Protocol   Continuous RT Alexis Hamilton M.D.       • ondansetron (ZOFRAN) syringe/vial injection 4 mg  4 mg Q4HRS PRN Alexis Hamilton M.D.   4 mg at 03/20/17 1821   • ondansetron (ZOFRAN ODT) dispertab 4 mg  4 mg Q4HRS PRN Alexis Hamilton M.D.       • promethazine (PHENERGAN) tablet 12.5-25 mg  12.5-25 mg Q4HRS PRN Alexis Hamilton M.D.       • promethazine (PHENERGAN) suppository 12.5-25 mg  12.5-25 mg Q4HRS PRN Alexis Hamilton M.D.       • prochlorperazine (COMPAZINE) injection 5-10 mg  5-10 mg Q4HRS PRN Alexis Hamilton M.D.       • acetaminophen (TYLENOL) tablet 650 mg  650 mg Q6HRS PRN Alexis Hamilton M.D.   650 mg at 03/20/17 0838   • senna-docusate (PERICOLACE or SENOKOT S) 8.6-50 MG per tablet 2 Tab  2 Tab BID Alexis Hamilton M.D.   2 Tab at 04/01/17 2015    And   • polyethylene glycol/lytes (MIRALAX) PACKET 1 Packet  1 Packet QDAY PRN Alexis Hamilton M.D.        And   • magnesium hydroxide (MILK OF MAGNESIA) suspension 30 mL  30 mL QDAY PRN Alexis Hamilton M.D.        And   • bisacodyl (DULCOLAX) suppository 10 mg  10 mg QDAY PRN Alexis Hamilton M.D.       • POLYMEM ALGINATE DRESSING PADS 1 Each  1 Each QDAY PRN Jeremy M Gonda, M.D.         Last reviewed on 3/13/2017  5:30 PM by Loi Ponce    Quality  Measures:  Labs  reviewed, Medications reviewed and Radiology images reviewed  Holt catheter: Critically Ill - Requiring Accurate Measurement of Urinary Output  Central line in place: Need for access    DVT Prophylaxis: Heparin and Contraindicated - High bleeding risk  DVT prophylaxis - mechanical: SCDs  Ulcer prophylaxis: Not indicated            Assessment and Plan:    Acute on chronic hypoxemic respiratory failure  Acute on chronic systolic heart failure   - EF 15-20%   - dobutamine gtt  Acute cardiogenic pulmonary edema  Acute metabolic/toxic encephalopathy   - lactulose and rifaximin  Sepsis - skin source  Bilateral lower extremity cellulitis with open ulcerations   - completed Zosyn per ID  Thrombocytopenia  Acute on chronic kidney disease   - worse  Hepatitis C  Blood in stool   - all anticoagulation stopped   - follow Hgb  Acute pancreatitis   - keep NG to suction   - lipase down slightly  LV thrombus - anticoagulation now contraindicated due to blood in stool  DNR/I    Unstable cardiac and pulmonary status.  Worsening renal function.  Prognosis is abysmal.  Paracentesis done today.      Critical Care Time:  35 minutes  Date of service:  4/2/17  [id number]  No time overlap  Time excludes procedures  Discussed with RN, RT, Team      I, Magaly Cortez (Lisseth), am scribing for, and in the presence of, Dr. Jean M.D..  Electronically signed by: Magaly Cortez (Lisseth), 4/2/2017  I, Dr. Jean M.D. personally performed the services described in this documentation, as scribed by Magaly Cortez in my presence, and it is both accurate and complete.

## 2017-04-02 NOTE — CARE PLAN
Problem: Safety  Goal: Will remain free from falls  Assessed for fall risk factors, fall precautions in place, bed alarm on, call light in reach, hourly rounding in place. Patient educated on calling nurse if he needed anything patient verbalized understanding.     Problem: Psychosocial Needs:  Goal: Level of anxiety will decrease  RN assessed factors contributing to patients anxiety, turned the lights down, reoriented patient to unit, family at bedside helping orient the patient. Breathing techniques used to help calm patient.

## 2017-04-03 NOTE — PROGRESS NOTES
Pulmonary Critical Care Progress Note    Interval Events:  24 hour interval history reviewed   - worsening ORVILLE, nearly anuric despite albumin challenge   - worsening encephalopathy   - increased need for vasopressor support    Reason for visit:  Acute on chronic systolic heart failure    PFSH:  No change.    Respiratory:   5 lpm FM  Pulse Oximetry: 99 %  CXR withunchanged pulmonary edema and effusions, enlarged cardiac silhouette  Crackles bilaterally, no wheeze, mild tachypnea with periodic breathing pattern    HemoDynamics:  Pulse: 82, Heart Rate (Monitored): 82  NIBP: (!) 68/51 mmHg  CVP (mm Hg): (!) 24 MM HG  Dobut - 5, levophed 10-->15, CVP 25-35    Exam: sinus, murmur, diffuse anasarca  Imaging: CONCLUSIONS  Compared to the images of the prior study done on 01/29/17-  there has   been no significant change.   Severely reduced left ventricular systolic function.  Left ventricular ejection fraction is visually estimated to be 20%.  Global hypokinesis.  Diastolic function is difficult to assess.  Moderately dilated right ventricle.  Pacer/ICD wire seen in right ventricle.  Moderate mitral regurgitation.  Mild tricuspid regurgitation.  Right ventricular systolic pressure is estimated to be 40 mmHg.    Neuro:  More lethargic, mumbles to self but incoherently, non-focal exam    Fluids:  Intake/Output       04/01/17 0700 - 04/02/17 0659 04/02/17 0700 - 04/03/17 0659 04/03/17 0700 - 04/04/17 0659      8989-2061 3569-7179 Total 2897-1033 9205-6334 Total 5104-9946 1693-1411 Total       Intake    I.V.  1308  1308 2616  1308  736 2044  --  -- --    Dobutamine Volume 408 408 816 408 336 744 -- -- --    IV Volume (NS Bolus)   -- -- --    Other  --  170 170  --  -- --  --  -- --    Medications (P.O./ Enteral Liquids) -- 170 170 -- -- -- -- -- --    Enteral  --  90 90  --  -- --  --  -- --    Free Water / Tube Flush -- 90 90 -- -- -- -- -- --    Total Intake 1308 1568 2876 5904 954 5412 -- -- --        Output    Urine  40  25 65  10  -- 10  --  -- --    Indwelling Cathether 40 25 65 10 -- 10 -- -- --    Stool  --  -- --  --  -- --  --  -- --    Number of Times Stooled -- -- -- 3 x -- 3 x -- -- --    Total Output 40 25 65 10 -- 10 -- -- --       Net I/O     1268 1543 2811 9623 370 5592 -- -- --           Recent Labs      17   SODIUM  125*  123*  124*   POTASSIUM  5.7*  6.5*  6.3*   CHLORIDE  97  96  97   CO2  13*  12*  16*   BUN  119*  129*  144*   CREATININE  3.16*  3.93*  4.54*   CALCIUM  9.6  9.1  9.0       GI/Nutrition:  Abd distended with fluid shift.  NG to suction.  Bowel sounds present.    Liver Function  Recent Labs      17   ALTSGPT  22  29  27   ASTSGOT  56*  66*  55*   ALKPHOSPHAT  36  42  41   TBILIRUBIN  5.0*  5.3*  5.0*   LIPASE  403*   --    --    PREALBUMIN   --    --   7.0*   GLUCOSE  107*  107*  113*       Heme:  Recent Labs      17   RBC  3.81*  4.00*  3.90*   HEMOGLOBIN  10.1*  10.4*  10.3*   HEMATOCRIT  32.7*  35.0*  34.7*   PLATELETCT  127*  169  155*       Infectious Disease:  Temp  Av.2 °C (97.2 °F)  Min: 35.7 °C (96.2 °F)  Max: 37.2 °C (99 °F)  Reviewed.  Recent Labs      17   WBC  15.4*  15.9*  14.9*   NEUTSPOLYS  83.90*  83.20*  85.00*   LYMPHOCYTES  6.10*  5.80*  4.90*   MONOCYTES  7.40  8.50  7.40   EOSINOPHILS  0.30  0.20  0.20   BASOPHILS  0.50  0.40  0.30   ASTSGOT  56*  66*  55*   ALTSGPT  22  29  27   ALKPHOSPHAT  36  42  41   TBILIRUBIN  5.0*  5.3*  5.0*     Current Facility-Administered Medications   Medication Dose Frequency Provider Last Rate Last Dose   • norepinephrine (LEVOPHED) 8 mg in  mL Infusion  0.5-30 mcg/min Continuous Nikhil Overton D.O.       • haloperidol lactate (HALDOL) injection 5 mg  5 mg Q4HRS PRN David Pack M.D.       • DOBUTamine (DOBUTREX) 1 mg/mL premix  infusion  5 mcg/kg/min Continuous Nikhil Nunez M.D. 34 mL/hr at 04/03/17 0405 5 mcg/kg/min at 04/03/17 0405   • NS infusion   Continuous Nikhil Nunez M.D. 25 mL/hr at 04/02/17 2107     • aripiprazole (ABILIFY) tablet 10 mg  10 mg DAILY Nikhil Enriquez M.D.   10 mg at 04/02/17 0842   • pantoprazole (PROTONIX) injection 40 mg  40 mg BID David Pack M.D.   40 mg at 04/02/17 2034   • glucose 4 g chewable tablet 16 g  16 g Q15 MIN PRN Gavin Molina M.D.        And   • dextrose 50% (D50W) injection 25 mL  25 mL Q15 MIN PRN Gavin Molina M.D.       • gabapentin (NEURONTIN) capsule 300 mg  300 mg BID Nikhil Nunez M.D.   300 mg at 04/02/17 2035   • lactulose 20 GM/30ML solution 30 mL  30 mL BID Gavin Molina M.D.   30 mL at 04/02/17 2035   • rifaximin (XIFAXAN) tablet 550 mg  550 mg BID Everton Paredes M.D.   550 mg at 04/02/17 2035   • diphenhydrAMINE (BENADRYL) tablet/capsule 50 mg  50 mg HS PRN Tate Hogan M.D.   50 mg at 04/01/17 2015   • oxycodone immediate-release (ROXICODONE) tablet 2.5-5 mg  2.5-5 mg Q4HRS PRN Alexia Munoz D.O.   2.5 mg at 04/01/17 0212   • Respiratory Care per Protocol   Continuous RT Alexis Hamilton M.D.       • ondansetron (ZOFRAN) syringe/vial injection 4 mg  4 mg Q4HRS PRN Alexis Hamilton M.D.   4 mg at 03/20/17 1821   • ondansetron (ZOFRAN ODT) dispertab 4 mg  4 mg Q4HRS PRN Alexis Hamilton M.D.       • promethazine (PHENERGAN) tablet 12.5-25 mg  12.5-25 mg Q4HRS PRN Alexis Hamilton M.D.       • promethazine (PHENERGAN) suppository 12.5-25 mg  12.5-25 mg Q4HRS PRN Alexis Hamilton M.D.       • prochlorperazine (COMPAZINE) injection 5-10 mg  5-10 mg Q4HRS PRN Alexis Hamilton M.D.       • acetaminophen (TYLENOL) tablet 650 mg  650 mg Q6HRS PRN Alexis Hamilton M.D.   650 mg at 03/20/17 0838   • senna-docusate (PERICOLACE or SENOKOT S) 8.6-50 MG per tablet 2 Tab  2 Tab BID Alexis Hamilton M.D.   Stopped at 04/02/17 2100    And   • polyethylene  glycol/lytes (MIRALAX) PACKET 1 Packet  1 Packet QDAY PRN Alexis Hamilton M.D.        And   • magnesium hydroxide (MILK OF MAGNESIA) suspension 30 mL  30 mL QDAY PRN Alexis Hamilton M.D.        And   • bisacodyl (DULCOLAX) suppository 10 mg  10 mg QDAY PRN Alexis Hamilton M.D.       • POLYMEM ALGINATE DRESSING PADS 1 Each  1 Each QDAY PRN Jeremy M Gonda, M.D.         Last reviewed on 3/13/2017  5:30 PM by Loi Ponce    Quality  Measures:  Labs reviewed, Medications reviewed and Radiology images reviewed  Holt catheter: Critically Ill - Requiring Accurate Measurement of Urinary Output  Central line in place: Need for access    DVT Prophylaxis: Contraindicated - High bleeding risk  DVT prophylaxis - mechanical: SCDs  Ulcer prophylaxis: Not indicated    Assessed for rehab: Patient unable to tolerate rehabilitation therapeutic regimen    Assessment and Plan:  Acute on chronic hypoxemic respiratory failure - unchanged   - monitor fluid status closely  Acute on chronic systolic heart failure   - EF 15-20%   - cont dobutamine gtt  Shock - combined cardiogenic with vasodistributive    - cont levo + dobutamine, midodrine increased  Acute cardiogenic pulmonary edema - kidneys unresponsive to lasix  Acute metabolic/toxic encephalopathy - worsening   - lactulose and rifaximin  Sepsis - skin source  Bilateral lower extremity cellulitis with open ulcerations   - completed Zosyn per ID  Thrombocytopenia  Acute on chronic kidney disease - nearly anuric with associated hyperkalemia   - renal consulted and risks>benefits of HD, continue medical management  Hepatitis C cirrhosis  Blood in stool - all anticoagulation stopped   - follow Hgb with conservative transfusion strategy  Acute pancreatitis   LV thrombus - anticoagulation now contraindicated due to blood in stool  DNR/I, prophylaxis, trial TFs    Met with patient's son Irvin (decision maker) regarding worsening condition and terminal prognosis. Unable to undergo HD  "given cardiac instability and would unfortunately not change end-point given MOF and cirrhosis. Offered comfort care again but son declines as his father \"told him a few days ago that he did not want that.\" Continue current medical management with DNR/DNI and family's awareness of likely death in next few days.    Critical Care Time:  39 minutes  [id number]  No time overlap  Time excludes procedures  Discussed with RN, RT, Team, patient, son Irvin    "

## 2017-04-03 NOTE — THERAPY
Physical Therapy Contact Note     RN requesting hold, pt not following commands, hypotensive; possible change in goals of care; will follow as appropriate within medical plan of care;     Ana Woods PT, DPT Pager: [phone]

## 2017-04-03 NOTE — PALLIATIVE CARE
Spiritual Care Note           Patient's Name: Obinna Grande   MRN: [id number]    Age and Gender: 51 y.o. male   YOB: 1965   Place of Residence: [place], [place]   Unit: Cardiac ICU   Room (and Bed): [place] [address]   Medical Diagnosis/Procedure: acute on chronic hypoxemic respiratory failure  acute on chronic systolic congestive heart failure  shock   acute cardiogenic pulmonary edema  acute metabolic/toxic encephalopathy  sepsis due to cellulitis of legs  bilateral lower extremity cellulitis with open ulcerations  acute on chronic kidney disease   hepatitis C cirrhosis   gastrointestinal bleeding  acute pancreatitis    left ventricular mural thrombus   Anabaptism Affiliation: Mandaeism   Code Status: DNR    Date of Admission: 3/13/2017    Length of Stay: 21 days   Date of Visit: 4/3/2017       Situation/Reason for Visit:  Patient followed by palliative care.  Also received request from staff for a  to visit patient.    Consultations/Referrals:  Patient and family in past have requested a .    Requests/Recommendations:  Left message for  on call at [facility].   should visit this afternoon or evening.    Continuing Care:  Will continue to follow.      Contact Information:  Chaplain KIMO Renteria  [phone]   [email or web address]

## 2017-04-03 NOTE — DISCHARGE PLANNING
Palliative Social Work    Message left for son/DPOA, Irvin, on his cell phone requesting a return call to follow up about previous conversations the palliative team has had with him regarding pt.

## 2017-04-03 NOTE — PROGRESS NOTES
Hospital Medicine Progress Note, Adult, Complex               Author: Nikhil Overton Date & Time created: 4/3/2017  8:54 AM     Interval History:  ID: 51-year-old male with history of dilated cardiomyopathy, EF of 15%, drug abuse, cirrhosis with hepatitis C, chronic kidney disease, left ventricle mural thrombus, obesity, evaluated here   at [facility] with increased shortness of breath, bilateral lower extremity swelling with redness, abdominal distention and pain.  Admitted for c/o decompensated heart failure, Sepsis due to cellulitis of leg, and acute respiratory distress.      Today:  Remains lethargic and unable to respond to my questions.  More groaning today. I started norepinenphrine this am as BP dropping.  Family at bedside.  I have consulted Dr Najjar for nephrology input given worsening of renal function.    I met with Irvin (pt's POA and oldest son) along with Dr Gonda and Jose, .  I alerted the son that the patient is having multiorgan failure.  I alerted him that dialysis likely only to be a temporary means and that death is highly likely imminent none the less.  Options of comfort care discussed giving multiorgan failure and the son stated that his dad didn't want comfort care.  Son aware that given increase potassium levels, worsening renal function, severe CHF, worsening encephalopathy, and ongoing anasarca that the patient is likely to only have a few days of life.      Review of Systems:  Review of Systems   Constitutional: Positive for malaise/fatigue. Negative for fever.   HENT: Negative for sore throat.    Eyes: Negative for redness.   Respiratory: Positive for shortness of breath.    Cardiovascular: Positive for leg swelling. Negative for chest pain.   Gastrointestinal: Negative for nausea, vomiting and abdominal pain.   Musculoskeletal: Negative for back pain and neck pain.   Neurological: Positive for weakness. Negative for dizziness and headaches.   Psychiatric/Behavioral: The  patient is not nervous/anxious.        Physical Exam:  Physical Exam   Constitutional: He is oriented to person, place, and time. No distress.   anasarca   HENT:   Head: Normocephalic and atraumatic.   Nose: Nose normal.   Mouth/Throat: No oropharyngeal exudate (dry).   Eyes: Conjunctivae and EOM are normal. Right eye exhibits no discharge. Left eye exhibits no discharge.   Neck: No tracheal deviation present.   Left IJ triple lumen catheter   Cardiovascular: Normal rate, regular rhythm, normal heart sounds and intact distal pulses.    No murmur heard.  Pulmonary/Chest: Effort normal and breath sounds normal. No stridor. No respiratory distress. He has no wheezes.   Abdominal: Soft. Bowel sounds are normal. He exhibits distension. There is no tenderness.   Musculoskeletal: He exhibits edema (Anasarca).   Neurological: He is alert and oriented to person, place, and time. No cranial nerve deficit.   Skin: Skin is warm. He is not diaphoretic.   Psychiatric: He has a normal mood and affect. His behavior is normal. Thought content normal. His speech is delayed. Cognition and memory are normal.   Vitals reviewed.      Labs:        Invalid input(s): [id number]      Recent Labs      04/01/17 0400 04/02/17 0545  04/03/17 0413   SODIUM  125*  123*  124*   POTASSIUM  5.7*  6.5*  6.3*   CHLORIDE  97  96  97   CO2  13*  12*  16*   BUN  119*  129*  144*   CREATININE  3.16*  3.93*  4.54*   CALCIUM  9.6  9.1  9.0     Recent Labs      04/01/17 0400 04/02/17 0545  04/03/17 0413   ALTSGPT  22  29  27   ASTSGOT  56*  66*  55*   ALKPHOSPHAT  36  42  41   TBILIRUBIN  5.0*  5.3*  5.0*   LIPASE  403*   --    --    PREALBUMIN   --    --   7.0*   GLUCOSE  107*  107*  113*     Recent Labs      04/01/17 0400 04/02/17 0545  04/03/17 0413   RBC  3.81*  4.00*  3.90*   HEMOGLOBIN  10.1*  10.4*  10.3*   HEMATOCRIT  32.7*  35.0*  34.7*   PLATELETCT  127*  169  155*     Recent Labs      04/01/17 0400 04/02/17 0545   17   0413   WBC  15.4*  15.9*  14.9*   NEUTSPOLYS  83.90*  83.20*  85.00*   LYMPHOCYTES  6.10*  5.80*  4.90*   MONOCYTES  7.40  8.50  7.40   EOSINOPHILS  0.30  0.20  0.20   BASOPHILS  0.50  0.40  0.30   ASTSGOT  56*  66*  55*   ALTSGPT  22  29  27   ALKPHOSPHAT  36  42  41   TBILIRUBIN  5.0*  5.3*  5.0*           Hemodynamics:  Temp (24hrs), Av.9 °C (96.6 °F), Min:35.3 °C (95.5 °F), Max:37.2 °C (99 °F)  Temperature: (!) 35.3 °C (95.5 °F)  Pulse  Av.6  Min: 50  Max: 194Heart Rate (Monitored): 82  NIBP: (!) 70/45 mmHg  CVP (mm Hg): (!) 35 MM HG  Respiratory:    Respiration: 18, Pulse Oximetry: 99 %     Work Of Breathing / Effort: Accessory Muscle Use;Grunting;Moderate  RUL Breath Sounds: Rhonchi, RML Breath Sounds: Rhonchi, RLL Breath Sounds: Diminished, BHAVNA Breath Sounds: Rhonchi, LLL Breath Sounds: Diminished  Fluids:    Intake/Output Summary (Last 24 hours) at 17 0854  Last data filed at 17 0800   Gross per 24 hour   Intake 1946.35 ml   Output     23 ml   Net 1923.35 ml        GI/Nutrition:  No orders of the defined types were placed in this encounter.     Medical Decision Making, by Problem:  Active Hospital Problems    Diagnosis   • Acute on chronic systolic congestive heart failure, NYHA class 4, present on admission (EF 15-20%) [I50.23]  - s/p albumin transfusion with marked improved albumin  - dobutamine drip  - add norepinephrine for drop in bp this am. Monitor CVP, I/O     • Hepatitis C virus infection [B19.20]     • Acute renal insufficiency [N28.9]  - worsening  - Dr Najjar consulted.  - Hemodialysis likely only futile.  I will give 80mg IV lasix and repeat an am BMP  - consult nephrology tomorrow if no improvement with lasix.     • Essential hypertension, benign [I10]  - monitor vitals     • Prolonged Q-T interval on ECG [R94.31]  - abilify for agiation  - monitor EKG, avoid meds prolonging QT     • GIB (gastrointestinal bleeding) [K92.2]  - monitor cbc  - on pantoprazole BID      • Acute delirium [R41.0]  - monitor neurologic status.  - C/O liver failure and encephalopathy. Continue with rifaximin and lactulose     • Metabolic acidosis [E87.2]     • Acute respiratory failure (CMS-HCC) [J96.00]  - Continue with RT and supplemental oxygen as needed to keep SpO2 >90  - Therapeutic paracentesis performed 4/3 with 4L removed      • Hyponatremia [E87.1]  - continue to monitor bmp     • Hyperkalemia [E87.5]  - no ectopy.  Treating w/ Calcium, D50, Insulin, lactulose  - monitor am bmp     • Cellulitis [L03.90]  - s/p abx  -   Difficult as has ongoing anasarca and c/o continued weeping of legs  - wound care     • Shock (CMS-HCC) [R57.9]  - dobutamine drip for low EF:15% titrate up.  If remains on fixed rate and improving in am will consider transfer to telemetry.  - monitor in ICU for now.       • LV (left ventricular) mural thrombus (CMS-[facility]) [I21.3]  - holding anticoagulation due to acute GI bleed.  - risk of CVA       *  Sepsis:        - due to cellulitis of legs.  Difficult as has ongoing anasarca and c/o continued weeping of legs        - Dr Brumfield, ID signed off. S/P 14 days of Zosyn on 3/29    CRITICAL CARE TIME: 40 minutes    Medications reviewed, Labs reviewed and Radiology images reviewed  Holt catheter: Critically Ill - Requiring Accurate Measurement of Urinary Output and Strict Intake and Output During Sepisis or Shock  Central line in place: Vasopressors and Sepsis

## 2017-04-04 NOTE — CARE PLAN
Problem: Nutritional:  Goal: Nutrition support tolerated and meeting greater than 85% of estimated needs  Outcome: PROGRESSING SLOWER THAN EXPECTED

## 2017-04-04 NOTE — CONSULTS
DATE OF SERVICE:  04/03/2017    REQUESTING PHYSICIAN:  Nikhil Overton DO    REASON FOR CONSULTATION:  Acute kidney injury, assessing the need for urgent   dialysis, and severe hyperkalemia.    Patient seen and examined.  Unfortunately, the patient is a very poor   historian because of lethargy and decreased level of consciousness.  Most of   the story was to review on record and discussing the case with Dr. Overton.    HISTORY OF PRESENT ILLNESS:  The patient is an unfortunate 51-year-old   gentleman with history of IV drug abuse, cocaine-induced cardiomyopathy, liver   cirrhosis, hepatitis C, was admitted to the hospital on 03/13/2017, with   shortness of breath and bilateral lower extremities cellulitis.  This   patient's hospitalization has been complicated by sepsis, also he developed   acute kidney injury with a creatinine today up to 4.54 and hyperkalemia with a   potassium at 6.3.    Again since the patient was diagnosed with cardiomyopathy with left   ventricular ejection fraction of about 20%, patient is not responding to IV   Lasix and he is becoming oliguric.    Patient has no recent use of NSAIDs or IV contrast.    PAST MEDICAL HISTORY:  Unobtainable because of the patient's mental status:      FAMILY HISTORY:  Unobtainable because of the patient's mental status    SOCIAL HISTORY:  Unobtainable because of the patient's mental status.    MEDICATIONS:  Unobtainable because of the patient's mental status.    REVIEW OF SYSTEMS:  Unobtainable because of the patient's mental status.    PHYSICAL EXAMINATION:  GENERAL:  Patient is lethargic.  He has a facemask.  VITAL SIGNS:  Show a blood pressure of 85/50, heart rate was 92.  HEENT:  Normocephalic, atraumatic.  Sclerae is icteric.  NECK:  No lymphadenopathy.  CHEST:  Normal.  LUNGS:  Poor respiratory sounds.  HEART:  S1, S2.  ABDOMEN:  Soft, nontender.  EXTREMITIES:  There is +2 edema.  SKIN:  No skin rash.  NEUROLOGIC:  The patient is very  lethargic.    LABORATORY DATA:  His recent labs were reviewed.    IMAGING:  Also had a chest x-ray which I reviewed the image myself, showed   airspace disease.    ASSESSMENT AND PLAN:  1.  Acute kidney injury.  The etiology is most likely acute tubular necrosis   from recent sepsis versus hepatorenal syndrome.  2.  Volume overload.  3.  Liver failure.  4.  Hepatitis C.  5.  Severe cardiomyopathy.    PLAN:  1.  There is no acute need for renal replacement therapy.  2.  Continue IV Lasix.  3.  If there is no improvement in the next 24 hours, we will consider   dialysis; however, the patient has very poor prognosis because of multisystem   organ failure.  The plan is to discuss the level of care with the patient's   son who is going to be around late this afternoon.  My recommendation is to   consider palliative care and comfort measure only because of the patient's   poor prognosis.  4.  Renal dose all medications.  5.  Avoid nephrotoxin exposure.  6.  Prognosis is poor.    Plan discussed in detail with Dr. Gonda as well as Dr. Overton.       ____________________________________     FADI NAJJAR, MD FN / [facility]    DD:  04/03/2017 13:39:21  DT:  04/03/2017 17:15:49    D#:  [id number]  Job#:  [id number]

## 2017-04-04 NOTE — PROGRESS NOTES
Pulmonary Critical Care Progress Note      HPI   The patient is a very unfortunate 51-year-old male with a past medical history significant for end-stage heart failure with an ejection fraction less than 15% secondary to cocaine abuse with associated chronic pulmonary edema, intraventricular clot, cirrhosis of the liver as wellas pulmonary embolism. He was brought into the emergency department by EMS from the [facility] where he presented to follow up with a new primary care doctor and was found to have abnormal vital signs and EMS was called. Per report, he has had over the last 2-3 weeks, worsening lower extremity as well as abdominal swelling with a 10-pound unintentional weight gain and intermittent confusion. He has also noted increased shortness of breath over the last several days. In the emergency department, patient was started on BiPAP by the emergency physician. We were consulted for assistance in his management. He was maintaining normal blood pressure at the time of my evaluation, but had other signs of decompensated heart failure. A bedside limited ultrasound of his heart revealed a dilated inferior vena cava of 2.2 cm without respiratory variability and biventricular enlargement as well severely depressed function. No obvious pericardial effusion was seen. Of note, he was recently admitted back in January of this year for similar symptoms including decompensated heart failure and he was initially planned [facility]; however, there was some discussion with the nurse per the family that he would follow up with Dr. Ortiz who might have additional options for his improvement and buy him another year or two of life. He had not followed up with her and per his son, they did not even feel all the medications he was discharged on including blood thinners and his Aldactone because of the price and they were hoping to see Dr. Ortiz beforehand to ask her about it. Patient denies any recent chest pain, no  recent travel and he has been sedentary for the last month and a half.    Interval Events:  24 hour interval history reviewed   - becoming more obtunded   - now on maximum dose of pressors   - anuric with worsening ORVILLE, hyperkalemia, acidosis     Reason for visit:  Acute on chronic systolic heart failure    PFSH:  No change.    Respiratory:   5 lpm FM  Pulse Oximetry: 99 %  CXR (personally reviewed) with none noted for today.   Exam:coarse crackles throughout, mild tachypnea       HemoDynamics:  Pulse: 88, Heart Rate (Monitored): 86  NIBP: (!) 74/50 mmHg  CVP (mm Hg): (!) 35 MM HG  Dobut - 5, levophed 30, CVP 25-35    Exam: sinus, murmur, diffuse anasarca  Imaging: CONCLUSIONS  Compared to the images of the prior study done on 01/29/17-  there has   been no significant change.   Severely reduced left ventricular systolic function.  Left ventricular ejection fraction is visually estimated to be 20%.  Global hypokinesis.  Diastolic function is difficult to assess.  Moderately dilated right ventricle.  Pacer/ICD wire seen in right ventricle.  Moderate mitral regurgitation.  Mild tricuspid regurgitation.  Right ventricular systolic pressure is estimated to be 40 mmHg.       Neuro:  More lethargic, mumbles to self but incoherently, non-focal exam    Fluids:  Intake/Output       04/02/17 0700 - 04/03/17 0659 04/03/17 0700 - 04/04/17 0659 04/04/17 0700 - 04/05/17 0659      1602-9173 3735-8940 Total 0746-0996 3604-0284 Total 6132-4416 3214-5098 Total       Intake    I.V.  1308  736 2044  888.8  1152.8 2041.6  --  -- --    Norepinephrine Volume -- -- -- 180.8 344.8 525.6 -- -- --    Dobutamine Volume 408 336 744 408 408 816 -- -- --    IV Volume (NS Bolus)  300 300 600 -- -- --    IV Piggyback Volume (Antibiotics) -- -- -- -- 100 100 -- -- --    Other  --  -- --  240  -- 240  --  -- --    Medications (P.O./ Enteral Liquids) -- -- -- 240 -- 240 -- -- --    Enteral  --  -- --  875 180 907  --  -- --    Enteral  Volume -- -- -- 120 480 600 -- -- --    Free Water / Tube Flush -- -- -- 210 -- 210 -- -- --    Total Intake 2230 785 4629 1458.8 1632.8 3091.6 -- -- --       Output    Urine  10  -- 10  21  0 21  --  -- --    Indwelling Cathether 10 -- 10 21 0 21 -- -- --    Drains  --  -- --  --  300 300  --  -- --    Nasogastric Tube -- -- -- -- 300 300 -- -- --    Stool  --  -- --  --  -- --  --  -- --    Number of Times Stooled 3 x -- 3 x 1 x -- 1 x -- -- --    Total Output 10 -- 10 21 300 321 -- -- --       Net I/O     6771 984 8735 1437.8 1332.8 2770.6 -- -- --           Recent Labs      17   SODIUM  123*  124*  125*   POTASSIUM  6.5*  6.3*  6.6*   CHLORIDE  96  97  98   CO2  12*  16*  14*   BUN  129*  144*  143*   CREATININE  3.93*  4.54*  5.22*   CALCIUM  9.1  9.0  9.0       GI/Nutrition:  Abd distended with fluid shift.  NG to suction.  Bowel sounds present. Non-tender    Liver Function  Recent Labs      17   ALTSGPT  29  27  27   ASTSGOT  66*  55*  55*   ALKPHOSPHAT  42  41  51   TBILIRUBIN  5.3*  5.0*  4.7*   PREALBUMIN   --   7.0*   --    GLUCOSE  107*  113*  109*       Heme:  Recent Labs      17   RBC  4.00*  3.90*  4.16*   HEMOGLOBIN  10.4*  10.3*  10.8*   HEMATOCRIT  35.0*  34.7*  36.2*   PLATELETCT  169  155*  178       Infectious Disease:  Temp  Av.8 °C (96.5 °F)  Min: 35.3 °C (95.5 °F)  Max: 36 °C (96.8 °F)  Reviewed.  Recent Labs      17   0545  17   0413  17   0315   WBC  15.9*  14.9*  15.8*   NEUTSPOLYS  83.20*  85.00*  87.00*   LYMPHOCYTES  5.80*  4.90*  3.70*   MONOCYTES  8.50  7.40  7.30   EOSINOPHILS  0.20  0.20  0.10   BASOPHILS  0.40  0.30  0.30   ASTSGOT  66*  55*  55*   ALTSGPT  29  27  27   ALKPHOSPHAT  42  41  51   TBILIRUBIN  5.3*  5.0*  4.7*     Current Facility-Administered Medications   Medication Dose Frequency Provider Last Rate Last  Dose   • norepinephrine (LEVOPHED) 8 mg in  mL Infusion  0.5-30 mcg/min Continuous Nikhil Overton DLoulouOLoulou 37.5 mL/hr at 04/04/17 0558 20 mcg/min at 04/04/17 0558   • midodrine (PROAMATINE) tablet 5 mg  5 mg TID WITH MEALS Jeremy M Gonda, M.D.   5 mg at 04/03/17 1811   • haloperidol lactate (HALDOL) injection 5 mg  5 mg Q4HRS PRN David Pack M.D.       • DOBUTamine (DOBUTREX) 1 mg/mL premix infusion  5 mcg/kg/min Continuous Nikhil Nunez M.D. 34 mL/hr at 04/04/17 0141 5 mcg/kg/min at 04/04/17 0141   • NS infusion   Continuous Nikhil Nunez M.D. 25 mL/hr at 04/04/17 0220     • aripiprazole (ABILIFY) tablet 10 mg  10 mg DAILY Nikhil Enriquez M.D.   10 mg at 04/03/17 1009   • pantoprazole (PROTONIX) injection 40 mg  40 mg BID David Pack M.D.   40 mg at 04/03/17 1952   • glucose 4 g chewable tablet 16 g  16 g Q15 MIN PRN Gavin Molina M.D.        And   • dextrose 50% (D50W) injection 25 mL  25 mL Q15 MIN PRN Gavin Molina M.D.       • gabapentin (NEURONTIN) capsule 300 mg  300 mg BID Nikhil Nunez M.D.   300 mg at 04/03/17 1953   • lactulose 20 GM/30ML solution 30 mL  30 mL BID Gavin Molina M.D.   30 mL at 04/03/17 1952   • rifaximin (XIFAXAN) tablet 550 mg  550 mg BID Everton Paredes M.D.   550 mg at 04/03/17 1952   • oxycodone immediate-release (ROXICODONE) tablet 2.5-5 mg  2.5-5 mg Q4HRS PRN Alexia Munoz D.O.   2.5 mg at 04/01/17 0212   • Respiratory Care per Protocol   Continuous RT Alexis Hamilton M.D.       • ondansetron (ZOFRAN) syringe/vial injection 4 mg  4 mg Q4HRS PRN Alexis Hamilton M.D.   4 mg at 03/20/17 1821   • ondansetron (ZOFRAN ODT) dispertab 4 mg  4 mg Q4HRS PRN Alexis Hamilton M.D.       • promethazine (PHENERGAN) tablet 12.5-25 mg  12.5-25 mg Q4HRS PRN Alexis Hamilton M.D.       • promethazine (PHENERGAN) suppository 12.5-25 mg  12.5-25 mg Q4HRS PRN Alexis Hamilton M.D.       • prochlorperazine (COMPAZINE) injection 5-10 mg  5-10 mg Q4HRS  PRN Alexis Hamilton M.D.       • acetaminophen (TYLENOL) tablet 650 mg  650 mg Q6HRS PRN Alexis Hamilton M.D.   650 mg at 03/20/17 0838   • senna-docusate (PERICOLACE or SENOKOT S) 8.6-50 MG per tablet 2 Tab  2 Tab BID Alexis Hamilton M.D.   2 Tab at 04/03/17 1953    And   • polyethylene glycol/lytes (MIRALAX) PACKET 1 Packet  1 Packet QDAY PRN Alexis Hamilton M.D.        And   • magnesium hydroxide (MILK OF MAGNESIA) suspension 30 mL  30 mL QDAY PRN Alexis Hamilton M.D.        And   • bisacodyl (DULCOLAX) suppository 10 mg  10 mg QDAY PRN Alexis Hamilton M.D.       • POLYMEM ALGINATE DRESSING PADS 1 Each  1 Each QDAY PRN Jeremy M Gonda, M.D.         Last reviewed on 3/13/2017  5:30 PM by Loi Ponce    Quality  Measures:  Labs reviewed, Medications reviewed and Radiology images reviewed  Holt catheter: Critically Ill - Requiring Accurate Measurement of Urinary Output  Central line in place: Need for access    DVT Prophylaxis: Contraindicated - High bleeding risk  DVT prophylaxis - mechanical: SCDs  Ulcer prophylaxis: Not indicated    Assessed for rehab: Patient unable to tolerate rehabilitation therapeutic regimen    Assessment and Plan:  Acute on chronic hypoxemic respiratory failure - unchanged   - cont O2/RT protocols  Acute on chronic systolic heart failure   - EF 15-20%   - cont dobutamine gtt  Shock - combined cardiogenic with vasodistributive    - cont levo + dobutamine, midodrine   Acute cardiogenic pulmonary edema - kidneys unresponsive to lasix, worsening   - higher dose lasix per renal if BP tolerates  Acute metabolic/toxic encephalopathy - worsening   - lactulose and rifaximin  Sepsis - skin source  Bilateral lower extremity cellulitis with open ulcerations   - completed Zosyn per ID  Thrombocytopenia  Acute on chronic kidney disease - anuric with associated hyperkalemia acidosis and fluid overload   - renal consulted and risks>benefits of HD, continue medical management  Hepatitis C cirrhosis  Blood  in stool - all anticoagulation stopped   - follow Hgb with conservative transfusion strategy  Acute pancreatitis   LV thrombus - anticoagulation now contraindicated due to blood in stool  DNR/I, prophylaxis, TFs    Critical Care Time:  34 minutes  [id number]  No time overlap  Time excludes procedures  Discussed with RN, RT, Team      I, Magaly Cortez (Lisseth), am scribing for, and in the presence of, Dr. Gonda M.D.   Electronically signed by: Magaly Cortez (Lisseth), 4/4/2017  I, Dr. Gonda, M.D. personally performed the services described in this documentation, as scribed by Magaly Cortez in my presence, and it is both accurate and complete.

## 2017-04-04 NOTE — CARE PLAN
Problem: Skin Integrity  Goal: Risk for impaired skin integrity will decrease  Intervention: Assess and monitor skin integrity, appearance and/or temperature  Skin turgor is tight from pitting edema bilat upper and lower extremities.

## 2017-04-04 NOTE — PROGRESS NOTES
Hospital Medicine Progress Note, Adult, Complex               Author: Gavin Molina  Date & Time created: 4/4/2017  4:26 PM     Interval History:  ID: 51-year-old male with history of dilated cardiomyopathy, EF of 15%, drug abuse, cirrhosis with hepatitis C, chronic kidney disease, left ventricle mural thrombus, obesity, evaluated here   at [facility] with increased shortness of breath, bilateral lower extremity swelling with redness, abdominal distention and pain.  Admitted for c/o decompensated heart failure, Sepsis due to cellulitis of leg, and acute respiratory distress.      Patient seen in the ICU, obtunded on pressors.      Review of Systems:  Review of Systems   Unable to perform ROS: acuity of condition       Physical Exam:  Physical Exam   Constitutional: He appears well-developed.   HENT:   Head: Normocephalic and atraumatic.   Right Ear: External ear normal.   Left Ear: External ear normal.   Nose: Nose normal.   Mouth/Throat: No oropharyngeal exudate.   Eyes: Right eye exhibits no discharge. Left eye exhibits no discharge. No scleral icterus.   Neck: JVD present. No tracheal deviation present.   Left IJ triple lumen catheter   Cardiovascular: Normal rate and regular rhythm.  Exam reveals no friction rub.    Murmur heard.  Pulmonary/Chest: No stridor. No respiratory distress. He has decreased breath sounds in the right lower field and the left lower field. He has no wheezes. He has rhonchi. He has rales. He exhibits no tenderness.   Abdominal: Soft. Bowel sounds are normal. He exhibits distension. There is no tenderness. There is no rebound.   Musculoskeletal: He exhibits edema (Anasarca).   Neurological:   Obtunded, withdraws to pain does not follow command   Skin: Skin is warm. He is not diaphoretic.   LE wounds dressed with clean dressing   Psychiatric: Cognition and memory are impaired.   Vitals reviewed.      Labs:        Invalid input(s): [id number]      Recent Labs      04/02/17   4021   17   SODIUM  123*  124*  125*   POTASSIUM  6.5*  6.3*  6.6*   CHLORIDE  96  97  98   CO2  12*  16*  14*   BUN  129*  144*  143*   CREATININE  3.93*  4.54*  5.22*   CALCIUM  9.1  9.0  9.0     Recent Labs      17   ALTSGPT  29  27  27   ASTSGOT  66*  55*  55*   ALKPHOSPHAT  42  41  51   TBILIRUBIN  5.3*  5.0*  4.7*   PREALBUMIN   --   7.0*   --    GLUCOSE  107*  113*  109*     Recent Labs      17   RBC  4.00*  3.90*  4.16*   HEMOGLOBIN  10.4*  10.3*  10.8*   HEMATOCRIT  35.0*  34.7*  36.2*   PLATELETCT  169  155*  178     Recent Labs      17   WBC  15.9*  14.9*  15.8*   NEUTSPOLYS  83.20*  85.00*  87.00*   LYMPHOCYTES  5.80*  4.90*  3.70*   MONOCYTES  8.50  7.40  7.30   EOSINOPHILS  0.20  0.20  0.10   BASOPHILS  0.40  0.30  0.30   ASTSGOT  66*  55*  55*   ALTSGPT  29  27  27   ALKPHOSPHAT  42  41  51   TBILIRUBIN  5.3*  5.0*  4.7*           Hemodynamics:  Temp (24hrs), Av.9 °C (96.6 °F), Min:35.8 °C (96.5 °F), Max:35.9 °C (96.7 °F)  Temperature: 35.9 °C (96.7 °F)  Pulse  Av.2  Min: 50  Max: 194Heart Rate (Monitored): 86  NIBP: (!) 81/55 mmHg  CVP (mm Hg): (!) 34 MM HG  Respiratory:    Respiration: 15, Pulse Oximetry: 98 %     Work Of Breathing / Effort: Accessory Muscle Use;Grunting;Moderate  RUL Breath Sounds: Rhonchi, RML Breath Sounds: Rhonchi, RLL Breath Sounds: Diminished, BHAVNA Breath Sounds: Rhonchi, LLL Breath Sounds: Diminished  Fluids:    Intake/Output Summary (Last 24 hours) at 17 1626  Last data filed at 17 1000   Gross per 24 hour   Intake 2574.96 ml   Output    302 ml   Net 2272.96 ml     Weight: 116.4 kg (256 lb 9.9 oz)  GI/Nutrition:  No orders of the defined types were placed in this encounter.     Medical Decision Making, by Problem:  Active Hospital Problems    Diagnosis   • Acute on chronic systolic congestive  heart failure, NYHA class 4, present on admission (EF 15-20%) [I50.23]    - dobutamine drip levophed drip     • Hepatitis C virus infection [B19.20]     • Acute renal failure  Hyperkalemia  - worsening  -discussed with  Dr Najjar , patient  started on lasix drip  HD unlikely to change outcome and pt unlikely to tolerate HD           • GIB (gastrointestinal bleeding) [K92.2]  - resolved, monitor cbc  - continue protonix     • Acute delirium [R41.0]  Hepatic encephalopathy  -. Continue with rifaximin and lactulose     • Metabolic acidosis [E87.2]     • Acute respiratory failure (CMS-HCC) [J96.00]  - Continue with RT and supplemental oxygen as needed to keep SpO2 >90  - s/p Therapeutic paracentesis on  4/3 with 4L removed      • Hyponatremia [E87.1]  - stable monitor bmp     • Hyperkalemia [E87.5]  - kayexalate, started on lasix drip   - monitor      • Cellulitis [L03.90]  - completed abx course   -continue wound care   • Shock (CMS-HCC) [R57.9]  - cardiogenic  Continue levophed dobutamine drip f   • LV (left ventricular) mural thrombus (CMS-HCC) [I21.3]  - off  Anticoagulation given  GI bleed.    Overall prognosis guarded pt unlikely to recover from this illness       >Patient is critically ill.   >The patient is having :shock  >The vital organ system that is effected is the:CV  >If untreated there is a high chance of deterioration into: death  >The critical care that I am providing today is: pressor management  >The critical care that has been undertaken is medically complex.   >There has been no overlap in critical care time.   Critical care time not including procedures, no overlap: 35 minutes              Medications reviewed, Labs reviewed and Radiology images reviewed  Holt catheter: Critically Ill - Requiring Accurate Measurement of Urinary Output and Strict Intake and Output During Sepisis or Shock  Central line in place: Vasopressors and Sepsis

## 2017-04-04 NOTE — PROGRESS NOTES
Monitor Summary:    0.24/0.12/0.44  Sinus rhythm 82-94  Rare PVCs and first degree heart block noted

## 2017-04-04 NOTE — DISCHARGE PLANNING
Conference yesterday with PMA, Hospitalist, myself and Son Irvin. POA son Irvin not willing to move forward with comfort care.  Grim prognosis.  Kidney failure.  HD not appropriate per Nephrology.  Oxy mask 5L.

## 2017-04-04 NOTE — PROGRESS NOTES
Son at bedside.   Orders received in rounds not to add any additional vasopressors if/when blood pressure declines.

## 2017-04-04 NOTE — DIETARY
Nutrition Services: Inadequate nutrition >3 days    TF last @ goal on 3/31, then pt had emesis. TF currently @ 40 ml/hr via small bowel Cortrak (goal rate 60 ml/hr). +Abdominal distention. S/p paracentesis yesterday. Pt on levophed @ 30 mcg/min and dobutamine @ 5 mcg/kg/min. Per PMA, no further escalation of care.    TF goal: Peptamen Intense VHP @ 60 ml/hr.    RD following, recommend advance TF as tolerated.

## 2017-04-04 NOTE — PROGRESS NOTES
Hospital Medicine Progress Note, Adult, Complex               Author: Fadi Najjar Date & Time created: 4/4/2017  12:50 PM     Interval History:  Pt with liver failure, sever cardiomyopathy, HCV, developed ORVILLE.      Review of Systems:  Review of Systems   Unable to perform ROS: medical condition       Physical Exam:  Physical Exam   Constitutional: He appears well-developed and well-nourished. He appears lethargic. No distress. Face mask in place.   HENT:   Head: Normocephalic and atraumatic.   Nose: Nose normal.   Eyes: Right eye exhibits no discharge. No scleral icterus.   Neck: Normal range of motion. Neck supple. No tracheal deviation present. No thyromegaly present.   Cardiovascular: Normal rate and regular rhythm.    No murmur heard.  Pulmonary/Chest: No respiratory distress. He has no wheezes.   Abdominal: Soft. Bowel sounds are normal. He exhibits no distension. There is no tenderness.   Neurological: He appears lethargic.   Skin: Skin is warm and dry. He is not diaphoretic. No erythema.   Nursing note and vitals reviewed.      Labs:        Invalid input(s): [id number]      Recent Labs      04/02/17 0545  04/03/17 0413 04/04/17 0315   SODIUM  123*  124*  125*   POTASSIUM  6.5*  6.3*  6.6*   CHLORIDE  96  97  98   CO2  12*  16*  14*   BUN  129*  144*  143*   CREATININE  3.93*  4.54*  5.22*   CALCIUM  9.1  9.0  9.0     Recent Labs      04/02/17 0545  04/03/17 0413  04/04/17 0315   ALTSGPT  29  27  27   ASTSGOT  66*  55*  55*   ALKPHOSPHAT  42  41  51   TBILIRUBIN  5.3*  5.0*  4.7*   PREALBUMIN   --   7.0*   --    GLUCOSE  107*  113*  109*     Recent Labs      04/02/17   0545  04/03/17 0413  04/04/17 0315   RBC  4.00*  3.90*  4.16*   HEMOGLOBIN  10.4*  10.3*  10.8*   HEMATOCRIT  35.0*  34.7*  36.2*   PLATELETCT  169  155*  178     Recent Labs      04/02/17   0545  04/03/17 0413  04/04/17 0315   WBC  15.9*  14.9*  15.8*   NEUTSPOLYS  83.20*  85.00*  87.00*   LYMPHOCYTES  5.80*  4.90*   3.70*   MONOCYTES  8.50  7.40  7.30   EOSINOPHILS  0.20  0.20  0.10   BASOPHILS  0.40  0.30  0.30   ASTSGOT  66*  55*  55*   ALTSGPT  29  27  27   ALKPHOSPHAT  42  41  51   TBILIRUBIN  5.3*  5.0*  4.7*           Hemodynamics:  Temp (24hrs), Av.9 °C (96.6 °F), Min:35.8 °C (96.4 °F), Max:36 °C (96.8 °F)  Temperature: 35.9 °C (96.6 °F)  Pulse  Av.2  Min: 50  Max: 194Heart Rate (Monitored): 86  NIBP: (!) 81/55 mmHg  CVP (mm Hg): (!) 34 MM HG  Respiratory:    Respiration: 15, Pulse Oximetry: 98 %     Work Of Breathing / Effort: Accessory Muscle Use;Grunting;Moderate  RUL Breath Sounds: Rhonchi, RML Breath Sounds: Rhonchi, RLL Breath Sounds: Diminished, BHAVNA Breath Sounds: Rhonchi, LLL Breath Sounds: Diminished  Fluids:    Intake/Output Summary (Last 24 hours) at 17 1250  Last data filed at 17 1000   Gross per 24 hour   Intake 3035.62 ml   Output    308 ml   Net 2727.62 ml     Weight: 116.4 kg (256 lb 9.9 oz)  GI/Nutrition:  No orders of the defined types were placed in this encounter.     Medical Decision Making, by Problem:  Active Hospital Problems    Diagnosis   • Acute on chronic systolic congestive heart failure, NYHA class 4, present on admission (EF 15-20%) [I50.23]   • Hepatitis C virus infection [B19.20]   • Acute renal insufficiency [N28.9]   • Essential hypertension, benign [I10]   • Prolonged Q-T interval on ECG [R94.31]   • GIB (gastrointestinal bleeding) [K92.2]   • Acute delirium [R41.0]   • Metabolic acidosis [E87.2]   • Acute respiratory failure (CMS-HCC) [J96.00]   • Hyponatremia [E87.1]   • Hyperkalemia [E87.5]   • Cellulitis [L03.90]   • Shock (CMS-HCC) [R57.9]   • LV (left ventricular) mural thrombus (CMS-HCC) [I21.3]       Labs reviewed, Medications reviewed and Radiology images reviewed  Holt catheter: Critically Ill - Requiring Accurate Measurement of Urinary Output                1 ORVILLE : oliguric, possible hepatorenal syndrome, Vs ATN.  2 cardiomyopathy  3 volume overload  4  Anemia  5 Hyperkalemia  Plan  Pt is not a candidate for HD because of co morbidities, the possibility of hepatorenal syndrome makes the prognosis very poor and dialysis will not change outcome  Renal dose all meds  Avoid nephrotoxins  Prognosis poor.  Multiorgan systems failure  Try IV Lasix gtt if BP tolerates  D/W Dr Mariely Molina

## 2017-04-05 NOTE — THERAPY
Occupational Therapy Contact Note    Attempted OT tx, RN requesting to put patient on hold from therapies as he is medically unstable to participate.  Will check back and treat as appropriate.    Julianne Portillo, OTR/L

## 2017-04-05 NOTE — CARE PLAN
Problem: Fluid Volume:  Goal: Will maintain balanced intake and output  Outcome: PROGRESSING SLOWER THAN EXPECTED  Pt fluid status monitored with strict I/O documentation, assessment of skin integrity, edema and lung sounds.  Pt severely edematous due to disease process.        Problem: Skin Integrity  Goal: Risk for impaired skin integrity will decrease  Outcome: PROGRESSING SLOWER THAN EXPECTED  Pt risk factors for impaired skin integrity assessed, Jakob score documented in flowsheet. Mepilex placed, heel float boots and pillows in use for support and positioning, Q 2 hr turning and hourly rounding in effect.

## 2017-04-05 NOTE — PROGRESS NOTES
Hospital Medicine Progress Note, Adult, Complex               Author: Gavin Molina  Date & Time created: 4/5/2017  7:29 AM     Interval History:  ID: 51-year-old male with history of dilated cardiomyopathy, EF of 15%, drug abuse, cirrhosis with hepatitis C, chronic kidney disease, left ventricle mural thrombus, obesity, evaluated here   at [facility] with increased shortness of breath, bilateral lower extremity swelling with redness, abdominal distention and pain.  Admitted for c/o decompensated heart failure, Sepsis due to cellulitis of leg, and acute respiratory distress.      Patient seen in the ICU, remains obtunded on pressors.  seizure like episode overnight, anuric BUN trending up      Review of Systems:  Review of Systems   Unable to perform ROS: acuity of condition       Physical Exam:  Physical Exam   Constitutional: He appears well-developed.   HENT:   Head: Normocephalic and atraumatic.   Nose: Nose normal.   Mouth/Throat: No oropharyngeal exudate.   Eyes: Conjunctivae are normal. Right eye exhibits no discharge. Left eye exhibits no discharge. No scleral icterus.   Neck: JVD present. No tracheal deviation present.   Left IJ triple lumen catheter   Cardiovascular: Normal rate and regular rhythm.  Exam reveals no gallop and no friction rub.    Murmur heard.  Pulmonary/Chest: No stridor. No respiratory distress. He has decreased breath sounds in the right lower field and the left lower field. He has rhonchi. He has rales. He exhibits no tenderness.   Abdominal: Soft. Bowel sounds are normal. He exhibits distension. There is no tenderness.   Musculoskeletal: He exhibits edema (3+).   Neurological:   Obtunded, withdraws to pain   does not follow command   Skin: Skin is warm. He is not diaphoretic.   LE wounds dressed with clean dressing   Psychiatric: Cognition and memory are impaired. He is noncommunicative.   Vitals reviewed.      Labs:        Invalid input(s): [id number]      Recent Labs       175   SODIUM  124*  125*  125*   POTASSIUM  6.3*  6.6*  5.3   CHLORIDE  97  98  97   CO2  16*  14*  12*   BUN  144*  143*  143*   CREATININE  4.54*  5.22*  5.58*   CALCIUM  9.0  9.0  8.6     Recent Labs      175   ALTSGPT  27  27  26   ASTSGOT  55*  55*  47*   ALKPHOSPHAT  41  51  56   TBILIRUBIN  5.0*  4.7*  3.8*   PREALBUMIN  7.0*   --    --    GLUCOSE  113*  109*  136*     Recent Labs      17   RBC  3.90*  4.16*  3.83*   HEMOGLOBIN  10.3*  10.8*  10.0*   HEMATOCRIT  34.7*  36.2*  33.6*   PLATELETCT  155*  178  152*     Recent Labs      17   WBC  14.9*  15.8*  13.8*   NEUTSPOLYS  85.00*  87.00*  85.20*   LYMPHOCYTES  4.90*  3.70*  2.60*   MONOCYTES  7.40  7.30  6.10   EOSINOPHILS  0.20  0.10  0.00   BASOPHILS  0.30  0.30  0.90   ASTSGOT  55*  55*  47*   ALTSGPT  27  27  26   ALKPHOSPHAT  41  51  56   TBILIRUBIN  5.0*  4.7*  3.8*           Hemodynamics:  Temp (24hrs), Av.9 °C (96.7 °F), Min:35.9 °C (96.6 °F), Max:36.1 °C (97 °F)  Temperature: 36 °C (96.8 °F)  Pulse  Av.8  Min: 50  Max: 194Heart Rate (Monitored): 88  NIBP: (!) 92/51 mmHg  CVP (mm Hg): (!) 44 MM HG  Respiratory:    Respiration: 16, Pulse Oximetry: 97 %     Work Of Breathing / Effort: Accessory Muscle Use;Grunting;Moderate  RUL Breath Sounds: Rhonchi, RML Breath Sounds: Rhonchi, RLL Breath Sounds: Diminished, BHAVNA Breath Sounds: Rhonchi, LLL Breath Sounds: Diminished  Fluids:    Intake/Output Summary (Last 24 hours) at 17 0729  Last data filed at 17 0600   Gross per 24 hour   Intake 3413.96 ml   Output      5 ml   Net 3408.96 ml     Weight: 116.8 kg (257 lb 8 oz)  GI/Nutrition:  No orders of the defined types were placed in this encounter.     Medical Decision Making, by Problem:  Active Hospital Problems    Diagnosis   • Acute on chronic systolic  congestive heart failure, NYHA class 4, present on admission (EF 15-20%) [I50.23]  Cardiogenic shock    - continue dobutamine and  levophed drips, wean off as toleerated     • Hepatitis C virus infection [B19.20]     • Acute renal failure  Hyperkalemia  - anuric on lasix drip  -discussed with  Dr Najjar  Pt is not a candidate for Hemodialysis             • GIB (gastrointestinal bleeding) [K92.2]  - resolved, monitor cbc  - continue protonix     • Acute delirium [R41.0]  Hepatic and metabolic encephalopathy  -. Continue with rifaximin and lactulose     • Metabolic acidosis [E87.2]     • Acute respiratory failure (CMS-HCC) [J96.00]  - Continue with RT and supplemental oxygen as needed to keep SpO2 >90  - s/p Therapeutic paracentesis on  4/3       • Hyponatremia [E87.1]  - Ma 125 stable monitor bmp     • Hyperkalemia [E87.5]  -improved continue  Kayexalate    - monitor      • Cellulitis [L03.90]  - completed abx course   -continue wound care   * Seizure  -lorazepam PRN discussed with Dr Gonda will not pursue brain imaging as it is unlikely to     • LV (left ventricular) mural thrombus (CMS-HCC) [I21.3]  - off  Anticoagulation given  GI bleed.    Overall prognosis extremely  guarded pt unlikely to recover from this illness and will likely  over next 24-48hours given worsening uremia, discussed with son at bedside he understands gravity of his father's illness but would like to continue cureent level of care, Pt is DNR       >Patient is critically ill.   >The patient is having :shock  >The vital organ system that is effected is the:CV  >If untreated there is a high chance of deterioration into: death  >The critical care that I am providing today is: pressor management  >The critical care that has been undertaken is medically complex.   >There has been no overlap in critical care time.   Critical care time not including procedures, no overlap: 33 minutes              Medications reviewed, Labs  reviewed and Radiology images reviewed  Holt catheter: Critically Ill - Requiring Accurate Measurement of Urinary Output and Strict Intake and Output During Sepisis or Shock  Central line in place: Vasopressors and Sepsis

## 2017-04-05 NOTE — CARE PLAN
Problem: Safety  Goal: Will remain free from injury  Outcome: PROGRESSING AS EXPECTED    Problem: Fluid Volume:  Goal: Will maintain balanced intake and output  Outcome: PROGRESSING AS EXPECTED  Pt fluid status monitored with strict I/O documentation, assessment of skin integrity, edema and lung sounds.         Problem: Skin Integrity  Goal: Risk for impaired skin integrity will decrease  Outcome: PROGRESSING SLOWER THAN EXPECTED  Pt risk factors for impaired skin integrity assessed, Jakob score documented in flowsheet. Mepilex placed, heel float boots and pillows in use for support and positioning, Q 2 hr turning and hourly rounding in effect.

## 2017-04-05 NOTE — CARE PLAN
Problem: Respiratory:  Goal: Respiratory status will improve  Intervention: Assess and monitor pulmonary status  Patient's respiratory status assessed regularly       Problem: Hemodynamic Status  Goal: Vital Signs and Fluid Balance Management  Intervention: Hemodynamic Monitoring  Patient hypotensive on vasopressors

## 2017-04-05 NOTE — PROGRESS NOTES
This RN was at bedside when patient had a seizure. On call PMA Dr. Fajardo paged and gave order for ativan for seizure activity. If ativan ineffective then RN will page MD to update.

## 2017-04-05 NOTE — PROGRESS NOTES
Occupational therapy at bedside.  Plan of care discussed with OT and OT agrees that mobilization is contraindicated at this time.

## 2017-04-05 NOTE — PROGRESS NOTES
Pulmonary Critical Care Progress Note    Chief Complaint: altered level of consciousness, sepsis, CHF exacerbation.       HPI   The patient is a very unfortunate 51-year-old male with a past medical history significant for end-stage heart failure with an ejection fraction less than 15% secondary to cocaine abuse with associated chronic pulmonary edema, intraventricular clot, cirrhosis of the liver as well as pulmonary embolism. He was brought into the emergency department by EMS from the [facility] where he presented to follow up with a new primary care doctor and was found to have abnormal vital signs and EMS was called. Per report, he has had over the last 2-3 weeks, worsening lower extremity as well as abdominal swelling with a 10-pound unintentional weight gain and intermittent confusion. He has also noted increased shortness of breath over the last several days. In the emergency department, patient was started on BiPAP by the emergency physician. We were consulted for assistance in his management. He was maintaining normal blood pressure at the time of my evaluation, but had other signs of decompensated heart failure. A bedside limited ultrasound of his heart revealed a dilated inferior vena cava of 2.2 cm without respiratory variability and biventricular enlargement as well severely depressed function. No obvious pericardial effusion was seen. Of note, he was recently admitted back in January of this year for similar symptoms including decompensated heart failure and he was initially planned [facility] hospice; however, there was some discussion with the nurse per the family that he would follow up with Dr. Ortiz who might have additional options for his improvement and buy him another year or two of life. He had not followed up with her and per his son, they did not even feel all the medications he was discharged on including blood thinners and his Aldactone because of the price and they were hoping to see   Angel beforehand to ask her about it. Patient denies any recent chest pain, no recent travel and he has been sedentary for the last month and a half.      Interval Events:  24 hour interval history reviewed  Seizure overnight  Withdraws to pain. PERRL  Sinus rhythm.  Norepinephrine 30   Dobutamine 5  Lasix 10   97.1 °F TMAX  No antibiotics.     Reason for visit:  Acute on chronic systolic heart failure    PFSH:  No change.    Respiratory:   6L FM  Pulse Oximetry: 97 %  CXR (personally reviewed) with no new imaging for today.   Exam: diffuse coarse crackles throughout, abnormal breathing pattern.   No blood gas for review.      HemoDynamics:  Pulse: 89, Heart Rate (Monitored): 88  NIBP: (!) 92/51 mmHg  CVP (mm Hg): (!) 44 MM HG  Dobutamine 5, levophed 30, CVP 25-35  Lasix 10  Exam: sinus tachycardia, 4/6 murmur. 3+ Diffuse anasarca.   Imaging: CONCLUSIONS  Compared to the images of the prior study done on 01/29/17-  there has   been no significant change.   Severely reduced left ventricular systolic function.  Left ventricular ejection fraction is visually estimated to be 20%.  Global hypokinesis.  Diastolic function is difficult to assess.  Moderately dilated right ventricle.  Pacer/ICD wire seen in right ventricle.  Moderate mitral regurgitation.  Mild tricuspid regurgitation.  Right ventricular systolic pressure is estimated to be 40 mmHg.       Neuro:  More encephalopathic. Less responsive. Grunts and moans with exam. Not following commands.     Fluids:  Intake/Output       04/03/17 0700 - 04/04/17 0659 04/04/17 0700 - 04/05/17 0659 04/05/17 0700 - 04/06/17 0659      1740-0331 9672-6509 Total 5633-3180 5731-7726 Total 6343-3421 5380-8176 Total       Intake    I.V.  888.8  1252.8 2141.6  1130.4  1083.6 2214  --  -- --    Norepinephrine Volume 180.8 344.8 525.6 619.4 675.6 1295 -- -- --    Dobutamine Volume 408 408 816 408 408 816 -- -- --    IV Volume (NS Bolus) 300 300 600 100 -- 100 -- -- --    IV Volume  (albumin) -- -- -- 3 -- 3 -- -- --    IV Piggyback Volume (Antibiotics) -- 200 200 -- -- -- -- -- --    Other  240  -- 240  --  90 90  --  -- --    Medications (P.O./ Enteral Liquids) 240 -- 240 -- 90 90 -- -- --    Enteral  330  480 810  540  570 1110  --  -- --    Enteral Volume 120 480 600 480 480 960 -- -- --    Free Water / Tube Flush 210 -- 210 60 90 150 -- -- --    Total Intake 1458.8 1732.8 3191.6 1670.4 1743.6 3414 -- -- --       Output    Urine  21  0 21  5  -- 5  --  -- --    Indwelling Cathether 21 0 21 5 -- 5 -- -- --    Drains  --  300 300  --  -- --  --  -- --    Nasogastric Tube -- 300 300 -- -- -- -- -- --    Stool  --  -- --  --  -- --  --  -- --    Number of Times Stooled 1 x -- 1 x 3 x -- 3 x -- -- --    Total Output 21 300 321 5 -- 5 -- -- --       Net I/O     1437.8 1432.8 2870.6 1665.4 1743.6 3409 -- -- --        Weight: 116.8 kg (257 lb 8 oz)  Recent Labs      17   SODIUM  124*  125*  125*   POTASSIUM  6.3*  6.6*  5.3   CHLORIDE  97  98  97   CO2  16*  14*  12*   BUN  144*  143*  143*   CREATININE  4.54*  5.22*  5.58*   CALCIUM  9.0  9.0  8.6       GI/Nutrition:  Abd distended with positive fluid shift. Non tender.  NG to suction.  Bowel sounds are hypoactive.     Liver Function  Recent Labs      17   ALTSGPT  27  27  26   ASTSGOT  55*  55*  47*   ALKPHOSPHAT  41  51  56   TBILIRUBIN  5.0*  4.7*  3.8*   PREALBUMIN  7.0*   --    --    GLUCOSE  113*  109*  136*       Heme:  Recent Labs      17   RBC  3.90*  4.16*  3.83*   HEMOGLOBIN  10.3*  10.8*  10.0*   HEMATOCRIT  34.7*  36.2*  33.6*   PLATELETCT  155*  178  152*           Infectious Disease:  Temp  Av.9 °C (96.7 °F)  Min: 35.9 °C (96.6 °F)  Max: 36.1 °C (97 °F)  Reviewed.  Afebrile   No antibiotics.   Recent Labs      17   WBC  14.9*  15.8*  13.8*    NEUTSPOLYS  85.00*  87.00*  85.20*   LYMPHOCYTES  4.90*  3.70*  2.60*   MONOCYTES  7.40  7.30  6.10   EOSINOPHILS  0.20  0.10  0.00   BASOPHILS  0.30  0.30  0.90   ASTSGOT  55*  55*  47*   ALTSGPT  27  27  26   ALKPHOSPHAT  41  51  56   TBILIRUBIN  5.0*  4.7*  3.8*     Current Facility-Administered Medications   Medication Dose Frequency Provider Last Rate Last Dose   • lorazepam (ATIVAN) injection 2 mg  2 mg Q6HRS PRN Zenaida Fajardo M.D.       • omeprazole 2 mg/mL in sodium bicarbonate (PRILOSEC) oral susp 40 mg  40 mg Q12HRS Jeremy M Gonda, M.D.   40 mg at 04/04/17 2028   • furosemide (LASIX) 100 mg in  mL infusion  10 mg/hr Continuous Fadi Najjar, M.D.   Stopped at 04/04/17 1315   • norepinephrine (LEVOPHED) 8 mg in  mL Infusion  0.5-30 mcg/min Continuous Nikhil Overton D.O. 56.3 mL/hr at 04/05/17 0400 30 mcg/min at 04/05/17 0400   • midodrine (PROAMATINE) tablet 5 mg  5 mg TID WITH MEALS Jeremy M Gonda, M.D.   5 mg at 04/04/17 1730   • haloperidol lactate (HALDOL) injection 5 mg  5 mg Q4HRS PRN David Pack M.D.       • DOBUTamine (DOBUTREX) 1 mg/mL premix infusion  5 mcg/kg/min Continuous Nikhil Nunez M.D. 34 mL/hr at 04/05/17 0030 5 mcg/kg/min at 04/05/17 0030   • glucose 4 g chewable tablet 16 g  16 g Q15 MIN PRN Gavin Molina M.D.        And   • dextrose 50% (D50W) injection 25 mL  25 mL Q15 MIN PRN Gavin Molina M.D.       • gabapentin (NEURONTIN) capsule 300 mg  300 mg BID Nikhil Nunez M.D.   300 mg at 04/04/17 2028   • lactulose 20 GM/30ML solution 30 mL  30 mL BID Gavin Molina M.D.   30 mL at 04/04/17 2100   • rifaximin (XIFAXAN) tablet 550 mg  550 mg BID Everton Paredes M.D.   550 mg at 04/04/17 2028   • oxycodone immediate-release (ROXICODONE) tablet 2.5-5 mg  2.5-5 mg Q4HRS PRN Alexia Munoz, D.OLoulou   2.5 mg at 04/01/17 0212   • Respiratory Care per Protocol   Continuous RT Alexis Hamilton M.D.       • ondansetron (ZOFRAN) syringe/vial  injection 4 mg  4 mg Q4HRS PRN Alexis Hamilton M.D.   4 mg at 03/20/17 1821   • ondansetron (ZOFRAN ODT) dispertab 4 mg  4 mg Q4HRS PRN Alexis Hamilton M.D.       • promethazine (PHENERGAN) tablet 12.5-25 mg  12.5-25 mg Q4HRS PRN Alexis Hamilton M.D.       • promethazine (PHENERGAN) suppository 12.5-25 mg  12.5-25 mg Q4HRS PRN Alexis Hamilton M.D.       • prochlorperazine (COMPAZINE) injection 5-10 mg  5-10 mg Q4HRS PRN Alexis Hamilton M.D.       • acetaminophen (TYLENOL) tablet 650 mg  650 mg Q6HRS PRN Alexis Hamilton M.D.   650 mg at 03/20/17 0838   • senna-docusate (PERICOLACE or SENOKOT S) 8.6-50 MG per tablet 2 Tab  2 Tab BID Alexis Hamilton M.D.   2 Tab at 04/04/17 2100    And   • polyethylene glycol/lytes (MIRALAX) PACKET 1 Packet  1 Packet QDAY PRN Alexis Haimlton M.D.        And   • magnesium hydroxide (MILK OF MAGNESIA) suspension 30 mL  30 mL QDAY PRN Alexis Hamilton M.D.        And   • bisacodyl (DULCOLAX) suppository 10 mg  10 mg QDAY PRN Alexis Hamilton M.D.       • POLYMEM ALGINATE DRESSING PADS 1 Each  1 Each QDAY PRN Jeremy M Gonda, M.D.         Last reviewed on 3/13/2017  5:30 PM by Loi Ponce    Quality  Measures:  Labs reviewed, Medications reviewed and Radiology images reviewed  Holt catheter: Critically Ill - Requiring Accurate Measurement of Urinary Output  Central line in place: Need for access    DVT Prophylaxis: Contraindicated - High bleeding risk  DVT prophylaxis - mechanical: SCDs  Ulcer prophylaxis: Not indicated    Assessed for rehab: Patient unable to tolerate rehabilitation therapeutic regimen      Assessment and Plan:  Acute on chronic hypoxemic respiratory failure - unchanged   - cont O2/RT protocols  Acute on chronic systolic heart failure   - EF 15-20%   - cont dobutamine gtt  Shock - combined cardiogenic with vasodistributive    - cont levo + dobutamine, midodrine   Acute cardiogenic pulmonary edema - kidneys unresponsive to lasix, worsening  Acute metabolic/toxic encephalopathy -  worsening   - lactulose and rifaximin  Sepsis - skin source  Bilateral lower extremity cellulitis with open ulcerations   - completed Zosyn per ID  Thrombocytopenia  Acute on chronic kidney disease - anuric with associated hyperkalemia acidosis and fluid overload   - renal consulted and risks>benefits of HD, continue medical management  Hepatitis C cirrhosis  Blood in stool - all anticoagulation stopped   - follow Hgb with conservative transfusion strategy  Acute pancreatitis   LV thrombus - anticoagulation now contraindicated due to blood in stool  Prophylaxis, TFs, DNR/DNI, dismal prognosis with likely death in 1-2 days    Critical Care Time:  31 minutes  12541  No time overlap  Time excludes procedures  Discussed with RN, RT, Team, family    IMagaly (Scranastasiya), am scribing for, and in the presence of, Dr. Gonda M.D.   Electronically signed by: Magaly Cortez (Lisseth), 4/5/2017  I, Dr. Gonda, M.D. personally performed the services described in this documentation, as scribed by Maagly Cortez in my presence, and it is both accurate and complete.

## 2017-04-05 NOTE — PROGRESS NOTES
Hospital Medicine Progress Note, Adult, Complex               Author: Fadi Najjar Date & Time created: 4/5/2017  11:38 AM     Interval History:  Pt with liver failure, sever cardiomyopathy, HCV, developed ORVILLE.    Review of Systems:  Review of Systems   Unable to perform ROS: medical condition       Physical Exam:  Physical Exam   Constitutional: He appears well-developed and well-nourished. He appears lethargic. No distress. Face mask in place.   HENT:   Head: Normocephalic and atraumatic.   Nose: Nose normal.   Eyes: Right eye exhibits no discharge. No scleral icterus.   Neck: Normal range of motion. Neck supple. No tracheal deviation present. No thyromegaly present.   Cardiovascular: Normal rate and regular rhythm.    No murmur heard.  Pulmonary/Chest: No respiratory distress. He has no wheezes.   Abdominal: Soft. Bowel sounds are normal. He exhibits no distension. There is no tenderness.   Neurological: He appears lethargic.   Skin: Skin is warm and dry. He is not diaphoretic. No erythema.   Nursing note and vitals reviewed.      Labs:        Invalid input(s): [id number]      Recent Labs      04/03/17 0413 04/04/17 0315 04/05/17 0455   SODIUM  124*  125*  125*   POTASSIUM  6.3*  6.6*  5.3   CHLORIDE  97  98  97   CO2  16*  14*  12*   BUN  144*  143*  143*   CREATININE  4.54*  5.22*  5.58*   CALCIUM  9.0  9.0  8.6     Recent Labs      04/03/17 0413 04/04/17 0315 04/05/17 0455   ALTSGPT  27  27  26   ASTSGOT  55*  55*  47*   ALKPHOSPHAT  41  51  56   TBILIRUBIN  5.0*  4.7*  3.8*   PREALBUMIN  7.0*   --    --    GLUCOSE  113*  109*  136*     Recent Labs      04/03/17 0413 04/04/17 0315 04/05/17   0455   RBC  3.90*  4.16*  3.83*   HEMOGLOBIN  10.3*  10.8*  10.0*   HEMATOCRIT  34.7*  36.2*  33.6*   PLATELETCT  155*  178  152*     Recent Labs      04/03/17 0413 04/04/17 0315 04/05/17   0455   WBC  14.9*  15.8*  13.8*   NEUTSPOLYS  85.00*  87.00*  85.20*   LYMPHOCYTES  4.90*  3.70*  2.60*    MONOCYTES  7.40  7.30  6.10   EOSINOPHILS  0.20  0.10  0.00   BASOPHILS  0.30  0.30  0.90   ASTSGOT  55*  55*  47*   ALTSGPT  27  27  26   ALKPHOSPHAT  41  51  56   TBILIRUBIN  5.0*  4.7*  3.8*           Hemodynamics:  Temp (24hrs), Av °C (96.8 °F), Min:35.9 °C (96.6 °F), Max:36.2 °C (97.1 °F)  Temperature: 36.2 °C (97.1 °F)  Pulse  Av.7  Min: 50  Max: 194Heart Rate (Monitored): 94  NIBP: (!) 87/59 mmHg  CVP (mm Hg): (!) 50 MM HG  Respiratory:    Respiration: (!) 23, Pulse Oximetry: 95 %     Work Of Breathing / Effort: Accessory Muscle Use;Grunting;Moderate  RUL Breath Sounds: Rhonchi, RML Breath Sounds: Rhonchi, RLL Breath Sounds: Diminished, BHAVNA Breath Sounds: Rhonchi, LLL Breath Sounds: Diminished  Fluids:    Intake/Output Summary (Last 24 hours) at 17 1138  Last data filed at 17 1000   Gross per 24 hour   Intake 3427.2 ml   Output      5 ml   Net 3422.2 ml     Weight: 116.8 kg (257 lb 8 oz)  GI/Nutrition:  No orders of the defined types were placed in this encounter.     Medical Decision Making, by Problem:  Active Hospital Problems    Diagnosis   • Acute on chronic systolic congestive heart failure, NYHA class 4, present on admission (EF 15-20%) [I50.23]   • Hepatitis C virus infection [B19.20]   • Acute renal insufficiency [N28.9]   • Essential hypertension, benign [I10]   • Prolonged Q-T interval on ECG [R94.31]   • GIB (gastrointestinal bleeding) [K92.2]   • Acute delirium [R41.0]   • Metabolic acidosis [E87.2]   • Acute respiratory failure (CMS-HCC) [J96.00]   • Hyponatremia [E87.1]   • Hyperkalemia [E87.5]   • Cellulitis [L03.90]   • Shock (CMS-HCC) [R57.9]   • LV (left ventricular) mural thrombus (CMS-HCC) [I21.3]       Labs reviewed, Medications reviewed and Radiology images reviewed  Holt catheter: Critically Ill - Requiring Accurate Measurement of Urinary Output                1 ORVILLE : oliguric, possible hepatorenal syndrome, Vs ATN.  2 cardiomyopathy  3 volume overload  4  Anemia  5 Hyperkalemia  Plan  Pt is not a candidate for HD because of co morbidities, the possibility of hepatorenal syndrome makes the prognosis very poor and dialysis will not change outcome  Renal dose all meds  Avoid nephrotoxins  Prognosis poor.  Multiorgan systems failure  Try IV Lasix gtt if BP tolerates  We will sign off the case, please call if needed  D/W Dr Mariely Molina, Dr Gonda

## 2017-04-05 NOTE — PROGRESS NOTES
Received report from RIYA Elmore. Patient in bed with low, but stable BP and on oxymask with stable vitals. Bed in low position and bed alarm in use. Family at bedside

## 2017-04-06 NOTE — THERAPY
Speech Therapy Contact Note:  EMR notes reporting patient with bleeding from mouth and tube. RN reporting to hold all treatments today.

## 2017-04-06 NOTE — PALLIATIVE CARE
Palliative Social Work    Spoke with son, Irvin, via phone and scheduled to meet with him today at 1130am.

## 2017-04-06 NOTE — PROGRESS NOTES
Dr. Fajardo at bedside. Physician explained to family that patient is past the point of getting better and that decisions must be made to either continue with care or to let the patient cease living naturally

## 2017-04-06 NOTE — PROGRESS NOTES
Hospital Medicine Progress Note, Adult, Complex               Author: Gavin Molina  Date & Time created: 4/6/2017  7:28 AM     Interval History:  ID: 51-year-old male with history of dilated cardiomyopathy, EF of 15%, drug abuse, cirrhosis with hepatitis C, chronic kidney disease, left ventricle mural thrombus, obesity, evaluated here   at [facility] with increased shortness of breath, bilateral lower extremity swelling with redness, abdominal distention and pain.  Admitted for c/o decompensated heart failure, Sepsis due to cellulitis of leg, and acute respiratory distress.      Patient seen in the ICU obtunded on pressors with agonal breathing  seizure overnight, anuric with worsening renal failure, low BP      Review of Systems:  Review of Systems   Unable to perform ROS: acuity of condition       Physical Exam:  Physical Exam   Constitutional: He appears well-developed.   HENT:   Head: Normocephalic and atraumatic.   Right Ear: External ear normal.   Nose: Nose normal.   Mouth/Throat: No oropharyngeal exudate.   Eyes: Right eye exhibits no discharge. Left eye exhibits no discharge. No scleral icterus.   Neck: Neck supple. JVD present. No tracheal deviation present.   Left IJ triple lumen catheter   Cardiovascular: Normal rate and regular rhythm.    Murmur heard.  Pulmonary/Chest: Accessory muscle usage present. No stridor. He has decreased breath sounds in the right lower field and the left lower field. He has rhonchi. He has rales. He exhibits no tenderness.   Abdominal: Soft. Bowel sounds are normal. He exhibits distension. There is no tenderness. There is no rebound and no guarding.   Musculoskeletal: He exhibits edema (3+).   Neurological:   Obtunded,  does not follow command   Skin: Skin is warm. He is not diaphoretic.   LE wounds dressed    Psychiatric: Cognition and memory are impaired. He is noncommunicative. He exhibits abnormal recent memory and abnormal remote memory.   Vitals reviewed.      Labs:         Invalid input(s): [id number]      Recent Labs      17   0340   SODIUM  125*  125*  127*   POTASSIUM  6.6*  5.3  5.8*   CHLORIDE  98  97  98   CO2  14*  12*  14*   BUN  143*  143*  155*   CREATININE  5.22*  5.58*  6.60*   CALCIUM  9.0  8.6  7.7*     Recent Labs      17   034   ALTSGPT  27  26  25   ASTSGOT  55*  47*  48*   ALKPHOSPHAT  51  56  55   TBILIRUBIN  4.7*  3.8*  3.1*   GLUCOSE  109*  136*  99     Recent Labs      17   034   RBC  4.16*  3.83*  2.94*   HEMOGLOBIN  10.8*  10.0*  7.9*   HEMATOCRIT  36.2*  33.6*  26.6*   PLATELETCT  178  152*  132*     Recent Labs      17   034   WBC  15.8*  13.8*  12.6*   NEUTSPOLYS  87.00*  85.20*  85.30*   LYMPHOCYTES  3.70*  2.60*  3.40*   MONOCYTES  7.30  6.10  9.10   EOSINOPHILS  0.10  0.00  0.20   BASOPHILS  0.30  0.90  0.10   ASTSGOT  55*  47*  48*   ALTSGPT  27  26  25   ALKPHOSPHAT  51  56  55   TBILIRUBIN  4.7*  3.8*  3.1*           Hemodynamics:  Temp (24hrs), Av.2 °C (97.1 °F), Min:35.7 °C (96.3 °F), Max:36.5 °C (97.7 °F)  Temperature: 36 °C (96.8 °F)  Pulse  Av.3  Min: 50  Max: 194Heart Rate (Monitored): 83  NIBP: (!) 69/39 mmHg  CVP (mm Hg): (!) 43 MM HG  Respiratory:    Respiration: 17, Pulse Oximetry: 97 %     Work Of Breathing / Effort: Accessory Muscle Use;Grunting;Moderate  RUL Breath Sounds: Rhonchi, RML Breath Sounds: Rhonchi, RLL Breath Sounds: Diminished, BHAVNA Breath Sounds: Rhonchi, LLL Breath Sounds: Diminished  Fluids:    Intake/Output Summary (Last 24 hours) at 17  Last data filed at 17   Gross per 24 hour   Intake 2016.45 ml   Output    310 ml   Net 1706.45 ml        GI/Nutrition:  No orders of the defined types were placed in this encounter.     Medical Decision Making, by Problem:  Active Hospital Problems    Diagnosis   • Acute on chronic systolic  congestive heart failure, NYHA class 4, present on admission (EF 15-20%) [I50.23]  Cardiogenic shock    - continue dobutamine and  levophed drips     • Hepatitis C virus infection [B19.20]     • Acute renal failure  Hyperkalemia  - anuric, lasix drip stopped  -evaluated by   Dr Najjar  Pt is not a candidate for Hemodialysis             • GIB (gastrointestinal bleeding) [K92.2]  - monitor cbc  - continue protonix     • Acute delirium [R41.0]  Hepatic and metabolic encephalopathy  -. rifaximin and lactulose     • Metabolic acidosis [E87.2]     • Acute respiratory failure (CMS-HCC) [J96.00]  - Continue with RT and supplemental oxygen as needed to keep SpO2 >90  - s/p Therapeutic paracentesis on  4/3    -Pt id DNR/DNI       • Hyponatremia [E87.1]  -  Na 127monitor       • Hyperkalemia [E87.5]  -  Kayexalate    - monitor      • Cellulitis [L03.90]  - completed abx course   -continue wound care   * Seizure  Add keppra  -lorazepam PRN     • LV (left ventricular) mural thrombus (CMS-HCC) [I21.3]  - off  Anticoagulation given  GI bleed and worsening anemia     Overall prognosis extremely  guarded pt likely to  over next 24 hours       >Patient is critically ill.   >The patient is having :shock  >The vital organ system that is effected is the:CV  >If untreated there is a high chance of deterioration into: death  >The critical care that I am providing today is: pressor management  >The critical care that has been undertaken is medically complex.   >There has been no overlap in critical care time.   Critical care time not including procedures, no overlap: 32 minutes              Medications reviewed, Labs reviewed and Radiology images reviewed  Holt catheter: Critically Ill - Requiring Accurate Measurement of Urinary Output and Strict Intake and Output During Sepisis or Shock  Central line in place: Vasopressors and Sepsis

## 2017-04-06 NOTE — DISCHARGE PLANNING
"Palliative Social Work    Rounded back on pt.  Many family members at bedside.  Irvin not present.  Per RN, family still not agreeable to comfort care.  \"Helpful Information During this Difficult Time\" and mortuary list handouts provided to RN to give to family if pt expires.  Will round back tomorrow to check in with family.  "

## 2017-04-06 NOTE — PROGRESS NOTES
Pt remains nonresponsive, agonal breathing and hypotensive this morning.  Discussed my concerns with Dr. Gonda and no orders for further pressor medication received.  Pt is a DNR.  Family not ready to go comfort cares at this time.  Pt appears comfortable and in no distress.

## 2017-04-06 NOTE — PROGRESS NOTES
When cleaning up pt and he was repositioned blood started coming from pt mouth.  Blood is already noted from gastric tube and around henao site.  Dr. Gonda notified.  Family in room and discussed with Irvin son and POA that pt bleeding and is agonal breathing and unresponsive.  Still no decision to make comfort care, but son allowed me to stop tube feedings at this time as pt stomach is very firm.  Will continue to monitor closely.

## 2017-04-06 NOTE — PROGRESS NOTES
Pt becoming more hypotensive, family at bedside.  Pt has had increased agonal breathing.  Morphine administered.  Family requested pastoral care.  Pt and family will continue to be monitored closely.

## 2017-04-06 NOTE — CARE PLAN
Problem: Safety  Goal: Will remain free from injury  Outcome: PROGRESSING AS EXPECTED  Patient has remained free from injury

## 2017-04-06 NOTE — PALLIATIVE CARE
Spiritual Care Note           Patient's Name: Obinna Grande   MRN: [id number]    Age and Gender: 51 y.o. male   YOB: 1965   Place of Residence:    Family/Friends/Others Present: 7 family   Clinical Team Present:    Unit:    Room (and Bed): T[facility]   Ethnicity or Nationality:    Primary Language:    Medical Diagnosis/Procedure:    Baptism Affiliation: Baptism   Code Status: DNR    Date of Admission: 3/13/2017    Length of Stay: 24 days   Date of Visit: 04/06/2017   Length of Visit: 10 minutes       Situation/Reason for Visit:      Background:      Receptivity to Visit:      Observations:      Summary of Conversation with Patient and/or Family/Friends/Others:      Assessment of Cultural/Social/Emotional/Spiritual Issues:      Interventions:      Outcomes:      Consultations/Referrals:      Requests/Recommendations:      Continuing Care:        Contact Information:  Chaplain KIMO Renteria  [phone]   [email or web address]

## 2017-04-06 NOTE — DISCHARGE PLANNING
"Palliative Social Work    This SW and PC RNSindy, rounded on pt.  Present in the room was son/DPOA, Irvin, and pts wife.  Pt is obtunded and unresponsive with agonal breathing.  Irvin and wife were both at bedside and were very tearful.  Irvin realizes that pts prognosis is poor.  Addressed comfort care and Irvin stated, \"The nurse just gave him morphine.  My father told me he did not want comfort care so I do not want to do that.\"  Sindy provided Irvin with education about comfort care.  Irvin feels that pt is not in any pain per his conversation with the RN but said if he appears to be in more pain throughout the day he might reconsider.  Offered spiritual care and wife requested a .  Message left for  Brent.  Informed Irvin and wife that this SW is available throughout the day to provide support.  Will round back later this afternoon.  Followed up with RNJoanne, and RN Isaac PARDO.  "

## 2017-04-06 NOTE — DIETARY
Nutrition Services: Weekly TF update    TF was @ 40 ml/hr this morning, now stopped due to pt's firm and distended abdomen; blood coming from pt's mouth and feeding tube. TF last @ goal on 3/31 (inadequate nutrition >5 days). Per discussion in Rounds, pt is not expected to survive much longer.     RD following, but agree with hold/discontinuation of TF.

## 2017-04-06 NOTE — PROGRESS NOTES
Dr. Fajardo made aware of lowering blood pressure in patient and informed that day nurse said that patient will not be placed on any other pressors. She told the nurse that since that aspect of care has already been addressed then she would comply with what was said by physicians earlier in the day. No new orders made at this time.

## 2017-04-06 NOTE — CARE PLAN
Problem: Respiratory:  Goal: Respiratory status will improve  Outcome: PROGRESSING AS EXPECTED  Patient has not needed to be put on higher level of oxygen

## 2017-04-07 NOTE — DISCHARGE SUMMARY
TIME OF DEATH:  .    CAUSE OF DEATH:  Acute respiratory failure secondary to acute on chronic   systolic congestive heart failure secondary to history of substance abuse.    OTHER COMORBIDITIES:  Hepatitis C infection, lower extremity cellulitis,   gastrointestinal bleed, acute delirium with hepatic and metabolic   encephalopathy, hyponatremia, hyperkalemia, and acute renal failure likely   secondary to hepatorenal syndrome and seizures, history of left ventricular   mural thrombus.    CONSULTANTS:  Dr. Najjar from nephrology, Dr. Brumfield from infectious disease,   Dr. Rolon from cardiology, Dr. Gonda from critical care.    HOSPITAL COURSE:  The patient is a 50-year-old male with history of severe   congestive heart failure with an ejection fraction of 15%, secondary to   previous substance abuse as well as liver cirrhosis and history of pulmonary   embolism and intraventricular clot who was admitted with lower extremity   cellulitis, CHF exacerbation and anasarca.  He was on pressors, antibiotics   and had a prolonged hospitalization course.  He was clinically not responding   to treatment and was critically ill with very guarded prognosis with his   severe end-stage cardiomyopathy.  After discussion with the family and the   patient's code status was changed to DNR.  He continued to progressively   deteriorate and went into renal failure.  He was evaluated by nephrology not   felt to be a candidate for hemodialysis.  He progressively got worse and   .       ____________________________________     MD TRINIDAD NICOLE / [facility]    DD:  2017 07:17:07  DT:  2017 07:30:56    D#:  [id number]  Job#:  [id number]

## 2017-04-07 NOTE — PROGRESS NOTES
Pt at the end, heart rate is 0, but with some continued ectopy.  Discussed with family that pt is trying to let go and the pressor medication is assisting with the continued ectopy.  Family allowed all pressor medication to be turned off.  Pt and will continue to be monitored closely and family supported as they request.

## 2017-04-07 NOTE — CARE PLAN
Problem: Safety  Goal: Will remain free from injury  Outcome: PROGRESSING AS EXPECTED  Pt has had no falls throughout hospital stay.    Problem: Respiratory:  Goal: Respiratory status will improve  Outcome: PROGRESSING SLOWER THAN EXPECTED

## 2017-04-07 NOTE — CARE PLAN
Problem: Bowel/Gastric:  Goal: Normal bowel function is maintained or improved  Outcome: PROGRESSING SLOWER THAN EXPECTED  Pt having bloody loose bowel movements.

## 2017-07-07 NOTE — PROGRESS NOTES
Infectious Disease Progress Note    Author: Bing Brumfield M.D. DOS & Time created: 3/28/2017  8:43 AM    Chief Complaint   Patient presents with   • Peripheral Edema   • Weight Gain     10 lbs weight gain over the last 2 weeks   • Other     CHF exaserbation. Not compliant with medications.    • ALOC     Disoriented to event and time.    FU for bilateral LE cellulitis    Interval History:  3/17 AF, no CBC, lethargic but arousable to voice then quickly falls back to sleep  3/18 AF, WBC 15.5, remains on pressors, more awake and alert today, complaining of abd pain and leg pain, recent wound care photos reviewed  3/19 AF, WBC 22.9, white count increasing, pulled out RIJ and left IJ inserted o/n. Off levophed  3/20 AF WBC 23 on dobutamine-verbalizing but obtunded Pain in legs  3/21 AF WBC 23 yesterdays's projectile vomiting feculent material noted  3/22 AF WBC 22 copious secretions/sputum  3/23 AF WBC 17 no new +cxs remains obtunded  3/24 AF WBC 17.4 more alert today-asking for soda  3/25 AF WBC 13.4 no acute events-choked on water  3/26 AF WBC 10.9 asking for pain meds-confused  3/27 AF, WBC 12.4, feels short of breath but stating 96% on NC, remains on dobutamine  3/28 AF, WBC 16, no clinical change from yesterday, asking when he can go home  Labs Reviewed, Medications Reviewed, Radiology Reviewed and Wound Reviewed.    Review of Systems:  Review of Systems   Unable to perform ROS: medical condition   Constitutional: Negative for fever.   Musculoskeletal: Positive for myalgias.        Both legs   All other systems reviewed and are negative.      Hemodynamics:  Temp (24hrs), Av.2 °C (97.2 °F), Min:35.7 °C (96.3 °F), Max:36.7 °C (98.1 °F)  Temperature: (!) 35.7 °C (96.3 °F)  Pulse  Av.4  Min: 50  Max: 194Heart Rate (Monitored): (!) 114  NIBP: 106/78 mmHg      Physical Exam:  Physical Exam   Constitutional: He appears well-developed.   Chronically ill Obese   HENT:   Head: Normocephalic and atraumatic.   NGT  [facility] needs a refill on Diabetic test strips. The caller stated it is okay for clinical staff to leave a detailed message on the patient's voice mail with their results or other medical information.     Phone number to leave message on [phone]      Eyes: EOM are normal. Pupils are equal, round, and reactive to light. Scleral icterus is present.   Neck: Neck supple.   Left IJ   Cardiovascular:   Murmur heard.  Tachy   Pulmonary/Chest: Effort normal. No respiratory distress. He has no wheezes.   Abdominal: Soft. He exhibits distension. There is no tenderness. There is no rebound and no guarding.   Musculoskeletal: He exhibits edema.   Bilateral LE erythema and edema. Improving  Ruptured bullae-serous drainage, superficial  Pitting edema to thigh   Neurological: He is alert.   Skin: There is erythema.   decreased   Nursing note and vitals reviewed.      Labs:  Recent Labs      03/26/17 0420 03/27/17 0415 03/28/17   0528   WBC  10.9*  12.4*  16.0*   RBC  4.56*  4.86  4.48*   HEMOGLOBIN  11.5*  12.3*  11.5*   HEMATOCRIT  36.4*  39.6*  36.8*   MCV  79.8*  81.5  82.1   MCH  25.2*  25.3*  25.7*   RDW  64.4*  68.7*  71.6*   PLATELETCT  110*  130*  152*   MPV  10.6  10.5  11.1   NEUTSPOLYS  70.40  73.90*  75.60*   LYMPHOCYTES  11.30*  5.30*  10.40*   MONOCYTES  6.10  5.20  7.60   EOSINOPHILS  1.80  2.60  0.90   BASOPHILS  1.70  2.60*  1.00   RBCMORPHOLO  Present  Present   --      Recent Labs      03/26/17 0420 03/27/17 0415 03/28/17   0528   SODIUM  130*  131*  127*   POTASSIUM  4.0  4.6  4.7   CHLORIDE  101  101  99   CO2  21  18*  17*   GLUCOSE  127*  132*  122*   BUN  45*  52*  60*     Recent Labs      03/26/17 0420 03/27/17 0415  03/28/17   0528   ALBUMIN  2.7*  3.0*  2.9*   TBILIRUBIN  5.3*  5.6*  5.5*   ALKPHOSPHAT  37  46  42   TOTPROTEIN  6.9  7.7  7.5   ALTSGPT  23  21  21   ASTSGOT  42  54*  53*   CREATININE  1.19  1.31  1.48*       BLOOD CULTURE   Date Value Ref Range Status   03/13/2017 No growth after 5 days of incubation.  Final     BLOOD CULTURE HOLD   Date Value Ref Range Status   02/09/2014 Collected  Final      Results     ** No results found for the last 168 hours. **          Fluids:  Intake/Output       03/26/17 0700 - 03/27/17  0659 03/27/17 0700 - 03/28/17 0659 03/28/17 0700 - 03/29/17 0659      [id number] 1753-7667 Total 9331-8792 8530-3535 Total 0700-1859 1900-0659 Total       Intake    I.V.  660  860 1520  952.9  880 1832.9  --  -- --    Heparin Volume 252 252 504 344.9 372 716.9 -- -- --    Dobutamine Volume 408 408 816 408 408 816 -- -- --    IV Piggyback Volume (Antibiotics) -- 200 200 200 100 300 -- -- --    Other  240  90 330  150  90 240  --  -- --    Medications (P.O./ Enteral Liquids) 240 90 330 150 90 240 -- -- --    Enteral  810  780 1590  1170  780 1950  --  -- --    Enteral Volume   -- -- --    Free Water / Tube Flush 90 60 150 210 60 270 -- -- --    Total Intake 1710 1730 3440 2272.9 1750 4022.9 -- -- --       Output    Urine  950  740 1690  575  315 890  --  -- --    Indwelling Cathether  575 315 890 -- -- --    Drains  --  -- --  --  0 0  --  -- --    Residual Amount (ml) (Discarded) -- -- -- -- 0 0 -- -- --    Stool  --  -- --  --  -- --  --  -- --    Number of Times Stooled -- -- -- -- 2 x 2 x -- -- --    Total Output  575 315 890 -- -- --       Net I/O      1697.9 1435 3132.9 -- -- --        DX-CHEST-PORTABLE (1 VIEW) (Final result) Result time: 03/21/17 03:06:06     Final result by Anika Saavedra M.D. (03/21/17 03:06:06)     Impression:     1.  Pulmonary edema and/or infiltrates are identified, which appear somewhat increased since the prior exam.  2.  Cardiomegaly  3.  Atherosclerosis   Weight: 113.4 kg (250 lb)    Assessment:  Active Hospital Problems    Diagnosis   • Acute renal insufficiency [N28.9]   • Acute on chronic systolic congestive heart failure, NYHA class 4, present on admission (EF 15-20%) [I50.23]   • Hepatitis C virus infection [B19.20]   • Cellulitis [L03.90]   • Shock (CMS-HCC) [R57.9]   • LV (left ventricular) mural thrombus (CMS-HCC) [I21.3]       Plan:  Bilateral lower extremity cellulitis with bulla, improved  Wound cx - mixed skin  patricia  Healing shallow bullae  Continue zosyn. Anticipate 2 weeks. Stop date 03/29/17  Wound care    Possible aspiration pneumonitis after emesis  NGT placed  Checked lipase- elevated  Keep meds IV  Resp status currently stable  Abx as above    Cardiogenic shock/PM/AICD  Remains on dobutamine  2/2 med noncompliance with hx cocaine-induced dilated CM EF 15%  Noncompliant with water restriction    Leukocytosis, increased today  Multifactorial  Abx per above  Monitor    Hepatitis C/cirrhosis  Contributing to low platelets, electrolyte derangements, and immunocompromised state  Elevated ammonia  +ascites  [place]  Improved  Creat 1.35  Avoid nephrotoxins    Prognosis - poor    Appreciate Palliative care/hospice evjosué GIRARD IM

## 2020-01-28 NOTE — PROGRESS NOTES
Report received from night shift RN. Patient remains in critical condition.  Answers yes/no questions. Close observation maintained.    Spoke with a [employer] representative regarding below and she was made aware that the patient had been switched to vials and the PA can be closed.

## 2020-05-06 NOTE — CARE PLAN
Problem: Knowledge Deficit  Goal: Knowledge of disease process/condition, treatment plan, diagnostic tests, and medications will improve  Outcome: PROGRESSING AS EXPECTED  Conducted pt and family education before, during and after each medication administration and intervention     Problem: Pain Management  Goal: Pain level will decrease to patient’s comfort goal  Outcome: PROGRESSING AS EXPECTED  Administered analgesics once during shift. Pt has pain with movement and/or manipulation of either of the lower extremities.      Problem: Fluid Volume:  Goal: Will maintain balanced intake and output  Outcome: PROGRESSING AS EXPECTED  Monitor labs, wt and physical assessments for S/Sx of fluid volume imbalance.     Problem: Skin Integrity  Goal: Risk for impaired skin integrity will decrease  Outcome: PROGRESSING AS EXPECTED  Dsg changed twice on the LLE and once on the RLE. Dsg(s) cover ruptured bullae.          spontaneous

## 2022-09-27 NOTE — PROGRESS NOTES
Received bedside report. No new issues reported. Pt resting in bed. Yelling at times. No family present at this time.   [Patient] : patient [Mother] : mother

## 2024-12-02 NOTE — THERAPY
Physical Therapy Contact Note    Pt remains inappropriate for skilled physical therapy intervention at this time; will sign off, please reconsult when pt following commands and can actively participate in mobility/exercise as appropriate in plan of care.     Ana Woods PT, DPT Pager: [phone]   Congruent

## 2025-03-07 NOTE — CARE PLAN
[facility] EMERGENCY DEPARTMENT  [address] [address]  Dept: [phone]      EMTALA TRANSFER CONSENT    NAME Ubaldo Trimble                                         2012                              MRN [id number]    I have been informed of my rights regarding examination, treatment, and transfer   by Dr. Miguelito Villalobos DO    Benefits: Specialized equipment and/or services available at the receiving facility (Include comment)________________________ ([facility])    Risks: Potential for delay in receiving treatment, Possible worsening of condition or death during transfer      Consent for Transfer:  I acknowledge that my medical condition has been evaluated and explained to me by the emergency department physician or other qualified medical person and/or my attending physician, who has recommended that I be transferred to the service of    at Accepting Facility Name, City & State : [facility]. The above potential benefits of such transfer, the potential risks associated with such transfer, and the probable risks of not being transferred have been explained to me, and I fully understand them.  The doctor has explained that, in my case, the benefits of transfer outweigh the risks.  I agree to be transferred.    I authorize the performance of emergency medical procedures and treatments upon me in both transit and upon arrival at the receiving facility.  Additionally, I authorize the release of any and all medical records to the receiving facility and request they be transported with me, if possible.  I understand that the safest mode of transportation during a medical emergency is an ambulance and that the Hospital advocates the use of this mode of transport. Risks of traveling to the receiving facility by car, including absence of medical control, life sustaining equipment, such as oxygen, and medical personnel has been explained to me and I fully understand  Problem: Urinary Elimination:  Goal: Ability to reestablish a normal urinary elimination pattern will improve  Intervention: Evaluate need to continue indwelling urinary catheter  Pt not responding or following any commands. Passive movements only by clinical staff. Unable to ambulate or use urinal.           them.    (AFUA CORRECT BOX BELOW)  [  ]  I consent to the stated transfer and to be transported by ambulance/helicopter.  [  ]  I consent to the stated transfer, but refuse transportation by ambulance and accept full responsibility for my transportation by car.  I understand the risks of non-ambulance transfers and I exonerate the Hospital and its staff from any deterioration in my condition that results from this refusal.    X___________________________________________    DATE  25  TIME________  Signature of patient or legally responsible individual signing on patient behalf           RELATIONSHIP TO PATIENT_________________________          Provider Certification    NAME Ubaldo Trimble                                         2012                              MRN [id number]    